# Patient Record
Sex: MALE | Race: BLACK OR AFRICAN AMERICAN | NOT HISPANIC OR LATINO | Employment: OTHER | URBAN - METROPOLITAN AREA
[De-identification: names, ages, dates, MRNs, and addresses within clinical notes are randomized per-mention and may not be internally consistent; named-entity substitution may affect disease eponyms.]

---

## 2017-01-18 ENCOUNTER — OFFICE VISIT (OUTPATIENT)
Dept: ORTHOPEDICS | Facility: CLINIC | Age: 77
End: 2017-01-18
Attending: ORTHOPAEDIC SURGERY
Payer: MEDICARE

## 2017-01-18 VITALS — RESPIRATION RATE: 20 BRPM | BODY MASS INDEX: 27.94 KG/M2 | WEIGHT: 178 LBS | HEIGHT: 67 IN

## 2017-01-18 DIAGNOSIS — M75.41 IMPINGEMENT SYNDROME OF BOTH SHOULDERS: ICD-10-CM

## 2017-01-18 DIAGNOSIS — M75.121 COMPLETE TEAR OF RIGHT ROTATOR CUFF: Primary | ICD-10-CM

## 2017-01-18 DIAGNOSIS — M75.42 IMPINGEMENT SYNDROME OF BOTH SHOULDERS: ICD-10-CM

## 2017-01-18 PROCEDURE — 20610 DRAIN/INJ JOINT/BURSA W/O US: CPT | Mod: RT,S$GLB,, | Performed by: ORTHOPAEDIC SURGERY

## 2017-01-18 PROCEDURE — 1159F MED LIST DOCD IN RCRD: CPT | Mod: S$GLB,,, | Performed by: ORTHOPAEDIC SURGERY

## 2017-01-18 PROCEDURE — 99999 PR PBB SHADOW E&M-EST. PATIENT-LVL III: CPT | Mod: PBBFAC,,, | Performed by: ORTHOPAEDIC SURGERY

## 2017-01-18 PROCEDURE — 1125F AMNT PAIN NOTED PAIN PRSNT: CPT | Mod: S$GLB,,, | Performed by: ORTHOPAEDIC SURGERY

## 2017-01-18 PROCEDURE — 99213 OFFICE O/P EST LOW 20 MIN: CPT | Mod: 25,S$GLB,, | Performed by: ORTHOPAEDIC SURGERY

## 2017-01-18 PROCEDURE — 1157F ADVNC CARE PLAN IN RCRD: CPT | Mod: S$GLB,,, | Performed by: ORTHOPAEDIC SURGERY

## 2017-01-18 PROCEDURE — 1160F RVW MEDS BY RX/DR IN RCRD: CPT | Mod: S$GLB,,, | Performed by: ORTHOPAEDIC SURGERY

## 2017-01-18 RX ORDER — TRIAMCINOLONE ACETONIDE 40 MG/ML
40 INJECTION, SUSPENSION INTRA-ARTICULAR; INTRAMUSCULAR
Status: COMPLETED | OUTPATIENT
Start: 2017-01-18 | End: 2017-01-18

## 2017-01-18 RX ADMIN — TRIAMCINOLONE ACETONIDE 40 MG: 40 INJECTION, SUSPENSION INTRA-ARTICULAR; INTRAMUSCULAR at 02:01

## 2017-01-18 NOTE — PROGRESS NOTES
HISTORY OF PRESENT ILLNESS:  Mr. Gil in followup of rotator cuff tear, right   shoulder, having a flare-up again.  Previously, we discussed surgery, but he is   trying to wait a while for the surgery, would like to have an injection for   symptomatic relief.    PHYSICAL EXAMINATION:  RIGHT SHOULDER:  Mild tenderness.  No swelling.  Range of motion limited   secondary to pain.  Positive impingement sign.  Slight weakness rotator cuff.    No instability.    IMPRESSION:  Rotator cuff tear, right shoulder.    PLAN:  Injection performed right shoulder, combination of Kenalog 40 mg, 2 mL   Xylocaine, sterile technique.  Also recommended that he avoid overhead lifting.    I would like him to consider surgery for the shoulder.  He will eventually need   this for the rotator cuff repair.  Follow up in 1-2 months.      MARK  dd: 01/18/2017 14:19:21 (CST)  td: 01/18/2017 22:02:25 (CST)  Doc ID   #[id number]  Job ID #[id number]    CC:

## 2017-01-25 ENCOUNTER — OFFICE VISIT (OUTPATIENT)
Dept: INTERNAL MEDICINE | Facility: CLINIC | Age: 77
End: 2017-01-25
Payer: MEDICARE

## 2017-01-25 VITALS
HEIGHT: 67 IN | BODY MASS INDEX: 27.48 KG/M2 | DIASTOLIC BLOOD PRESSURE: 80 MMHG | WEIGHT: 175.06 LBS | HEART RATE: 70 BPM | SYSTOLIC BLOOD PRESSURE: 144 MMHG

## 2017-01-25 DIAGNOSIS — E78.2 MIXED HYPERLIPIDEMIA: ICD-10-CM

## 2017-01-25 DIAGNOSIS — M75.42 IMPINGEMENT SYNDROME OF BOTH SHOULDERS: ICD-10-CM

## 2017-01-25 DIAGNOSIS — I70.0 AORTIC ATHEROSCLEROSIS: ICD-10-CM

## 2017-01-25 DIAGNOSIS — M75.41 IMPINGEMENT SYNDROME OF BOTH SHOULDERS: ICD-10-CM

## 2017-01-25 DIAGNOSIS — N18.2 STAGE 2 CHRONIC KIDNEY DISEASE: ICD-10-CM

## 2017-01-25 DIAGNOSIS — E11.40 TYPE 2 DIABETES MELLITUS WITH DIABETIC NEUROPATHY, WITHOUT LONG-TERM CURRENT USE OF INSULIN: ICD-10-CM

## 2017-01-25 DIAGNOSIS — Z00.00 ENCOUNTER FOR PREVENTIVE HEALTH EXAMINATION: Primary | ICD-10-CM

## 2017-01-25 DIAGNOSIS — E11.22 TYPE 2 DIABETES MELLITUS WITH STAGE 2 CHRONIC KIDNEY DISEASE, WITHOUT LONG-TERM CURRENT USE OF INSULIN: ICD-10-CM

## 2017-01-25 DIAGNOSIS — I10 ESSENTIAL HYPERTENSION: ICD-10-CM

## 2017-01-25 DIAGNOSIS — H40.053 OCULAR HYPERTENSION, BILATERAL: ICD-10-CM

## 2017-01-25 DIAGNOSIS — N18.2 TYPE 2 DIABETES MELLITUS WITH STAGE 2 CHRONIC KIDNEY DISEASE, WITHOUT LONG-TERM CURRENT USE OF INSULIN: ICD-10-CM

## 2017-01-25 DIAGNOSIS — Z85.46 HISTORY OF PROSTATE CANCER: ICD-10-CM

## 2017-01-25 DIAGNOSIS — E11.42 DIABETIC POLYNEUROPATHY ASSOCIATED WITH TYPE 2 DIABETES MELLITUS: ICD-10-CM

## 2017-01-25 PROCEDURE — 3077F SYST BP >= 140 MM HG: CPT | Mod: S$GLB,,, | Performed by: NURSE PRACTITIONER

## 2017-01-25 PROCEDURE — 99499 UNLISTED E&M SERVICE: CPT | Mod: S$GLB,,, | Performed by: NURSE PRACTITIONER

## 2017-01-25 PROCEDURE — 3079F DIAST BP 80-89 MM HG: CPT | Mod: S$GLB,,, | Performed by: NURSE PRACTITIONER

## 2017-01-25 PROCEDURE — 99999 PR PBB SHADOW E&M-EST. PATIENT-LVL IV: CPT | Mod: PBBFAC,,, | Performed by: NURSE PRACTITIONER

## 2017-01-25 PROCEDURE — G0439 PPPS, SUBSEQ VISIT: HCPCS | Mod: S$GLB,,, | Performed by: NURSE PRACTITIONER

## 2017-01-25 NOTE — PROGRESS NOTES
"Bryant Gil presented for a  Medicare AWV and comprehensive Health Risk Assessment today. The following components were reviewed and updated:    · Medical history  · Family History  · Social history  · Allergies and Current Medications  · Health Risk Assessment  · Health Maintenance  · Care Team     ** See Completed Assessments for Annual Wellness Visit within the encounter summary.**       The following assessments were completed:  · Living Situation  · CAGE  · Depression Screening  · Timed Get Up and Go  · Whisper Test  · Cognitive Function Screening  · Nutrition Screening  · ADL Screening  · PAQ Screening            Vitals:    01/25/17 0805   BP: (!) 144/80   BP Location: Left arm   Patient Position: Sitting   Pulse: 70   Weight: 79.4 kg (175 lb 0.7 oz)   Height: 5' 7" (1.702 m)     Body mass index is 27.42 kg/(m^2).  Physical Exam   Constitutional: He is oriented to person, place, and time. He appears well-developed and well-nourished. No distress.   HENT:   Head: Normocephalic and atraumatic.   Eyes: Conjunctivae are normal. No scleral icterus.   Neck: Normal range of motion.   Cardiovascular: Normal rate, regular rhythm, normal heart sounds and intact distal pulses.    Pulmonary/Chest: Effort normal and breath sounds normal. No respiratory distress.   Abdominal: Soft. Bowel sounds are normal. He exhibits no distension.   Musculoskeletal: He exhibits no edema.   Neurological: He is alert and oriented to person, place, and time.   Skin: Skin is warm and dry. He is not diaphoretic.   Old scarring noted to bilateral lower extremities from childhood accident   Psychiatric: He has a normal mood and affect. His behavior is normal.         Diagnoses and health risks identified today and associated recommendations/orders:    1. Encounter for preventive health examination  Assessments completed  Preventative health recommendations reviewed    2. Type 2 diabetes mellitus with diabetic neuropathy, without long-term " current use of insulin  Stable.   Controlled with current medical therapy  Followed by PCP.     3. Diabetic polyneuropathy associated with type 2 diabetes mellitus  Stable.   Followed by PCP.     4. Type 2 diabetes mellitus with stage 2 chronic kidney disease, without long-term current use of insulin  Stable.   Controlled with current medical therapy  Followed by PCP.     5. Aortic atherosclerosis  Stable. Seen on CT from 07/11/13  Controlled with current medical therapy  Followed by PCP.     6. Stage 2 chronic kidney disease  Stable.   Followed by PCP.     7. Essential hypertension  Mildly elevated at today's visit.  Patient denies headache, chest pain, sudden vision changes, sob.  Instructed to decreased salt intake and stay hydrated.  He is seeing his PCP in three weeks for a routine follow up and will reassess blood pressure at that time.   Controlled with current medical therapy  Followed by PCP.     8. Mixed hyperlipidemia  Stable.   Controlled with current medical therapy  Followed by PCP.     9. Impingement syndrome of both shoulders  Stable.   Controlled with current medical therapy  Followed by orthopedics.      10. History of prostate cancer  Stable. Hx of prostatectomy  Followed by PCP.     11. Ocular hypertension, bilateral  Stable.   Controlled with current medical therapy  Followed by optometry    Patient is seeing his PCP in three weeks.  Will get lab work done at that time per PCPs recommendations.  Will discuss colonoscopy with PCP as well.     Provided Bryant with a 5-10 year written screening schedule and personal prevention plan. Recommendations were developed using the USPSTF age appropriate recommendations. Education, counseling, and referrals were provided as needed. After Visit Summary printed and given to patient which includes a list of additional screenings\tests needed.    Return in 3 weeks (on 2/16/2017) for routine visit with your primary care provider or sooner if problems arise.   .    Adelina Stephen, NP

## 2017-01-25 NOTE — MR AVS SNAPSHOT
[facility]  [address]  [facility] [address]  Phone: [phone]  Fax: [phone]                  Bryant Gil   2017 8:00 AM   Office Visit    Description:  Male : 1940   Provider:  JUSTIN PARTIDA 2   Department:  [facility]           Reason for Visit     Health Risk Assessment           Diagnoses this Visit        Comments    Encounter for preventive health examination    -  Primary            To Do List           Future Appointments        Provider Department Dept Phone    2017 8:00 AM Laverne Zaidi MD [facility] [phone]      Goals (5 Years of Data)     None      Follow-Up and Disposition     Return in 3 weeks (on 2017) for routine visit with your primary care provider or sooner if problems arise.  .      [facility]     [facility] -  Assistance  Registered nurses in the [facility] provide clinical advisement, health education, appointment booking, and other advisory services.  Call for this free service at [phone].             Medications           Message regarding Medications     Verify the changes and/or additions to your medication regime listed below are the same as discussed with your clinician today.  If any of these changes or additions are incorrect, please notify your healthcare provider.             Verify that the below list of medications is an accurate representation of the medications you are currently taking.  If none reported, the list may be blank. If incorrect, please contact your healthcare provider. Carry this list with you in case of emergency.           Current Medications     acetaminophen-codeine 300-30mg (TYLENOL #3) 300-30 mg Tab 1 po tid prn    alcohol swabs (BD ALCOHOL SWABS) PadM Apply 1 each topically as needed.    amlodipine (NORVASC) 5 MG tablet TAKE 1 TABLET EVERY DAY    ascorbic acid (VITAMIN C) 100 MG tablet Take 100 mg by mouth  "once daily.    aspirin (ENTERIC COATED ASPIRIN) 81 MG EC tablet Take by mouth. 1 Tablet, Delayed Release (E.C.) Oral Every day    aspirin-caffeine (INEZ BACK & BODY) 500-32.5 mg Tab Take 1 tablet by mouth daily as needed.     atorvastatin (LIPITOR) 20 MG tablet TAKE 1 TABLET EVERY DAY    blood sugar diagnostic (TRUETEST TEST STRIPS) Strp 2 strips by Misc.(Non-Drug; Combo Route) route once daily.    blood sugar diagnostic Strp 1 strip by Misc.(Non-Drug; Combo Route) route once daily.    lancets (ACCU-CHEK SOFTCLIX LANCETS) Misc 1 Device by Misc.(Non-Drug; Combo Route) route once daily.    latanoprost 0.005 % ophthalmic solution INSTILL 1 DROP INTO BOTH EYES EVERY NIGHT    lisinopril-hydrochlorothiazide (PRINZIDE,ZESTORETIC) 20-12.5 mg per tablet TAKE 1 TABLET ONE TIME DAILY EVERY DAY    metformin (GLUCOPHAGE) 1000 MG tablet TAKE 1 TABLET TWICE DAILY WITH MEALS    multivitamin (THERAGRAN) per tablet Take 1 tablet by mouth once daily.    vitamin D 1000 units Tab Take 185 mg by mouth once daily.    vitamin E 100 UNIT capsule Take 400 Units by mouth once daily.    blood-glucose meter (ACCU-CHEK YULIA PLUS METER) Misc 1 Device by Misc.(Non-Drug; Combo Route) route once daily.           Clinical Reference Information           Vital Signs - Last Recorded  Most recent update: 1/25/2017  8:35 AM by Adelina Stephen NP    BP Pulse Ht Wt BMI    (!) 144/80 (BP Location: Left arm, Patient Position: Sitting) 70 5' 7" (1.702 m) 79.4 kg (175 lb 0.7 oz) 27.42 kg/m2      Blood Pressure          Most Recent Value    BP  (!)  144/80      Allergies as of 1/25/2017     No Known Allergies      Immunizations Administered on Date of Encounter - 1/25/2017     None      Instructions      Counseling and Referral of Other Preventative  (Italic type indicates deductible and co-insurance are waived)    Patient Name: Bryant Gil  Today's Date: 1/25/2017      SERVICE LIMITATIONS RECOMMENDATION    Vaccines    · Pneumococcal (once after " 65)    · Influenza (annually)    · Hepatitis B (if medium/high risk)    · Prevnar 13      Hepatitis B medium/high risk factors:       - End-stage renal disease       - Hemophiliacs who received Factor VII or         IX concentrates       - Clients of institutions for the mentally             retarded       - Persons who live in the same house as          a HepB carrier       - Homosexual men       - Illicit injectable drug abusers     Pneumococcal: Done, no repeat necessary     Influenza: Done, repeat in one year     Hepatitis B: N/A : defer to PCP     Prevnar 13: Done, no repeat necessary    Prostate cancer screening (annually to age 75)     Prostate specific antigen (PSA) Shared decision making with Provider. Sometimes a co-pay may be required if the patient decides to have this test. The USPSTF no longer recommends prostate cancer screening routinely in medicine: defer to PCP    Colorectal cancer screening (to age 75)    · Fecal occult blood test (annual)  · Flexible sigmoidoscopy (5y)  · Screening colonoscopy (10y)  · Barium enema   Recommended to patient.  Last done 01/10/12, due for repeat.  Will have PCP order    Diabetes self-management training (no USPSTF recommendations)  Requires referral by treating physician for patient with diabetes or renal disease. 10 hours of initial DSMT sessions of no less than 30 minutes each in a continuous 12-month period. 2 hours of follow-up DSMT in subsequent years.  Recommended to patient, declined    Glaucoma screening (no USPSTF recommendation)  Diabetes mellitus, family history   , age 50 or over    American, age 65 or over  Last done 07/19/16, recommend to repeat every 1  year    Medical nutrition therapy for diabetes or renal disease (no recommended schedule)  Requires referral by treating physician for patient with diabetes or renal disease or kidney transplant within the past 3 years.  Can be provided in same year as diabetes self-management  training (DSMT), and CMS recommends medical nutrition therapy take place after DSMT. Up to 3 hours for initial year and 2 hours in subsequent years.  N/A : n/a    Cardiovascular screening blood tests (every 5 years)  · Fasting lipid panel  Order as a panel if possible  Last done 10/22/15, recommend to repeat every 1  year    Diabetes screening tests (at least every 3 years, Medicare covers annually or at 6-month intervals for prediabetic patients)  · Fasting blood sugar (FBS) or glucose tolerance test (GTT)  Patient must be diagnosed with one of the following:       - Hypertension       - Dyslipidemia       - Obesity (BMI 30kg/m2)       - Previous elevated impaired FBS or GTT       ... or any two of the following:       - Overweight (BMI 25 but <30)       - Family history of diabetes       - Age 65 or older       - History of gestational diabetes or birth of baby weighing more than 9 pounds  Last done 04/15/16, recommend to repeat every 4  months    Abdominal aortic aneurysm screening (once)  · Sonogram   Limited to patients who meet one of the following criteria:       - Men who are 65-75 years old and have smoked more than 100 cigarette in their lifetime       - Anyone with a family history of abdominal aortic aneurysm       - Anyone recommended for screening by the USPSTF  N/A : n/a    HIV screening (annually for increased risk patients)  · HIV-1 and HIV-2 by EIA, or SOLIS, rapid antibody test or oral mucosa transudate  Patients must be at increased risk for HIV infection per USPSTF guidelines or pregnant. Tests covered annually for patient at increased risk or as requested by the patient. Pregnant patients may receive up to 3 tests during pregnancy.  Risks discussed, screening is not recommended    Smoking cessation counseling (up to 8 sessions per year)  Patients must be asymptomatic of tobacco-related conditions to receive as a preventative service.  does not smoke    Subsequent annual wellness visit  At least  12 months since last AWV  Return in one year     The following information is provided to all patients.  This information is to help you find resources for any of the problems found today that may be affecting your health:                Living healthy guide: www.[facility].[place].[facility]      Understanding Diabetes: www.diabetes.org      Eating healthy: www.cdc.gov/healthyweight      CDC home safety checklist: www.cdc.gov/steadi/patient.html      Agency on Aging: www.goea.[place].[facility]      Alcoholics anonymous (AA): www.aa.org      Physical Activity: www.juliette.nih.gov/ga4nmef      Tobacco use: www.quitwithusla.org

## 2017-01-25 NOTE — PATIENT INSTRUCTIONS
Counseling and Referral of Other Preventative  (Italic type indicates deductible and co-insurance are waived)    Patient Name: Bryant Gil  Today's Date: 1/25/2017      SERVICE LIMITATIONS RECOMMENDATION    Vaccines    · Pneumococcal (once after 65)    · Influenza (annually)    · Hepatitis B (if medium/high risk)    · Prevnar 13      Hepatitis B medium/high risk factors:       - End-stage renal disease       - Hemophiliacs who received Factor VII or         IX concentrates       - Clients of institutions for the mentally             retarded       - Persons who live in the same house as          a HepB carrier       - Homosexual men       - Illicit injectable drug abusers     Pneumococcal: Done, no repeat necessary     Influenza: Done, repeat in one year     Hepatitis B: N/A : defer to PCP     Prevnar 13: Done, no repeat necessary    Prostate cancer screening (annually to age 75)     Prostate specific antigen (PSA) Shared decision making with Provider. Sometimes a co-pay may be required if the patient decides to have this test. The USPSTF no longer recommends prostate cancer screening routinely in medicine: defer to PCP    Colorectal cancer screening (to age 75)    · Fecal occult blood test (annual)  · Flexible sigmoidoscopy (5y)  · Screening colonoscopy (10y)  · Barium enema   Recommended to patient.  Last done 01/10/12, due for repeat.  Will have PCP order    Diabetes self-management training (no USPSTF recommendations)  Requires referral by treating physician for patient with diabetes or renal disease. 10 hours of initial DSMT sessions of no less than 30 minutes each in a continuous 12-month period. 2 hours of follow-up DSMT in subsequent years.  Recommended to patient, declined    Glaucoma screening (no USPSTF recommendation)  Diabetes mellitus, family history   , age 50 or over    American, age 65 or over  Last done 07/19/16, recommend to repeat every 1  year    Medical nutrition  therapy for diabetes or renal disease (no recommended schedule)  Requires referral by treating physician for patient with diabetes or renal disease or kidney transplant within the past 3 years.  Can be provided in same year as diabetes self-management training (DSMT), and CMS recommends medical nutrition therapy take place after DSMT. Up to 3 hours for initial year and 2 hours in subsequent years.  N/A : n/a    Cardiovascular screening blood tests (every 5 years)  · Fasting lipid panel  Order as a panel if possible  Last done 10/22/15, recommend to repeat every 1  year    Diabetes screening tests (at least every 3 years, Medicare covers annually or at 6-month intervals for prediabetic patients)  · Fasting blood sugar (FBS) or glucose tolerance test (GTT)  Patient must be diagnosed with one of the following:       - Hypertension       - Dyslipidemia       - Obesity (BMI 30kg/m2)       - Previous elevated impaired FBS or GTT       ... or any two of the following:       - Overweight (BMI 25 but <30)       - Family history of diabetes       - Age 65 or older       - History of gestational diabetes or birth of baby weighing more than 9 pounds  Last done 04/15/16, recommend to repeat every 4  months    Abdominal aortic aneurysm screening (once)  · Sonogram   Limited to patients who meet one of the following criteria:       - Men who are 65-75 years old and have smoked more than 100 cigarette in their lifetime       - Anyone with a family history of abdominal aortic aneurysm       - Anyone recommended for screening by the USPSTF  N/A : n/a    HIV screening (annually for increased risk patients)  · HIV-1 and HIV-2 by EIA, or SOLIS, rapid antibody test or oral mucosa transudate  Patients must be at increased risk for HIV infection per USPSTF guidelines or pregnant. Tests covered annually for patient at increased risk or as requested by the patient. Pregnant patients may receive up to 3 tests during pregnancy.  Risks  discussed, screening is not recommended    Smoking cessation counseling (up to 8 sessions per year)  Patients must be asymptomatic of tobacco-related conditions to receive as a preventative service.  does not smoke    Subsequent annual wellness visit  At least 12 months since last AWV  Return in one year     The following information is provided to all patients.  This information is to help you find resources for any of the problems found today that may be affecting your health:                Living healthy guide: www.[facility].[place].[facility]      Understanding Diabetes: www.diabetes.org      Eating healthy: www.cdc.gov/healthyweight      CDC home safety checklist: www.cdc.gov/steadi/patient.html      Agency on Aging: www.goea.[place].[facility]      Alcoholics anonymous (AA): www.aa.org      Physical Activity: www.[place]te.nih.gov/mp7ytxy      Tobacco use: www.quitwithusla.org

## 2017-02-06 RX ORDER — LATANOPROST 50 UG/ML
SOLUTION/ DROPS OPHTHALMIC
Qty: 3 BOTTLE | Refills: 3 | Status: SHIPPED | OUTPATIENT
Start: 2017-02-06 | End: 2018-06-06 | Stop reason: SDUPTHER

## 2017-02-16 ENCOUNTER — PATIENT MESSAGE (OUTPATIENT)
Dept: ADMINISTRATIVE | Facility: OTHER | Age: 77
End: 2017-02-16

## 2017-02-16 ENCOUNTER — LAB VISIT (OUTPATIENT)
Dept: LAB | Facility: HOSPITAL | Age: 77
End: 2017-02-16
Attending: INTERNAL MEDICINE
Payer: MEDICARE

## 2017-02-16 ENCOUNTER — OFFICE VISIT (OUTPATIENT)
Dept: INTERNAL MEDICINE | Facility: CLINIC | Age: 77
End: 2017-02-16
Payer: MEDICARE

## 2017-02-16 VITALS
BODY MASS INDEX: 27.06 KG/M2 | DIASTOLIC BLOOD PRESSURE: 82 MMHG | HEIGHT: 67 IN | WEIGHT: 172.38 LBS | HEART RATE: 72 BPM | SYSTOLIC BLOOD PRESSURE: 150 MMHG

## 2017-02-16 DIAGNOSIS — E11.42 DIABETIC POLYNEUROPATHY ASSOCIATED WITH TYPE 2 DIABETES MELLITUS: ICD-10-CM

## 2017-02-16 DIAGNOSIS — N18.2 TYPE 2 DIABETES MELLITUS WITH STAGE 2 CHRONIC KIDNEY DISEASE, WITHOUT LONG-TERM CURRENT USE OF INSULIN: ICD-10-CM

## 2017-02-16 DIAGNOSIS — C61: ICD-10-CM

## 2017-02-16 DIAGNOSIS — I10 ESSENTIAL HYPERTENSION: ICD-10-CM

## 2017-02-16 DIAGNOSIS — Z85.46 HISTORY OF PROSTATE CANCER: ICD-10-CM

## 2017-02-16 DIAGNOSIS — N18.2 CHRONIC KIDNEY DISEASE, STAGE II (MILD): ICD-10-CM

## 2017-02-16 DIAGNOSIS — E11.22 TYPE 2 DIABETES MELLITUS WITH STAGE 2 CHRONIC KIDNEY DISEASE, WITHOUT LONG-TERM CURRENT USE OF INSULIN: ICD-10-CM

## 2017-02-16 DIAGNOSIS — Z00.00 ANNUAL PHYSICAL EXAM: Primary | ICD-10-CM

## 2017-02-16 DIAGNOSIS — H90.5 SENSORINEURAL HEARING LOSS, UNSPECIFIED LATERALITY: ICD-10-CM

## 2017-02-16 DIAGNOSIS — E11.49 TYPE II OR UNSPECIFIED TYPE DIABETES MELLITUS WITH NEUROLOGICAL MANIFESTATIONS, NOT STATED AS UNCONTROLLED(250.60): ICD-10-CM

## 2017-02-16 DIAGNOSIS — E78.2 MIXED HYPERLIPIDEMIA: ICD-10-CM

## 2017-02-16 DIAGNOSIS — Z12.11 SCREEN FOR COLON CANCER: ICD-10-CM

## 2017-02-16 DIAGNOSIS — R30.0 DYSURIA: ICD-10-CM

## 2017-02-16 DIAGNOSIS — D12.2 ADENOMATOUS POLYP OF ASCENDING COLON: ICD-10-CM

## 2017-02-16 DIAGNOSIS — H40.053 OCULAR HYPERTENSION, BILATERAL: ICD-10-CM

## 2017-02-16 DIAGNOSIS — E11.40 TYPE 2 DIABETES MELLITUS WITH DIABETIC NEUROPATHY: ICD-10-CM

## 2017-02-16 LAB
ALBUMIN SERPL BCP-MCNC: 3.8 G/DL
ALP SERPL-CCNC: 46 U/L
ALT SERPL W/O P-5'-P-CCNC: 20 U/L
ANION GAP SERPL CALC-SCNC: 9 MMOL/L
AST SERPL-CCNC: 19 U/L
BASOPHILS # BLD AUTO: 0.02 K/UL
BASOPHILS NFR BLD: 0.5 %
BILIRUB SERPL-MCNC: 0.4 MG/DL
BILIRUB UR QL STRIP: NEGATIVE
BUN SERPL-MCNC: 22 MG/DL
CALCIUM SERPL-MCNC: 9.5 MG/DL
CHLORIDE SERPL-SCNC: 104 MMOL/L
CHOLEST/HDLC SERPL: 3.3 {RATIO}
CLARITY UR REFRACT.AUTO: CLEAR
CO2 SERPL-SCNC: 28 MMOL/L
COLOR UR AUTO: YELLOW
COMPLEXED PSA SERPL-MCNC: <0.01 NG/ML
CREAT SERPL-MCNC: 1 MG/DL
CREAT UR-MCNC: 56 MG/DL
DIFFERENTIAL METHOD: ABNORMAL
EOSINOPHIL # BLD AUTO: 0.1 K/UL
EOSINOPHIL NFR BLD: 1.6 %
ERYTHROCYTE [DISTWIDTH] IN BLOOD BY AUTOMATED COUNT: 13 %
EST. GFR  (AFRICAN AMERICAN): >60 ML/MIN/1.73 M^2
EST. GFR  (NON AFRICAN AMERICAN): >60 ML/MIN/1.73 M^2
GLUCOSE SERPL-MCNC: 149 MG/DL
GLUCOSE UR QL STRIP: NEGATIVE
HCT VFR BLD AUTO: 39.2 %
HDL/CHOLESTEROL RATIO: 30.6 %
HDLC SERPL-MCNC: 170 MG/DL
HDLC SERPL-MCNC: 52 MG/DL
HGB BLD-MCNC: 13.1 G/DL
HGB UR QL STRIP: NEGATIVE
KETONES UR QL STRIP: NEGATIVE
LDLC SERPL CALC-MCNC: 107.4 MG/DL
LEUKOCYTE ESTERASE UR QL STRIP: NEGATIVE
LYMPHOCYTES # BLD AUTO: 1.5 K/UL
LYMPHOCYTES NFR BLD: 35.2 %
MCH RBC QN AUTO: 29.4 PG
MCHC RBC AUTO-ENTMCNC: 33.4 %
MCV RBC AUTO: 88 FL
MICROALBUMIN UR DL<=1MG/L-MCNC: 8 UG/ML
MICROALBUMIN/CREATININE RATIO: 14.3 UG/MG
MONOCYTES # BLD AUTO: 0.4 K/UL
MONOCYTES NFR BLD: 9.1 %
NEUTROPHILS # BLD AUTO: 2.3 K/UL
NEUTROPHILS NFR BLD: 53.4 %
NITRITE UR QL STRIP: NEGATIVE
NONHDLC SERPL-MCNC: 118 MG/DL
PH UR STRIP: 7 [PH] (ref 5–8)
PLATELET # BLD AUTO: 224 K/UL
PMV BLD AUTO: 10.4 FL
POTASSIUM SERPL-SCNC: 4.1 MMOL/L
PROT SERPL-MCNC: 7.2 G/DL
PROT UR QL STRIP: NEGATIVE
RBC # BLD AUTO: 4.46 M/UL
SODIUM SERPL-SCNC: 141 MMOL/L
SP GR UR STRIP: 1.01 (ref 1–1.03)
TRIGL SERPL-MCNC: 53 MG/DL
TSH SERPL DL<=0.005 MIU/L-ACNC: 1.17 UIU/ML
URATE SERPL-MCNC: 4.4 MG/DL
URN SPEC COLLECT METH UR: NORMAL
UROBILINOGEN UR STRIP-ACNC: NEGATIVE EU/DL
WBC # BLD AUTO: 4.29 K/UL

## 2017-02-16 PROCEDURE — 80061 LIPID PANEL: CPT

## 2017-02-16 PROCEDURE — 99999 PR PBB SHADOW E&M-EST. PATIENT-LVL III: CPT | Mod: PBBFAC,,, | Performed by: INTERNAL MEDICINE

## 2017-02-16 PROCEDURE — 3079F DIAST BP 80-89 MM HG: CPT | Mod: S$GLB,,, | Performed by: INTERNAL MEDICINE

## 2017-02-16 PROCEDURE — 84550 ASSAY OF BLOOD/URIC ACID: CPT

## 2017-02-16 PROCEDURE — 84443 ASSAY THYROID STIM HORMONE: CPT

## 2017-02-16 PROCEDURE — 80053 COMPREHEN METABOLIC PANEL: CPT

## 2017-02-16 PROCEDURE — 83036 HEMOGLOBIN GLYCOSYLATED A1C: CPT

## 2017-02-16 PROCEDURE — 3077F SYST BP >= 140 MM HG: CPT | Mod: S$GLB,,, | Performed by: INTERNAL MEDICINE

## 2017-02-16 PROCEDURE — 85025 COMPLETE CBC W/AUTO DIFF WBC: CPT

## 2017-02-16 PROCEDURE — 99499 UNLISTED E&M SERVICE: CPT | Mod: S$GLB,,, | Performed by: INTERNAL MEDICINE

## 2017-02-16 PROCEDURE — 84153 ASSAY OF PSA TOTAL: CPT

## 2017-02-16 PROCEDURE — 99397 PER PM REEVAL EST PAT 65+ YR: CPT | Mod: S$GLB,,, | Performed by: INTERNAL MEDICINE

## 2017-02-16 PROCEDURE — 36415 COLL VENOUS BLD VENIPUNCTURE: CPT

## 2017-02-16 NOTE — MR AVS SNAPSHOT
[facility]  [address]  [facility] [address]  Phone: [phone]  Fax: [phone]                  Bryant Gil   2017 8:00 AM   Office Visit    Description:  Male : 1940   Provider:  Laverne Zaidi MD   Department:  [facility]           Reason for Visit     Annual Exam           Diagnoses this Visit        Comments    Annual physical exam    -  Primary     Type II or unspecified type diabetes mellitus with neurological manifestations, not stated as uncontrolled         Diabetic polyneuropathy associated with type 2 diabetes mellitus         Type 2 diabetes mellitus with stage 2 chronic kidney disease, without long-term current use of insulin         Chronic kidney disease, stage II (mild)         Essential hypertension         Ocular hypertension, bilateral         Mixed hyperlipidemia         Adenomatous polyp of ascending colon         Screen for colon cancer         History of prostate cancer         Sensorineural hearing loss, unspecified laterality         Dysuria                To Do List           To Schedule:     Please call the Endoscopy Department at [phone] to schedule your appointment.          Goals (5 Years of Data)     None      Follow-Up and Disposition     Return in about 6 months (around 2017) for also one year PE.      [facility]     [facility] Care Line -  Assistance  Registered nurses in the [facility] provide clinical advisement, health education, appointment booking, and other advisory services.  Call for this free service at [phone].             Medications           Message regarding Medications     Verify the changes and/or additions to your medication regime listed below are the same as discussed with your clinician today.  If any of these changes or additions are incorrect, please notify your healthcare provider.             Verify that the below list of  "medications is an accurate representation of the medications you are currently taking.  If none reported, the list may be blank. If incorrect, please contact your healthcare provider. Carry this list with you in case of emergency.           Current Medications     amlodipine (NORVASC) 5 MG tablet TAKE 1 TABLET EVERY DAY    ascorbic acid (VITAMIN C) 100 MG tablet Take 100 mg by mouth once daily.    aspirin (ENTERIC COATED ASPIRIN) 81 MG EC tablet Take by mouth. 1 Tablet, Delayed Release (E.C.) Oral Every day    aspirin-caffeine (INEZ BACK & BODY) 500-32.5 mg Tab Take 1 tablet by mouth daily as needed.     atorvastatin (LIPITOR) 20 MG tablet TAKE 1 TABLET EVERY DAY    blood sugar diagnostic (TRUETEST TEST STRIPS) Strp 2 strips by Misc.(Non-Drug; Combo Route) route once daily.    blood sugar diagnostic Strp 1 strip by Misc.(Non-Drug; Combo Route) route once daily.    lancets (ACCU-CHEK SOFTCLIX LANCETS) Misc 1 Device by Misc.(Non-Drug; Combo Route) route once daily.    latanoprost 0.005 % ophthalmic solution INSTILL 1 DROP INTO BOTH EYES EVERY NIGHT    lisinopril-hydrochlorothiazide (PRINZIDE,ZESTORETIC) 20-12.5 mg per tablet TAKE 1 TABLET ONE TIME DAILY EVERY DAY    metformin (GLUCOPHAGE) 1000 MG tablet TAKE 1 TABLET TWICE DAILY WITH MEALS    multivitamin (THERAGRAN) per tablet Take 1 tablet by mouth once daily.    vitamin D 1000 units Tab Take 185 mg by mouth once daily.    vitamin E 100 UNIT capsule Take 400 Units by mouth once daily.    acetaminophen-codeine 300-30mg (TYLENOL #3) 300-30 mg Tab 1 po tid prn    alcohol swabs (BD ALCOHOL SWABS) PadM Apply 1 each topically as needed.    blood-glucose meter (ACCU-CHEK YULIA PLUS METER) Misc 1 Device by Misc.(Non-Drug; Combo Route) route once daily.           Clinical Reference Information           Your Vitals Were     BP Pulse Height Weight BMI    150/82 72 5' 7" (1.702 m) 78.2 kg (172 lb 6.4 oz) 27 kg/m2      Blood Pressure          Most Recent Value    BP  (!)  " 150/82      Allergies as of 2/16/2017     No Known Allergies      Immunizations Administered on Date of Encounter - 2/16/2017     None      Orders Placed During Today's Visit      Normal Orders This Visit    Assign HDMP Onboarding Questionnaire Series     Case request GI: COLONOSCOPY     Hypertension Digital Medicine (HDMP)  Enrollment Order     Microalbumin/creatinine urine ratio     Urinalysis     Urine culture       Hypertension Digital Medicine Program Information              As discussed, you could benefit from enrolling in the Hypertension Digital Medicine Program. The goal of the program is to help you effectively manage your high blood pressure through an appropriate balance of medication and lifestyle changes, all from the comfort of your own home. Effectively managed blood pressure reduces your risk of having a heart attack or a stroke in the future, so you can enjoy a long and healthy life. We want to make blood pressure control your goal.        What is the Hypertension Digital Medicine Program?  Finding the right balance in managing high blood pressure is different for every person, and we will tailor our treatment approach based on your individual needs using the most current evidence-based guidelines.  As a participant, you will be able to send home blood pressure readings, on your schedule, directly into your medical record at [facility]. I, along with a team of pharmacists, will monitor this data, help you adjust your medication(s) and/or make lifestyle recommendations to better manage your hypertension.       What are the requirements of the program?   Participating in the program is as easy as 1 - 2 - 3.   1. Smartphone - You must have your own smartphone to participate (either an iPhone or an Android phone such as M Lite Solution, MeterHero, HTC, Vizibility, RatePoint, or GigsTime).    2. [facility] account - [facility] offers a great way to connect through the online patient portal, [facility], which is free and  "provides you access to your [facility].  3. Digital blood pressure cuff - Using this blood pressure cuff that hooks up to your smartphone, you will be able to send in your home blood pressure readings to the Hypertension Digital Medicine Team. We have made arrangements to offer blood pressure cuffs at a discount for our programs participants.           What can I do to get started?   Complete the Hypertension Digital Medicine Patient Consent questionnaire already available in your [facility] account. To access and complete this questionnaire, either  - Use the [facility] website on a computer and select My Medical Record, then Questionnaires.  - Use the [employer] patricio on your smartphone and select "Questionnaires".    Once you have given consent, additional onboarding questionnaires will be assigned to you to complete prior to starting the program. You must return to the "Questionnaires" page of your [facility] account to start the additional questionnaires.     How do I purchase a digital blood pressure cuff and obtain a discount?  [facility] has negotiated a discounted price from industry leading healthcare technology companies. Patients using an Apple iPhone can purchase an [employer] Ease Blood Pressure cuff for $33 (originally $39.99). While supplies last, Android users can purchase the [employer] Blood Pressure Monitor for $45 (originally $129.95).  Purchase locations will be sent once all onboarding questionnaires have been completed (see above).    If you have any questions regarding this program or would like more information, please visit our Hypertension Digital Medicine website ([email or web address]) or call Digital Medicine Patient Support at [phone].          Language Assistance Services     ATTENTION: Language assistance services are available, free of charge. Please call [phone].      ATENCIÓN: Si habla español, tiene a cardoza disposición servicios gratuitos de " asistencia lingüística. Marco al [phone].     MARITZA Ý: N?u b?n nói Ti?ng Vi?t, có các d?ch v? h? tr? ngôn ng? mi?n phí dành cho b?n. G?i s? [phone].         [facility] complies with applicable Federal civil rights laws and does not discriminate on the basis of race, color, national origin, age, disability, or sex.

## 2017-02-17 ENCOUNTER — PATIENT MESSAGE (OUTPATIENT)
Dept: INTERNAL MEDICINE | Facility: CLINIC | Age: 77
End: 2017-02-17

## 2017-02-17 DIAGNOSIS — E11.40 TYPE 2 DIABETES MELLITUS WITH DIABETIC NEUROPATHY, WITHOUT LONG-TERM CURRENT USE OF INSULIN: Primary | ICD-10-CM

## 2017-02-17 LAB
ESTIMATED AVG GLUCOSE: 180 MG/DL
HBA1C MFR BLD HPLC: 7.9 %

## 2017-02-17 NOTE — PROGRESS NOTES
Subjective:       Patient ID: Bryant Gil is a 76 y.o. male.    Chief Complaint: Annual Exam   wellness visit  His wife accompanies him to this visit  Many issues discussed  Memory concern. Only destinesia as an example.  No difficulties at work.  Normal aging brain changes discussed.  He is hard of hearing.  He tried hearing aids in the past and did not find them helpful.  He is considering making a future investment in hearing aids.  The past 3 months he's had a cortical steroid-shots in his right shoulder and also in his left shoulder.  His blood sugar temporarily spiked.  HPI  Review of Systems   Constitutional: Negative for activity change, appetite change, chills, fatigue, fever and unexpected weight change.   HENT: Positive for hearing loss. Negative for dental problem, tinnitus, trouble swallowing and voice change.    Eyes: Negative for visual disturbance.   Respiratory: Negative for cough, chest tightness, shortness of breath and wheezing.    Cardiovascular: Negative for chest pain, palpitations and leg swelling.   Gastrointestinal: Negative for abdominal pain, blood in stool, constipation, diarrhea, nausea and vomiting.   Genitourinary: Negative for difficulty urinating, dysuria, flank pain, frequency and urgency.   Musculoskeletal: Negative for arthralgias, back pain, gait problem, joint swelling, myalgias, neck pain and neck stiffness.   Skin: Negative for rash.   Neurological: Negative for tremors, light-headedness, numbness and headaches.   Psychiatric/Behavioral: Negative for dysphoric mood and sleep disturbance. The patient is not nervous/anxious.        Objective:      Physical Exam   Constitutional: He is oriented to person, place, and time. He appears well-developed and well-nourished. No distress.   HENT:   Head: Atraumatic.   Mouth/Throat: No oropharyngeal exudate.   Eyes: Conjunctivae are normal. No scleral icterus.   Cardiovascular: Normal rate, regular rhythm and normal heart sounds.     Pulmonary/Chest: Effort normal and breath sounds normal.   Abdominal: Soft. There is no tenderness.   Musculoskeletal: He exhibits no edema.   Protective Sensation (w/ 10 gram monofilament):  Right: Intact  Left: Intact    Visual Inspection:  Normal -  Bilateral    Pedal Pulses:   Right: Present  Left: Present    Posterior tibialis:   Right:Present  Left: Present   Lymphadenopathy:     He has no cervical adenopathy.   Neurological: He is alert and oriented to person, place, and time.   Skin: Skin is warm and dry.   Psychiatric: He has a normal mood and affect. His behavior is normal.   Nursing note and vitals reviewed.      Assessment:       1. Annual physical exam    2. Type II or unspecified type diabetes mellitus with neurological manifestations, not stated as uncontrolled    3. Diabetic polyneuropathy associated with type 2 diabetes mellitus    4. Type 2 diabetes mellitus with stage 2 chronic kidney disease, without long-term current use of insulin    5. Chronic kidney disease, stage II (mild)    6. Essential hypertension    7. Ocular hypertension, bilateral    8. Mixed hyperlipidemia    9. Adenomatous polyp of ascending colon    10. Screen for colon cancer    11. History of prostate cancer    12. Sensorineural hearing loss, unspecified laterality    13. Dysuria        Plan:   Bryant was seen today for annual exam.    Diagnoses and all orders for this visit:    Annual physical exam    Type II or unspecified type diabetes mellitus with neurological manifestations, not stated as uncontrolled    Diabetic polyneuropathy associated with type 2 diabetes mellitus    Type 2 diabetes mellitus with stage 2 chronic kidney disease, without long-term current use of insulin    Chronic kidney disease, stage II (mild)    Essential hypertension  -     Hypertension Digital Medicine (HDMP)  Enrollment Order  -     Assign HDMP Onboarding Questionnaire Series    Ocular hypertension, bilateral    Mixed hyperlipidemia    Adenomatous  polyp of ascending colon    Screen for colon cancer  -     Case request GI: COLONOSCOPY    History of prostate cancer  -     Microalbumin/creatinine urine ratio  -     Urinalysis    Sensorineural hearing loss, unspecified laterality    Dysuria  -     Urine culture    Return in about 6 months (around 8/16/2017) for also one year PE.

## 2017-02-18 LAB — BACTERIA UR CULT: NORMAL

## 2017-02-23 ENCOUNTER — PATIENT OUTREACH (OUTPATIENT)
Dept: OTHER | Facility: OTHER | Age: 77
End: 2017-02-23
Payer: MEDICARE

## 2017-02-23 ENCOUNTER — TELEPHONE (OUTPATIENT)
Dept: GASTROENTEROLOGY | Facility: CLINIC | Age: 77
End: 2017-02-23

## 2017-02-23 DIAGNOSIS — Z86.010 HX OF COLONIC POLYPS: Primary | ICD-10-CM

## 2017-02-23 NOTE — PROGRESS NOTES
Last 5 Patient Entered Readings                                                               Current 30 Day Average: 145/82     Recent Readings 2/22/2017 2/22/2017 2/21/2017 2/21/2017 2/20/2017    Systolic BP (mmHg) 131 131 136 136 146    Diastolic BP (mmHg) 84 84 77 77 86    Pulse 85 85 78 78 88        Initial introduction completed with the Mr. Bryant Gil and the role of the health  was explained.   We discussed the following information:  Exercise - He does not do any formal exercise but he does walk a lot and if very active at work. I encouraged him to continue walking and try to get at least 30 minutes of activity in each day.  Diet - He does not monitor his sodium intake at this time so this is something we will work on during our next call. I sent him some resources to take a look at for now.     Resources on diet and exercise were sent.     Patient is aware that I am not available for emergencies and to call 911 or [facility] on call if one arises.  Patient is aware of the importance of medication adherence.  Patient is aware of the importance of diet and exercise.  Patient is aware that his sodium intake should be no more than 2000mg per day.  Patient is aware that the recommended physical activity each week should be about 30 minutes per day at least 5 times per week.   Patient is aware of the importance of taking BP readings at least weekly if not more and during different times each day.  Patient is aware that the health  can be used as a resource for lifestyle modifications to help reduce or maintain a healthy BP

## 2017-02-23 NOTE — TELEPHONE ENCOUNTER
Colonoscopy Referral    Referring Physician: Dr. Laverne Varma  Date: 2/23/2017    Reason for Referral: Hx colon polyps    Family History of:   Colon polyp: No  Relationship/Age of Onset:     Colon cancer: No  Relationship/Age of Onset:     Patient with:   Hemoccults Done: No  Iron deficient: No  On Blood Thinner: No  Valvular heart disease/valve replacement: No  Anemia Present: No  On NSAID: No  Lung disease: No  Kidney disease: No  Hx of polyps: Yes  Hx of colon cancer: No    Previous colon evalations: Yes   Colonoscopy  When: 1/10/2012  Where: [facility]  Pertinent symptoms: colon polyp in ascending colon and diverticulosis    Current Outpatient Prescriptions   Medication Sig Dispense Refill    acetaminophen-codeine 300-30mg (TYLENOL #3) 300-30 mg Tab 1 po tid prn (Patient taking differently: 1 po tid prn pain) 30 tablet 1    alcohol swabs (BD ALCOHOL SWABS) PadM Apply 1 each topically as needed. 100 each 4    amlodipine (NORVASC) 5 MG tablet TAKE 1 TABLET EVERY DAY 90 tablet 3    ascorbic acid (VITAMIN C) 100 MG tablet Take 100 mg by mouth once daily.      aspirin (ENTERIC COATED ASPIRIN) 81 MG EC tablet Take by mouth. 1 Tablet, Delayed Release (E.C.) Oral Every day      aspirin-caffeine (INEZ BACK & BODY) 500-32.5 mg Tab Take 1 tablet by mouth daily as needed.       atorvastatin (LIPITOR) 20 MG tablet TAKE 1 TABLET EVERY DAY 90 tablet 3    blood sugar diagnostic (TRUETEST TEST STRIPS) Strp 2 strips by Misc.(Non-Drug; Combo Route) route once daily. 200 strip 4    blood sugar diagnostic Strp 1 strip by Misc.(Non-Drug; Combo Route) route once daily. 100 strip 4    blood-glucose meter (ACCU-CHEK YULIA PLUS METER) Misc 1 Device by Misc.(Non-Drug; Combo Route) route once daily. 1 each 1    lancets (ACCU-CHEK SOFTCLIX LANCETS) Misc 1 Device by Misc.(Non-Drug; Combo Route) route once daily. 100 each 4    latanoprost 0.005 % ophthalmic solution INSTILL 1 DROP INTO BOTH EYES EVERY NIGHT 3 Bottle 3     lisinopril-hydrochlorothiazide (PRINZIDE,ZESTORETIC) 20-12.5 mg per tablet TAKE 1 TABLET ONE TIME DAILY EVERY DAY 90 tablet 3    metformin (GLUCOPHAGE) 1000 MG tablet TAKE 1 TABLET TWICE DAILY WITH MEALS 180 tablet 3    multivitamin (THERAGRAN) per tablet Take 1 tablet by mouth once daily.      vitamin D 1000 units Tab Take 185 mg by mouth once daily.      vitamin E 100 UNIT capsule Take 400 Units by mouth once daily.       No current facility-administered medications for this visit.        Review of patient's allergies indicates:  No Known Allergies    Patient was scheduled for colonoscopy on Wed 3/29/17 with Dr. Cleaning at [facility]. Golytely prep instructions were reviewed with patient.

## 2017-03-13 ENCOUNTER — PATIENT OUTREACH (OUTPATIENT)
Dept: OTHER | Facility: OTHER | Age: 77
End: 2017-03-13
Payer: MEDICARE

## 2017-03-13 DIAGNOSIS — I10 ESSENTIAL HYPERTENSION: Primary | ICD-10-CM

## 2017-03-13 NOTE — LETTER
"   Balbina Romano, PharmD  [address] [place] [address]     Dear Bryant Gil,    Welcome to the [facility] Hypert[facility] Digital [facility] Program!         My name is Balbina Romano and I am your dedicated clinical pharmacist.  As an expert in medication management, I will help ensure that the medications you are taking continue to provide you with the intended benefits.      This is Balbina Aburto and she will be your health  for the duration of the program.  Her job is to help you identify lifestyle changes to improve your blood pressure control.  You will talk about nutrition, exercise, and other ways that you may be able to adjust your current habits to better your health. Together, we will work to improve your overall health and encourage you to meet your goals for a healthier lifestyle.    What we expect from YOU:    You will need to take blood pressure readings multiple times a week and no less than one reading per week.   It is important that you take your measurements at different times during the day, when possible.     What you should expect from your Digital Medicine Care Team:   We will provide you with education about high blood pressure, including lifestyle changes that could help you to control your blood pressure.   We will review your weekly readings and provide you with monthly blood pressure progress reports after you have been in the program for more than 30 days.   We will send monthly progress reports on your blood pressure control to your physician so they can follow along with your progress as well.    You will be able to reach me by phone at   or through your [facility] account by clicking "Send a message to your doctor's office" on the home screen then selecting my name in the "To the office of:" field.     I look forward to working with you to achieve your blood pressure goals!    Sincerely,    Balbina Romano, Terence  Your personal Clinical Pharmacist    Please " visit [email or web address] to learn more about high blood pressure and what you can do lower your blood pressure.                                                                                         Bryant Gil  [facility] [address]

## 2017-03-15 RX ORDER — AMLODIPINE BESYLATE 5 MG/1
10 TABLET ORAL DAILY
Qty: 90 TABLET | Refills: 3
Start: 2017-03-15 | End: 2017-04-24 | Stop reason: SDUPTHER

## 2017-03-15 NOTE — PROGRESS NOTES
Last 5 Patient Entered Readings                                                               Current 30 Day Average: 145/83     Recent Readings 3/13/2017 3/13/2017 3/12/2017 3/12/2017 3/11/2017    Systolic BP (mmHg) 141 141 150 150 158    Diastolic BP (mmHg) 82 82 87 87 87    Pulse 82 82 82 82 81        Called Mr. Gil to introduce him into the Hypertension Digital Medicine Program.     Reviewed patient's medications and verified allergies on file.     His BP is above goal, will increase amlodipine to 10 mg QD.     Hypertension Medications             amlodipine (NORVASC) 5 MG tablet Take 2 tablets (10 mg total) by mouth once daily.    lisinopril-hydrochlorothiazide (PRINZIDE,ZESTORETIC) 20-12.5 mg per tablet TAKE 1 TABLET ONE TIME DAILY EVERY DAY        Reviewed questionnaire:    Depression: n/a  Sleep apnea: not indicated    Explained that we expect him to obtain several blood pressures/week at random times of day. Also asked that the BP be taken at least 1 hour after taking BP medications.     Explained that our goal is to get his BP to consistently below 140/90mmHg.     Patient and I agreed that the patient will take his BP daily to every other day at varying times of the day.     I will plan to follow-up with the patient in 2 weeks.    Emailed patient link to [facility] HTN webpage as well as my direct phone number in case he has in questions.

## 2017-03-23 ENCOUNTER — PATIENT OUTREACH (OUTPATIENT)
Dept: OTHER | Facility: OTHER | Age: 77
End: 2017-03-23
Payer: MEDICARE

## 2017-03-23 NOTE — PROGRESS NOTES
Last 5 Patient Entered Readings                                                               Current 30 Day Average: 144/83     Recent Readings 3/21/2017 3/21/2017 3/19/2017 3/19/2017 3/17/2017    Systolic BP (mmHg) 139 139 147 147 145    Diastolic BP (mmHg) 81 81 84 84 78    Pulse 88 88 86 86 84          Follow up with Mr. Bryant Gil completed. Patient is maintaining a low sodium diet and continuing his exercise regime. He reports that he is doing well and feeling good. He denies symptoms and also denies side effects with the recent medication adjustment. Will continue to work with him on getting his BP consistently under the goal of 140/90. Patient did not have any further questions or concerns. I will follow up in a few weeks to see how he is doing and progressing.

## 2017-03-27 ENCOUNTER — TELEPHONE (OUTPATIENT)
Dept: GASTROENTEROLOGY | Facility: CLINIC | Age: 77
End: 2017-03-27

## 2017-03-27 DIAGNOSIS — Z12.11 SPECIAL SCREENING FOR MALIGNANT NEOPLASMS, COLON: Primary | ICD-10-CM

## 2017-03-27 NOTE — TELEPHONE ENCOUNTER
Spoke with patient's wife to inform her that the prep will be called into pharmacy. She verbalized understanding.    ----- Message from Orville Calvin sent at 3/27/2017  2:47 PM CDT -----  Contact: Dina(wife) [phone]  Wife(Dina) called to speak with someone about medication for 's upcoming procedure.  Please advise.

## 2017-03-29 ENCOUNTER — HOSPITAL ENCOUNTER (OUTPATIENT)
Facility: HOSPITAL | Age: 77
Discharge: HOME | End: 2017-03-29
Attending: INTERNAL MEDICINE | Admitting: INTERNAL MEDICINE
Payer: MEDICARE

## 2017-03-29 ENCOUNTER — ANESTHESIA EVENT (OUTPATIENT)
Dept: ENDOSCOPY | Facility: HOSPITAL | Age: 77
End: 2017-03-29
Payer: MEDICARE

## 2017-03-29 ENCOUNTER — SURGERY (OUTPATIENT)
Age: 77
End: 2017-03-29

## 2017-03-29 ENCOUNTER — ANESTHESIA (OUTPATIENT)
Dept: ENDOSCOPY | Facility: HOSPITAL | Age: 77
End: 2017-03-29
Payer: MEDICARE

## 2017-03-29 VITALS
HEART RATE: 71 BPM | RESPIRATION RATE: 20 BRPM | OXYGEN SATURATION: 98 % | SYSTOLIC BLOOD PRESSURE: 145 MMHG | BODY MASS INDEX: 28.25 KG/M2 | WEIGHT: 180 LBS | DIASTOLIC BLOOD PRESSURE: 91 MMHG | HEIGHT: 67 IN | TEMPERATURE: 98 F

## 2017-03-29 DIAGNOSIS — D12.6 ADENOMATOUS POLYP OF COLON, UNSPECIFIED PART OF COLON: Primary | ICD-10-CM

## 2017-03-29 DIAGNOSIS — Z86.010 PERSONAL HISTORY OF COLONIC POLYPS: ICD-10-CM

## 2017-03-29 PROBLEM — Z86.0100 PERSONAL HISTORY OF COLONIC POLYPS: Status: ACTIVE | Noted: 2017-03-29

## 2017-03-29 LAB — GLUCOSE SERPL-MCNC: 151 MG/DL (ref 70–110)

## 2017-03-29 PROCEDURE — 63600175 PHARM REV CODE 636 W HCPCS: Performed by: NURSE ANESTHETIST, CERTIFIED REGISTERED

## 2017-03-29 PROCEDURE — 82962 GLUCOSE BLOOD TEST: CPT | Performed by: INTERNAL MEDICINE

## 2017-03-29 PROCEDURE — 37000008 HC ANESTHESIA 1ST 15 MINUTES: Performed by: INTERNAL MEDICINE

## 2017-03-29 PROCEDURE — G0105 COLORECTAL SCRN; HI RISK IND: HCPCS | Mod: ,,, | Performed by: INTERNAL MEDICINE

## 2017-03-29 PROCEDURE — 25000003 PHARM REV CODE 250: Performed by: NURSE ANESTHETIST, CERTIFIED REGISTERED

## 2017-03-29 PROCEDURE — G0105 COLORECTAL SCRN; HI RISK IND: HCPCS | Performed by: INTERNAL MEDICINE

## 2017-03-29 PROCEDURE — 25000003 PHARM REV CODE 250: Performed by: INTERNAL MEDICINE

## 2017-03-29 PROCEDURE — 37000009 HC ANESTHESIA EA ADD 15 MINS: Performed by: INTERNAL MEDICINE

## 2017-03-29 RX ORDER — LIDOCAINE HCL/PF 100 MG/5ML
SYRINGE (ML) INTRAVENOUS
Status: DISCONTINUED | OUTPATIENT
Start: 2017-03-29 | End: 2017-03-29

## 2017-03-29 RX ORDER — PROPOFOL 10 MG/ML
VIAL (ML) INTRAVENOUS CONTINUOUS PRN
Status: DISCONTINUED | OUTPATIENT
Start: 2017-03-29 | End: 2017-03-29

## 2017-03-29 RX ORDER — PROPOFOL 10 MG/ML
VIAL (ML) INTRAVENOUS
Status: DISCONTINUED | OUTPATIENT
Start: 2017-03-29 | End: 2017-03-29

## 2017-03-29 RX ORDER — SODIUM CHLORIDE 9 MG/ML
INJECTION, SOLUTION INTRAVENOUS CONTINUOUS
Status: DISCONTINUED | OUTPATIENT
Start: 2017-03-29 | End: 2017-03-29 | Stop reason: HOSPADM

## 2017-03-29 RX ADMIN — PROPOFOL 100 MG: 10 INJECTION, EMULSION INTRAVENOUS at 11:03

## 2017-03-29 RX ADMIN — SODIUM CHLORIDE: 0.9 INJECTION, SOLUTION INTRAVENOUS at 10:03

## 2017-03-29 RX ADMIN — PROPOFOL 150 MCG/KG/MIN: 10 INJECTION, EMULSION INTRAVENOUS at 11:03

## 2017-03-29 RX ADMIN — LIDOCAINE HYDROCHLORIDE 50 MG: 20 INJECTION, SOLUTION INTRAVENOUS at 11:03

## 2017-03-29 NOTE — ANESTHESIA PREPROCEDURE EVALUATION
03/29/2017  Bryant Gil is a 76 y.o., male.    Past Medical History:   Diagnosis Date    Cancer     prostate    Cataract of both eyes     Diabetes mellitus     HTN (hypertension)     HTN (hypertension) 10/9/2012    Hyperlipidemia     OHT (ocular hypertension)     Prostate cancer     Pyelonephritis, right no stone on CT scan. 7/11/2013    Sepsis, same organism in urine grew in his blood, Klebsiella 7/11/2013    Tendinitis of both rotator cuffs 6/25/2013    Type 2 diabetes mellitus     Type II or unspecified type diabetes mellitus with neurological manifestations, not stated as uncontrolled 10/9/2012     Past Surgical History:   Procedure Laterality Date    CARPAL TUNNEL RELEASE Right 08/25/2015    CIRCUMCISION  04/13/2005    COLONOSCOPY W/ POLYPECTOMY  01/10/2012    Repeat in 5 years     PENILE PROSTHESIS IMPLANT      PROSTATE SURGERY  1999    Prostatectomy for prostate ca; [facility]    TRIGGER FINGER RELEASE Right 2015     Review of patient's allergies indicates:  No Known Allergies    EKG 3/2014  Vent. Rate : 075 BPM Atrial Rate : 075 BPM  P-R Int : 128 ms QRS Dur : 100 ms  QT Int : 370 ms P-R-T Axes : 046 -60 028 degrees  QTc Int : 413 ms    Normal sinus rhythm  Left anterior fascicular block  Abnormal ECG  When compared with ECG of 11-JUL-2013 14:37,  Vent. rate has decreased BY 45 BPM  Confirmed by Geeta SPEAR, Kay ([id number]) on 3/12/2014 2:20:32 PM    Lab Results   Component Value Date    WBC 4.29 02/16/2017    HGB 13.1 (L) 02/16/2017    HCT 39.2 (L) 02/16/2017     02/16/2017    CHOL 170 02/16/2017    TRIG 53 02/16/2017    HDL 52 02/16/2017    ALT 20 02/16/2017    AST 19 02/16/2017     02/16/2017    K 4.1 02/16/2017     02/16/2017    CREATININE 1.0 02/16/2017    BUN 22 02/16/2017    CO2 28 02/16/2017    TSH 1.172 02/16/2017    PSA <0.01 04/24/2015    HGBA1C 7.9 (H)  02/16/2017       [facility] Anesthesia Evaluation    I have reviewed the Patient Summary Reports.    I have reviewed the Nursing Notes.      Review of Systems  Anesthesia Hx:  Denies Hx of Anesthetic complications  History of prior surgery of interest to airway management or planning:  Denies Personal Hx of Anesthesia complications.   Social:  Non-Smoker, No Alcohol Use    Cardiovascular:   Exercise tolerance: good Hypertension Denies MI.   Denies CABG/stent.   Denies Angina. hyperlipidemia    Pulmonary:  Pulmonary Normal    Renal/:  Renal/ Normal     Hepatic/GI:  Hepatic/GI Normal    Musculoskeletal:  Musculoskeletal Normal    Neurological:   Neuromuscular Disease,    Endocrine:   Diabetes        Physical Exam  General:  Well nourished    Airway/Jaw/Neck:  Airway Findings: Mouth Opening: Normal Tongue: Normal  General Airway Assessment: Adult  Mallampati: III  TM Distance: Normal, at least 6 cm         Dental:  Dental Findings:    Chest/Lungs:  Chest/Lungs Findings: Clear to auscultation, Normal Respiratory Rate     Heart/Vascular:  Heart Findings: Rate: Normal  Rhythm: Regular Rhythm  Sounds: Normal        Mental Status:  Mental Status Findings: Normal        Anesthesia Plan  Type of Anesthesia, risks & benefits discussed:  Anesthesia Type:  MAC, general  Patient's Preference:   Intra-op Monitoring Plan:   Intra-op Monitoring Plan Comments:   Post Op Pain Control Plan:   Post Op Pain Control Plan Comments:   Induction:   IV  Beta Blocker:  Patient is not currently on a Beta-Blocker (No further documentation required).       Informed Consent: Patient understands risks and agrees with Anesthesia plan.  Questions answered. Anesthesia consent signed with patient.  ASA Score: 2     Day of Surgery Review of History & Physical:            Ready For Surgery From Anesthesia Perspective.

## 2017-03-29 NOTE — H&P
History and Physical      Chief complaints: Requesting screening colonscopy    History of Presenting Illness    Patient requesting colonoscopy.  Patient denies any abdominal pain, weight loss or blood in the stool.  There is no family history of colon cancer.Has a history of colon polyp.    Review of Systems   Constitutional: Negative for fever and appetite change.   HENT: Negative for sore throat, trouble swallowing and neck pain.   Eyes: Negative for photophobia and visual disturbance.   Respiratory: Negative for wheezing.   Cardiovascular: Negative for chest pain and palpitations.   Gastrointestinal: See HPI for details   Musculoskeletal: Negative for joint swelling and arthralgias.   Skin: Negative for rash and wound.   Neurological: Negative for dizziness, tremors and weakness.   Hematological: Negative.   Psychiatric/Behavioral: Negative for suicidal ideas and behavioral problems.     Past Medical History:   Diagnosis Date    Cancer     prostate    Cataract of both eyes     Diabetes mellitus     HTN (hypertension)     HTN (hypertension) 10/9/2012    Hyperlipidemia     OHT (ocular hypertension)     Prostate cancer     Pyelonephritis, right no stone on CT scan. 7/11/2013    Sepsis, same organism in urine grew in his blood, Klebsiella 7/11/2013    Tendinitis of both rotator cuffs 6/25/2013    Type 2 diabetes mellitus     Type II or unspecified type diabetes mellitus with neurological manifestations, not stated as uncontrolled 10/9/2012       Past Surgical History:   Procedure Laterality Date    CARPAL TUNNEL RELEASE Right 08/25/2015    CIRCUMCISION  04/13/2005    COLONOSCOPY W/ POLYPECTOMY  01/10/2012    Repeat in 5 years     PENILE PROSTHESIS IMPLANT      PROSTATE SURGERY  1999    Prostatectomy for prostate ca; [facility]    TRIGGER FINGER RELEASE Right 2015       Family History   Problem Relation Age of Onset    Diabetes Sister     Cataracts Sister     Hypertension Mother     No Known  Problems Brother     No Known Problems Sister     No Known Problems Son     No Known Problems Daughter     Heart attack Brother     No Known Problems Sister     No Known Problems Sister     Cancer Brother     Prostate cancer Brother     Blindness Neg Hx     Amblyopia Neg Hx     Glaucoma Neg Hx     Retinal detachment Neg Hx     Strabismus Neg Hx     Macular degeneration Neg Hx     Stroke Neg Hx     Thyroid disease Neg Hx        Social History     Social History    Marital status:      Spouse name: N/A    Number of children: N/A    Years of education: N/A     Social History Main Topics    Smoking status: Former Smoker     Packs/day: 0.50     Years: 3.00     Types: Cigarettes    Smokeless tobacco: Never Used      Comment: smoked as a teenager    Alcohol use Yes      Comment: social drinks a beer 1-2 x month    Drug use: No    Sexual activity: Yes     Partners: Female     Other Topics Concern    None     Social History Narrative    Works at [employer] in [place]       Current Facility-Administered Medications   Medication Dose Route Frequency Provider Last Rate Last Dose    0.9%  NaCl infusion   Intravenous Continuous Kavitha Cleaning MD 20 mL/hr at 03/29/17 1054         Review of patient's allergies indicates:  No Known Allergies    Objective:      Vitals:    03/29/17 1041   BP: (!) 165/88   Pulse: 80   Resp: 16   Temp: 98.4 °F (36.9 °C)     Physical Exam   Constitutional: Patient is oriented to person, place, and time. Appears well-nourished.   HENT:   Mouth/Throat: Oropharynx is clear and moist.   Eyes: Pupils are equal, round, and reactive to light.   Neck: Neck supple.   Cardiovascular: Normal heart sounds.   Pulmonary/Chest: Effort normal and breath sounds normal.   Abdominal: Soft. Exhibits no mass. There is no tenderness. There is no guarding.   Musculoskeletal: Normal range of motion.   Lymphadenopathy: Has no cervical adenopathy.   Neurological:Alert and oriented to person,  place, and time.   Skin: Skin is warm. No rash noted.   Psychiatric: Has a normal mood and affect.     Assessment:  History of colon polyp    Plan:  Colonoscopy       I have reviewed the patient's medical history in detail and updated the computerized patient record

## 2017-03-29 NOTE — TRANSFER OF CARE
"Anesthesia Transfer of Care Note    Patient: Bryant Gil    Procedure(s) Performed: Procedure(s) (LRB):  COLONOSCOPY Golytely prep (N/A)    Patient location: GI    Anesthesia Type: MAC    Transport from OR: Transported from OR on room air with adequate spontaneous ventilation    Post pain: adequate analgesia    Post assessment: no apparent anesthetic complications and tolerated procedure well    Post vital signs: stable    Level of consciousness: alert, awake and oriented    Nausea/Vomiting: no nausea/vomiting    Complications: none          Last vitals:   Visit Vitals    BP (!) 165/88 (BP Location: Left arm, Patient Position: Lying, BP Method: Automatic)    Pulse 80    Temp 36.9 °C (98.4 °F) (Oral)    Resp 16    Ht 5' 7" (1.702 m)    Wt 81.6 kg (180 lb)    SpO2 98%    BMI 28.19 kg/m2     "

## 2017-03-29 NOTE — IP AVS SNAPSHOT
[facility]  [address]  [place] [address]  Phone: [phone]           Patient Discharge Instructions   Our goal is to set you up for success. This packet includes information on your condition, medications, and your home care.  It will help you care for yourself to prevent having to return to the hospital.     Please ask your nurse if you have any questions.      There are many details to remember when preparing to leave the hospital. Here is what you will need to do:    1. Take your medicine. If you are prescribed medications, review your Medication List on the following pages. You may have new medications to  at the pharmacy and others that you'll need to stop taking. Review the instructions for how and when to take your medications. Talk with your doctor or nurses if you are unsure of what to do.     2. Go to your follow-up appointments. Specific follow-up information is listed in the following pages. Your may be contacted by a nurse or clinical provider about future appointments. Be sure we have all of the phone numbers to reach you. Please contact your provider's office if you are unable to make an appointment.     3. Watch for warning signs. Your doctor or nurse will give you detailed warning signs to watch for and when to call for assistance. These instructions may also include educational information about your condition. If you experience any of warning signs to your health, call your doctor.               ** Verify the list of medication(s) below is accurate and up to date. Carry this with you in case of emergency. If your medications have changed, please notify your healthcare provider.             Medication List      CONTINUE taking these medications        Additional Info                      amlodipine 5 MG tablet   Commonly known as:  NORVASC   Quantity:  90 tablet   Refills:  3   Dose:  10 mg    Instructions:  Take 2 tablets (10 mg total) by mouth once daily.      Begin Date    AM    Noon    PM    Bedtime       ascorbic acid (vitamin C) 100 MG tablet   Commonly known as:  VITAMIN C   Refills:  0   Dose:  100 mg    Instructions:  Take 100 mg by mouth once daily.     Begin Date    AM    Noon    PM    Bedtime       atorvastatin 20 MG tablet   Commonly known as:  LIPITOR   Quantity:  90 tablet   Refills:  3    Instructions:  TAKE 1 TABLET EVERY DAY     Begin Date    AM    Noon    PM    Bedtime       INEZ BACK AND BODY 500-32.5 mg Tab   Refills:  0   Dose:  1 tablet   Indications:  Pain   Generic drug:  aspirin-caffeine    Instructions:  Take 1 tablet by mouth daily as needed.     Begin Date    AM    Noon    PM    Bedtime       * blood sugar diagnostic Strp   Commonly known as:  TRUETEST TEST STRIPS   Quantity:  200 strip   Refills:  4   Dose:  2 strip    Instructions:  2 strips by Misc.(Non-Drug; Combo Route) route once daily.     Begin Date    AM    Noon    PM    Bedtime       * blood sugar diagnostic Strp   Quantity:  100 strip   Refills:  4   Dose:  1 strip    Instructions:  1 strip by Misc.(Non-Drug; Combo Route) route once daily.     Begin Date    AM    Noon    PM    Bedtime       blood-glucose meter Misc   Commonly known as:  ACCU-CHEK YULIA PLUS METER   Quantity:  1 each   Refills:  1   Dose:  1 Device    Instructions:  1 Device by Misc.(Non-Drug; Combo Route) route once daily.     Begin Date    AM    Noon    PM    Bedtime       ENTERIC COATED ASPIRIN 81 MG EC tablet   Refills:  0   Generic drug:  aspirin    Instructions:  Take by mouth. 1 Tablet, Delayed Release (E.C.) Oral Every day     Begin Date    AM    Noon    PM    Bedtime       lancets Misc   Commonly known as:  ACCU-CHEK SOFTCLIX LANCETS   Quantity:  100 each   Refills:  4   Dose:  1 Device    Instructions:  1 Device by Misc.(Non-Drug; Combo Route) route once daily.     Begin Date    AM    Noon    PM    Bedtime       latanoprost 0.005 % ophthalmic solution   Quantity:  3 Bottle   Refills:  3    Instructions:   INSTILL 1 DROP INTO BOTH EYES EVERY NIGHT     Begin Date    AM    Noon    PM    Bedtime       lisinopril-hydrochlorothiazide 20-12.5 mg per tablet   Commonly known as:  PRINZIDE,ZESTORETIC   Quantity:  90 tablet   Refills:  3    Instructions:  TAKE 1 TABLET ONE TIME DAILY EVERY DAY     Begin Date    AM    Noon    PM    Bedtime       metformin 1000 MG tablet   Commonly known as:  GLUCOPHAGE   Quantity:  180 tablet   Refills:  3    Instructions:  TAKE 1 TABLET TWICE DAILY WITH MEALS     Begin Date    AM    Noon    PM    Bedtime       multivitamin per tablet   Commonly known as:  THERAGRAN   Refills:  0   Dose:  1 tablet    Instructions:  Take 1 tablet by mouth once daily.     Begin Date    AM    Noon    PM    Bedtime       vitamin D 1000 units Tab   Refills:  0   Dose:  185 mg    Instructions:  Take 185 mg by mouth once daily.     Begin Date    AM    Noon    PM    Bedtime       vitamin E 100 UNIT capsule   Refills:  0   Dose:  400 Units    Instructions:  Take 400 Units by mouth once daily.     Begin Date    AM    Noon    PM    Bedtime       * Notice:  This list has 2 medication(s) that are the same as other medications prescribed for you. Read the directions carefully, and ask your doctor or other care provider to review them with you.      STOP taking these medications     acetaminophen-codeine 300-30mg 300-30 mg Tab   Commonly known as:  TYLENOL #3       alcohol swabs Padm   Commonly known as:  BD ALCOHOL SWABS       COLYTE WITH FLAVOR PACKS 227.1-21.5-6.36 gram Solr   Generic drug:  peg 3350-electrolytes       peg 3350-electrolytes 227.1-21.5-6.36 gram Solr   Commonly known as:  COLYTE WITH FLAVOR PACKS                  Please bring to all follow up appointments:    1. A copy of your discharge instructions.  2. All medicines you are currently taking in their original bottles.  3. Identification and insurance card.    Please arrive 15 minutes ahead of scheduled appointment time.    Please call 24 hours in advance if  you must reschedule your appointment and/or time.          Discharge Instructions     Future Orders    Activity as tolerated     Diet general     Questions:    Total calories:      Fat restriction, if any:      Protein restriction, if any:      Na restriction, if any:      Fluid restriction:      Additional restrictions:          Discharge Instructions       Discharge Summary/Instructions for after Colonoscopy with Biopsy/Polypectomy    Bryant Gil  3/29/2017  Kavitha Cleaning MD    Restrictions on Activity:    - Do not drive car or operate machinery until the day after the procedure.  - The following day: return to full activity including work.  - For 3 days: No heavy lifting, straining or running.  - Diet: Eat and drink normally unless instructed otherwise.    Treatment for Common Side Effects:  - Mild abdominal pain and bloating or excessive gas: rest, eat lightly and use a heating pad.     Symptoms to watch for and report to your physician:  1. Severe abdominal pain.  2. Fever within 24 hours after a procedure.  3. A large amount of rectal bleeding. (A small amount of blood from the rectum is not serious, especially if hemorrhoids are present.)  4. Because air was put into your colon during the procedure, expelling large amount of air from your rectum is normal.  5. You may not have a bowel movement for 1-3 days because of the colonoscopy prep. This is normal.  6. Go directly to the emergency room if you notice any of the following:     Chills and/orfever over 101   Persistent vomiting   Severe abdominal pain, other than gas cramps   Severe chest pain   Black, tarry stools   Any bleeding - exceeding one tablespoon    If you have any questions or problems, please call your Physician:    Kavitha Cleaning MD    Lab Results: [phone]    If a complication or emergency situation arises and you are unable to reach your Physician - GO TO THE EMERGENCY ROOM.          Admission Information     Date &  "Time Provider Department CSN    3/29/2017 10:11 AM Kavitha Cleaning MD [facility] [id number]      Care Providers     Provider Role Specialty Primary office phone    Kavitha Cleaning MD Attending Provider Gastroenterology [phone]    Kavitha Cleaning MD Surgeon  Gastroenterology [phone]      Your Vitals Were     BP Pulse Temp Resp Height Weight    110/70 71 98.4 °F (36.9 °C) (Oral) 14 5' 7" (1.702 m) 81.6 kg (180 lb)    SpO2 BMI             95% 28.19 kg/m2         Recent Lab Values        7/12/2013 3/12/2014 11/19/2014 1/22/2015 4/24/2015 10/22/2015 4/15/2016 2/16/2017      5:40 AM 12:09 PM 10:12 AM 10:36 AM  7:00 AM 10:46 AM 10:25 AM  9:34 AM    A1C 6.9 (H) 7.4 (H) 7.0 (H) 7.3 (H) 7.2 (H) 7.2 (H) 7.0 (H) 7.9 (H)    Comment for A1C at  9:34 AM on 2/16/2017:  According to ADA guidelines, hemoglobin A1C <7.0% represents  optimal control in non-pregnant diabetic patients.  Different  metrics may apply to specific populations.   Standards of Medical Care in Diabetes - 2016.  For the purpose of screening for the presence of diabetes:  <5.7%     Consistent with the absence of diabetes  5.7-6.4%  Consistent with increasing risk for diabetes   (prediabetes)  >or=6.5%  Consistent with diabetes  Currently no consensus exists for use of hemoglobin A1C  for diagnosis of diabetes for children.        Allergies as of 3/29/2017     No Known Allergies      [facility]     [facility] Nurse Care Line - 24/7 Assistance  Unless otherwise directed by your provider, please contact [facility], our nurse care line that is available for 24/7 assistance.     Registered nurses in the [facility] provide clinical advisement, health education, appointment booking, and other advisory services.  Call for this free service at [phone].        Advance Directives     An advance directive is a document which, in the event you are no longer able to make decisions for yourself, " tells your healthcare team what kind of treatment you do or do not want to receive, or who you would like to make those decisions for you.  If you do not currently have an advance directive, [facility] encourages you to create one.  For more information call:  [phone]), [phone] ([phone]),  or log on to [email or web address].        Smoking Cessation     If you would like to quit smoking:   You may be eligible for free services if you are a [place] resident and started smoking cigarettes before September 1, 1988.  Call the Smoking Cessation Trust (SCT) toll free at (915) 643-7421 or (773) 667-9881.   Call 0-367-QUIT-NOW if you do not meet the above criteria.            Language Assistance Services     ATTENTION: Language assistance services are available, free of charge. Please call [phone].      ATENCIÓN: Si habla español, tiene a cardoza disposición servicios gratuitos de asistencia lingüística. Llame al [phone].     CHÚ Ý: N?u b?n nói Ti?ng Vi?t, có các d?ch v? h? tr? ngôn ng? mi?n phí dành cho b?n. G?i s? [phone].        Chronic Kindey Disease Education             Diabetes Discharge Instructions                                    [facility] complies with applicable Federal civil rights laws and does not discriminate on the basis of race, color, national origin, age, disability, or sex.

## 2017-03-29 NOTE — ANESTHESIA POSTPROCEDURE EVALUATION
"Anesthesia Post Evaluation    Patient: Bryant Gil    Procedure(s) Performed: Procedure(s) (LRB):  COLONOSCOPY Golytely prep (N/A)    Final Anesthesia Type: MAC  Patient location during evaluation: GI PACU  Patient participation: Yes- Able to Participate  Level of consciousness: awake and alert and oriented  Post-procedure vital signs: reviewed and stable  Pain management: adequate  Airway patency: patent  PONV status at discharge: No PONV  Anesthetic complications: no      Cardiovascular status: blood pressure returned to baseline and hemodynamically stable  Respiratory status: unassisted, room air and spontaneous ventilation  Hydration status: euvolemic  Follow-up not needed.        Visit Vitals    BP (!) 165/88 (BP Location: Left arm, Patient Position: Lying, BP Method: Automatic)    Pulse 80    Temp 36.9 °C (98.4 °F) (Oral)    Resp 16    Ht 5' 7" (1.702 m)    Wt 81.6 kg (180 lb)    SpO2 98%    BMI 28.19 kg/m2       Pain/Luis Manuel Score: Pain Assessment Performed: Yes (3/29/2017 11:03 AM)  Presence of Pain: complains of pain/discomfort (3/29/2017 10:45 AM)      "

## 2017-03-29 NOTE — DISCHARGE INSTRUCTIONS
Discharge Summary/Instructions for after Colonoscopy with Biopsy/Polypectomy    Bryant Gil  3/29/2017  Kavitha Cleaning MD    Restrictions on Activity:    - Do not drive car or operate machinery until the day after the procedure.  - The following day: return to full activity including work.  - For 3 days: No heavy lifting, straining or running.  - Diet: Eat and drink normally unless instructed otherwise.    Treatment for Common Side Effects:  - Mild abdominal pain and bloating or excessive gas: rest, eat lightly and use a heating pad.     Symptoms to watch for and report to your physician:  1. Severe abdominal pain.  2. Fever within 24 hours after a procedure.  3. A large amount of rectal bleeding. (A small amount of blood from the rectum is not serious, especially if hemorrhoids are present.)  4. Because air was put into your colon during the procedure, expelling large amount of air from your rectum is normal.  5. You may not have a bowel movement for 1-3 days because of the colonoscopy prep. This is normal.  6. Go directly to the emergency room if you notice any of the following:     Chills and/orfever over 101   Persistent vomiting   Severe abdominal pain, other than gas cramps   Severe chest pain   Black, tarry stools   Any bleeding - exceeding one tablespoon    If you have any questions or problems, please call your Physician:    Kavitha Cleaning MD    Lab Results: [phone]    If a complication or emergency situation arises and you are unable to reach your Physician - GO TO THE EMERGENCY ROOM.

## 2017-04-05 ENCOUNTER — PATIENT OUTREACH (OUTPATIENT)
Dept: OTHER | Facility: OTHER | Age: 77
End: 2017-04-05
Payer: MEDICARE

## 2017-04-05 DIAGNOSIS — I10 ESSENTIAL HYPERTENSION: Primary | ICD-10-CM

## 2017-04-05 RX ORDER — LISINOPRIL AND HYDROCHLOROTHIAZIDE 12.5; 2 MG/1; MG/1
2 TABLET ORAL DAILY
Qty: 180 TABLET | Refills: 3
Start: 2017-04-05 | End: 2017-04-24 | Stop reason: DRUGHIGH

## 2017-04-24 ENCOUNTER — PATIENT OUTREACH (OUTPATIENT)
Dept: OTHER | Facility: OTHER | Age: 77
End: 2017-04-24
Payer: MEDICARE

## 2017-04-24 DIAGNOSIS — I10 ESSENTIAL HYPERTENSION: ICD-10-CM

## 2017-04-24 RX ORDER — LISINOPRIL AND HYDROCHLOROTHIAZIDE 12.5; 2 MG/1; MG/1
2 TABLET ORAL DAILY
Qty: 180 TABLET | Refills: 3 | Status: SHIPPED | OUTPATIENT
Start: 2017-04-24 | End: 2017-10-10 | Stop reason: SDUPTHER

## 2017-04-24 RX ORDER — AMLODIPINE BESYLATE 10 MG/1
10 TABLET ORAL DAILY
Qty: 90 TABLET | Refills: 3 | Status: SHIPPED | OUTPATIENT
Start: 2017-04-24 | End: 2017-10-10 | Stop reason: SDUPTHER

## 2017-04-24 NOTE — PROGRESS NOTES
Last 5 Patient Entered Readings                                                               Current 30 Day Average: 141/80     Recent Readings 4/23/2017 4/23/2017 4/23/2017 4/22/2017 4/22/2017    Systolic BP (mmHg) 135 135 143 142 142    Diastolic BP (mmHg) 80 80 82 80 80    Pulse 85 85 85 88 88        Mr. aBptiste BP is getting closer to goal. No changes made today as he needs refills. May add a beta blocker if BP remains above goal.    Patient denies any symptoms and has no questions/concerns.     Current HTN regimen:  Hypertension Medications             amlodipine (NORVASC) 10 MG tablet Take 1 tablet (10 mg total) by mouth once daily.    lisinopril-hydrochlorothiazide (PRINZIDE,ZESTORETIC) 20-12.5 mg per tablet Take 2 tablets by mouth once daily.          Will continue to monitor regularly. Will follow up in 3 months, sooner if BP begins to trend upward or downward.    Patient has my contact information and knows to call with any concerns or clinical changes.

## 2017-05-18 ENCOUNTER — PATIENT OUTREACH (OUTPATIENT)
Dept: OTHER | Facility: OTHER | Age: 77
End: 2017-05-18
Payer: MEDICARE

## 2017-05-18 NOTE — PROGRESS NOTES
"Last 5 Patient Entered Readings                                                               Current 30 Day Average: 138/80     Recent Readings 5/14/2017 5/14/2017 5/14/2017 5/7/2017 5/7/2017    Systolic BP (mmHg) 136 136 146 137 137    Diastolic BP (mmHg) 77 77 79 81 81    Pulse 78 78 78 87 87          Follow up with Mr. Bryant Gil completed. Patient is maintaining a low sodium diet and continuing his exercise regime. He reports that overall, he is doing well and feeling good. Whenever he takes a reading that is a little elevated, he retakes it and it typically comes back down to normal range. It does look like he may be clicking the "done" button twice when he is done taking a reading which is why we are getting dup readings. We discussed this so I will keep an eye on it and see if it continues to happen. Patient did not have any further questions or concerns. I will follow up in a few weeks to see how he is doing and progressing.      "

## 2017-05-25 ENCOUNTER — PATIENT OUTREACH (OUTPATIENT)
Dept: OTHER | Facility: OTHER | Age: 77
End: 2017-05-25
Payer: MEDICARE

## 2017-05-25 NOTE — PROGRESS NOTES
Last 5 Patient Entered Readings                                                               Current 30 Day Average: 137/79     Recent Readings 5/24/2017 5/24/2017 5/22/2017 5/22/2017 5/14/2017    Systolic BP (mmHg) 135 135 140 140 136    Diastolic BP (mmHg) 79 79 80 80 77    Pulse 83 83 85 85 78        Mr. Gil's BP is improving with higher amlodipine dose. He has no side effects or symptoms.     Patient's BP is at goal. Patient denies any symptoms and has no questions/concerns.     Current HTN regimen:  Hypertension Medications             amlodipine (NORVASC) 10 MG tablet Take 1 tablet (10 mg total) by mouth once daily.    lisinopril-hydrochlorothiazide (PRINZIDE,ZESTORETIC) 20-12.5 mg per tablet Take 2 tablets by mouth once daily.          Will continue to monitor regularly. Will follow up in 1-2 months, sooner if BP begins to trend upward or downward.    Patient has my contact information and knows to call with any concerns or clinical changes.

## 2017-06-13 ENCOUNTER — OFFICE VISIT (OUTPATIENT)
Dept: ORTHOPEDICS | Facility: CLINIC | Age: 77
End: 2017-06-13
Payer: MEDICARE

## 2017-06-13 VITALS — WEIGHT: 180 LBS | HEIGHT: 67 IN | BODY MASS INDEX: 28.25 KG/M2

## 2017-06-13 DIAGNOSIS — M25.511 BILATERAL SHOULDER PAIN, UNSPECIFIED CHRONICITY: Primary | ICD-10-CM

## 2017-06-13 DIAGNOSIS — M75.42 IMPINGEMENT SYNDROME OF BOTH SHOULDERS: ICD-10-CM

## 2017-06-13 DIAGNOSIS — M25.512 BILATERAL SHOULDER PAIN, UNSPECIFIED CHRONICITY: Primary | ICD-10-CM

## 2017-06-13 DIAGNOSIS — M75.41 IMPINGEMENT SYNDROME OF BOTH SHOULDERS: ICD-10-CM

## 2017-06-13 PROCEDURE — 1125F AMNT PAIN NOTED PAIN PRSNT: CPT | Mod: S$GLB,,, | Performed by: ORTHOPAEDIC SURGERY

## 2017-06-13 PROCEDURE — 99213 OFFICE O/P EST LOW 20 MIN: CPT | Mod: 25,S$GLB,, | Performed by: ORTHOPAEDIC SURGERY

## 2017-06-13 PROCEDURE — 99999 PR PBB SHADOW E&M-EST. PATIENT-LVL II: CPT | Mod: PBBFAC,,, | Performed by: ORTHOPAEDIC SURGERY

## 2017-06-13 PROCEDURE — 1159F MED LIST DOCD IN RCRD: CPT | Mod: S$GLB,,, | Performed by: ORTHOPAEDIC SURGERY

## 2017-06-13 PROCEDURE — 20610 DRAIN/INJ JOINT/BURSA W/O US: CPT | Mod: 50,S$GLB,, | Performed by: ORTHOPAEDIC SURGERY

## 2017-06-13 RX ORDER — TRIAMCINOLONE ACETONIDE 40 MG/ML
40 INJECTION, SUSPENSION INTRA-ARTICULAR; INTRAMUSCULAR
Status: COMPLETED | OUTPATIENT
Start: 2017-06-13 | End: 2017-06-13

## 2017-06-13 RX ADMIN — TRIAMCINOLONE ACETONIDE 40 MG: 40 INJECTION, SUSPENSION INTRA-ARTICULAR; INTRAMUSCULAR at 11:06

## 2017-06-13 NOTE — PROGRESS NOTES
Mr. Gil in followup of bilateral shoulder symptoms.  He does have a rotator   cuff tear on the right shoulder and impingement on the left shoulder.  He would   like to have injections performed, which have helped in the past.    PHYSICAL EXAMINATION:  RIGHT SHOULDER:  Demonstrates limited range of motion secondary to pain.  Some   slight weakness rotator cuff.  Positive impingement both shoulders.    IMPRESSION:   1.  Rotator cuff tear, right shoulder.  2.  Impingement, left shoulder.    PROCEDURE NOTE:  After pause for timeout injections performed into each   shoulder, combination of Kenalog 40 mg and 2 mL Xylocaine, sterile technique.    Continue current medications.  Follow up in 2-3 months if symptoms persist.  I   think we will need to proceed with rotator cuff repair, right shoulder.      MARK  dd: 06/13/2017 11:17:30 (CDT)  td: 06/14/2017 07:24:02 (CDT)  Doc ID   #[id number]  Job ID #[id number]    CC:

## 2017-06-14 ENCOUNTER — PATIENT OUTREACH (OUTPATIENT)
Dept: OTHER | Facility: OTHER | Age: 77
End: 2017-06-14
Payer: MEDICARE

## 2017-06-14 NOTE — PROGRESS NOTES
Last 5 Patient Entered Readings                                                               Current 30 Day Average: 139/80     Recent Readings 6/11/2017 6/8/2017 6/4/2017 5/30/2017 5/28/2017    Systolic BP (mmHg) 137 140 149 144 133    Diastolic BP (mmHg) 78 79 85 78 79    Pulse 92 83 83 85 82        LVM to follow up with Mr. Bryant Gil. Inquired about how his low sodium diet and exercise is going. Overall, his readings are looking good with the exception of a few fluctuations. I reminded him about taking his medication as prescribed and about monitoring his sodium intake. Will continue to follow. Advised pt to call or message back if he has any questions or concerns.    Patient called back and informed me that he is doing well and feeling good. He is watching his sodium and does not have any questions about that. He will continue to monitor it. He is unsure why his readings fluctuate from time to time but he denies symptoms.

## 2017-06-27 RX ORDER — ATORVASTATIN CALCIUM 20 MG/1
TABLET, FILM COATED ORAL
Qty: 90 TABLET | Refills: 3 | Status: SHIPPED | OUTPATIENT
Start: 2017-06-27 | End: 2017-10-10 | Stop reason: SDUPTHER

## 2017-06-27 RX ORDER — METFORMIN HYDROCHLORIDE 1000 MG/1
TABLET ORAL
Qty: 180 TABLET | Refills: 3 | Status: SHIPPED | OUTPATIENT
Start: 2017-06-27 | End: 2017-10-10 | Stop reason: SDUPTHER

## 2017-07-11 ENCOUNTER — PATIENT OUTREACH (OUTPATIENT)
Dept: OTHER | Facility: OTHER | Age: 77
End: 2017-07-11

## 2017-07-11 NOTE — PROGRESS NOTES
Last 5 Patient Entered Redings Current 30 Day Average: 135/79     Recent Readings 7/7/2017 7/2/2017 6/25/2017 6/21/2017 6/21/2017    Systolic BP (mmHg) 123 136 124 142 146    Diastolic BP (mmHg) 79 82 76 78 85    Pulse 86 85 91 82 82          Mr. Baptiste BP is at goal. He is doing well and has no symptoms or concerns.      Current HTN regimen:  Hypertension Medications             amlodipine (NORVASC) 10 MG tablet Take 1 tablet (10 mg total) by mouth once daily.    lisinopril-hydrochlorothiazide (PRINZIDE,ZESTORETIC) 20-12.5 mg per tablet Take 2 tablets by mouth once daily.          Will continue to monitor regularly. Will follow up in 3 months, sooner if BP begins to trend upward or downward.    Patient has my contact information and knows to call with any concerns or clinical changes.

## 2017-07-24 ENCOUNTER — PATIENT OUTREACH (OUTPATIENT)
Dept: OTHER | Facility: OTHER | Age: 77
End: 2017-07-24

## 2017-07-24 NOTE — PROGRESS NOTES
Last 5 Patient Entered Redings Current 30 Day Average: 131/79     Recent Readings 7/21/2017 7/21/2017 7/17/2017 7/13/2017 7/7/2017    Systolic BP (mmHg) 137 146 139 124 123    Diastolic BP (mmHg) 82 86 81 74 79    Pulse 80 80 83 86 86            Follow up with Mr. Bryant Gil completed. Patient is maintaining a low sodium diet and continuing his exercise regime. He reports that he is doing well and feeling good. Still making small changes with his low sodium diet. He denies symptoms and side effects. Patient did not have any further questions or concerns. I will follow up in a few weeks to see how he is doing and progressing.

## 2017-08-21 ENCOUNTER — TELEPHONE (OUTPATIENT)
Dept: OPTOMETRY | Facility: CLINIC | Age: 77
End: 2017-08-21

## 2017-08-22 ENCOUNTER — PATIENT OUTREACH (OUTPATIENT)
Dept: OTHER | Facility: OTHER | Age: 77
End: 2017-08-22

## 2017-08-22 NOTE — PROGRESS NOTES
Last 5 Patient Entered [place] Current 30 Day Average: 133/78     Recent Readings 8/19/2017 8/16/2017 8/16/2017 8/14/2017 8/13/2017    Systolic BP (mmHg) 137 133 141 135 136    Diastolic BP (mmHg) 73 79 80 78 79    Pulse 81 82 83 87 85        Follow up with Mr. Bryant Gil completed. Patient reports that he is doing well and feeling good. Patient did not have any further questions or concerns. I will follow up in a few weeks to see how he is doing and progressing.      -Low sodium diet maintained?: In progress  How or how not : Patient reports that the majority of the time, he does well with managing his sodium intake. He does not add salt to meals and reads food labels. He does admit to slacking on this sometimes so he continues to work on this and will continue making small changes. He did not have any specific questions. He knows what he needs to do, he just needs to continue working on it.     -Physical activity maintained? yes    -Any side effects from BP medication? no    -Any symptoms related to their blood pressure? no     -Patient taking regular BP readings weekly? yes : he stated that he is unsure why his readings come back slightly higher then normal sometimes. He makes sure to wait a few minutes and retake the reading. Typically it comes right back down to normal range.     -Is the patient taking their BP medication as prescribed? Per patient yes    [Patient is aware of what to do incase of a medical emergency (call 911, call [facility] on call or go to the ER)].

## 2017-09-18 ENCOUNTER — PATIENT OUTREACH (OUTPATIENT)
Dept: OTHER | Facility: OTHER | Age: 77
End: 2017-09-18

## 2017-09-18 NOTE — PROGRESS NOTES
Last 5 Patient Entered Redings Current 30 Day Average: 138/78     Recent Readings 9/15/2017 9/15/2017 9/14/2017 9/11/2017 9/11/2017    Systolic BP (mmHg) 141 148 138 150 152    Diastolic BP (mmHg) 79 83 73 83 84    Pulse 77 76 72 78 78          Hypertension Digital Medicine Program (HDMP): Health  Follow Up    Lifestyle Modifications:    1.Low sodium diet: yes    2.Physical activity: yes     3.Hypotension/Hypertension symptoms: no   Frequency/Alleviating factors/Precipitating factors, etc.     4.Patient has been compliant with the medication regimen.     He reports that he waits an hour or more before taking his BP readings after taking his BP medication. He will start sitting at his kitchen table to see if that helps with his BP readings being more consistent. He is unsure why he has been having BP fluctuations. Will continue to monitor.     Follow up with  Bryant Jeffery completed. No further questions or concerns. I will follow up in a few weeks to assess progress.

## 2017-09-25 ENCOUNTER — OFFICE VISIT (OUTPATIENT)
Dept: OPTOMETRY | Facility: CLINIC | Age: 77
End: 2017-09-25
Payer: MEDICARE

## 2017-09-25 DIAGNOSIS — H25.813 COMBINED FORMS OF AGE-RELATED CATARACT OF BOTH EYES: ICD-10-CM

## 2017-09-25 DIAGNOSIS — Z01.00 EXAMINATION OF EYES AND VISION: Primary | ICD-10-CM

## 2017-09-25 DIAGNOSIS — E11.9 TYPE 2 DIABETES MELLITUS WITHOUT OPHTHALMIC MANIFESTATIONS: ICD-10-CM

## 2017-09-25 DIAGNOSIS — H40.053 OCULAR HYPERTENSION, BILATERAL: ICD-10-CM

## 2017-09-25 PROCEDURE — 99499 UNLISTED E&M SERVICE: CPT | Mod: S$GLB,,, | Performed by: OPTOMETRIST

## 2017-09-25 PROCEDURE — 92015 DETERMINE REFRACTIVE STATE: CPT | Mod: S$GLB,,, | Performed by: OPTOMETRIST

## 2017-09-25 PROCEDURE — 99999 PR PBB SHADOW E&M-EST. PATIENT-LVL III: CPT | Mod: PBBFAC,,, | Performed by: OPTOMETRIST

## 2017-09-25 PROCEDURE — 92250 FUNDUS PHOTOGRAPHY W/I&R: CPT | Mod: S$GLB,,, | Performed by: OPTOMETRIST

## 2017-09-25 PROCEDURE — 92014 COMPRE OPH EXAM EST PT 1/>: CPT | Mod: S$GLB,,, | Performed by: OPTOMETRIST

## 2017-09-25 NOTE — PROGRESS NOTES
HPI     Bryant Gil is a 77 y.o. Male who returns  for continued diabetic and   overdue ocular hypertensive eye care.  He was last seen by us on   07/19/2016 for bilateral ocular hypertension and diabetes type 2 eye care.   Bryant reports that he feels that his vision has changed with his current   trifocal glasses and he may need a new prescription. He was diagnosed with   T 2 DM 20+ years ago. He feels as if he has his blood sugar levels under   control with metformin. Ocular hypertension is being treated with   Latanoprost (1 drop each eye OU).      Treatment history: He was originally examined by me prior to 2004. In   2008, an early morning IOP spike of 32/27 was noted. He was then diagnosed   with OHT and treated with Latanoprost.  His IOP has been stable since then   with goal of </= 20 OD and </=18 OS.     Hemoglobin A1C       Date                     Value               Ref Range             Status                02/16/2017               7.9 (H)             4.5 - 6.2 %           Final              (+)blurred vision  (--)Headaches  (--)diplopia  (--)flashes  (--)floaters  (--)pain  (--)Itching  (--)tearing  (--)burning  (--)Dryness  (--) OTC Drops  (--)Photophobia    Last edited by Taniya Barney, OD on 9/25/2017  3:04 PM. (History)        ROS     Positive for: Gastrointestinal (Colon polyp), Genitourinary (h/o prostate   cancer), Endocrine (DM), Cardiovascular (HTN, HLD), Eyes (OHT)    Negative for: Constitutional, Neurological, Skin, Musculoskeletal, HENT,   Respiratory, Psychiatric, Allergic/Imm, Heme/Lymph    Last edited by Taniya Barney, OD on 9/25/2017  3:04 PM. (History)        Assessment /Plan     For exam results, see Encounter Report.    1. Ocular hypertension, bilateral  - pretreatment Tmax of 32OD, 27 OS  - (--) HVF defects OU  - normal optic nerve on OCT OD, normal optic nerve on OCT OS   -      increased CCT with adjustment factors of -2 OD and -3 OS  - Goal IOP of 22 OD 18 OS  - Current  treatment with latanoprost is adequate and is well tolerated by patient    2. Type 2 diabetes mellitus  - DFE 9/18      3. Essential hypertension - DFE 9/18    4. Mixed Senile Cataracts OU - not subjectively visually significant  - Defer treatment; Monitor annually, sooner prn    5. Refractive error with Presbyopia  - Spec Rx per final Rx below for distance only  Glasses Prescription (9/25/2017)        Sphere Cylinder Axis Add    Right +0.50 +0.75 005 +2.50    Left +0.75 +0.75 015 +2.50    Type:  Trifocal    Expiration Date:  9/26/2018        Patient education; Continue Latanoprost once daily OU    RTC in 6 months, sooner prn

## 2017-10-10 ENCOUNTER — LAB VISIT (OUTPATIENT)
Dept: LAB | Facility: HOSPITAL | Age: 77
End: 2017-10-10
Attending: INTERNAL MEDICINE
Payer: MEDICARE

## 2017-10-10 ENCOUNTER — OFFICE VISIT (OUTPATIENT)
Dept: INTERNAL MEDICINE | Facility: CLINIC | Age: 77
End: 2017-10-10
Payer: MEDICARE

## 2017-10-10 ENCOUNTER — IMMUNIZATION (OUTPATIENT)
Dept: INTERNAL MEDICINE | Facility: CLINIC | Age: 77
End: 2017-10-10
Payer: MEDICARE

## 2017-10-10 VITALS
HEART RATE: 79 BPM | OXYGEN SATURATION: 97 % | WEIGHT: 173.94 LBS | HEIGHT: 67 IN | DIASTOLIC BLOOD PRESSURE: 60 MMHG | BODY MASS INDEX: 27.3 KG/M2 | SYSTOLIC BLOOD PRESSURE: 140 MMHG

## 2017-10-10 DIAGNOSIS — Z85.46 HISTORY OF PROSTATE CANCER: ICD-10-CM

## 2017-10-10 DIAGNOSIS — N18.2 TYPE 2 DIABETES MELLITUS WITH STAGE 2 CHRONIC KIDNEY DISEASE, WITHOUT LONG-TERM CURRENT USE OF INSULIN: Primary | ICD-10-CM

## 2017-10-10 DIAGNOSIS — E11.22 TYPE 2 DIABETES MELLITUS WITH STAGE 2 CHRONIC KIDNEY DISEASE, WITHOUT LONG-TERM CURRENT USE OF INSULIN: ICD-10-CM

## 2017-10-10 DIAGNOSIS — N18.2 STAGE 2 CHRONIC KIDNEY DISEASE: ICD-10-CM

## 2017-10-10 DIAGNOSIS — E78.2 MIXED HYPERLIPIDEMIA: ICD-10-CM

## 2017-10-10 DIAGNOSIS — I10 ESSENTIAL HYPERTENSION: ICD-10-CM

## 2017-10-10 DIAGNOSIS — E11.22 TYPE 2 DIABETES MELLITUS WITH STAGE 2 CHRONIC KIDNEY DISEASE, WITHOUT LONG-TERM CURRENT USE OF INSULIN: Primary | ICD-10-CM

## 2017-10-10 DIAGNOSIS — Z23 NEEDS FLU SHOT: ICD-10-CM

## 2017-10-10 DIAGNOSIS — N18.2 TYPE 2 DIABETES MELLITUS WITH STAGE 2 CHRONIC KIDNEY DISEASE, WITHOUT LONG-TERM CURRENT USE OF INSULIN: ICD-10-CM

## 2017-10-10 DIAGNOSIS — E11.40 TYPE 2 DIABETES MELLITUS WITH DIABETIC NEUROPATHY, WITHOUT LONG-TERM CURRENT USE OF INSULIN: ICD-10-CM

## 2017-10-10 PROCEDURE — 90662 IIV NO PRSV INCREASED AG IM: CPT | Mod: S$GLB,,, | Performed by: INTERNAL MEDICINE

## 2017-10-10 PROCEDURE — 99214 OFFICE O/P EST MOD 30 MIN: CPT | Mod: S$GLB,,, | Performed by: INTERNAL MEDICINE

## 2017-10-10 PROCEDURE — G0008 ADMIN INFLUENZA VIRUS VAC: HCPCS | Mod: S$GLB,,, | Performed by: INTERNAL MEDICINE

## 2017-10-10 PROCEDURE — 36415 COLL VENOUS BLD VENIPUNCTURE: CPT

## 2017-10-10 PROCEDURE — 99999 PR PBB SHADOW E&M-EST. PATIENT-LVL III: CPT | Mod: PBBFAC,,, | Performed by: INTERNAL MEDICINE

## 2017-10-10 PROCEDURE — 99499 UNLISTED E&M SERVICE: CPT | Mod: S$GLB,,, | Performed by: INTERNAL MEDICINE

## 2017-10-10 PROCEDURE — 83036 HEMOGLOBIN GLYCOSYLATED A1C: CPT

## 2017-10-10 RX ORDER — ATORVASTATIN CALCIUM 20 MG/1
20 TABLET, FILM COATED ORAL DAILY
Qty: 90 TABLET | Refills: 3 | Status: SHIPPED | OUTPATIENT
Start: 2017-10-10 | End: 2018-10-03 | Stop reason: SDUPTHER

## 2017-10-10 RX ORDER — ATORVASTATIN CALCIUM 20 MG/1
20 TABLET, FILM COATED ORAL DAILY
Qty: 90 TABLET | Refills: 3 | Status: SHIPPED | OUTPATIENT
Start: 2017-10-10 | End: 2017-10-10 | Stop reason: SDUPTHER

## 2017-10-10 RX ORDER — LISINOPRIL AND HYDROCHLOROTHIAZIDE 12.5; 2 MG/1; MG/1
2 TABLET ORAL DAILY
Qty: 180 TABLET | Refills: 3 | Status: SHIPPED | OUTPATIENT
Start: 2017-10-10 | End: 2017-10-10 | Stop reason: SDUPTHER

## 2017-10-10 RX ORDER — LISINOPRIL AND HYDROCHLOROTHIAZIDE 12.5; 2 MG/1; MG/1
2 TABLET ORAL DAILY
Qty: 180 TABLET | Refills: 3 | Status: SHIPPED | OUTPATIENT
Start: 2017-10-10 | End: 2018-10-03 | Stop reason: SDUPTHER

## 2017-10-10 RX ORDER — METFORMIN HYDROCHLORIDE 1000 MG/1
1000 TABLET ORAL 2 TIMES DAILY WITH MEALS
Qty: 180 TABLET | Refills: 3 | Status: SHIPPED | OUTPATIENT
Start: 2017-10-10 | End: 2017-10-10 | Stop reason: SDUPTHER

## 2017-10-10 RX ORDER — METFORMIN HYDROCHLORIDE 1000 MG/1
1000 TABLET ORAL 2 TIMES DAILY WITH MEALS
Qty: 180 TABLET | Refills: 3 | Status: SHIPPED | OUTPATIENT
Start: 2017-10-10 | End: 2018-10-03 | Stop reason: SDUPTHER

## 2017-10-10 RX ORDER — AMLODIPINE BESYLATE 10 MG/1
10 TABLET ORAL DAILY
Qty: 90 TABLET | Refills: 3 | Status: SHIPPED | OUTPATIENT
Start: 2017-10-10 | End: 2018-10-03 | Stop reason: SDUPTHER

## 2017-10-10 RX ORDER — ASPIRIN 81 MG/1
81 TABLET ORAL ONCE
Start: 2017-10-10 | End: 2018-10-03 | Stop reason: SDUPTHER

## 2017-10-10 NOTE — PROGRESS NOTES
Subjective:       Patient ID: Bryant Gil is a 77 y.o. male.    Chief Complaint: Shoulder Pain and Hypertension  He is contemplating having a rotator cuff surgery with Dr. Cooper.  He has a rotator cuff tear in his right shoulder.  He has bilateral impingement syndrome both shoulders.  He needs medication refills.  As usual, he is doing extremely well  HPI  Review of Systems   Constitutional: Negative for activity change, appetite change, chills, fatigue, fever and unexpected weight change.   HENT: Negative for dental problem, hearing loss, tinnitus, trouble swallowing and voice change.    Eyes: Negative for visual disturbance.   Respiratory: Negative for cough, chest tightness, shortness of breath and wheezing.    Cardiovascular: Negative for chest pain, palpitations and leg swelling.   Gastrointestinal: Negative for abdominal pain, blood in stool, constipation, diarrhea, nausea and vomiting.   Genitourinary: Negative for difficulty urinating, dysuria, flank pain, frequency and urgency.   Musculoskeletal: Negative for arthralgias, back pain, gait problem, joint swelling, myalgias, neck pain and neck stiffness.   Skin: Negative for rash.   Neurological: Negative for tremors, light-headedness, numbness and headaches.   Psychiatric/Behavioral: Negative for dysphoric mood and sleep disturbance. The patient is not nervous/anxious.        Objective:      Physical Exam   Constitutional: He is oriented to person, place, and time. No distress.   HENT:   Head: Atraumatic.   Eyes: Conjunctivae are normal. No scleral icterus.   Neck: Neck supple.   Cardiovascular: Normal rate and regular rhythm.    Pulmonary/Chest: Effort normal and breath sounds normal.   Abdominal: Soft. There is no tenderness.   Musculoskeletal: He exhibits no edema.   Lymphadenopathy:     He has no cervical adenopathy.   Neurological: He is alert and oriented to person, place, and time.   Skin: Skin is warm and dry.   Psychiatric: He has a normal mood  and affect. His behavior is normal.       Assessment:       1. Type 2 diabetes mellitus with stage 2 chronic kidney disease, without long-term current use of insulin    2. Type 2 diabetes mellitus with diabetic neuropathy, without long-term current use of insulin    3. Stage 2 chronic kidney disease    4. Mixed hyperlipidemia    5. Essential hypertension    6. Needs flu shot    7. History of prostate cancer        Plan:   Bryant was seen today for shoulder pain and hypertension.    Diagnoses and all orders for this visit:    Type 2 diabetes mellitus with stage 2 chronic kidney disease, without long-term current use of insulin  -     Hemoglobin A1c; Future    Type 2 diabetes mellitus with diabetic neuropathy, without long-term current use of insulin    Stage 2 chronic kidney disease    Mixed hyperlipidemia    Essential hypertension  -     lisinopril-hydrochlorothiazide (PRINZIDE,ZESTORETIC) 20-12.5 mg per tablet; Take 2 tablets by mouth once daily.    Needs flu shot    History of prostate cancer    Other orders  -     atorvastatin (LIPITOR) 20 MG tablet; Take 1 tablet (20 mg total) by mouth once daily.  -     metformin (GLUCOPHAGE) 1000 MG tablet; Take 1 tablet (1,000 mg total) by mouth 2 (two) times daily with meals.    We discussed Januvia should hisA1c remain elevated on metformin alone.  Return in about 4 months (around 2/10/2018).  Medication List with Changes/Refills   Current Medications    ASCORBIC ACID (VITAMIN C) 100 MG TABLET    Take 100 mg by mouth once daily.    BLOOD SUGAR DIAGNOSTIC (TRUETEST TEST STRIPS) STRP    2 strips by Misc.(Non-Drug; Combo Route) route once daily.    BLOOD SUGAR DIAGNOSTIC STRP    1 strip by Misc.(Non-Drug; Combo Route) route once daily.    BLOOD-GLUCOSE METER (ACCU-CHEK YULIA PLUS METER) MISC    1 Device by Misc.(Non-Drug; Combo Route) route once daily.    LANCETS (ACCU-CHEK SOFTCLIX LANCETS) MISC    1 Device by Misc.(Non-Drug; Combo Route) route once daily.    LATANOPROST  0.005 % OPHTHALMIC SOLUTION    INSTILL 1 DROP INTO BOTH EYES EVERY NIGHT    VITAMIN D 1000 UNITS TAB    Take 185 mg by mouth once daily.   Changed and/or Refilled Medications    Modified Medication Previous Medication    AMLODIPINE (NORVASC) 10 MG TABLET amlodipine (NORVASC) 10 MG tablet       Take 1 tablet (10 mg total) by mouth once daily.    Take 1 tablet (10 mg total) by mouth once daily.    ASPIRIN (ENTERIC COATED ASPIRIN) 81 MG EC TABLET aspirin (ENTERIC COATED ASPIRIN) 81 MG EC tablet       Take 1 tablet (81 mg total) by mouth once. 1 Tablet, Delayed Release (E.C.) Oral Every day    Take by mouth. 1 Tablet, Delayed Release (E.C.) Oral Every day    ATORVASTATIN (LIPITOR) 20 MG TABLET atorvastatin (LIPITOR) 20 MG tablet       Take 1 tablet (20 mg total) by mouth once daily.    TAKE 1 TABLET EVERY DAY    LISINOPRIL-HYDROCHLOROTHIAZIDE (PRINZIDE,ZESTORETIC) 20-12.5 MG PER TABLET lisinopril-hydrochlorothiazide (PRINZIDE,ZESTORETIC) 20-12.5 mg per tablet       Take 2 tablets by mouth once daily.    Take 2 tablets by mouth once daily.    METFORMIN (GLUCOPHAGE) 1000 MG TABLET metformin (GLUCOPHAGE) 1000 MG tablet       Take 1 tablet (1,000 mg total) by mouth 2 (two) times daily with meals.    TAKE 1 TABLET TWICE DAILY WITH MEALS   Discontinued Medications    ASPIRIN-CAFFEINE (INEZ BACK & BODY) 500-32.5 MG TAB    Take 1 tablet by mouth daily as needed.     MULTIVITAMIN (THERAGRAN) PER TABLET    Take 1 tablet by mouth once daily.    VITAMIN E 100 UNIT CAPSULE    Take 400 Units by mouth once daily.

## 2017-10-10 NOTE — PATIENT INSTRUCTIONS
Sitagliptin oral tablet  What is this medicine?  SITAGLIPTIN (sit a GLIP tin) helps to treat type 2 diabetes. It helps to control blood sugar. Treatment is combined with diet and exercise.  How should I use this medicine?  Take this medicine by mouth with a glass of water. Follow the directions on the prescription label. You can take it with or without food. Do not cut, crush or chew this medicine. Take your dose at the same time each day. Do not take more often than directed. Do not stop taking except on your doctor's advice.  Talk to your pediatrician regarding the use of this medicine in children. Special care may be needed.  What side effects may I notice from receiving this medicine?  Side effects that you should report to your doctor or health care professional as soon as possible:  · allergic reactions like skin rash, itching or hives, swelling of the face, lips, or tongue  · breathing problems  · fever, chills  · joint pain  · loss of appetite  · signs and symptoms of low blood sugar such as feeling anxious, confusion, dizziness, increased hunger, unusually weak or tired, sweating, shakiness, cold, irritable, headache, blurred vision, fast heartbeat, loss of consciousness  · unusual stomach pain or discomfort  · vomiting  Side effects that usually do not require medical attention (report to your doctor or health care professional if they continue or are bothersome):  · diarrhea  · headache  · sore throat  · stomach upset  · stuffy or runny nose  What may interact with this medicine?  Do not take this medicine with any of the following medications:  · gatifloxacin  This medicine may also interact with the following medications:  · alcohol  · digoxin  · insulin  · sulfonylureas like glimepiride, glipizide, glyburide  What if I miss a dose?  If you miss a dose, take it as soon as you can. If it is almost time for your next dose, take only that dose. Do not take double or extra doses.  Where should I keep my  medicine?  Keep out of the reach of children.  Store at room temperature between 15 and 30 degrees C (59 and 86 degrees F). Throw away any unused medicine after the expiration date.  What should I tell my health care provider before I take this medicine?  They need to know if you have any of these conditions:  · diabetic ketoacidosis  · kidney disease  · pancreatitis  · previous swelling of the tongue, face, or lips with difficulty breathing, difficulty swallowing, hoarseness, or tightening of the throat  · type 1 diabetes  · an unusual or allergic reaction to sitagliptin, other medicines, foods, dyes, or preservatives  · pregnant or trying to get pregnant  · breast-feeding  What should I watch for while using this medicine?  Visit your doctor or health care professional for regular checks on your progress.  A test called the HbA1C (A1C) will be monitored. This is a simple blood test. It measures your blood sugar control over the last 2 to 3 months. You will receive this test every 3 to 6 months.  Learn how to check your blood sugar. Learn the symptoms of low and high blood sugar and how to manage them.  Always carry a quick-source of sugar with you in case you have symptoms of low blood sugar. Examples include hard sugar candy or glucose tablets. Make sure others know that you can choke if you eat or drink when you develop serious symptoms of low blood sugar, such as seizures or unconsciousness. They must get medical help at once.  Tell your doctor or health care professional if you have high blood sugar. You might need to change the dose of your medicine. If you are sick or exercising more than usual, you might need to change the dose of your medicine.  Do not skip meals. Ask your doctor or health care professional if you should avoid alcohol. Many nonprescription cough and cold products contain sugar or alcohol. These can affect blood sugar.  Wear a medical ID bracelet or chain, and carry a card that describes  your disease and details of your medicine and dosage times.  NOTE:This sheet is a summary. It may not cover all possible information. If you have questions about this medicine, talk to your doctor, pharmacist, or health care provider. Copyright© 2017 [employer]

## 2017-10-11 ENCOUNTER — PATIENT OUTREACH (OUTPATIENT)
Dept: OTHER | Facility: OTHER | Age: 77
End: 2017-10-11

## 2017-10-11 ENCOUNTER — PATIENT MESSAGE (OUTPATIENT)
Dept: INTERNAL MEDICINE | Facility: CLINIC | Age: 77
End: 2017-10-11

## 2017-10-11 DIAGNOSIS — E11.40 TYPE 2 DIABETES MELLITUS WITH DIABETIC NEUROPATHY, WITHOUT LONG-TERM CURRENT USE OF INSULIN: Primary | ICD-10-CM

## 2017-10-11 LAB
ESTIMATED AVG GLUCOSE: 177 MG/DL
HBA1C MFR BLD HPLC: 7.8 %

## 2017-10-11 NOTE — PROGRESS NOTES
Last 5 Patient Entered Redings Current 30 Day Average: 139/77     Recent Readings 10/9/2017 10/9/2017 10/6/2017 10/6/2017 10/4/2017    Systolic BP (mmHg) 137 143 130 146 129    Diastolic BP (mmHg) 79 80 74 84 76    Pulse 79 81 81 80 75          Hypertension Digital Medicine Program (HDMP): Health  Follow Up    Lifestyle Modifications:    1.Low sodium diet: In progress : Patient informed me that he has not done anything different in his diet and his wife cooks most of his meals. He does go out to eat sometimes on the weekend but its limited. We spoke about what his wife cooks with and he did mention that she does season their food. We spoke about how these seasonings have a lot of salt in them and provided him with some alternatives. He was unsure why this is starting to affect him now and it was not in the past. I explained that as our bodies change, the salt can start affect us more then in the past. He also mentioned that the times he takes his medications has changed so this could be affecting his readings as well. I also offered to speak with his wife about sodium content and he stated that he will talk to her about all of this and have her reach out to me with any further questions.     2.Physical activity: Deferred     3.Hypotension/Hypertension symptoms: no   Frequency/Alleviating factors/Precipitating factors, etc.     4.Patient has been compliant with the medication regimen.     Follow up with Manoj Bryant Gil completed. No further questions or concerns. I will follow up in a few weeks to assess progress.

## 2017-10-11 NOTE — TELEPHONE ENCOUNTER
I sent Mr. Gil an email to begin taking Januvia 100 mg every morning with breakfast   in addition to all of his existing medications.  I sent the Januvia to his mail order [facility] pharmacy    Please schedule labs as ordered in early February

## 2017-10-12 NOTE — TELEPHONE ENCOUNTER
Spoke with patient scheduled appt for labs and advised of medication changes per Dr. Zaidi's notes.

## 2017-10-17 ENCOUNTER — PATIENT MESSAGE (OUTPATIENT)
Dept: INTERNAL MEDICINE | Facility: CLINIC | Age: 77
End: 2017-10-17

## 2017-11-08 ENCOUNTER — PATIENT OUTREACH (OUTPATIENT)
Dept: OTHER | Facility: OTHER | Age: 77
End: 2017-11-08

## 2017-11-08 NOTE — PROGRESS NOTES
Last 5 Patient Entered Readings Current 30 Day Average: 130/74     Recent Readings 11/8/2017 11/7/2017 11/3/2017 11/1/2017 10/31/2017    Systolic BP (mmHg) 131 132 122 133 124    Diastolic BP (mmHg) 75 78 71 73 72    Pulse 73 78 77 73 80        Mr. Baptiste BP average is controlled. He is feeling well and has no questions or concerns about his BP. He did mention that he recently started Januvia for diabetes. He noticed some GI upset when he first started it, but feels it is improving.     Current HTN regimen:  Hypertension Medications             amlodipine (NORVASC) 10 MG tablet Take 1 tablet (10 mg total) by mouth once daily.    lisinopril-hydrochlorothiazide (PRINZIDE,ZESTORETIC) 20-12.5 mg per tablet Take 2 tablets by mouth once daily.        Will continue to monitor regularly. Will follow up in 3-4 months, sooner if BP begins to trend upward or downward.    Patient has my contact information and knows to call with any concerns or clinical changes.

## 2017-12-07 ENCOUNTER — PATIENT OUTREACH (OUTPATIENT)
Dept: OTHER | Facility: OTHER | Age: 77
End: 2017-12-07

## 2017-12-07 NOTE — PROGRESS NOTES
Last 5 Patient Entered Readings Current 30 Day Average: 132/75     Recent Readings 12/6/2017 12/1/2017 11/30/2017 11/30/2017 11/28/2017    Systolic BP (mmHg) 139 131 130 140 136    Diastolic BP (mmHg) 79 75 75 77 79    Pulse 82 79 82 84 72          Hypertension Digital Medicine (HDMP) Health  Follow Up    LVM to follow up with Manoj Bryant Gil.    Per 30 day average, blood pressure is board line controlled 132/75 mmHg. Encouraged adherence to low sodium diet and physical activity guidelines. Informed him of the new BP guidelines. Requested patient call or message back so we can discuss his readings/obtain an update. Will continue to monitor/follow up.

## 2018-01-08 ENCOUNTER — PATIENT OUTREACH (OUTPATIENT)
Dept: OTHER | Facility: OTHER | Age: 78
End: 2018-01-08

## 2018-01-08 NOTE — PROGRESS NOTES
Last 5 Patient Entered Readings Current 30 Day Average: 136/76     Recent Readings 1/6/2018 1/5/2018 1/4/2018 1/4/2018 1/4/2018    SBP (mmHg) 136 139 146 148 152    DBP (mmHg) 73 77 82 85 88    Pulse 81 75 72 73 73          Hypertension Digital Medicine Program (HDMP): Health  Follow Up    Lifestyle Modifications:    1.Low sodium diet: In progress : Patient is working on getting his sodium intake a little lower. We reviewed all of the guidelines again and spoke about alternatives. He is going to work on making some brand swaps with some items that he has at home that may be higher in sodium. He is going to get with me if he has specific questions.     2.Physical activity: Deferred    3.Hypotension/Hypertension symptoms: no   Frequency/Alleviating factors/Precipitating factors, etc.     4.Patient has been compliant with the medication regimen.     Follow up with  Bryant Jeffery completed. No further questions or concerns. I will follow up in a few weeks to assess progress.

## 2018-01-15 ENCOUNTER — PES CALL (OUTPATIENT)
Dept: ADMINISTRATIVE | Facility: CLINIC | Age: 78
End: 2018-01-15

## 2018-01-31 ENCOUNTER — PATIENT OUTREACH (OUTPATIENT)
Dept: OTHER | Facility: OTHER | Age: 78
End: 2018-01-31

## 2018-01-31 NOTE — PROGRESS NOTES
Last 5 Patient Entered Readings                                      Current 30 Day Average: 138/76     Recent Readings 1/31/2018 1/30/2018 1/30/2018 1/24/2018 1/22/2018    SBP (mmHg) 138 131 143 132 144    DBP (mmHg) 76 76 79 75 77    Pulse 76 71 76 76 70          Hypertension Digital Medicine Program (HDMP): Health  Follow Up    Lifestyle Modifications:    1.Low sodium diet: In progress : Patient stated that he continues working on maintaining a low sodium diet but his wife and daughter do most of the grocery shopping and cooking. He knows they do not add salt to meals but they may be using seasonings. He is going to speak with them about this. I also sent the patient the sodium guidelines and resources so that he can share that with his wife and daughter. He will call me with any further questions. Also offered to speak with his wife and daughter if they have specific questions.     2.Physical activity: yes : Patient rides his stationary bike 4-7 times per week.     3.Hypotension/Hypertension symptoms: no   Frequency/Alleviating factors/Precipitating factors, etc.     4.Patient has been compliant with the medication regimen.     Follow up with  Bryantbishop Gil completed. No further questions or concerns. I will follow up in a few weeks to assess progress.

## 2018-02-06 ENCOUNTER — OFFICE VISIT (OUTPATIENT)
Dept: INTERNAL MEDICINE | Facility: CLINIC | Age: 78
End: 2018-02-06
Attending: INTERNAL MEDICINE
Payer: MEDICARE

## 2018-02-06 ENCOUNTER — HOSPITAL ENCOUNTER (OUTPATIENT)
Dept: CARDIOLOGY | Facility: CLINIC | Age: 78
Discharge: HOME | End: 2018-02-06
Attending: INTERNAL MEDICINE
Payer: MEDICARE

## 2018-02-06 ENCOUNTER — OFFICE VISIT (OUTPATIENT)
Dept: INTERNAL MEDICINE | Facility: CLINIC | Age: 78
End: 2018-02-06
Payer: MEDICARE

## 2018-02-06 ENCOUNTER — HOSPITAL ENCOUNTER (OUTPATIENT)
Dept: CARDIOLOGY | Facility: CLINIC | Age: 78
Discharge: HOME | End: 2018-02-06
Payer: MEDICARE

## 2018-02-06 ENCOUNTER — HOSPITAL ENCOUNTER (OUTPATIENT)
Dept: RADIOLOGY | Facility: HOSPITAL | Age: 78
Discharge: HOME | End: 2018-02-06
Attending: INTERNAL MEDICINE
Payer: MEDICARE

## 2018-02-06 VITALS
DIASTOLIC BLOOD PRESSURE: 70 MMHG | BODY MASS INDEX: 27.57 KG/M2 | HEIGHT: 67 IN | SYSTOLIC BLOOD PRESSURE: 134 MMHG | OXYGEN SATURATION: 95 % | WEIGHT: 175.69 LBS | HEART RATE: 77 BPM

## 2018-02-06 VITALS
HEIGHT: 67 IN | SYSTOLIC BLOOD PRESSURE: 130 MMHG | HEART RATE: 76 BPM | DIASTOLIC BLOOD PRESSURE: 68 MMHG | BODY MASS INDEX: 27.6 KG/M2 | WEIGHT: 175.81 LBS

## 2018-02-06 DIAGNOSIS — E11.40 TYPE 2 DIABETES MELLITUS WITH DIABETIC NEUROPATHY, WITHOUT LONG-TERM CURRENT USE OF INSULIN: ICD-10-CM

## 2018-02-06 DIAGNOSIS — M75.41 IMPINGEMENT SYNDROME OF BOTH SHOULDERS: ICD-10-CM

## 2018-02-06 DIAGNOSIS — Z86.010 PERSONAL HISTORY OF COLONIC POLYPS: ICD-10-CM

## 2018-02-06 DIAGNOSIS — I35.1 NONRHEUMATIC AORTIC VALVE INSUFFICIENCY: ICD-10-CM

## 2018-02-06 DIAGNOSIS — N12 PYELONEPHRITIS: ICD-10-CM

## 2018-02-06 DIAGNOSIS — I10 ESSENTIAL HYPERTENSION: ICD-10-CM

## 2018-02-06 DIAGNOSIS — I70.0 AORTIC ATHEROSCLEROSIS: ICD-10-CM

## 2018-02-06 DIAGNOSIS — H40.053 BILATERAL OCULAR HYPERTENSION: ICD-10-CM

## 2018-02-06 DIAGNOSIS — E11.22 TYPE 2 DIABETES MELLITUS WITH STAGE 2 CHRONIC KIDNEY DISEASE, WITHOUT LONG-TERM CURRENT USE OF INSULIN: ICD-10-CM

## 2018-02-06 DIAGNOSIS — E78.2 MIXED HYPERLIPIDEMIA: ICD-10-CM

## 2018-02-06 DIAGNOSIS — I77.1 TORTUOUS AORTA: ICD-10-CM

## 2018-02-06 DIAGNOSIS — N18.2 TYPE 2 DIABETES MELLITUS WITH STAGE 2 CHRONIC KIDNEY DISEASE, WITHOUT LONG-TERM CURRENT USE OF INSULIN: ICD-10-CM

## 2018-02-06 DIAGNOSIS — Z85.46 HISTORY OF PROSTATE CANCER: ICD-10-CM

## 2018-02-06 DIAGNOSIS — I77.819 ECTATIC AORTA: ICD-10-CM

## 2018-02-06 DIAGNOSIS — N18.2 STAGE 2 CHRONIC KIDNEY DISEASE: ICD-10-CM

## 2018-02-06 DIAGNOSIS — D12.6 ADENOMATOUS POLYP OF COLON, UNSPECIFIED PART OF COLON: ICD-10-CM

## 2018-02-06 DIAGNOSIS — G56.02 CARPAL TUNNEL SYNDROME OF LEFT WRIST: ICD-10-CM

## 2018-02-06 DIAGNOSIS — E11.42 DIABETIC POLYNEUROPATHY ASSOCIATED WITH TYPE 2 DIABETES MELLITUS: ICD-10-CM

## 2018-02-06 DIAGNOSIS — E11.42 DIABETIC POLYNEUROPATHY ASSOCIATED WITH TYPE 2 DIABETES MELLITUS: Primary | ICD-10-CM

## 2018-02-06 DIAGNOSIS — M65.30 TRIGGER FINGER, UNSPECIFIED FINGER, UNSPECIFIED LATERALITY: ICD-10-CM

## 2018-02-06 DIAGNOSIS — M75.42 IMPINGEMENT SYNDROME OF BOTH SHOULDERS: ICD-10-CM

## 2018-02-06 DIAGNOSIS — H90.5 SENSORINEURAL HEARING LOSS (SNHL), UNSPECIFIED LATERALITY: ICD-10-CM

## 2018-02-06 DIAGNOSIS — Z00.00 ENCOUNTER FOR PREVENTIVE HEALTH EXAMINATION: Primary | ICD-10-CM

## 2018-02-06 DIAGNOSIS — Z85.46 H/O PROSTATE CANCER: ICD-10-CM

## 2018-02-06 DIAGNOSIS — E53.8 VITAMIN B 12 DEFICIENCY: ICD-10-CM

## 2018-02-06 LAB
AORTIC VALVE REGURGITATION: NORMAL
BACTERIA #/AREA URNS AUTO: ABNORMAL /HPF
BILIRUB UR QL STRIP: NEGATIVE
CAOX CRY UR QL COMP ASSIST: ABNORMAL
CLARITY UR REFRACT.AUTO: CLEAR
COLOR UR AUTO: YELLOW
CREAT UR-MCNC: 178 MG/DL
DIASTOLIC DYSFUNCTION: NO
ESTIMATED PA SYSTOLIC PRESSURE: 25.09
GLUCOSE UR QL STRIP: NEGATIVE
HGB UR QL STRIP: NEGATIVE
HYALINE CASTS UR QL AUTO: 3 /LPF
KETONES UR QL STRIP: NEGATIVE
LEUKOCYTE ESTERASE UR QL STRIP: NEGATIVE
MICROALBUMIN UR DL<=1MG/L-MCNC: 17 UG/ML
MICROALBUMIN/CREATININE RATIO: 9.6 UG/MG
MICROSCOPIC COMMENT: ABNORMAL
NITRITE UR QL STRIP: NEGATIVE
PH UR STRIP: 5 [PH] (ref 5–8)
PROT UR QL STRIP: NEGATIVE
RBC #/AREA URNS AUTO: 1 /HPF (ref 0–4)
RETIRED EF AND QEF - SEE NOTES: 65 (ref 55–65)
SP GR UR STRIP: 1.03 (ref 1–1.03)
SQUAMOUS #/AREA URNS AUTO: 0 /HPF
TRICUSPID VALVE REGURGITATION: NORMAL
URN SPEC COLLECT METH UR: NORMAL
UROBILINOGEN UR STRIP-ACNC: NEGATIVE EU/DL
WBC #/AREA URNS AUTO: 1 /HPF (ref 0–5)

## 2018-02-06 PROCEDURE — 3008F BODY MASS INDEX DOCD: CPT | Mod: S$GLB,,, | Performed by: INTERNAL MEDICINE

## 2018-02-06 PROCEDURE — 1126F AMNT PAIN NOTED NONE PRSNT: CPT | Mod: S$GLB,,, | Performed by: INTERNAL MEDICINE

## 2018-02-06 PROCEDURE — G0439 PPPS, SUBSEQ VISIT: HCPCS | Mod: S$GLB,,, | Performed by: NURSE PRACTITIONER

## 2018-02-06 PROCEDURE — 99999 PR PBB SHADOW E&M-EST. PATIENT-LVL V: CPT | Mod: PBBFAC,,, | Performed by: NURSE PRACTITIONER

## 2018-02-06 PROCEDURE — 99214 OFFICE O/P EST MOD 30 MIN: CPT | Mod: S$GLB,,, | Performed by: INTERNAL MEDICINE

## 2018-02-06 PROCEDURE — 82043 UR ALBUMIN QUANTITATIVE: CPT

## 2018-02-06 PROCEDURE — 71046 X-RAY EXAM CHEST 2 VIEWS: CPT | Mod: TC

## 2018-02-06 PROCEDURE — 99499 UNLISTED E&M SERVICE: CPT | Mod: S$GLB,,, | Performed by: INTERNAL MEDICINE

## 2018-02-06 PROCEDURE — 81001 URINALYSIS AUTO W/SCOPE: CPT

## 2018-02-06 PROCEDURE — 93306 TTE W/DOPPLER COMPLETE: CPT | Mod: S$GLB,,, | Performed by: INTERNAL MEDICINE

## 2018-02-06 PROCEDURE — 99999 PR PBB SHADOW E&M-EST. PATIENT-LVL III: CPT | Mod: PBBFAC,,, | Performed by: INTERNAL MEDICINE

## 2018-02-06 PROCEDURE — 93000 ELECTROCARDIOGRAM COMPLETE: CPT | Mod: S$GLB,,, | Performed by: INTERNAL MEDICINE

## 2018-02-06 PROCEDURE — 99499 UNLISTED E&M SERVICE: CPT | Mod: S$GLB,,, | Performed by: NURSE PRACTITIONER

## 2018-02-06 PROCEDURE — 71046 X-RAY EXAM CHEST 2 VIEWS: CPT | Mod: 26,,, | Performed by: RADIOLOGY

## 2018-02-06 PROCEDURE — 1159F MED LIST DOCD IN RCRD: CPT | Mod: S$GLB,,, | Performed by: INTERNAL MEDICINE

## 2018-02-06 NOTE — PATIENT INSTRUCTIONS
Counseling and Referral of Other Preventative  (Italic type indicates deductible and co-insurance are waived)    Patient Name: Bryant Gil  Today's Date: 2/6/2018    Health Maintenance       Date Due Completion Date    TETANUS VACCINE Done about 1.5 years ago Done about 1.5 years ago    Foot Exam Done today 6/23/2016 (Done)    Override on 6/23/2016: Done (per md note)    Override on 3/20/2014: Done    Urine Microalbumin 02/16/2018 2/16/2017    Lipid Panel 02/16/2018 2/16/2017    Hemoglobin A1c 04/10/2018 10/10/2017    Eye Exam 09/25/2018 9/25/2017    High Dose Statin 10/10/2018 10/10/2017    Colonoscopy 03/29/2022 3/29/2017        Orders Placed This Encounter   Procedures    Ambulatory Referral to Audiology     The following information is provided to all patients.  This information is to help you find resources for any of the problems found today that may be affecting your health:                Living healthy guide: www.Count includes the [facility].[email or web address]      Understanding Diabetes: www.diabetes.org      Eating healthy: www.cdc.gov/healthyweight      [facility] home safety checklist: www.cdc.gov/steadi/patient.html      Agency on Aging: www.goea.[place]      Alcoholics anonymous (AA): www.aa.org      Physical Activity: www.juilette.nih.gov/ai4dnkt      Tobacco use: www.quitwithusla.org

## 2018-02-06 NOTE — PROGRESS NOTES
"Bryant Gil presented for a  Medicare AWV and comprehensive Health Risk Assessment today. The following components were reviewed and updated:    · Medical history  · Family History  · Social history  · Allergies and Current Medications  · Health Risk Assessment  · Health Maintenance  · Care Team     ** See Completed Assessments for Annual Wellness Visit within the encounter summary.**       The following assessments were completed:  · Living Situation  · CAGE  · Depression Screening  · Timed Get Up and Go  · Whisper Test  · Cognitive Function Screening  · Nutrition Screening  · ADL Screening  · PAQ Screening          Vitals:    02/06/18 0736   BP: 130/68   BP Location: Right arm   Patient Position: Sitting   Pulse: 76   Weight: 79.8 kg (175 lb 13.1 oz)   Height: 5' 7" (1.702 m)     Body mass index is 27.54 kg/m².  Physical Exam   Constitutional: He is oriented to person, place, and time. He appears well-developed and well-nourished. No distress.   HENT:   Head: Normocephalic and atraumatic.   Eyes: No scleral icterus.   Neck: Normal range of motion. Neck supple.   Cardiovascular: Normal rate, regular rhythm, normal heart sounds and intact distal pulses.    Pulses:       Dorsalis pedis pulses are 2+ on the right side, and 2+ on the left side.   Pulmonary/Chest: Effort normal and breath sounds normal. No respiratory distress.   Abdominal: Bowel sounds are normal. He exhibits no distension.   Musculoskeletal: Normal range of motion. He exhibits no edema.        Right foot: There is no deformity.        Left foot: There is no deformity.   Feet:   Right Foot:   Protective Sensation: 7 sites tested. 7 sites sensed.   Skin Integrity: Negative for ulcer, blister, skin breakdown or dry skin.   Left Foot:   Protective Sensation: 7 sites tested. 7 sites sensed.   Skin Integrity: Negative for ulcer, blister, skin breakdown or dry skin.   Neurological: He is alert and oriented to person, place, and time.   Skin: Skin is warm " and dry. He is not diaphoretic.   Areas of hypopigmentation on ble, small healing cut on left shin   Psychiatric: He has a normal mood and affect. His behavior is normal.   Vitals reviewed.        Diagnoses and health risks identified today and associated recommendations/orders:    1. Encounter for preventive health examination  Assessments completed  Preventative health recommendations reviewed    2. Type 2 diabetes mellitus with stage 2 chronic kidney disease, without long-term current use of insulin  Stable. Last HgbA1C was 7.8  Currently on metformin, not taking januvia because of side effects.  Will discuss this with his PCP today at his visit.   Followed by PCP.     3. Stage 2 chronic kidney disease  Stable.   Followed by PCP.     4. Type 2 diabetes mellitus with diabetic neuropathy, without long-term current use of insulin  Stable. Last HgbA1C was 7.8  Currently on metformin, not taking januvia because of side effects.  Will discuss this with his PCP today at his visit.   Followed by PCP.     5. Diabetic polyneuropathy associated with type 2 diabetes mellitus  Stable.   Followed by PCP.     6. Aortic atherosclerosis  Stable. Seen on CT from 07/11/13  Controlled with current medical therapy  Followed by PCP.     7. Essential hypertension  Stable.   Controlled with current medical therapy  Followed by PCP.     8. Mixed hyperlipidemia  Stable.   Controlled with current medical therapy  Followed by PCP.     9. Bilateral ocular hypertension  Stable.   Controlled with current medical therapy  Followed by optometry.     10. History of prostate cancer  Stable.   Followed by PCP.     11. Personal history of colonic polyps  Stable.   Last colonoscopy 03/29/17, repeat in 5 years  Followed by PCP.     12. Impingement syndrome of both shoulders  Stable.   Followed by orhtopedics.     13. Trigger finger, unspecified finger, unspecified laterality  Stable.   Controlled with current medical therapy  Followed by orthopedics.      14. Sensorineural hearing loss (SNHL), unspecified laterality  Has had hearing aids in the past, they broke  Interested in getting another pair of hearing aids    - Ambulatory Referral to Audiology    Provided Bryant with a 5-10 year written screening schedule and personal prevention plan. Recommendations were developed using the USPSTF age appropriate recommendations. Education, counseling, and referrals were provided as needed. After Visit Summary printed and given to patient which includes a list of additional screenings\tests needed.    Patient is seeing his PCP today, after his HRA visit.  Will get lab work done at/after his PCP visit.     Adelina Stephen NP

## 2018-02-09 NOTE — PROGRESS NOTES
"Subjective:       Patient ID: Bryant Gil is a 77 y.o. male.    Chief Complaint: Follow-up   he presents to the office today with his wife [place]  This note was created with the use of Dragon dictation    Ricardouvia caused loose bowels  He cannot afford it  It was over $100 for a one-month supply  He has noticed that his urine "slacks up"  He's had a little trouble with low back pain  No stinging no burning on urination no nocturia no fever chills or sweats his energy level is good  He is enjoying working  HPI  Review of Systems   Constitutional: Negative for activity change, chills and fever.   HENT: Negative for congestion, dental problem, hearing loss, tinnitus and trouble swallowing.    Eyes: Negative for visual disturbance.   Respiratory: Negative for cough, shortness of breath and wheezing.    Cardiovascular: Negative for chest pain, palpitations and leg swelling.   Genitourinary: Negative for dysuria, frequency and urgency.   Musculoskeletal: Negative for back pain and neck pain.   Neurological: Negative for dizziness, weakness and headaches.   Psychiatric/Behavioral: Negative for dysphoric mood and sleep disturbance. The patient is not nervous/anxious.        Objective:      Physical Exam   Constitutional: He is oriented to person, place, and time. He appears well-developed and well-nourished. No distress.   HENT:   Head: Atraumatic.   Mouth/Throat: No oropharyngeal exudate.   Eyes: Conjunctivae are normal. No scleral icterus.   Cardiovascular: Normal rate, regular rhythm and normal heart sounds.    Pulmonary/Chest: Effort normal and breath sounds normal.   Abdominal: Soft. There is no tenderness.   Musculoskeletal: He exhibits no edema.   Lymphadenopathy:     He has no cervical adenopathy.   Neurological: He is alert and oriented to person, place, and time.   Skin: Skin is warm and dry.   Psychiatric: He has a normal mood and affect. His behavior is normal.       Assessment:       1. Diabetic " polyneuropathy associated with type 2 diabetes mellitus    2. Essential hypertension    3. Impingement syndrome of both shoulders    4. Mixed hyperlipidemia    5. Bilateral ocular hypertension    6. Personal history of colonic polyps    7. Pyelonephritis, right no stone on CT scan.    8. Stage 2 chronic kidney disease    9. Type 2 diabetes mellitus with diabetic neuropathy, without long-term current use of insulin    10. Type 2 diabetes mellitus with stage 2 chronic kidney disease, without long-term current use of insulin    11. Carpal tunnel syndrome of left wrist    12. Aortic atherosclerosis    13. Adenomatous polyp of colon, unspecified part of colon    14. Tortuous aorta    15. Ectatic aorta    16. Nonrheumatic aortic valve insufficiency    17. Vitamin B 12 deficiency    18. H/O prostate cancer        Plan:   Bryant was seen today for follow-up.    Diagnoses and all orders for this visit:    Diabetic polyneuropathy associated with type 2 diabetes mellitus    Essential hypertension  -     2D echo with color flow doppler; Future  -     EKG 12-lead; Future  -     X-Ray Chest PA And Lateral; Future    Impingement syndrome of both shoulders    Mixed hyperlipidemia  -     2D echo with color flow doppler; Future  -     EKG 12-lead; Future  -     X-Ray Chest PA And Lateral; Future    Bilateral ocular hypertension    Personal history of colonic polyps    Pyelonephritis, right no stone on CT scan.    Stage 2 chronic kidney disease  -     2D echo with color flow doppler; Future  -     EKG 12-lead; Future  -     X-Ray Chest PA And Lateral; Future  -     Urinalysis  -     Microalbumin/creatinine urine ratio    Type 2 diabetes mellitus with diabetic neuropathy, without long-term current use of insulin  -     2D echo with color flow doppler; Future  -     EKG 12-lead; Future  -     X-Ray Chest PA And Lateral; Future  -     Urinalysis  -     Microalbumin/creatinine urine ratio  -     Hemoglobin A1c; Future    Type 2 diabetes  mellitus with stage 2 chronic kidney disease, without long-term current use of insulin  -     Comprehensive metabolic panel; Future  -     Hemoglobin A1c; Future  -     Hemoglobin A1c; Future    Carpal tunnel syndrome of left wrist    Aortic atherosclerosis  -     2D echo with color flow doppler; Future  -     EKG 12-lead; Future  -     X-Ray Chest PA And Lateral; Future    Adenomatous polyp of colon, unspecified part of colon    Tortuous aorta  -     2D echo with color flow doppler; Future  -     EKG 12-lead; Future  -     X-Ray Chest PA And Lateral; Future    Ectatic aorta  -     2D echo with color flow doppler; Future  -     EKG 12-lead; Future  -     X-Ray Chest PA And Lateral; Future    Nonrheumatic aortic valve insufficiency  -     2D echo with color flow doppler; Future  -     EKG 12-lead; Future  -     X-Ray Chest PA And Lateral; Future    Vitamin B 12 deficiency  -     Vitamin B12; Future    H/O prostate cancer  -     PROSTATE SPECIFIC ANTIGEN, DIAGNOSTIC; Future    Other orders  -     Urinalysis Microscopic      Diabetes Management Status    Statin: Taking  ACE/ARB: Taking    Screening or Prevention Patient's value Goal Complete/Controlled?   HgA1C Testing and Control   Lab Results   Component Value Date    HGBA1C 6.8 (H) 02/06/2018    HGBA1C 6.8 (H) 02/06/2018      Annually/Less than 8% Yes   Lipid profile : 02/16/2017 Annually Yes   LDL control Lab Results   Component Value Date    LDLCALC 107.4 02/16/2017    Annually/Less than 100 mg/dl  No   Nephropathy screening Lab Results   Component Value Date    LABMICR 17.0 02/06/2018     Lab Results   Component Value Date    PROTEINUA Negative 02/06/2018    Annually Yes   Blood pressure BP Readings from Last 1 Encounters:   02/06/18 134/70    Less than 140/90 Yes   Dilated retinal exam : 09/25/2017 Annually Yes   Foot exam   : 02/06/2018 Annually Yes     Hemoglobin A1C   Date Value Ref Range Status   02/06/2018 6.8 (H) 4.0 - 5.6 % Final     Comment:      According to ADA guidelines, hemoglobin A1c <7.0% represents  optimal control in non-pregnant diabetic patients. Different  metrics may apply to specific patient populations.   Standards of Medical Care in Diabetes-2016.  For the purpose of screening for the presence of diabetes:  <5.7%     Consistent with the absence of diabetes  5.7-6.4%  Consistent with increasing risk for diabetes   (prediabetes)  >or=6.5%  Consistent with diabetes  Currently, no consensus exists for use of hemoglobin A1c  for diagnosis of diabetes for children.  This Hemoglobin A1c assay has significant interference with fetal   hemoglobin   (HbF). The results are invalid for patients with abnormal amounts of   HbF,   including those with known Hereditary Persistence   of Fetal Hemoglobin. Heterozygous hemoglobin variants (HbAS, HbAC,   HbAD, HbAE, HbA2) do not significantly interfere with this assay;   however, presence of multiple variants in a sample may impact the %   interference.     02/06/2018 6.8 (H) 4.0 - 5.6 % Final     Comment:     According to ADA guidelines, hemoglobin A1c <7.0% represents  optimal control in non-pregnant diabetic patients. Different  metrics may apply to specific patient populations.   Standards of Medical Care in Diabetes-2016.  For the purpose of screening for the presence of diabetes:  <5.7%     Consistent with the absence of diabetes  5.7-6.4%  Consistent with increasing risk for diabetes   (prediabetes)  >or=6.5%  Consistent with diabetes  Currently, no consensus exists for use of hemoglobin A1c  for diagnosis of diabetes for children.  This Hemoglobin A1c assay has significant interference with fetal   hemoglobin   (HbF). The results are invalid for patients with abnormal amounts of   HbF,   including those with known Hereditary Persistence   of Fetal Hemoglobin. Heterozygous hemoglobin variants (HbAS, HbAC,   HbAD, HbAE, HbA2) do not significantly interfere with this assay;   however, presence of multiple  variants in a sample may impact the %   interference.     10/10/2017 7.8 (H) 4.0 - 5.6 % Final     Comment:     According to ADA guidelines, hemoglobin A1c <7.0% represents  optimal control in non-pregnant diabetic patients. Different  metrics may apply to specific patient populations.   Standards of Medical Care in Diabetes-2016.  For the purpose of screening for the presence of diabetes:  <5.7%     Consistent with the absence of diabetes  5.7-6.4%  Consistent with increasing risk for diabetes   (prediabetes)  >or=6.5%  Consistent with diabetes  Currently, no consensus exists for use of hemoglobin A1c  for diagnosis of diabetes for children.  This Hemoglobin A1c assay has significant interference with fetal   hemoglobin   (HbF). The results are invalid for patients with abnormal amounts of   HbF,   including those with known Hereditary Persistence   of Fetal Hemoglobin. Heterozygous hemoglobin variants (HbAS, HbAC,   HbAD, HbAE, HbA2) do not significantly interfere with this assay;   however, presence of multiple variants in a sample may impact the %   interference.       Medication List with Changes/Refills   Current Medications    AMLODIPINE (NORVASC) 10 MG TABLET    Take 1 tablet (10 mg total) by mouth once daily.    ASCORBIC ACID (VITAMIN C) 100 MG TABLET    Take 100 mg by mouth once daily.    ASPIRIN (ENTERIC COATED ASPIRIN) 81 MG EC TABLET    Take 1 tablet (81 mg total) by mouth once. 1 Tablet, Delayed Release (E.C.) Oral Every day    ATORVASTATIN (LIPITOR) 20 MG TABLET    Take 1 tablet (20 mg total) by mouth once daily.    BLOOD SUGAR DIAGNOSTIC (TRUETEST TEST STRIPS) STRP    2 strips by Misc.(Non-Drug; Combo Route) route once daily.    BLOOD SUGAR DIAGNOSTIC STRP    1 strip by Misc.(Non-Drug; Combo Route) route once daily.    BLOOD-GLUCOSE METER (ACCU-CHEK YULIA PLUS METER) MISC    1 Device by Misc.(Non-Drug; Combo Route) route once daily.    LANCETS (ACCU-CHEK SOFTCLIX LANCETS) MISC    1 Device by  Misc.(Non-Drug; Combo Route) route once daily.    LATANOPROST 0.005 % OPHTHALMIC SOLUTION    INSTILL 1 DROP INTO BOTH EYES EVERY NIGHT    LISINOPRIL-HYDROCHLOROTHIAZIDE (PRINZIDE,ZESTORETIC) 20-12.5 MG PER TABLET    Take 2 tablets by mouth once daily.    METFORMIN (GLUCOPHAGE) 1000 MG TABLET    Take 1 tablet (1,000 mg total) by mouth 2 (two) times daily with meals.    VITAMIN D 1000 UNITS TAB    Take 185 mg by mouth once daily.   Discontinued Medications    SITAGLIPTIN (JANUVIA) 100 MG TAB    Take 1 tablet (100 mg total) by mouth every morning. Better blood sugar control     Follow-up in about 4 months (around 6/6/2018) for me or Barbara Ventura NP for diabetes check.

## 2018-02-14 ENCOUNTER — TELEPHONE (OUTPATIENT)
Dept: INTERNAL MEDICINE | Facility: CLINIC | Age: 78
End: 2018-02-14

## 2018-02-14 NOTE — TELEPHONE ENCOUNTER
----- Message from Toño Zimmerman sent at 2/14/2018  9:57 AM CST -----  Contact: self [phone]  Doctor appointment and lab have been scheduled.  Please link lab orders to the lab appointment.  Date of doctor appointment: 06/06   Physical or EP:  EP  Date of lab appointment: 06/01   Comments:

## 2018-02-27 ENCOUNTER — PATIENT OUTREACH (OUTPATIENT)
Dept: OTHER | Facility: OTHER | Age: 78
End: 2018-02-27

## 2018-02-27 NOTE — PROGRESS NOTES
"Last 5 Patient Entered Readings                                      Current 30 Day Average: 133/74     Recent Readings 2/27/2018 2/23/2018 2/21/2018 2/21/2018 2/16/2018    SBP (mmHg) 138 128 134 142 136    DBP (mmHg) 79 70 75 74 74    Pulse 76 76 72 72 78          Hypertension Digital Medicine Program (HDMP): Health  Follow Up    Lifestyle Modifications:    1.Low sodium diet: In progress : He informed me that they are doing better with watching the sodium intake and he did talk to his wife and daughter about it but he did not share the resources that I sent with them yet. He admits to being "hard headed" when it comes to his diet but knows it is important so he will share that information with his daughter and wife soon.     2.Physical activity: yes : Patient continues riding his stationary bike 4-7 times per week and is walking a lot at work.     3.Hypotension/Hypertension symptoms: no   Frequency/Alleviating factors/Precipitating factors, etc.     4.Patient has been compliant with the medication regimen.     Follow up with Mr. Bryant Gil completed. No further questions or concerns. I will follow up in a few weeks to assess progress.     "

## 2018-03-12 ENCOUNTER — PATIENT OUTREACH (OUTPATIENT)
Dept: OTHER | Facility: OTHER | Age: 78
End: 2018-03-12

## 2018-03-27 ENCOUNTER — PATIENT OUTREACH (OUTPATIENT)
Dept: OTHER | Facility: OTHER | Age: 78
End: 2018-03-27

## 2018-03-27 NOTE — PROGRESS NOTES
Last 5 Patient Entered Readings                                      Current 30 Day Average: 140/78     Recent Readings 3/22/2018 3/8/2018 3/2/2018 3/1/2018 3/1/2018    SBP (mmHg) 136 151 139 137 148    DBP (mmHg) 75 81 74 80 84    Pulse 77 77 69 71 71        Mr. Gil's BP average has increased above goal but he was having problems with BP monitor. Will continue to monitor readings now that his monitor is working properly. Encouraged him to monitor salt intake.     Patient's BP average is above goal of <130/80.     Will continue to monitor regularly. Will follow up in 4-6 weeks, sooner if BP begins to trend upward or downward.    Patient has my contact information and knows to call with any concerns or clinical changes.     Current HTN regimen:  Hypertension Medications             amlodipine (NORVASC) 10 MG tablet Take 1 tablet (10 mg total) by mouth once daily.    lisinopril-hydrochlorothiazide (PRINZIDE,ZESTORETIC) 20-12.5 mg per tablet Take 2 tablets by mouth once daily.

## 2018-04-18 ENCOUNTER — PATIENT OUTREACH (OUTPATIENT)
Dept: OTHER | Facility: OTHER | Age: 78
End: 2018-04-18

## 2018-04-18 NOTE — PROGRESS NOTES
Last 5 Patient Entered Readings                                      Current 30 Day Average: 137/76     Recent Readings 4/17/2018 4/12/2018 4/12/2018 4/10/2018 4/10/2018    SBP (mmHg) 135 137 145 135 149    DBP (mmHg) 78 72 77 77 81    Pulse 78 78 77 81 82          Hypertension Digital Medicine Program (HDMP): Health  Follow Up    Lifestyle Modifications:    1.Low sodium diet: He shared the resources that I sent to him with his wife and he believes that they have been doing better with the sodium intake. He does his best to avoid highly salted foods and he will have his wife call me if she has any other questions.     2.Physical activity: Patient does not appear to be riding his stationary bike anymore but he works 5 days a week and his job is very physical. 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     3.Hypotension/Hypertension symptoms: no   Frequency/Alleviating factors/Precipitating factors, etc.     4.Patient has been compliant with the medication regimen.     Patient has been taking his BP readings after he gets home from work which is at 4am-5am. He takes his BP medication at 11am when he eats. I requested that he takes his BP reading an hour after he takes his BP medication. I also requested that he sit at the kitchen table instead of the couch just to see if these changes have any affect. He is going to try this for the next few weeks and he will let me know if he has any issues.     Follow up with  Bryantbishop Gil completed. No further questions or concerns. I will follow up in a few weeks to assess progress.

## 2018-04-25 ENCOUNTER — PATIENT OUTREACH (OUTPATIENT)
Dept: OTHER | Facility: OTHER | Age: 78
End: 2018-04-25

## 2018-04-25 DIAGNOSIS — I10 ESSENTIAL HYPERTENSION: Primary | ICD-10-CM

## 2018-04-25 RX ORDER — METOPROLOL SUCCINATE 25 MG/1
25 TABLET, EXTENDED RELEASE ORAL DAILY
Qty: 30 TABLET | Refills: 0 | Status: SHIPPED | OUTPATIENT
Start: 2018-04-25 | End: 2018-05-16 | Stop reason: SDUPTHER

## 2018-04-25 NOTE — PROGRESS NOTES
Last 5 Patient Entered Readings                                      Current 30 Day Average: 136/75     Recent Readings 4/23/2018 4/19/2018 4/17/2018 4/12/2018 4/12/2018    SBP (mmHg) 135 136 135 137 145    DBP (mmHg) 72 75 78 72 77    Pulse 83 79 78 78 77        Mr. Gil's BP average is above goal. He confirms he is checking BP after taking his medications, which he was not doing previously. Will add metoprolol XL 25 mg to current regimen.     Will continue to monitor regularly. Will follow up in 2-3 weeks, sooner if BP begins to trend upward or downward.    Patient has my contact information and knows to call with any concerns or clinical changes.     Current HTN regimen:  Hypertension Medications             amlodipine (NORVASC) 10 MG tablet Take 1 tablet (10 mg total) by mouth once daily.    lisinopril-hydrochlorothiazide (PRINZIDE,ZESTORETIC) 20-12.5 mg per tablet Take 2 tablets by mouth once daily.    metoprolol succinate (TOPROL-XL) 25 MG 24 hr tablet Take 1 tablet (25 mg total) by mouth once daily.

## 2018-05-02 ENCOUNTER — PATIENT OUTREACH (OUTPATIENT)
Dept: OTHER | Facility: OTHER | Age: 78
End: 2018-05-02

## 2018-05-02 NOTE — PROGRESS NOTES
"Last 5 Patient Entered Readings                                      Current 30 Day Average: 132/74     Recent Readings 5/1/2018 5/1/2018 4/30/2018 4/30/2018 4/29/2018    SBP (mmHg) 133 135 130 144 121    DBP (mmHg) 78 78 71 74 70    Pulse 79 79 74 73 83        Mr. Gil reports "having a rough start" on metoprolol because it "drained the life out of him." He is feeling better now and "is handling it fine." He is pleased to see his BP improve with the addition of metoprolol. Will continue current dose and regimen.    Will continue to monitor regularly. Will follow up in 2-3 weeks, sooner if BP begins to trend upward or downward.    Patient has my contact information and knows to call with any concerns or clinical changes.     Current HTN regimen:  Hypertension Medications             amlodipine (NORVASC) 10 MG tablet Take 1 tablet (10 mg total) by mouth once daily.    lisinopril-hydrochlorothiazide (PRINZIDE,ZESTORETIC) 20-12.5 mg per tablet Take 2 tablets by mouth once daily.    metoprolol succinate (TOPROL-XL) 25 MG 24 hr tablet Take 1 tablet (25 mg total) by mouth once daily.              "

## 2018-05-10 ENCOUNTER — OFFICE VISIT (OUTPATIENT)
Dept: ORTHOPEDICS | Facility: CLINIC | Age: 78
End: 2018-05-10
Payer: MEDICARE

## 2018-05-10 VITALS — HEIGHT: 67 IN | WEIGHT: 175 LBS | BODY MASS INDEX: 27.47 KG/M2

## 2018-05-10 DIAGNOSIS — G56.02 CARPAL TUNNEL SYNDROME OF LEFT WRIST: Primary | ICD-10-CM

## 2018-05-10 PROCEDURE — 99213 OFFICE O/P EST LOW 20 MIN: CPT | Mod: 25,S$GLB,, | Performed by: ORTHOPAEDIC SURGERY

## 2018-05-10 PROCEDURE — 99999 PR PBB SHADOW E&M-EST. PATIENT-LVL II: CPT | Mod: PBBFAC,,, | Performed by: ORTHOPAEDIC SURGERY

## 2018-05-10 PROCEDURE — 20526 THER INJECTION CARP TUNNEL: CPT | Mod: LT,S$GLB,, | Performed by: ORTHOPAEDIC SURGERY

## 2018-05-10 RX ORDER — TRIAMCINOLONE ACETONIDE 40 MG/ML
40 INJECTION, SUSPENSION INTRA-ARTICULAR; INTRAMUSCULAR
Status: COMPLETED | OUTPATIENT
Start: 2018-05-10 | End: 2018-05-10

## 2018-05-10 RX ADMIN — TRIAMCINOLONE ACETONIDE 40 MG: 40 INJECTION, SUSPENSION INTRA-ARTICULAR; INTRAMUSCULAR at 11:05

## 2018-05-10 NOTE — PROGRESS NOTES
HISTORY OF PRESENT ILLNESS:  Mr. Gil seen previously for rotator cuff tear of   the shoulder, is doing well with his shoulder.  He really does not want any   further treatment at least not right now.  His main complaint today is pain and   numbness, left hand.  He has had right carpal tunnel release in the past, but   not the left, and I do not think he has had a nerve test yet.    PHYSICAL EXAMINATION:  There is some mild swelling in the volar compartment of   the wrist.  Positive Tinel sign.  Phalen test negative.  Range of motion in   wrist and fingers full.    IMPRESSION:  Possible left carpal tunnel syndrome.    PLAN:  After pause for timeout, he identified the left wrist injected the left   carpal tunnel with combination of Kenalog 20 mg, 0.5 mL Xylocaine, sterile   technique.  He tolerated the procedure well without complication.      I have also recommended and ordered a nerve conduction study for the left hand   and wrist to check for carpal tunnel syndrome.    Even though he is not interested in surgery for his shoulder, I would like him   to keep an eye on his shoulder symptoms and we will treat that accordingly.    Follow up after the nerve test is complete.      MARK  dd: 05/10/2018 11:25:27 (CDT)  td: 05/11/2018 07:29:09 (CDT)  Doc ID   #[id number]  Job ID #[id number]    CC:

## 2018-05-16 ENCOUNTER — PATIENT OUTREACH (OUTPATIENT)
Dept: OTHER | Facility: OTHER | Age: 78
End: 2018-05-16

## 2018-05-16 DIAGNOSIS — I10 ESSENTIAL HYPERTENSION: ICD-10-CM

## 2018-05-16 RX ORDER — METOPROLOL SUCCINATE 25 MG/1
25 TABLET, EXTENDED RELEASE ORAL DAILY
Qty: 90 TABLET | Refills: 1 | Status: SHIPPED | OUTPATIENT
Start: 2018-05-16 | End: 2018-10-03 | Stop reason: SDUPTHER

## 2018-05-16 NOTE — PROGRESS NOTES
Last 5 Patient Entered Readings                                      Current 30 Day Average: 131/73     Recent Readings 5/16/2018 5/16/2018 5/13/2018 5/12/2018 5/12/2018    SBP (mmHg) 122 146 137 132 142    DBP (mmHg) 68 74 70 72 79    Pulse 72 74 81 76 76        Patient's BP average is approaching goal based on 2017 ACC/AHA HTN guidelines of goal BP <130/80.      Mr. Gil is feeling well. He is pleased to see BP average trend down on current regimen. Will send 90 day prescription of metoprolol to [facility].     Patient's health , Balbina Aburto, will be following up every 3-4 weeks. I will continue to monitor regularly and will follow up in 2-3 months, sooner if BP begins to trend upward or downward.    Patient has my contact information and knows to call with any concerns or clinical changes.     Current HTN regimen:  Hypertension Medications             amlodipine (NORVASC) 10 MG tablet Take 1 tablet (10 mg total) by mouth once daily.    lisinopril-hydrochlorothiazide (PRINZIDE,ZESTORETIC) 20-12.5 mg per tablet Take 2 tablets by mouth once daily.    metoprolol succinate (TOPROL-XL) 25 MG 24 hr tablet Take 1 tablet (25 mg total) by mouth once daily.

## 2018-06-01 ENCOUNTER — LAB VISIT (OUTPATIENT)
Dept: LAB | Facility: HOSPITAL | Age: 78
End: 2018-06-01
Payer: MEDICARE

## 2018-06-01 DIAGNOSIS — E11.40 TYPE 2 DIABETES MELLITUS WITH DIABETIC NEUROPATHY, WITHOUT LONG-TERM CURRENT USE OF INSULIN: ICD-10-CM

## 2018-06-01 LAB
ESTIMATED AVG GLUCOSE: 157 MG/DL
HBA1C MFR BLD HPLC: 7.1 %

## 2018-06-01 PROCEDURE — 83036 HEMOGLOBIN GLYCOSYLATED A1C: CPT

## 2018-06-01 PROCEDURE — 36415 COLL VENOUS BLD VENIPUNCTURE: CPT

## 2018-06-04 RX ORDER — LATANOPROST 50 UG/ML
SOLUTION/ DROPS OPHTHALMIC
Qty: 3 BOTTLE | Refills: 3 | Status: SHIPPED | OUTPATIENT
Start: 2018-06-04 | End: 2018-10-23 | Stop reason: SDUPTHER

## 2018-06-06 ENCOUNTER — OFFICE VISIT (OUTPATIENT)
Dept: INTERNAL MEDICINE | Facility: CLINIC | Age: 78
End: 2018-06-06
Payer: MEDICARE

## 2018-06-06 VITALS
BODY MASS INDEX: 27.64 KG/M2 | WEIGHT: 176.13 LBS | OXYGEN SATURATION: 98 % | HEART RATE: 73 BPM | DIASTOLIC BLOOD PRESSURE: 68 MMHG | HEIGHT: 67 IN | SYSTOLIC BLOOD PRESSURE: 136 MMHG

## 2018-06-06 DIAGNOSIS — Z85.46 HISTORY OF PROSTATE CANCER: ICD-10-CM

## 2018-06-06 DIAGNOSIS — E11.42 DIABETIC POLYNEUROPATHY ASSOCIATED WITH TYPE 2 DIABETES MELLITUS: ICD-10-CM

## 2018-06-06 DIAGNOSIS — I10 ESSENTIAL HYPERTENSION: ICD-10-CM

## 2018-06-06 DIAGNOSIS — E11.40 TYPE 2 DIABETES MELLITUS WITH DIABETIC NEUROPATHY, WITHOUT LONG-TERM CURRENT USE OF INSULIN: ICD-10-CM

## 2018-06-06 DIAGNOSIS — G56.02 CARPAL TUNNEL SYNDROME OF LEFT WRIST: Primary | ICD-10-CM

## 2018-06-06 DIAGNOSIS — E78.2 MIXED HYPERLIPIDEMIA: ICD-10-CM

## 2018-06-06 PROCEDURE — 99499 UNLISTED E&M SERVICE: CPT | Mod: S$PBB,,, | Performed by: INTERNAL MEDICINE

## 2018-06-06 PROCEDURE — 3078F DIAST BP <80 MM HG: CPT | Mod: CPTII,S$GLB,, | Performed by: INTERNAL MEDICINE

## 2018-06-06 PROCEDURE — 99214 OFFICE O/P EST MOD 30 MIN: CPT | Mod: S$GLB,,, | Performed by: INTERNAL MEDICINE

## 2018-06-06 PROCEDURE — 3045F PR MOST RECENT HEMOGLOBIN A1C LEVEL 7.0-9.0%: CPT | Mod: CPTII,S$GLB,, | Performed by: INTERNAL MEDICINE

## 2018-06-06 PROCEDURE — 3075F SYST BP GE 130 - 139MM HG: CPT | Mod: CPTII,S$GLB,, | Performed by: INTERNAL MEDICINE

## 2018-06-06 PROCEDURE — 99999 PR PBB SHADOW E&M-EST. PATIENT-LVL III: CPT | Mod: PBBFAC,,, | Performed by: INTERNAL MEDICINE

## 2018-06-07 NOTE — PROGRESS NOTES
Subjective:       Patient ID: Bryant Gil is a 77 y.o. male.    Chief Complaint: Follow-up  This dictation was performed using using MModal.    MAIN CONCERN  He is in the digital medicine hypertension program  Every time he stands up, he becomes momentarily lightheaded.  He has not had any falls.  He is concerned that he is overmedicated.    He got an injection in his wrist for carpal tunnel syndrome it seems to be helping.  He has less numbness and may be better dexterity.    He has decided to continue to put up with right shoulder pain. He knows to people who have had shoulder surgery who are still complete 10 you to complain of pain. One of these person is his wife.  HPI  Review of Systems   Constitutional: Negative for activity change, appetite change, chills, fatigue, fever and unexpected weight change.   HENT: Negative for dental problem, hearing loss, tinnitus, trouble swallowing and voice change.    Eyes: Negative for visual disturbance.   Respiratory: Negative for cough, chest tightness, shortness of breath and wheezing.    Cardiovascular: Negative for chest pain, palpitations and leg swelling.   Gastrointestinal: Negative for abdominal pain, blood in stool, constipation, diarrhea, nausea and vomiting.   Genitourinary: Negative for difficulty urinating, dysuria, flank pain, frequency and urgency.   Musculoskeletal: Negative for arthralgias, back pain, gait problem, joint swelling, myalgias, neck pain and neck stiffness.   Skin: Negative for rash.   Neurological: Negative for tremors, light-headedness, numbness and headaches.   Psychiatric/Behavioral: Negative for dysphoric mood and sleep disturbance. The patient is not nervous/anxious.        Objective:      Physical Exam   Constitutional: He is oriented to person, place, and time. No distress.   HENT:   Head: Atraumatic.   Eyes: Conjunctivae are normal. No scleral icterus.   Neck: Neck supple.   Cardiovascular: Normal rate and regular rhythm.     Pulmonary/Chest: Effort normal and breath sounds normal.   Abdominal: Soft. There is no tenderness.   Musculoskeletal: He exhibits no edema.   Lymphadenopathy:     He has no cervical adenopathy.   Neurological: He is alert and oriented to person, place, and time.   Skin: Skin is warm and dry.   Psychiatric: He has a normal mood and affect. His behavior is normal.   Nursing note and vitals reviewed.      Assessment:       1. Carpal tunnel syndrome of left wrist    2. Diabetic polyneuropathy associated with type 2 diabetes mellitus    3. History of prostate cancer    4. Essential hypertension    5. Mixed hyperlipidemia    6. Type 2 diabetes mellitus with diabetic neuropathy, without long-term current use of insulin        Plan:   Bryant was seen today for follow-up.    Diagnoses and all orders for this visit:    Carpal tunnel syndrome of left wrist  -     CBC auto differential; Future    Diabetic polyneuropathy associated with type 2 diabetes mellitus  -     Hemoglobin A1c; Future  -     Lipid panel; Future  -     Hemoglobin A1c; Future  -     Comprehensive metabolic panel; Future  -     CBC auto differential; Future    History of prostate cancer  -     CBC auto differential; Future    Essential hypertension  -     CBC auto differential; Future    Mixed hyperlipidemia  -     Hemoglobin A1c; Future  -     Lipid panel; Future    Type 2 diabetes mellitus with diabetic neuropathy, without long-term current use of insulin  -     Hemoglobin A1c; Future  -     Lipid panel; Future      Medication List with Changes/Refills   Current Medications    AMLODIPINE (NORVASC) 10 MG TABLET    Take 1 tablet (10 mg total) by mouth once daily.    ASCORBIC ACID (VITAMIN C) 100 MG TABLET    Take 100 mg by mouth once daily.    ASPIRIN (ENTERIC COATED ASPIRIN) 81 MG EC TABLET    Take 1 tablet (81 mg total) by mouth once. 1 Tablet, Delayed Release (E.C.) Oral Every day    ATORVASTATIN (LIPITOR) 20 MG TABLET    Take 1 tablet (20 mg total) by  mouth once daily.    BLOOD SUGAR DIAGNOSTIC (TRUETEST TEST STRIPS) STRP    2 strips by Misc.(Non-Drug; Combo Route) route once daily.    BLOOD SUGAR DIAGNOSTIC STRP    1 strip by Misc.(Non-Drug; Combo Route) route once daily.    BLOOD-GLUCOSE METER (ACCU-CHEK YULIA PLUS METER) MISC    1 Device by Misc.(Non-Drug; Combo Route) route once daily.    LANCETS (ACCU-CHEK SOFTCLIX LANCETS) MISC    1 Device by Misc.(Non-Drug; Combo Route) route once daily.    LATANOPROST 0.005 % OPHTHALMIC SOLUTION    INSTILL 1 DROP INTO BOTH EYES EVERY NIGHT (OPENED BOTTLE EXPIRES IN 42 DAYS)    LISINOPRIL-HYDROCHLOROTHIAZIDE (PRINZIDE,ZESTORETIC) 20-12.5 MG PER TABLET    Take 2 tablets by mouth once daily.    METFORMIN (GLUCOPHAGE) 1000 MG TABLET    Take 1 tablet (1,000 mg total) by mouth 2 (two) times daily with meals.    METOPROLOL SUCCINATE (TOPROL-XL) 25 MG 24 HR TABLET    Take 1 tablet (25 mg total) by mouth once daily.    VITAMIN D 1000 UNITS TAB    Take 185 mg by mouth once daily.     Follow-up in about 6 months (around 12/4/2018) for after labs.

## 2018-06-08 ENCOUNTER — PATIENT OUTREACH (OUTPATIENT)
Dept: OTHER | Facility: OTHER | Age: 78
End: 2018-06-08

## 2018-06-08 NOTE — PROGRESS NOTES
Last 5 Patient Entered Readings                                      Current 30 Day Average: 129/73     Recent Readings 6/7/2018 6/7/2018 6/3/2018 5/31/2018 5/29/2018    SBP (mmHg) 127 140 123 128 134    DBP (mmHg) 75 79 75 76 77    Pulse 81 81 78 78 72          Digital Medicine: Health  Follow Up    Lifestyle Modifications:    1.Dietary Modifications (Sodium intake <2,000mg/day, food labels, dining out): Patient continues working on his low sodium diet. He and his wife continue cooking at home and avoiding salt and salted seasonings.     2.Physical Activity: Deferred    3.Medication Therapy: Patient has been compliant with the medication regimen.    4.Patient has the following medication side effects/concerns:   (Frequency/Alleviating factors/Precipitating factors, etc.)     Patient has changed up the times he takes his BP medication and his BP readings. Before he was taking his medication after taking his BP readings so we were not really getting a good idea of how his medication was working. Now, he gets home from work around 4am and goes to sleep. He wakes up around 9am and takes his BP medication along with something light to eat. He eats lunch around 11am and take his vitamins during that time as well. He takes his BP readings after noon. He use to take all of his medication together but has been struggling with feeling run down and fatigue so he is now waiting to take his vitamins around lunch time. This has helped a little but he still feels run down. I asked if he always felt like this or if it is something new. He stated that he has always felt like this and just figured that he was suppose to feel this way because of the medication but it is starting to take a toll on him. He is going to see how the next few weeks go and if it doesn't get better (since splitting up medication times) he will let us know and I will discuss further with his PharmD, SHANIQUE Romano.     Follow up with Mr. Bryant Gil  completed. No further questions or concerns. Will continue follow up to achieve health goals.

## 2018-07-11 ENCOUNTER — PATIENT OUTREACH (OUTPATIENT)
Dept: OTHER | Facility: OTHER | Age: 78
End: 2018-07-11

## 2018-07-11 NOTE — PROGRESS NOTES
Last 5 Patient Entered Readings                                      Current 30 Day Average: 128/75     Recent Readings 7/8/2018 7/4/2018 6/30/2018 6/30/2018 6/29/2018    SBP (mmHg) 122 130 131 140 136    DBP (mmHg) 72 75 79 79 80    Pulse 82 76 77 76 77        Patient's BP average is controlled based on 2017 ACC/AHA HTN guidelines of goal BP <130/80.      Mr. Gil is doing well. He is unsure why his BP spikes on occasion, but usually improves upon repeat check. Overall average remains at goal. He has no questions or concerns at this time.      Patient's health , Balbina Aburto, will be following up every 3-4 weeks. I will continue to monitor regularly and will follow up in 3-4 months, sooner if BP begins to trend upward or downward.    Patient has my contact information and knows to call with any concerns or clinical changes.     Current HTN regimen:  Hypertension Medications             amlodipine (NORVASC) 10 MG tablet Take 1 tablet (10 mg total) by mouth once daily.    lisinopril-hydrochlorothiazide (PRINZIDE,ZESTORETIC) 20-12.5 mg per tablet Take 2 tablets by mouth once daily.    metoprolol succinate (TOPROL-XL) 25 MG 24 hr tablet Take 1 tablet (25 mg total) by mouth once daily.            
2018

## 2018-08-22 ENCOUNTER — PATIENT OUTREACH (OUTPATIENT)
Dept: OTHER | Facility: OTHER | Age: 78
End: 2018-08-22

## 2018-08-22 NOTE — PROGRESS NOTES
Last 5 Patient Entered Readings                                      Current 30 Day Average: 119/68     Recent Readings 8/19/2018 8/19/2018 8/15/2018 8/11/2018 8/11/2018    SBP (mmHg) 126 138 117 125 138    DBP (mmHg) 68 73 66 68 86    Pulse 78 80 86 85 82          Digital Medicine: Health  Follow Up    Lifestyle Modifications:    1.Dietary Modifications (Sodium intake <2,000mg/day, food labels, dining out): Deferred    2.Physical Activity: Deferred    3.Medication Therapy: Patient has been compliant with the medication regimen. Patient is doing well on his current BP medication regimen. He denies symptoms/side effects.     4.Patient has the following medication side effects/concerns:   (Frequency/Alleviating factors/Precipitating factors, etc.)     Patient has now retired and he is not working 4pm-4am anymore. 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   He is very  Pleased with his BP readings.     Follow up with Mr. Bryant Gil completed. No further questions or concerns. Will continue follow up to achieve health goals.

## 2018-09-21 ENCOUNTER — PATIENT OUTREACH (OUTPATIENT)
Dept: OTHER | Facility: OTHER | Age: 78
End: 2018-09-21

## 2018-09-21 NOTE — PROGRESS NOTES
Last 5 Patient Entered Readings                                      Current 30 Day Average: 123/73     Recent Readings 9/19/2018 9/18/2018 9/18/2018 9/16/2018 9/16/2018    SBP (mmHg) 125 130 131 119 132    DBP (mmHg) 68 76 77 70 75    Pulse 77 77 77 78 78          Digital Medicine: Health  Follow Up    Lifestyle Modifications:    1.Dietary Modifications (Sodium intake <2,000mg/day, food labels, dining out): Deferred    2.Physical Activity: Deferred    3.Medication Therapy: Patient has been compliant with the medication regimen. Patient is doing well on his current regimen. He denies symptoms/side effects.     4.Patient has the following medication side effects/concerns:   (Frequency/Alleviating factors/Precipitating factors, etc.)     Patient is very pleased with his BP readings.     Follow up with Mr. Bryant Gil completed. No further questions or concerns. Will continue to follow up to achieve health goals.

## 2018-10-02 ENCOUNTER — LAB VISIT (OUTPATIENT)
Dept: LAB | Facility: HOSPITAL | Age: 78
End: 2018-10-02
Attending: INTERNAL MEDICINE
Payer: MEDICARE

## 2018-10-02 DIAGNOSIS — E78.2 MIXED HYPERLIPIDEMIA: ICD-10-CM

## 2018-10-02 DIAGNOSIS — E11.42 DIABETIC POLYNEUROPATHY ASSOCIATED WITH TYPE 2 DIABETES MELLITUS: ICD-10-CM

## 2018-10-02 DIAGNOSIS — E11.40 TYPE 2 DIABETES MELLITUS WITH DIABETIC NEUROPATHY, WITHOUT LONG-TERM CURRENT USE OF INSULIN: ICD-10-CM

## 2018-10-02 LAB
CHOLEST SERPL-MCNC: 167 MG/DL
CHOLEST/HDLC SERPL: 3.9 {RATIO}
ESTIMATED AVG GLUCOSE: 186 MG/DL
HBA1C MFR BLD HPLC: 8.1 %
HDLC SERPL-MCNC: 43 MG/DL
HDLC SERPL: 25.7 %
LDLC SERPL CALC-MCNC: 109.2 MG/DL
NONHDLC SERPL-MCNC: 124 MG/DL
TRIGL SERPL-MCNC: 74 MG/DL

## 2018-10-02 PROCEDURE — 80061 LIPID PANEL: CPT

## 2018-10-02 PROCEDURE — 83036 HEMOGLOBIN GLYCOSYLATED A1C: CPT

## 2018-10-02 PROCEDURE — 36415 COLL VENOUS BLD VENIPUNCTURE: CPT

## 2018-10-03 ENCOUNTER — IMMUNIZATION (OUTPATIENT)
Dept: INTERNAL MEDICINE | Facility: CLINIC | Age: 78
End: 2018-10-03
Payer: MEDICARE

## 2018-10-03 ENCOUNTER — OFFICE VISIT (OUTPATIENT)
Dept: INTERNAL MEDICINE | Facility: CLINIC | Age: 78
End: 2018-10-03
Payer: MEDICARE

## 2018-10-03 ENCOUNTER — TELEPHONE (OUTPATIENT)
Dept: INTERNAL MEDICINE | Facility: CLINIC | Age: 78
End: 2018-10-03

## 2018-10-03 ENCOUNTER — TELEPHONE (OUTPATIENT)
Dept: OPHTHALMOLOGY | Facility: CLINIC | Age: 78
End: 2018-10-03

## 2018-10-03 VITALS
DIASTOLIC BLOOD PRESSURE: 72 MMHG | SYSTOLIC BLOOD PRESSURE: 130 MMHG | HEART RATE: 75 BPM | BODY MASS INDEX: 27.37 KG/M2 | OXYGEN SATURATION: 95 % | WEIGHT: 174.38 LBS | HEIGHT: 67 IN

## 2018-10-03 DIAGNOSIS — E11.40 TYPE 2 DIABETES MELLITUS WITH DIABETIC NEUROPATHY, WITHOUT LONG-TERM CURRENT USE OF INSULIN: Primary | ICD-10-CM

## 2018-10-03 DIAGNOSIS — I10 ESSENTIAL HYPERTENSION: ICD-10-CM

## 2018-10-03 DIAGNOSIS — Z23 NEEDS FLU SHOT: ICD-10-CM

## 2018-10-03 DIAGNOSIS — E78.2 MIXED HYPERLIPIDEMIA: ICD-10-CM

## 2018-10-03 PROCEDURE — 99999 PR PBB SHADOW E&M-EST. PATIENT-LVL IV: CPT | Mod: PBBFAC,,, | Performed by: INTERNAL MEDICINE

## 2018-10-03 PROCEDURE — 1101F PT FALLS ASSESS-DOCD LE1/YR: CPT | Mod: CPTII,,, | Performed by: INTERNAL MEDICINE

## 2018-10-03 PROCEDURE — 90662 IIV NO PRSV INCREASED AG IM: CPT | Mod: PBBFAC

## 2018-10-03 PROCEDURE — 3078F DIAST BP <80 MM HG: CPT | Mod: CPTII,,, | Performed by: INTERNAL MEDICINE

## 2018-10-03 PROCEDURE — 99214 OFFICE O/P EST MOD 30 MIN: CPT | Mod: S$PBB,,, | Performed by: INTERNAL MEDICINE

## 2018-10-03 PROCEDURE — 99214 OFFICE O/P EST MOD 30 MIN: CPT | Mod: PBBFAC,25 | Performed by: INTERNAL MEDICINE

## 2018-10-03 PROCEDURE — 3075F SYST BP GE 130 - 139MM HG: CPT | Mod: CPTII,,, | Performed by: INTERNAL MEDICINE

## 2018-10-03 RX ORDER — ATORVASTATIN CALCIUM 20 MG/1
20 TABLET, FILM COATED ORAL DAILY
Qty: 90 TABLET | Refills: 3 | Status: SHIPPED | OUTPATIENT
Start: 2018-10-03 | End: 2019-09-25 | Stop reason: SDUPTHER

## 2018-10-03 RX ORDER — LISINOPRIL AND HYDROCHLOROTHIAZIDE 12.5; 2 MG/1; MG/1
2 TABLET ORAL DAILY
Qty: 180 TABLET | Refills: 3 | Status: SHIPPED | OUTPATIENT
Start: 2018-10-03 | End: 2018-11-27 | Stop reason: ALTCHOICE

## 2018-10-03 RX ORDER — ASPIRIN 81 MG/1
81 TABLET ORAL ONCE
Qty: 1 TABLET | Refills: 0
Start: 2018-10-03 | End: 2021-02-05

## 2018-10-03 RX ORDER — METOPROLOL SUCCINATE 25 MG/1
25 TABLET, EXTENDED RELEASE ORAL DAILY
Qty: 90 TABLET | Refills: 1 | Status: SHIPPED | OUTPATIENT
Start: 2018-10-03 | End: 2019-03-19 | Stop reason: SDUPTHER

## 2018-10-03 RX ORDER — METFORMIN HYDROCHLORIDE 1000 MG/1
1000 TABLET ORAL 2 TIMES DAILY WITH MEALS
Qty: 180 TABLET | Refills: 3 | Status: SHIPPED | OUTPATIENT
Start: 2018-10-03 | End: 2019-09-25 | Stop reason: SDUPTHER

## 2018-10-03 RX ORDER — AMLODIPINE BESYLATE 10 MG/1
10 TABLET ORAL DAILY
Qty: 90 TABLET | Refills: 3 | Status: SHIPPED | OUTPATIENT
Start: 2018-10-03 | End: 2019-09-25 | Stop reason: SDUPTHER

## 2018-10-03 NOTE — PROGRESS NOTES
Subjective:       Patient ID: Bryant Gil is a 78 y.o. male.    Chief Complaint: Follow-up (4 month f/u)  This dictation was performed using using MModal.  This 78-year-old gentleman presents to address all of his chronic medical problems.    He retired August 1, 2018 and he cannot believe how much happyer and how much better he feels overall.  He feels like he is taking good care of himself now.    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  Most of the people are young and pleasant, tip him generously and are going a short distance.    He is compliant with all his medications and willing to update his immunizations and screenings.  Diabetes Management Status    Statin: Taking  ACE/ARB: Taking    Screening or Prevention Patient's value Goal Complete/Controlled?   HgA1C Testing and Control   Lab Results   Component Value Date    HGBA1C 8.1 (H) 10/02/2018      Annually/Less than 8% No   Lipid profile : 10/02/2018 Annually Yes   LDL control Lab Results   Component Value Date    LDLCALC 109.2 10/02/2018    Annually/Less than 100 mg/dl  No   Nephropathy screening Lab Results   Component Value Date    LABMICR 17.0 02/06/2018     Lab Results   Component Value Date    PROTEINUA Negative 02/06/2018    Annually Yes   Blood pressure BP Readings from Last 1 Encounters:   10/03/18 130/72    Less than 140/90 Yes   Dilated retinal exam : 09/25/2017 Annually No   Foot exam   : 02/06/2018 Annually Yes       Hemoglobin A1C   Date Value Ref Range Status   10/02/2018 8.1 (H) 4.0 - 5.6 % Final     Comment:     ADA Screening Guidelines:  5.7-6.4%  Consistent with prediabetes  >or=6.5%  Consistent with diabetes  High levels of fetal hemoglobin interfere with the HbA1C  assay. Heterozygous hemoglobin variants (HbS, HgC, etc)do  not significantly interfere with this assay.   However, presence of multiple variants may affect accuracy.     06/01/2018 7.1 (H) 4.0 - 5.6 % Final     Comment:     ADA Screening Guidelines:  5.7-6.4%   Consistent with prediabetes  >or=6.5%  Consistent with diabetes  High levels of fetal hemoglobin interfere with the HbA1C  assay. Heterozygous hemoglobin variants (HbS, HgC, etc)do  not significantly interfere with this assay.   However, presence of multiple variants may affect accuracy.     02/06/2018 6.8 (H) 4.0 - 5.6 % Final     Comment:     According to ADA guidelines, hemoglobin A1c <7.0% represents  optimal control in non-pregnant diabetic patients. Different  metrics may apply to specific patient populations.   Standards of Medical Care in Diabetes-2016.  For the purpose of screening for the presence of diabetes:  <5.7%     Consistent with the absence of diabetes  5.7-6.4%  Consistent with increasing risk for diabetes   (prediabetes)  >or=6.5%  Consistent with diabetes  Currently, no consensus exists for use of hemoglobin A1c  for diagnosis of diabetes for children.  This Hemoglobin A1c assay has significant interference with fetal   hemoglobin   (HbF). The results are invalid for patients with abnormal amounts of   HbF,   including those with known Hereditary Persistence   of Fetal Hemoglobin. Heterozygous hemoglobin variants (HbAS, HbAC,   HbAD, HbAE, HbA2) do not significantly interfere with this assay;   however, presence of multiple variants in a sample may impact the %   interference.     02/06/2018 6.8 (H) 4.0 - 5.6 % Final     Comment:     According to ADA guidelines, hemoglobin A1c <7.0% represents  optimal control in non-pregnant diabetic patients. Different  metrics may apply to specific patient populations.   Standards of Medical Care in Diabetes-2016.  For the purpose of screening for the presence of diabetes:  <5.7%     Consistent with the absence of diabetes  5.7-6.4%  Consistent with increasing risk for diabetes   (prediabetes)  >or=6.5%  Consistent with diabetes  Currently, no consensus exists for use of hemoglobin A1c  for diagnosis of diabetes for children.  This Hemoglobin A1c assay has  significant interference with fetal   hemoglobin   (HbF). The results are invalid for patients with abnormal amounts of   HbF,   including those with known Hereditary Persistence   of Fetal Hemoglobin. Heterozygous hemoglobin variants (HbAS, HbAC,   HbAD, HbAE, HbA2) do not significantly interfere with this assay;   however, presence of multiple variants in a sample may impact the %   interference.       He requests no medication change and will be monitored.  HPI  Review of Systems   Constitutional: Negative for activity change, chills and fever.   HENT: Negative for congestion, dental problem, hearing loss, tinnitus and trouble swallowing.    Eyes: Negative for visual disturbance.   Respiratory: Negative for cough, shortness of breath and wheezing.    Cardiovascular: Negative for chest pain, palpitations and leg swelling.   Genitourinary: Negative for dysuria, frequency and urgency.   Musculoskeletal: Negative for back pain and neck pain.   Neurological: Negative for dizziness, weakness and headaches.   Psychiatric/Behavioral: Negative for dysphoric mood and sleep disturbance. The patient is not nervous/anxious.        Objective:      Physical Exam   Constitutional: He is oriented to person, place, and time. No distress.   HENT:   Head: Atraumatic.   Eyes: Conjunctivae are normal. No scleral icterus.   Neck: Neck supple.   Cardiovascular: Normal rate and regular rhythm.   Pulmonary/Chest: Effort normal and breath sounds normal.   Abdominal: Soft. There is no tenderness.   Musculoskeletal: He exhibits no edema.   Lymphadenopathy:     He has no cervical adenopathy.   Neurological: He is alert and oriented to person, place, and time.   Skin: Skin is warm and dry.   Psychiatric: He has a normal mood and affect. His behavior is normal.       Assessment:       1. Type 2 diabetes mellitus with diabetic neuropathy, without long-term current use of insulin    2. Essential hypertension    3. Mixed hyperlipidemia    4. Needs  flu shot        Plan:   Bryant was seen today for follow-up.    Diagnoses and all orders for this visit:    Type 2 diabetes mellitus with diabetic neuropathy, without long-term current use of insulin  -     Ambulatory referral to Optometry  -     Comprehensive metabolic panel; Future  -     Hemoglobin A1c; Future    Essential hypertension  -     lisinopril-hydrochlorothiazide (PRINZIDE,ZESTORETIC) 20-12.5 mg per tablet; Take 2 tablets by mouth once daily.  -     metoprolol succinate (TOPROL-XL) 25 MG 24 hr tablet; Take 1 tablet (25 mg total) by mouth once daily.  -     amLODIPine (NORVASC) 10 MG tablet; Take 1 tablet (10 mg total) by mouth once daily.  -     Comprehensive metabolic panel; Future  -     Hemoglobin A1c; Future    Mixed hyperlipidemia    Needs flu shot    Other orders  -     atorvastatin (LIPITOR) 20 MG tablet; Take 1 tablet (20 mg total) by mouth once daily.  -     metFORMIN (GLUCOPHAGE) 1000 MG tablet; Take 1 tablet (1,000 mg total) by mouth 2 (two) times daily with meals.  -     aspirin (ENTERIC COATED ASPIRIN) 81 MG EC tablet; Take 1 tablet (81 mg total) by mouth once. 1 Tablet, Delayed Release (E.C.) Oral Every day for 1 dose    Follow-up in about 6 months (around 4/4/2019) for after labs.

## 2018-10-23 ENCOUNTER — INITIAL CONSULT (OUTPATIENT)
Dept: OPTOMETRY | Facility: CLINIC | Age: 78
End: 2018-10-23
Payer: MEDICARE

## 2018-10-23 ENCOUNTER — CLINICAL SUPPORT (OUTPATIENT)
Dept: OPHTHALMOLOGY | Facility: CLINIC | Age: 78
End: 2018-10-23
Payer: MEDICARE

## 2018-10-23 ENCOUNTER — PATIENT OUTREACH (OUTPATIENT)
Dept: OTHER | Facility: OTHER | Age: 78
End: 2018-10-23

## 2018-10-23 ENCOUNTER — TELEPHONE (OUTPATIENT)
Dept: OPTOMETRY | Facility: CLINIC | Age: 78
End: 2018-10-23

## 2018-10-23 DIAGNOSIS — H40.053 OCULAR HYPERTENSION, BILATERAL: ICD-10-CM

## 2018-10-23 DIAGNOSIS — H25.13 NUCLEAR SCLEROTIC CATARACT OF BOTH EYES: ICD-10-CM

## 2018-10-23 DIAGNOSIS — Z79.84 LONG TERM CURRENT USE OF ORAL HYPOGLYCEMIC DRUG: ICD-10-CM

## 2018-10-23 DIAGNOSIS — E11.9 TYPE 2 DIABETES MELLITUS WITHOUT RETINOPATHY: Primary | ICD-10-CM

## 2018-10-23 PROCEDURE — 92133 CPTRZD OPH DX IMG PST SGM ON: CPT | Mod: PBBFAC | Performed by: OPTOMETRIST

## 2018-10-23 PROCEDURE — 99999 PR PBB SHADOW E&M-EST. PATIENT-LVL III: CPT | Mod: PBBFAC,,, | Performed by: OPTOMETRIST

## 2018-10-23 PROCEDURE — 92083 EXTENDED VISUAL FIELD XM: CPT | Mod: PBBFAC | Performed by: OPTOMETRIST

## 2018-10-23 PROCEDURE — 99213 OFFICE O/P EST LOW 20 MIN: CPT | Mod: PBBFAC | Performed by: OPTOMETRIST

## 2018-10-23 PROCEDURE — 92014 COMPRE OPH EXAM EST PT 1/>: CPT | Mod: S$PBB,,, | Performed by: OPTOMETRIST

## 2018-10-23 RX ORDER — LATANOPROST 50 UG/ML
1 SOLUTION/ DROPS OPHTHALMIC DAILY
Qty: 3 BOTTLE | Refills: 3 | Status: SHIPPED | OUTPATIENT
Start: 2018-10-23 | End: 2020-06-16 | Stop reason: SDUPTHER

## 2018-10-23 NOTE — LETTER
October 23, 2018      Laverne Zaidi MD  [address]  [facility] [address]           [facility]  [address]  [facility] [address]  Phone: [phone]          Patient: Bryant Gil   MR Number: [id number]   YOB: 1940   Date of Visit: 10/23/2018       Dear Dr. Laverne Zaidi:    Thank you for referring Bryant Gil to me for evaluation. Attached you will find relevant portions of my assessment and plan of care.    If you have questions, please do not hesitate to call me. I look forward to following Bryant Gil along with you.    Sincerely,    Adelina Enciso, OD    Enclosure  CC:  No Recipients    If you would like to receive this communication electronically, please contact [email or web address] or [phone] to request more information on [employer] access.    For providers and/or their staff who would like to refer a patient to [facility], please contact us through our one-stop-shop provider referral line, [facility], at [phone].    If you feel you have received this communication in error or would no longer like to receive these types of communications, please e-mail [email or web address]

## 2018-10-23 NOTE — PROGRESS NOTES
HPI     Mr. Bryant Gil was referred by Laverne Roberson MD for a diabetic eye   exam.    Patient reports no complaints about eyes, vision, or glasses today.     He reports good compliance and no adverse effects with Latanoprost. He   used to use QHS OU, now uses qAM because of change in work schedule.    Would patient like a refraction today? No, pt will return in the future   when blood glucose lower/stable.     (+)drops: Latanoprost qAM OU (last used yesterday about 8am)  (-)flashes  (-)floaters  (-)diplopia    Diabetic: yes, Type 2, dx over 20 years ago  Hemoglobin A1C       Date                     Value               Ref Range             Status           10/02/2018               8.1 (H)             4.0 - 5.6 %         Final  06/01/2018               7.1 (H)             4.0 - 5.6 %         Final  02/06/2018               6.8 (H)             4.0 - 5.6 %         Final        OCULAR HISTORY  Last Eye Exam: 09/25/17 with Dr. Barney   (-)eye surgery   Ocular hypertension OU  Cataracts OU     FAMILY HISTORY  (+)Glaucoma: sister and mother         Last edited by Adelina Enciso, OD on 10/23/2018 11:13 AM. (History)            Assessment /Plan     For exam results, see Encounter Report.    Type 2 diabetes mellitus without retinopathy  Long term current use of oral hypoglycemic drug   No retinopathy noted OU. Monitor with yearly DFE.     Ocular hypertension, bilateral   IOP below target with Latanoprost qAM OU. Continue same management.   Pt sent today for HVF 24-2ss, RNFL OCT, and Posterior Pole OCT to monitor for progression (last performed in 2016) -- OCT stable OU, HVF clear OD, HVF borderline OS but questionable reliability. Will call pt with results. RTC 6 months for IOP check. Repeat RNFL and PPole OCT annually. Repeat HVF if progression in OCT noted.    -     latanoprost 0.005 % ophthalmic solution; Place 1 drop into both eyes once daily.  Dispense: 3 Bottle; Refill: 3  -     Cedeno Visual Field - OU -  "Extended - Both Eyes  -     Posterior Segment OCT Optic Nerve- Both eyes    GLAUCOMA HISTORY (updated 10/23/2018):  Diagnosis: Ocular hypertension OU  Date of Dx: 2008  (+)Family history of glaucoma: mother, sister  PreTx Tmax: OD 32mmHg, OS 27mmHg  Target IOP: OD </= 20mmHg, OS </= 18mmHg  Treatment history:   * Originally examined by Dr. Taniya Barney prior to 2004.   * In 2008, an early morning IOP spike of 32/27 was noted. He was then diagnosed with OHT and treated with Latanoprost. His IOP has been stable since then.  Started treatment on: 2008  Current treatment: Latanoprost qDay OU  Adverse effects: none  CCT: actual values unknown, but per prior note: "increased CCT with adjustment factors of -2 OD and -3 OS"  Interpretation of RNFL OCT (10/23/2018): WNL OU. Stable compared to 2016 OU.    Date ST T IT IN N SN G   OD 03/16 134 65 120 82 56 114 86    10/18 132 65 120 84 54 112 86 Stable.   OS 03/16 132 62 142 76 50 119 87    10/18 130 62 140 80 48 116 86 Stable.  Interpretation of Posterior Pole OCT (10/23/2018):   OD: Superior hemisphere slightly thinner than inferior. Normal foveal contour and macular morphology.   OS: Superior and inferior hemispheres relatively symmetrical. Normal foveal contour and macular morphology.  Interpretation of HVF 24-2ss (10/23/2018):   OD: Good reliability (fixation losses 0/14, false positives 0%, false negatives 0%). Clear field.   OS: Good reliability indices (fixation losses 1/15, false positives 1%, false negatives 9%), but lots of movement on eye tracker. Cluster at superior edge of field (new, no correlation to other findings). GHT borderline.  Last DFE: 10/23/2018   Last gonio: never  Last stereo disc photos: 2017     Nuclear sclerotic cataract of both eyes   Stable. Minimal visual significance OU. Surgery not yet indicated. Monitor.        RTC 6 months for IOP check  Next annual diabetic eye exam due around October 2019                 "

## 2018-10-23 NOTE — Clinical Note
Dear Dr. Zaidi,Thank you for referring Mr. Gil for a diabetic eye examination; there is no retinopathy and I will continue to monitor yearly. Please let me know if you have questions.Sincerely,Adelina Enciso OD

## 2018-10-23 NOTE — PROGRESS NOTES
Last 5 Patient Entered Readings                                      Current 30 Day Average: 127/74     Recent Readings 10/23/2018 10/22/2018 10/22/2018 10/21/2018 10/19/2018    SBP (mmHg) 119 124 138 129 131    DBP (mmHg) 75 74 77 75 74    Pulse 83 77 79 75 75          Digital Medicine: Health  Follow Up    Lifestyle Modifications:    1.Dietary Modifications (Sodium intake <2,000mg/day, food labels, dining out): Patient is starting to focus on his sodium intake more especially since his wife is starting a low sodium diet as well. I offered to speak with her to answer any questions she has so he will have her call me if any questions arise.     2.Physical Activity: Patient continues riding his stationary bike.    3.Medication Therapy: Patient has been compliant with the medication regimen. Patient is doing well on his current regimen. He denies symptoms/side effects.     4.Patient has the following medication side effects/concerns:   (Frequency/Alleviating factors/Precipitating factors, etc.)     Follow up with Mr. Bryant Gil completed. No further questions or concerns. Will continue to follow up to achieve health goals.

## 2018-10-23 NOTE — TELEPHONE ENCOUNTER
Spoke with pt directly regarding results of HVF/OCT performed earlier today. Normal and stable OU. Continue Latanoprost OU. RTC 6 months for IOP check. Pt denies having any questions.    Adelina Enciso, OD   10/23/2018

## 2018-10-31 ENCOUNTER — PATIENT OUTREACH (OUTPATIENT)
Dept: OTHER | Facility: OTHER | Age: 78
End: 2018-10-31

## 2018-10-31 NOTE — PROGRESS NOTES
Last 5 Patient Entered Readings                                      Current 30 Day Average: 124/73     Recent Readings 10/30/2018 10/29/2018 10/28/2018 10/28/2018 10/24/2018    SBP (mmHg) 129 119 115 134 129    DBP (mmHg) 74 65 71 73 73    Pulse 79 81 77 78 80        Patient's BP average is controlled based on 2017 ACC/AHA HTN guidelines of goal BP <130/80.      Mr. Gil is doing well. He has no questions or concerns and is pleased with current BP readings/average.     Patient's health , Balbina Aburto, will be following up every 3-4 weeks. I will continue to monitor regularly and will follow up in 4-6 months, sooner if BP begins to trend upward or downward.    Patient has my contact information and knows to call with any concerns or clinical changes.     Current HTN regimen:  Hypertension Medications             amLODIPine (NORVASC) 10 MG tablet Take 1 tablet (10 mg total) by mouth once daily.    lisinopril-hydrochlorothiazide (PRINZIDE,ZESTORETIC) 20-12.5 mg per tablet Take 2 tablets by mouth once daily.    metoprolol succinate (TOPROL-XL) 25 MG 24 hr tablet Take 1 tablet (25 mg total) by mouth once daily.

## 2018-11-08 ENCOUNTER — TELEPHONE (OUTPATIENT)
Dept: INTERNAL MEDICINE | Facility: CLINIC | Age: 78
End: 2018-11-08

## 2018-11-14 ENCOUNTER — PATIENT MESSAGE (OUTPATIENT)
Dept: ADMINISTRATIVE | Facility: OTHER | Age: 78
End: 2018-11-14

## 2018-11-27 ENCOUNTER — OFFICE VISIT (OUTPATIENT)
Dept: INTERNAL MEDICINE | Facility: CLINIC | Age: 78
End: 2018-11-27
Payer: MEDICARE

## 2018-11-27 ENCOUNTER — PATIENT OUTREACH (OUTPATIENT)
Dept: OTHER | Facility: OTHER | Age: 78
End: 2018-11-27

## 2018-11-27 VITALS
DIASTOLIC BLOOD PRESSURE: 60 MMHG | HEART RATE: 75 BPM | WEIGHT: 175.06 LBS | SYSTOLIC BLOOD PRESSURE: 112 MMHG | BODY MASS INDEX: 27.48 KG/M2 | HEIGHT: 67 IN | OXYGEN SATURATION: 97 %

## 2018-11-27 DIAGNOSIS — K31.89 NSAID-ASSOCIATED GASTROPATHY: Primary | ICD-10-CM

## 2018-11-27 DIAGNOSIS — E11.22 TYPE 2 DIABETES MELLITUS WITH STAGE 2 CHRONIC KIDNEY DISEASE, WITHOUT LONG-TERM CURRENT USE OF INSULIN: ICD-10-CM

## 2018-11-27 DIAGNOSIS — I10 ESSENTIAL HYPERTENSION: ICD-10-CM

## 2018-11-27 DIAGNOSIS — N18.2 TYPE 2 DIABETES MELLITUS WITH STAGE 2 CHRONIC KIDNEY DISEASE, WITHOUT LONG-TERM CURRENT USE OF INSULIN: ICD-10-CM

## 2018-11-27 DIAGNOSIS — T39.395A NSAID-ASSOCIATED GASTROPATHY: Primary | ICD-10-CM

## 2018-11-27 PROCEDURE — 3074F SYST BP LT 130 MM HG: CPT | Mod: CPTII,HCNC,S$GLB, | Performed by: INTERNAL MEDICINE

## 2018-11-27 PROCEDURE — 1101F PT FALLS ASSESS-DOCD LE1/YR: CPT | Mod: CPTII,HCNC,S$GLB, | Performed by: INTERNAL MEDICINE

## 2018-11-27 PROCEDURE — 3078F DIAST BP <80 MM HG: CPT | Mod: CPTII,HCNC,S$GLB, | Performed by: INTERNAL MEDICINE

## 2018-11-27 PROCEDURE — 99999 PR PBB SHADOW E&M-EST. PATIENT-LVL III: CPT | Mod: PBBFAC,HCNC,, | Performed by: INTERNAL MEDICINE

## 2018-11-27 PROCEDURE — 99214 OFFICE O/P EST MOD 30 MIN: CPT | Mod: HCNC,S$GLB,, | Performed by: INTERNAL MEDICINE

## 2018-11-27 RX ORDER — LOSARTAN POTASSIUM AND HYDROCHLOROTHIAZIDE 25; 100 MG/1; MG/1
1 TABLET ORAL EVERY MORNING
Qty: 90 TABLET | Refills: 3 | Status: SHIPPED | OUTPATIENT
Start: 2018-11-27 | End: 2019-10-02 | Stop reason: SDUPTHER

## 2018-11-27 RX ORDER — PANTOPRAZOLE SODIUM 40 MG/1
40 TABLET, DELAYED RELEASE ORAL EVERY MORNING
Qty: 30 TABLET | Refills: 0 | Status: SHIPPED | OUTPATIENT
Start: 2018-11-27 | End: 2018-12-26 | Stop reason: ALTCHOICE

## 2018-11-27 NOTE — PROGRESS NOTES
Last 5 Patient Entered Readings                                      Current 30 Day Average: 121/71     Recent Readings 11/26/2018 11/24/2018 11/24/2018 11/23/2018 11/21/2018    SBP (mmHg) 124 122 138 125 126    DBP (mmHg) 71 75 77 68 72    Pulse 74 76 78 79 79          Digital Medicine: Health  Follow Up    Lifestyle Modifications:    1.Dietary Modifications (Sodium intake <2,000mg/day, food labels, dining out): Patient continues monitoring his sodium intake. He and his wife have been working on this together.     2.Physical Activity: Deferred    3.Medication Therapy: Patient has been compliant with the medication regimen. Patient is doing well on his current regimen. He denies symptoms/side effects.     4.Patient has the following medication side effects/concerns:   (Frequency/Alleviating factors/Precipitating factors, etc.)     Patient could not talk too long because he was out when I called.     Follow up with  Bryant Jeffery completed. No further questions or concerns. Will continue to follow up to achieve health goals.

## 2018-12-01 NOTE — PROGRESS NOTES
Subjective:       Patient ID: Bryant Gil is a 78 y.o. male.    Chief Complaint: GI Problem   Established patient  Presents with his wife  Very concerned about a stomach problem that he has been having for the past 3 weeks  Upper abdomen is tender  Slightly nauseous  He has been taking ibuprofen and Shruthi Back and Body relief 2 to 3 times a day    Blood pressure well controlled    Last A1c was not optimal    He retired in August and is not as active as he used to be.  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   HENT: Negative for congestion, dental problem, hearing loss, tinnitus and trouble swallowing.    Eyes: Negative for visual disturbance.   Respiratory: Negative for cough, shortness of breath and wheezing.    Cardiovascular: Negative for chest pain, palpitations and leg swelling.   Gastrointestinal: Positive for abdominal pain.   Genitourinary: Negative for dysuria, frequency and urgency.   Musculoskeletal: Positive for arthralgias, back pain and neck pain.   Neurological: Negative for dizziness, weakness and headaches.   Psychiatric/Behavioral: Negative for dysphoric mood and sleep disturbance. The patient is not nervous/anxious.        Objective:      Physical Exam   Constitutional: He is oriented to person, place, and time. He appears well-developed and well-nourished. No distress.   HENT:   Head: Atraumatic.   Mouth/Throat: No oropharyngeal exudate.   Eyes: Conjunctivae are normal. No scleral icterus.   Cardiovascular: Normal rate, regular rhythm and normal heart sounds.   Pulmonary/Chest: Effort normal and breath sounds normal.   Abdominal: Soft. There is no tenderness.   Musculoskeletal: He exhibits no edema.   Lymphadenopathy:     He has no cervical adenopathy.   Neurological: He is alert and oriented to person, place, and time.   Skin: Skin is warm and dry.   Psychiatric: He has a normal mood and affect. His behavior is  normal.   Nursing note and vitals reviewed.      Assessment:       1. NSAID-associated gastropathy    2. Essential hypertension    3. Type 2 diabetes mellitus with stage 2 chronic kidney disease, without long-term current use of insulin        Plan:   Bryant was seen today for gi problem.    Diagnoses and all orders for this visit:    NSAID-associated gastropathy,.  All nonsteroidal anti-inflammatory medication except for aspirin 81 mg once daily.  Tylenol is okay to take.  Try topical over-the-counter pain relievers.    Essential hypertension, lisinopril discontinued because of lung cancer risk    Type 2 diabetes mellitus with stage 2 chronic kidney disease, without long-term current use of insulin, bring record of home recorded blood sugars to next visit.    Other orders  -     losartan-hydrochlorothiazide 100-25 mg (HYZAAR) 100-25 mg per tablet; Take 1 tablet by mouth every morning. Blood pressure  -     pantoprazole (PROTONIX) 40 MG tablet; Take 1 tablet (40 mg total) by mouth every morning.  -     ranitidine (ZANTAC) 300 MG tablet; Take 1 tablet (300 mg total) by mouth nightly.       Medication List           Accurate as of 11/27/18 11:59 PM. If you have any questions, ask your nurse or doctor.               START taking these medications    losartan-hydrochlorothiazide 100-25 mg 100-25 mg per tablet  Commonly known as:  HYZAAR  Take 1 tablet by mouth every morning. Blood pressure  Started by:  Laverne Roberson MD     pantoprazole 40 MG tablet  Commonly known as:  PROTONIX  Take 1 tablet (40 mg total) by mouth every morning.  Started by:  Laverne Roberson MD     ranitidine 300 MG tablet  Commonly known as:  ZANTAC  Take 1 tablet (300 mg total) by mouth nightly.  Started by:  Laverne Roberson MD        CONTINUE taking these medications    amLODIPine 10 MG tablet  Commonly known as:  NORVASC  Take 1 tablet (10 mg total) by mouth once daily.     ascorbic acid (vitamin C) 100 MG tablet  Commonly known as:   VITAMIN C     aspirin 81 MG EC tablet  Commonly known as:  ENTERIC COATED ASPIRIN  Take 1 tablet (81 mg total) by mouth once. 1 Tablet, Delayed Release (E.C.) Oral Every day for 1 dose     atorvastatin 20 MG tablet  Commonly known as:  LIPITOR  Take 1 tablet (20 mg total) by mouth once daily.     * blood sugar diagnostic Strp  Commonly known as:  TRUETEST TEST STRIPS  2 strips by Misc.(Non-Drug; Combo Route) route once daily.     * blood sugar diagnostic Strp  1 strip by Misc.(Non-Drug; Combo Route) route once daily.     blood-glucose meter Misc  Commonly known as:  ACCU-CHEK YULIA PLUS METER  1 Device by Misc.(Non-Drug; Combo Route) route once daily.     lancets Misc  Commonly known as:  ACCU-CHEK SOFTCLIX LANCETS  1 Device by Misc.(Non-Drug; Combo Route) route once daily.     latanoprost 0.005 % ophthalmic solution  Place 1 drop into both eyes once daily.     metFORMIN 1000 MG tablet  Commonly known as:  GLUCOPHAGE  Take 1 tablet (1,000 mg total) by mouth 2 (two) times daily with meals.     metoprolol succinate 25 MG 24 hr tablet  Commonly known as:  TOPROL-XL  Take 1 tablet (25 mg total) by mouth once daily.     vitamin D 1000 units Tab  Commonly known as:  VITAMIN D3         * This list has 2 medication(s) that are the same as other medications prescribed for you. Read the directions carefully, and ask your doctor or other care provider to review them with you.            STOP taking these medications    lisinopril-hydrochlorothiazide 20-12.5 mg per tablet  Commonly known as:  BARBARA ESPINOZA  Stopped by:  Laverne Roberson MD           Where to Get Your Medications      These medications were sent to [facility] - [place], [place] - [address]  [address], [place] [place] [address]    Phone:  [phone]   · losartan-hydrochlorothiazide 100-25 mg 100-25 mg per tablet     These medications were sent to [address] ([place], [place] - [address]   [address] ([place] [address]    Phone:  [phone]   · pantoprazole 40 MG tablet  · ranitidine 300 MG tablet         Diabetes Management Status    Statin: Taking  ACE/ARB: Taking    Screening or Prevention Patient's value Goal Complete/Controlled?   HgA1C Testing and Control   Lab Results   Component Value Date    HGBA1C 8.1 (H) 10/02/2018      Annually/Less than 8% No   Lipid profile : 10/02/2018 Annually Yes   LDL control Lab Results   Component Value Date    LDLCALC 109.2 10/02/2018    Annually/Less than 100 mg/dl  No   Nephropathy screening Lab Results   Component Value Date    LABMICR 17.0 02/06/2018     Lab Results   Component Value Date    PROTEINUA Negative 02/06/2018    Annually Yes   Blood pressure BP Readings from Last 1 Encounters:   11/27/18 112/60    Less than 140/90 Yes   Dilated retinal exam : 10/23/2018 Annually Yes   Foot exam   : 02/06/2018 Annually Yes

## 2018-12-04 ENCOUNTER — LAB VISIT (OUTPATIENT)
Dept: LAB | Facility: HOSPITAL | Age: 78
End: 2018-12-04
Attending: INTERNAL MEDICINE
Payer: MEDICARE

## 2018-12-04 DIAGNOSIS — I10 ESSENTIAL HYPERTENSION: ICD-10-CM

## 2018-12-04 DIAGNOSIS — G56.02 CARPAL TUNNEL SYNDROME OF LEFT WRIST: ICD-10-CM

## 2018-12-04 DIAGNOSIS — Z85.46 HISTORY OF PROSTATE CANCER: ICD-10-CM

## 2018-12-04 DIAGNOSIS — E11.42 DIABETIC POLYNEUROPATHY ASSOCIATED WITH TYPE 2 DIABETES MELLITUS: ICD-10-CM

## 2018-12-04 LAB
ALBUMIN SERPL BCP-MCNC: 4.2 G/DL
ALP SERPL-CCNC: 51 U/L
ALT SERPL W/O P-5'-P-CCNC: 21 U/L
ANION GAP SERPL CALC-SCNC: 10 MMOL/L
AST SERPL-CCNC: 18 U/L
BASOPHILS # BLD AUTO: 0.03 K/UL
BASOPHILS NFR BLD: 0.6 %
BILIRUB SERPL-MCNC: 0.4 MG/DL
BUN SERPL-MCNC: 23 MG/DL
CALCIUM SERPL-MCNC: 9.6 MG/DL
CHLORIDE SERPL-SCNC: 104 MMOL/L
CO2 SERPL-SCNC: 27 MMOL/L
CREAT SERPL-MCNC: 1 MG/DL
DIFFERENTIAL METHOD: ABNORMAL
EOSINOPHIL # BLD AUTO: 0.1 K/UL
EOSINOPHIL NFR BLD: 1.8 %
ERYTHROCYTE [DISTWIDTH] IN BLOOD BY AUTOMATED COUNT: 12.4 %
EST. GFR  (AFRICAN AMERICAN): >60 ML/MIN/1.73 M^2
EST. GFR  (NON AFRICAN AMERICAN): >60 ML/MIN/1.73 M^2
ESTIMATED AVG GLUCOSE: 177 MG/DL
GLUCOSE SERPL-MCNC: 144 MG/DL
HBA1C MFR BLD HPLC: 7.8 %
HCT VFR BLD AUTO: 41 %
HGB BLD-MCNC: 13.4 G/DL
LYMPHOCYTES # BLD AUTO: 1.8 K/UL
LYMPHOCYTES NFR BLD: 34.4 %
MCH RBC QN AUTO: 28.2 PG
MCHC RBC AUTO-ENTMCNC: 32.7 G/DL
MCV RBC AUTO: 86 FL
MONOCYTES # BLD AUTO: 0.6 K/UL
MONOCYTES NFR BLD: 11.3 %
NEUTROPHILS # BLD AUTO: 2.7 K/UL
NEUTROPHILS NFR BLD: 51.7 %
PLATELET # BLD AUTO: 275 K/UL
PMV BLD AUTO: 9.7 FL
POTASSIUM SERPL-SCNC: 3.8 MMOL/L
PROT SERPL-MCNC: 7.3 G/DL
RBC # BLD AUTO: 4.76 M/UL
SODIUM SERPL-SCNC: 141 MMOL/L
WBC # BLD AUTO: 5.12 K/UL

## 2018-12-04 PROCEDURE — 83036 HEMOGLOBIN GLYCOSYLATED A1C: CPT | Mod: HCNC

## 2018-12-04 PROCEDURE — 80053 COMPREHEN METABOLIC PANEL: CPT | Mod: HCNC

## 2018-12-04 PROCEDURE — 36415 COLL VENOUS BLD VENIPUNCTURE: CPT | Mod: HCNC

## 2018-12-04 PROCEDURE — 85025 COMPLETE CBC W/AUTO DIFF WBC: CPT | Mod: HCNC

## 2018-12-26 ENCOUNTER — OFFICE VISIT (OUTPATIENT)
Dept: INTERNAL MEDICINE | Facility: CLINIC | Age: 78
End: 2018-12-26
Payer: MEDICARE

## 2018-12-26 VITALS
HEART RATE: 88 BPM | SYSTOLIC BLOOD PRESSURE: 122 MMHG | BODY MASS INDEX: 27.71 KG/M2 | WEIGHT: 176.56 LBS | DIASTOLIC BLOOD PRESSURE: 64 MMHG | OXYGEN SATURATION: 96 % | HEIGHT: 67 IN

## 2018-12-26 DIAGNOSIS — Z85.46 HISTORY OF PROSTATE CANCER: ICD-10-CM

## 2018-12-26 DIAGNOSIS — R10.13 EPIGASTRIC PAIN: Primary | ICD-10-CM

## 2018-12-26 DIAGNOSIS — N18.2 TYPE 2 DIABETES MELLITUS WITH STAGE 2 CHRONIC KIDNEY DISEASE, WITHOUT LONG-TERM CURRENT USE OF INSULIN: ICD-10-CM

## 2018-12-26 DIAGNOSIS — E11.40 TYPE 2 DIABETES MELLITUS WITH DIABETIC NEUROPATHY, WITHOUT LONG-TERM CURRENT USE OF INSULIN: ICD-10-CM

## 2018-12-26 DIAGNOSIS — I10 ESSENTIAL HYPERTENSION: ICD-10-CM

## 2018-12-26 DIAGNOSIS — E11.22 TYPE 2 DIABETES MELLITUS WITH STAGE 2 CHRONIC KIDNEY DISEASE, WITHOUT LONG-TERM CURRENT USE OF INSULIN: ICD-10-CM

## 2018-12-26 PROCEDURE — 99214 OFFICE O/P EST MOD 30 MIN: CPT | Mod: HCNC,S$GLB,, | Performed by: INTERNAL MEDICINE

## 2018-12-26 PROCEDURE — 1101F PT FALLS ASSESS-DOCD LE1/YR: CPT | Mod: CPTII,HCNC,S$GLB, | Performed by: INTERNAL MEDICINE

## 2018-12-26 PROCEDURE — 3074F SYST BP LT 130 MM HG: CPT | Mod: CPTII,HCNC,S$GLB, | Performed by: INTERNAL MEDICINE

## 2018-12-26 PROCEDURE — 3078F DIAST BP <80 MM HG: CPT | Mod: CPTII,HCNC,S$GLB, | Performed by: INTERNAL MEDICINE

## 2018-12-26 PROCEDURE — 99999 PR PBB SHADOW E&M-EST. PATIENT-LVL III: CPT | Mod: PBBFAC,HCNC,, | Performed by: INTERNAL MEDICINE

## 2018-12-26 NOTE — PROGRESS NOTES
Subjective:       Patient ID: Bryant Gil is a 78 y.o. male.    Chief Complaint: Follow-up (4 wk f/u)  all stomach problem resolved  HPI  Review of Systems   Constitutional: Negative for activity change, chills and fever.   HENT: Negative for congestion, dental problem, hearing loss, tinnitus and trouble swallowing.    Eyes: Negative for visual disturbance.   Respiratory: Negative for cough, shortness of breath and wheezing.    Cardiovascular: Negative for chest pain, palpitations and leg swelling.   Genitourinary: Negative for dysuria, frequency and urgency.   Musculoskeletal: Negative for back pain and neck pain.   Neurological: Negative for dizziness, weakness and headaches.   Psychiatric/Behavioral: Negative for dysphoric mood and sleep disturbance. The patient is not nervous/anxious.        Objective:      Physical Exam   Constitutional: He is oriented to person, place, and time. No distress.   HENT:   Head: Atraumatic.   Eyes: Conjunctivae are normal. No scleral icterus.   Neck: Neck supple.   Cardiovascular: Normal rate and regular rhythm.   Pulmonary/Chest: Effort normal and breath sounds normal.   Abdominal: Soft. Bowel sounds are normal. He exhibits no distension and no mass. There is no tenderness. There is no rebound and no guarding. No hernia.   Musculoskeletal: He exhibits no edema.   Lymphadenopathy:     He has no cervical adenopathy.   Neurological: He is alert and oriented to person, place, and time.   Skin: Skin is warm and dry.   Psychiatric: He has a normal mood and affect. His behavior is normal.       Assessment:       1. Epigastric pain    2. Essential hypertension    3. Type 2 diabetes mellitus with diabetic neuropathy, without long-term current use of insulin    4. Type 2 diabetes mellitus with stage 2 chronic kidney disease, without long-term current use of insulin    5. History of prostate cancer        Plan:   Bryant was seen today for follow-up.    Diagnoses and all orders for this  visit:    Epigastric pain    Essential hypertension  -     Comprehensive metabolic panel; Future  -     Lipid panel; Future  -     Comprehensive metabolic panel; Future  -     Hemoglobin A1c; Future    Type 2 diabetes mellitus with diabetic neuropathy, without long-term current use of insulin  -     Hemoglobin A1c; Future  -     Lipid panel; Future  -     Comprehensive metabolic panel; Future  -     Hemoglobin A1c; Future    Type 2 diabetes mellitus with stage 2 chronic kidney disease, without long-term current use of insulin  -     Hemoglobin A1c; Future    History of prostate cancer  -     PROSTATE SPECIFIC ANTIGEN, DIAGNOSTIC; Future    Other orders  -     blood sugar diagnostic Strp; 1 strip by Misc.(Non-Drug; Combo Route) route once daily.

## 2019-01-08 ENCOUNTER — HOSPITAL ENCOUNTER (OUTPATIENT)
Facility: HOSPITAL | Age: 79
Discharge: HOME | End: 2019-01-09
Attending: EMERGENCY MEDICINE | Admitting: HOSPITALIST
Payer: MEDICARE

## 2019-01-08 ENCOUNTER — ANESTHESIA (OUTPATIENT)
Dept: ENDOSCOPY | Facility: HOSPITAL | Age: 79
End: 2019-01-08
Payer: MEDICARE

## 2019-01-08 ENCOUNTER — ANESTHESIA EVENT (OUTPATIENT)
Dept: ENDOSCOPY | Facility: HOSPITAL | Age: 79
End: 2019-01-08
Payer: MEDICARE

## 2019-01-08 DIAGNOSIS — K57.30 SIGMOID DIVERTICULOSIS: ICD-10-CM

## 2019-01-08 DIAGNOSIS — K62.5 BRIGHT RED BLOOD PER RECTUM: ICD-10-CM

## 2019-01-08 DIAGNOSIS — K62.5 BRBPR (BRIGHT RED BLOOD PER RECTUM): Primary | ICD-10-CM

## 2019-01-08 PROBLEM — K76.89 HEPATIC CYST: Status: ACTIVE | Noted: 2019-01-08

## 2019-01-08 PROBLEM — N18.2 TYPE 2 DIABETES MELLITUS WITH STAGE 2 CHRONIC KIDNEY DISEASE, WITHOUT LONG-TERM CURRENT USE OF INSULIN: Chronic | Status: ACTIVE | Noted: 2017-01-25

## 2019-01-08 PROBLEM — E11.22 TYPE 2 DIABETES MELLITUS WITH STAGE 2 CHRONIC KIDNEY DISEASE, WITHOUT LONG-TERM CURRENT USE OF INSULIN: Chronic | Status: ACTIVE | Noted: 2017-01-25

## 2019-01-08 PROBLEM — N18.2 STAGE 2 CHRONIC KIDNEY DISEASE: Chronic | Status: ACTIVE | Noted: 2017-01-25

## 2019-01-08 LAB
ABO + RH BLD: NORMAL
ALBUMIN SERPL BCP-MCNC: 4.1 G/DL
ALP SERPL-CCNC: 57 U/L
ALT SERPL W/O P-5'-P-CCNC: 21 U/L
ANION GAP SERPL CALC-SCNC: 13 MMOL/L
AST SERPL-CCNC: 18 U/L
BASOPHILS # BLD AUTO: 0.02 K/UL
BASOPHILS NFR BLD: 0.4 %
BILIRUB SERPL-MCNC: 0.2 MG/DL
BLD GP AB SCN CELLS X3 SERPL QL: NORMAL
BUN SERPL-MCNC: 30 MG/DL
CALCIUM SERPL-MCNC: 9.6 MG/DL
CHLORIDE SERPL-SCNC: 107 MMOL/L
CO2 SERPL-SCNC: 21 MMOL/L
CREAT SERPL-MCNC: 1.2 MG/DL
DIFFERENTIAL METHOD: ABNORMAL
EOSINOPHIL # BLD AUTO: 0.1 K/UL
EOSINOPHIL NFR BLD: 1.6 %
ERYTHROCYTE [DISTWIDTH] IN BLOOD BY AUTOMATED COUNT: 13.1 %
EST. GFR  (AFRICAN AMERICAN): >60 ML/MIN/1.73 M^2
EST. GFR  (NON AFRICAN AMERICAN): 58 ML/MIN/1.73 M^2
GLUCOSE SERPL-MCNC: 198 MG/DL
HCT VFR BLD AUTO: 36.2 %
HGB BLD-MCNC: 10.6 G/DL
HGB BLD-MCNC: 11 G/DL
HGB BLD-MCNC: 11.1 G/DL
HGB BLD-MCNC: 11.8 G/DL
INR PPP: 1
LYMPHOCYTES # BLD AUTO: 2.1 K/UL
LYMPHOCYTES NFR BLD: 42.7 %
MCH RBC QN AUTO: 28.1 PG
MCHC RBC AUTO-ENTMCNC: 32.6 G/DL
MCV RBC AUTO: 86 FL
MONOCYTES # BLD AUTO: 0.6 K/UL
MONOCYTES NFR BLD: 12.1 %
NEUTROPHILS # BLD AUTO: 2.2 K/UL
NEUTROPHILS NFR BLD: 43.2 %
PLATELET # BLD AUTO: 315 K/UL
PMV BLD AUTO: 10.7 FL
POCT GLUCOSE: 131 MG/DL (ref 70–110)
POCT GLUCOSE: 136 MG/DL (ref 70–110)
POCT GLUCOSE: 146 MG/DL (ref 70–110)
POCT GLUCOSE: 180 MG/DL (ref 70–110)
POTASSIUM SERPL-SCNC: 3.9 MMOL/L
PROT SERPL-MCNC: 7.2 G/DL
PROTHROMBIN TIME: 10 SEC
RBC # BLD AUTO: 4.2 M/UL
SODIUM SERPL-SCNC: 141 MMOL/L
WBC # BLD AUTO: 4.97 K/UL

## 2019-01-08 PROCEDURE — G0378 HOSPITAL OBSERVATION PER HR: HCPCS | Mod: HCNC

## 2019-01-08 PROCEDURE — 93010 ELECTROCARDIOGRAM REPORT: CPT | Mod: 76,,, | Performed by: INTERNAL MEDICINE

## 2019-01-08 PROCEDURE — 82962 GLUCOSE BLOOD TEST: CPT | Mod: 59,HCNC

## 2019-01-08 PROCEDURE — 63600175 PHARM REV CODE 636 W HCPCS: Mod: HCNC | Performed by: NURSE ANESTHETIST, CERTIFIED REGISTERED

## 2019-01-08 PROCEDURE — 88342 IMHCHEM/IMCYTCHM 1ST ANTB: CPT | Mod: 26,HCNC,, | Performed by: PATHOLOGY

## 2019-01-08 PROCEDURE — 93010 ELECTROCARDIOGRAM REPORT: CPT | Mod: HCNC,,, | Performed by: INTERNAL MEDICINE

## 2019-01-08 PROCEDURE — 45330 DIAGNOSTIC SIGMOIDOSCOPY: CPT | Mod: HCNC,,, | Performed by: INTERNAL MEDICINE

## 2019-01-08 PROCEDURE — 88305 TISSUE SPECIMEN TO PATHOLOGY - SURGERY: ICD-10-PCS | Mod: 26,HCNC,, | Performed by: PATHOLOGY

## 2019-01-08 PROCEDURE — 25000003 PHARM REV CODE 250: Mod: HCNC | Performed by: NURSE ANESTHETIST, CERTIFIED REGISTERED

## 2019-01-08 PROCEDURE — 43239 EGD BIOPSY SINGLE/MULTIPLE: CPT | Mod: 51,HCNC,, | Performed by: INTERNAL MEDICINE

## 2019-01-08 PROCEDURE — 85610 PROTHROMBIN TIME: CPT | Mod: HCNC

## 2019-01-08 PROCEDURE — 99285 EMERGENCY DEPT VISIT HI MDM: CPT | Mod: HCNC

## 2019-01-08 PROCEDURE — 36415 COLL VENOUS BLD VENIPUNCTURE: CPT | Mod: HCNC

## 2019-01-08 PROCEDURE — 86920 COMPATIBILITY TEST SPIN: CPT | Mod: HCNC,59

## 2019-01-08 PROCEDURE — 45330 PR SIGMOIDOSCOPY,DIAG2STIC: ICD-10-PCS | Mod: HCNC,,, | Performed by: INTERNAL MEDICINE

## 2019-01-08 PROCEDURE — 88305 TISSUE EXAM BY PATHOLOGIST: CPT | Mod: 26,HCNC,, | Performed by: PATHOLOGY

## 2019-01-08 PROCEDURE — 88342 TISSUE SPECIMEN TO PATHOLOGY - SURGERY: ICD-10-PCS | Mod: 26,HCNC,, | Performed by: PATHOLOGY

## 2019-01-08 PROCEDURE — P9021 RED BLOOD CELLS UNIT: HCPCS | Mod: HCNC

## 2019-01-08 PROCEDURE — 86850 RBC ANTIBODY SCREEN: CPT | Mod: HCNC

## 2019-01-08 PROCEDURE — 93010 EKG 12-LEAD: ICD-10-PCS | Mod: 76,,, | Performed by: INTERNAL MEDICINE

## 2019-01-08 PROCEDURE — 85018 HEMOGLOBIN: CPT | Mod: 91,HCNC

## 2019-01-08 PROCEDURE — 85025 COMPLETE CBC W/AUTO DIFF WBC: CPT | Mod: HCNC

## 2019-01-08 PROCEDURE — 36430 TRANSFUSION BLD/BLD COMPNT: CPT | Mod: HCNC

## 2019-01-08 PROCEDURE — 80053 COMPREHEN METABOLIC PANEL: CPT | Mod: HCNC

## 2019-01-08 PROCEDURE — 99204 OFFICE O/P NEW MOD 45 MIN: CPT | Mod: HCNC,25,GC, | Performed by: INTERNAL MEDICINE

## 2019-01-08 PROCEDURE — 93010 EKG 12-LEAD: ICD-10-PCS | Mod: HCNC,,, | Performed by: INTERNAL MEDICINE

## 2019-01-08 PROCEDURE — 43239 PR EGD, FLEX, W/BIOPSY, SGL/MULTI: ICD-10-PCS | Mod: 51,HCNC,, | Performed by: INTERNAL MEDICINE

## 2019-01-08 PROCEDURE — 37000008 HC ANESTHESIA 1ST 15 MINUTES: Mod: HCNC | Performed by: INTERNAL MEDICINE

## 2019-01-08 PROCEDURE — 43239 EGD BIOPSY SINGLE/MULTIPLE: CPT | Mod: HCNC | Performed by: INTERNAL MEDICINE

## 2019-01-08 PROCEDURE — 88305 TISSUE EXAM BY PATHOLOGIST: CPT | Mod: HCNC | Performed by: PATHOLOGY

## 2019-01-08 PROCEDURE — 93005 ELECTROCARDIOGRAM TRACING: CPT | Mod: HCNC,59

## 2019-01-08 PROCEDURE — 45330 DIAGNOSTIC SIGMOIDOSCOPY: CPT | Mod: HCNC | Performed by: INTERNAL MEDICINE

## 2019-01-08 PROCEDURE — 27201012 HC FORCEPS, HOT/COLD, DISP: Mod: HCNC | Performed by: INTERNAL MEDICINE

## 2019-01-08 PROCEDURE — 37000009 HC ANESTHESIA EA ADD 15 MINS: Mod: HCNC | Performed by: INTERNAL MEDICINE

## 2019-01-08 PROCEDURE — 25500020 PHARM REV CODE 255: Mod: HCNC | Performed by: EMERGENCY MEDICINE

## 2019-01-08 PROCEDURE — 88342 IMHCHEM/IMCYTCHM 1ST ANTB: CPT | Mod: HCNC | Performed by: PATHOLOGY

## 2019-01-08 PROCEDURE — 99204 PR OFFICE/OUTPT VISIT, NEW, LEVL IV, 45-59 MIN: ICD-10-PCS | Mod: HCNC,25,GC, | Performed by: INTERNAL MEDICINE

## 2019-01-08 RX ORDER — GLUCAGON 1 MG
1 KIT INJECTION
Status: DISCONTINUED | OUTPATIENT
Start: 2019-01-08 | End: 2019-01-09 | Stop reason: HOSPADM

## 2019-01-08 RX ORDER — IBUPROFEN 200 MG
16 TABLET ORAL
Status: DISCONTINUED | OUTPATIENT
Start: 2019-01-08 | End: 2019-01-09 | Stop reason: HOSPADM

## 2019-01-08 RX ORDER — IBUPROFEN 200 MG
24 TABLET ORAL
Status: DISCONTINUED | OUTPATIENT
Start: 2019-01-08 | End: 2019-01-09 | Stop reason: HOSPADM

## 2019-01-08 RX ORDER — ONDANSETRON 8 MG/1
8 TABLET, ORALLY DISINTEGRATING ORAL EVERY 8 HOURS PRN
Status: DISCONTINUED | OUTPATIENT
Start: 2019-01-08 | End: 2019-01-09 | Stop reason: HOSPADM

## 2019-01-08 RX ORDER — SODIUM CHLORIDE 9 MG/ML
INJECTION, SOLUTION INTRAVENOUS CONTINUOUS PRN
Status: DISCONTINUED | OUTPATIENT
Start: 2019-01-08 | End: 2019-01-08

## 2019-01-08 RX ORDER — PROPOFOL 10 MG/ML
VIAL (ML) INTRAVENOUS
Status: DISCONTINUED | OUTPATIENT
Start: 2019-01-08 | End: 2019-01-08

## 2019-01-08 RX ORDER — HYDROCODONE BITARTRATE AND ACETAMINOPHEN 500; 5 MG/1; MG/1
TABLET ORAL
Status: DISCONTINUED | OUTPATIENT
Start: 2019-01-08 | End: 2019-01-09 | Stop reason: HOSPADM

## 2019-01-08 RX ORDER — INSULIN ASPART 100 [IU]/ML
0-5 INJECTION, SOLUTION INTRAVENOUS; SUBCUTANEOUS
Status: DISCONTINUED | OUTPATIENT
Start: 2019-01-08 | End: 2019-01-09 | Stop reason: HOSPADM

## 2019-01-08 RX ORDER — ACETAMINOPHEN 325 MG/1
650 TABLET ORAL EVERY 6 HOURS PRN
Status: DISCONTINUED | OUTPATIENT
Start: 2019-01-08 | End: 2019-01-09 | Stop reason: HOSPADM

## 2019-01-08 RX ORDER — SODIUM CHLORIDE 0.9 % (FLUSH) 0.9 %
5 SYRINGE (ML) INJECTION
Status: DISCONTINUED | OUTPATIENT
Start: 2019-01-08 | End: 2019-01-09 | Stop reason: HOSPADM

## 2019-01-08 RX ORDER — LIDOCAINE HCL/PF 100 MG/5ML
SYRINGE (ML) INTRAVENOUS
Status: DISCONTINUED | OUTPATIENT
Start: 2019-01-08 | End: 2019-01-08

## 2019-01-08 RX ADMIN — PROPOFOL 20 MG: 10 INJECTION, EMULSION INTRAVENOUS at 02:01

## 2019-01-08 RX ADMIN — PROPOFOL 60 MG: 10 INJECTION, EMULSION INTRAVENOUS at 02:01

## 2019-01-08 RX ADMIN — LIDOCAINE HYDROCHLORIDE 75 MG: 20 INJECTION, SOLUTION INTRAVENOUS at 02:01

## 2019-01-08 RX ADMIN — SODIUM CHLORIDE: 0.9 INJECTION, SOLUTION INTRAVENOUS at 02:01

## 2019-01-08 RX ADMIN — PROPOFOL 40 MG: 10 INJECTION, EMULSION INTRAVENOUS at 02:01

## 2019-01-08 RX ADMIN — PROPOFOL 20 MG: 10 INJECTION, EMULSION INTRAVENOUS at 03:01

## 2019-01-08 RX ADMIN — IOHEXOL 100 ML: 350 INJECTION, SOLUTION INTRAVENOUS at 03:01

## 2019-01-08 NOTE — ANESTHESIA POSTPROCEDURE EVALUATION
"Anesthesia Post Evaluation    Patient: Bryant Gil    Procedure(s) Performed: Procedure(s) (LRB):  SIGMOIDOSCOPY, FLEXIBLE (N/A)  EGD (ESOPHAGOGASTRODUODENOSCOPY) (N/A)    Final Anesthesia Type: MAC  Patient location during evaluation: GI PACU  Patient participation: Yes- Able to Participate  Level of consciousness: awake and alert  Post-procedure vital signs: reviewed and stable  Pain management: adequate  Airway patency: patent  PONV status at discharge: No PONV  Anesthetic complications: no      Cardiovascular status: hemodynamically stable  Respiratory status: unassisted, spontaneous ventilation and room air  Hydration status: euvolemic  Follow-up not needed.        Visit Vitals  /74 (BP Location: Left arm, Patient Position: Lying)   Pulse 80   Temp 36.6 °C (97.9 °F) (Temporal)   Resp 18   Ht 5' 7" (1.702 m)   Wt 79.4 kg (175 lb)   SpO2 96%   BMI 27.41 kg/m²       Pain/Luis Manuel Score: No Data Recorded      "

## 2019-01-08 NOTE — ASSESSMENT & PLAN NOTE
Presentation consistent with likely diverticular etiology given painless brighter red bleeding with mild anemia, CTA negative, recent colonoscopy with diverticulosis on R and L sides of colon. No known or mentioned history of hemorrhoids but could also be hemorrhoidal. Hemodynamically stable with mild anemia lower than baseline.  Plan:  - Discussed various potential clinical courses with patient and options include clinical observation and colonoscopy if needed/symptoms continued vs prep with planned colonoscopy tomorrow vs flex sig now with followup depending on findings --> elects to proceed with offered flex sig today that may identify etiology with understanding that if negative, unremarkable, or findings warrant may recommend colonoscopy tomorrow.  - will also perform EGD for significant NSAID history to exclude PUD as a contributory etiology to pain. Lower suspicion as he does not admit to pain or related upper GI symptoms.  - generally, recommend avoidance of BC powder altogether and limit NSAID use.  - Maintain NPO  - Tap water enema x2 now  - hold any chemical prophylactic anticoagulation  - EGD/flex sig today as above with further recs to follow

## 2019-01-08 NOTE — TRANSFER OF CARE
"Anesthesia Transfer of Care Note    Patient: Bryant Gil    Procedure(s) Performed: Procedure(s) (LRB):  SIGMOIDOSCOPY, FLEXIBLE (N/A)  EGD (ESOPHAGOGASTRODUODENOSCOPY) (N/A)    Patient location: GI    Anesthesia Type: MAC    Transport from OR: Transported from OR on room air with adequate spontaneous ventilation    Post pain: adequate analgesia    Post assessment: no apparent anesthetic complications and tolerated procedure well    Post vital signs: stable    Level of consciousness: responds to stimulation and sedated    Nausea/Vomiting: no nausea/vomiting    Complications: none    Transfer of care protocol was followed      Last vitals:   Visit Vitals  /74 (BP Location: Left arm, Patient Position: Lying)   Pulse 80   Temp 36.6 °C (97.9 °F) (Temporal)   Resp 18   Ht 5' 7" (1.702 m)   Wt 79.4 kg (175 lb)   SpO2 96%   BMI 27.41 kg/m²     "

## 2019-01-08 NOTE — ED PROVIDER NOTES
"Encounter Date: 1/8/2019    SCRIBE #1 NOTE: I, Jonh Padilla, am scribing for, and in the presence of,  Dr. Roper. I have scribed the entire note.       History     Chief Complaint   Patient presents with    Rectal Bleeding     patient states  " i been passing alot of blood in my stool" noticed it for the last couple of days however tonight it is " running like water and nonstop.      This is a 78 y.o. male who has a past medical history of Cancer, Cataract of both eyes, Diabetes mellitus, HTN (hypertension), HTN (hypertension) (10/9/2012), OHT (ocular hypertension), Prostate cancer, Pyelonephritis, right no stone on CT scan. (7/11/2013), Sepsis, same organism in urine grew in his blood, Klebsiella (7/11/2013), Tendinitis of both rotator cuffs (6/25/2013), and Type II or unspecified type diabetes mellitus with neurological manifestations, not stated as uncontrolled(250.60) (10/9/2012).     The patient presents to the Emergency Department with rectal bleeding x 2 days.  He reports passing BRB with loose stool over the past couple of days, but reports an increase in bleeding tonight.  Specifically, he reports passing liquid BRB without stool twice tonight.   Symptoms are associated with dizziness and lightheadedness while moving around.  He also reports a recent sinus HA over the past few weeks.  He denies passing any blood from his rectum while not having a BM.  Pt denies any LOC, abdominal pain, nausea, vomiting, SOB, or any other concerning symptoms.   Patient has no prior history of similar symptoms, but does take a daily Aspirin.  He is not on any other anticoagulants.      The history is provided by the patient.     Review of patient's allergies indicates:  No Known Allergies  Past Medical History:   Diagnosis Date    Cataract of both eyes     HTN (hypertension)     HTN (hypertension) 10/9/2012    OHT (ocular hypertension)     Prostate cancer     Pyelonephritis, right no stone on CT scan. 7/11/2013    " Sepsis, same organism in urine grew in his blood, Klebsiella 7/11/2013    Sigmoid diverticulosis 03/19/2017    Tendinitis of both rotator cuffs 6/25/2013    Tubular adenoma of colon 01/10/2012    Type II or unspecified type diabetes mellitus with neurological manifestations, not stated as uncontrolled(250.60) 10/9/2012     Past Surgical History:   Procedure Laterality Date    CARPAL TUNNEL RELEASE Right 08/25/2015    CIRCUMCISION  04/13/2005    COLONOSCOPY Golytely prep N/A 3/29/2017    Performed by Kavitha Cleaning [facility] at [facility]    COLONOSCOPY W/ POLYPECTOMY  01/10/2012    Repeat in 5 years     PENILE PROSTHESIS IMPLANT      PROSTATE SURGERY  1999    Prostatectomy for prostate ca; [facility]    RELEASE-CARPAL TUNNEL Right 8/25/2015    Performed by Anthony Cooper Jr., MD at [facility] OR    RELEASE-FINGER-TRIGGER Right 8/25/2015    Performed by Anthony Cooper Jr., MD at [facility] OR    TRIGGER FINGER RELEASE Right 2015     Family History   Problem Relation Age of Onset    Diabetes Sister     Cataracts Sister     Hypertension Mother     No Known Problems Brother     No Known Problems Sister     No Known Problems Son     No Known Problems Daughter     Heart attack Brother     No Known Problems Sister     No Known Problems Sister     Cancer Brother     Prostate cancer Brother     Blindness Neg Hx     Amblyopia Neg Hx     Glaucoma Neg Hx     Retinal detachment Neg Hx     Strabismus Neg Hx     Macular degeneration Neg Hx     Stroke Neg Hx     Thyroid disease Neg Hx      Social History     Tobacco Use    Smoking status: Former Smoker     Packs/day: 0.50     Years: 3.00     Pack years: 1.50     Types: Cigarettes    Smokeless tobacco: Never Used    Tobacco comment: smoked as a teenager   Substance Use Topics    Alcohol use: Yes     Comment: social drinks a beer 1-2 x month    Drug use: No     Review of Systems   Constitutional: Negative for activity change, appetite change and fatigue.    Respiratory: Negative for shortness of breath.    Cardiovascular: Negative for chest pain.   Gastrointestinal: Positive for anal bleeding, blood in stool and diarrhea. Negative for abdominal pain, nausea and vomiting.   Skin: Negative for color change and rash.   Neurological: Positive for dizziness, light-headedness and headaches.   Hematological: Bruises/bleeds easily.     Physical Exam     Initial Vitals [01/08/19 0010]   BP Pulse Resp Temp SpO2   138/86 107 14 98 °F (36.7 °C) 98 %      MAP       --         Physical Exam    Nursing note and vitals reviewed.  Constitutional: He appears well-developed and well-nourished. He is not diaphoretic. No distress.   HENT:   Head: Normocephalic and atraumatic.   Mouth/Throat: Oropharynx is clear and moist.   Gums mildly pale   Eyes: Conjunctivae and EOM are normal. Pupils are equal, round, and reactive to light.   No conjunctival pallor   Neck: Normal range of motion. Neck supple.   Cardiovascular: Regular rhythm, normal heart sounds and intact distal pulses.   Tachycardic  Good peripheral pulses   Pulmonary/Chest: Breath sounds normal. No respiratory distress. He has no wheezes. He has no rhonchi. He has no rales.   Abdominal: Soft. Bowel sounds are normal. He exhibits no distension. There is no tenderness.   Musculoskeletal: Normal range of motion. He exhibits no edema or tenderness.   Lymphadenopathy:     He has no cervical adenopathy.   Neurological: He is alert and oriented to person, place, and time. He has normal strength.   Skin: Skin is warm and dry.   Good capillary refill   Psychiatric: He has a normal mood and affect. Thought content normal.       ED Course   Critical Care  Date/Time: 1/14/2019 1:07 AM  Performed by: Efra Roper MD  Authorized by: Efra Roper MD   Direct patient critical care time: 15 minutes  Additional history critical care time: 5 minutes  Ordering / reviewing critical care time: 5 minutes  Documentation critical care time: 5  minutes  Consulting other physicians critical care time: 5 minutes  Total critical care time (exclusive of procedural time) : 35 minutes  Comments: Critical Care time: 35 minutes inclusive of direct patient care, review of previous records, interpretation of labs, imaging and ekg, as well as discussion of my impression and plan of care with the patient, family and other clinicians/consultants. This time is exclusive of any separate billable procedures and of treating other patients. Critical care was required for this patient who presented with large volume hematochezia and hypovolemic shock requiring my emergent evaluation and management in the emergency department.          Labs Reviewed   CBC W/ AUTO DIFFERENTIAL - Abnormal; Notable for the following components:       Result Value    RBC 4.20 (*)     Hemoglobin 11.8 (*)     Hematocrit 36.2 (*)     All other components within normal limits   COMPREHENSIVE METABOLIC PANEL - Abnormal; Notable for the following components:    CO2 21 (*)     Glucose 198 (*)     BUN, Bld 30 (*)     eGFR if non  58 (*)     All other components within normal limits   HEMOGLOBIN - Abnormal; Notable for the following components:    Hemoglobin 11.1 (*)     All other components within normal limits   POCT GLUCOSE - Abnormal; Notable for the following components:    POCT Glucose 146 (*)     All other components within normal limits   POCT GLUCOSE - Abnormal; Notable for the following components:    POCT Glucose 136 (*)     All other components within normal limits   PROTIME-INR   POCT GLUCOSE, HAND-HELD DEVICE   TYPE & SCREEN   PREPARE RBC SOFT     EKG Readings: (Independently Interpreted)   Rhythm: Normal Sinus Rhythm. Heart Rate: 91. Conduction: LAFP.   Normal axis  Mild LVH  No ST or T wave abnormalities     ECG Results          EKG 12-lead (Final result)  Result time 01/08/19 18:56:38    Final result by Interface, Lab In [facility] (01/08/19 18:56:38)                  Narrative:    Test Reason : K62.5,  Blood Pressure : 129/073 mmHG  Vent. Rate : 088 BPM     Atrial Rate : 088 BPM     P-R Int : 132 ms          QRS Dur : 098 ms      QT Int : 352 ms       P-R-T Axes : 148 239 156 degrees     QTc Int : 425 ms    Right and left arm leads are transposed, a technical artifact  Normal sinus rhythm with Premature atrial complexes  Possible LVH  Abnormal ECG  When compared with ECG of 06-FEB-2018 14:51,  limb leads are now transposed  Confirmed by Ace Gibson MD ([id number]) on 1/8/2019 6:56:29 PM    Referred By: [email or web address]   SELF           Confirmed By:Ace Gibson MD                          X-Rays:   Independently Interpreted Readings:   Other Readings:  0420  Per my read, there is no extravasation or blushing of contrast on  CTA.    Imaging Results          CTA Abdomen and Pelvis (Final result)  Result time 01/08/19 04:42:05    Final result by Florence Simpson MD (01/08/19 04:42:05)                 Impression:      1. No evidence to suggest acute gastrointestinal hemorrhage.  No additional acute intra-abdominal abnormality identified.  2. Large right hepatic lobe cyst.  3. Nonobstructing left nephrolithiasis and bilateral renal cortical hypodensities many of which are too small to definitively characterize and the larger of which likely represent cysts.  4. Postoperative change of prior prostatectomy and pelvic lymph node dissection.  Penile prosthesis in place.  5. Additional findings as above.      Electronically signed by: Florence Simpson MD  Date:    01/08/2019  Time:    04:42             Narrative:    EXAMINATION:  CTA ABDOMEN AND PELVIS    CLINICAL HISTORY:  GI bleeding;    TECHNIQUE:  Contiguous axial CT images of the abdomen and pelvis were obtained before and after the administration of 100 cc omni 350 IV contrast per CTA GI bleed protocol.  Coronal, sagittal, MIP reformatted images reviewed.    COMPARISON:  Renal stone study 07/11/2013    FINDINGS:  Visualized lung bases  demonstrate mild dependent bibasilar atelectasis.  The visualized portions of the heart and pericardium demonstrate no significant abnormalities.    There is a 7.2 cm right hepatic hypodensity demonstrating fluid attenuation compatible with a hepatic cyst.  This is slightly increased in size from prior examination.  The portal vein and splenic vein are patent.  The spleen, pancreas, and adrenal glands are unremarkable.  The gallbladder demonstrates no radiopaque stones.  No significant intra or extrahepatic biliary ductal dilatation.    The kidneys are normal in size and location and enhance symmetrically and excrete contrast satisfactorily on delayed phase imaging.  No evidence of hydronephrosis.  There is a nonobstructing nephroliths in the superior pole of the left kidney.  There are bilateral renal cortical hypodensities the majority of which are too small to definitively characterize the larger of which likely reflect renal cysts.  The urinary bladder is unremarkable.  There is postoperative change of prostatectomy and pelvic lymph node dissection.  There is a penile prosthesis in place.    The abdominal aorta is nonaneurysmal with mild atherosclerosis.  No significant lymphadenopathy.    The visualized loops of large and small bowel demonstrate no evidence of obstruction or inflammatory change.  There are small hyperdense foci within the distal small bowel loops on noncontrast CT images presumably reflecting recent ingested material.  No significant intraluminal contrast is seen within the stomach or visualized loops of large and small bowel suggest gastrointestinal hemorrhage.  No ascites, free intraperitoneal air or portal venous gas.    The visualized osseous structures demonstrate degenerative change without acute abnormality.                              Medical Decision Making:   Initial Assessment:   Patient presents due to bright red blood per rectum x 2 tonight.   Differential Diagnosis:    Hemorrhoids, diverticulosis, angiodysplasia, colitis, polyp, other mass  Clinical Tests:   Lab Tests: Ordered and Reviewed  Radiological Study: Ordered and Reviewed  Medical Tests: Ordered and Reviewed  ED Management:  Plan for basic labs, INR, type and screen, and EKG.                       0045  Patient passed approximately 300 mL of BRB while in the ED.    0300  Patient's H&H is 11.8 and 36, down from previous 1 month ago by 1.5, no acute renal failure. Will order CTA abdomen/pelvis.     0420  Per my read, there is no extravasation or blushing of contrast on  CTA.    0540  Spoke with Dr. Mendez, [facility], who will accept the patient to observation.      Clinical Impression:     1. BRBPR (bright red blood per rectum)    2. Sigmoid diverticulosis        Disposition:   Disposition: Placed in Observation  Condition: Fair      I, Dr. Efra Roper, personally performed the services described in this documentation.   All medical record entries made by the scribe were at my direction and in my presence.   I have reviewed the chart and agree that the record is accurate and complete.   Efra Roper MD.       Efra Roper MD  01/08/19 [id number]       Efar Roper MD  01/14/19 [id number]

## 2019-01-08 NOTE — HPI
Bryant Gil is a 78 y.o. male patient with history of ascending/descending/sigmoid diverticulosis who presents with 6-day history of progressive, painless, intermittent rectal bleeding; associated with history of NSAID use including BC powder and ibuprofen; not associated with dysphagia, odynophagia, abdominal pain, rectal pain with defecation, or known history of hemorrhoids. Last colonoscopy 2017 with diverticulosis as noted above.    Medical records reviewed with collateral history obtained and summarized for inclusion here.    Collateral history obtained from patient's immediately-available family member, wife, present at bedside.

## 2019-01-08 NOTE — HOSPITAL COURSE
The pt presented to ED, H/H was stable, he had 1 unit of PRBC transfused.  1/9 the pt had egd that was negative and he had a flex sigg, that showed old nela blood in recto-sigmoid colon. H/H stable no acute issues, he wants to go home, feeling good.  Pt will be dc home on PPI, no NSAIDS, and F/U with PCP and GI.  Dc once is cleared by GI

## 2019-01-08 NOTE — [employer] PATIENT INSTRUCTIONS
Discharge Summary/Instructions after an Endoscopic Procedure  Patient Name: Bryant Gil  Patient MRN: [id number]  Patient YOB: 1940 Tuesday, January 08, 2019  Rachel Burrows MD  RESTRICTIONS:  During your procedure today, you received medications for sedation.  These   medications may affect your judgment, balance and coordination.  Therefore,   for 24 hours, you have the following restrictions:   - DO NOT drive a car, operate machinery, make legal/financial decisions,   sign important papers or drink alcohol.    ACTIVITY:  Today: no heavy lifting, straining or running due to procedural   sedation/anesthesia.  The following day: return to full activity including work.  DIET:  Eat and drink normally unless instructed otherwise.     TREATMENT FOR COMMON SIDE EFFECTS:  - Mild abdominal pain, nausea, belching, bloating or excessive gas:  rest,   eat lightly and use a heating pad.  - Sore Throat: treat with throat lozenges and/or gargle with warm salt   water.  - Because air was used during the procedure, expelling large amounts of air   from your rectum or belching is normal.  - If a bowel prep was taken, you may not have a bowel movement for 1-3 days.    This is normal.  SYMPTOMS TO WATCH FOR AND REPORT TO YOUR PHYSICIAN:  1. Abdominal pain or bloating, other than gas cramps.  2. Chest pain.  3. Back pain.  4. Signs of infection such as: chills or fever occurring within 24 hours   after the procedure.  5. Rectal bleeding, which would show as bright red, maroon, or black stools.   (A tablespoon of blood from the rectum is not serious, especially if   hemorrhoids are present.)  6. Vomiting.  7. Weakness or dizziness.  GO DIRECTLY TO THE NEAREST EMERGENCY ROOM IF YOU HAVE ANY OF THE FOLLOWING:      Difficulty breathing              Chills and/or fever over 101 F   Persistent vomiting and/or vomiting blood   Severe abdominal pain   Severe chest pain   Black, tarry stools   Bleeding- more than one  tablespoon   Any other symptom or condition that you feel may need urgent attention  Your doctor recommends these additional instructions:  If any biopsies were taken, your doctors clinic will contact you in 1 to 2   weeks with any results.  - Proceed with flexible sigmoidoscopy  - No ibuprofen, naproxen, or other non-steroidal anti-inflammatory drugs   indefinitely including cessation of BC powder.   - Await pathology results with further follow-up as needed pending results   of biopsies.  - Daily PO PPI x 6 weeks  - Discharge patient to home.  For questions, problems or results please call your physician - Rachel Burrows MD at Work:  [phone].  EMERGENCY PHONE NUMBER: [phone],  LAB RESULTS: [phone]  IF A COMPLICATION OR EMERGENCY SITUATION ARISES AND YOU ARE UNABLE TO REACH   YOUR PHYSICIAN - GO DIRECTLY TO THE EMERGENCY ROOM.  Rachel Burrows MD  1/8/2019 3:55:34 PM  This report has been verified and signed electronically.  [employer]

## 2019-01-08 NOTE — [employer] PATIENT INSTRUCTIONS
Discharge Summary/Instructions after an Endoscopic Procedure  Patient Name: Bryant Gil  Patient MRN: [id number]  Patient YOB: 1940 Tuesday, January 08, 2019  Rachel Burrows MD  RESTRICTIONS:  During your procedure today, you received medications for sedation.  These   medications may affect your judgment, balance and coordination.  Therefore,   for 24 hours, you have the following restrictions:   - DO NOT drive a car, operate machinery, make legal/financial decisions,   sign important papers or drink alcohol.    ACTIVITY:  Today: no heavy lifting, straining or running due to procedural   sedation/anesthesia.  The following day: return to full activity including work.  DIET:  Eat and drink normally unless instructed otherwise.     TREATMENT FOR COMMON SIDE EFFECTS:  - Mild abdominal pain, nausea, belching, bloating or excessive gas:  rest,   eat lightly and use a heating pad.  - Sore Throat: treat with throat lozenges and/or gargle with warm salt   water.  - Because air was used during the procedure, expelling large amounts of air   from your rectum or belching is normal.  - If a bowel prep was taken, you may not have a bowel movement for 1-3 days.    This is normal.  SYMPTOMS TO WATCH FOR AND REPORT TO YOUR PHYSICIAN:  1. Abdominal pain or bloating, other than gas cramps.  2. Chest pain.  3. Back pain.  4. Signs of infection such as: chills or fever occurring within 24 hours   after the procedure.  5. Rectal bleeding, which would show as bright red, maroon, or black stools.   (A tablespoon of blood from the rectum is not serious, especially if   hemorrhoids are present.)  6. Vomiting.  7. Weakness or dizziness.  GO DIRECTLY TO THE NEAREST EMERGENCY ROOM IF YOU HAVE ANY OF THE FOLLOWING:      Difficulty breathing              Chills and/or fever over 101 F   Persistent vomiting and/or vomiting blood   Severe abdominal pain   Severe chest pain   Black, tarry stools   Bleeding- more than one  tablespoon   Any other symptom or condition that you feel may need urgent attention  Your doctor recommends these additional instructions:  If any biopsies were taken, your doctors clinic will contact you in 1 to 2   weeks with any results.  - Observe the patient for overnight for improvement in symptoms.   - May advance to clear liquid diet.  - GI will follow, to follow up clinical course in morning.  - Discharge patient to home (via wheelchair).  For questions, problems or results please call your physician - Rachel Burrows MD at Work:  [phone].  EMERGENCY PHONE NUMBER: [phone],  LAB RESULTS: [phone]  IF A COMPLICATION OR EMERGENCY SITUATION ARISES AND YOU ARE UNABLE TO REACH   YOUR PHYSICIAN - GO DIRECTLY TO THE EMERGENCY ROOM.  Rachel Burrows MD  1/8/2019 3:55:16 PM  This report has been verified and signed electronically.  [employer]

## 2019-01-08 NOTE — SUBJECTIVE & OBJECTIVE
Past Medical History:   Diagnosis Date    Cataract of both eyes     HTN (hypertension)     HTN (hypertension) 10/9/2012    OHT (ocular hypertension)     Prostate cancer     Pyelonephritis, right no stone on CT scan. 7/11/2013    Sepsis, same organism in urine grew in his blood, Klebsiella 7/11/2013    Sigmoid diverticulosis 03/19/2017    Tendinitis of both rotator cuffs 6/25/2013    Tubular adenoma of colon 01/10/2012    Type II or unspecified type diabetes mellitus with neurological manifestations, not stated as uncontrolled(250.60) 10/9/2012     Past Surgical History:   Procedure Laterality Date    CARPAL TUNNEL RELEASE Right 08/25/2015    CIRCUMCISION  04/13/2005    COLONOSCOPY Golytely prep N/A 3/29/2017    Performed [facility]a [facility]aning [facility] at [facility]    COLONOSCOPY W/ POLYPECTOMY  01/10/2012    Repeat in 5 years     PENILE PROSTHESIS IMPLANT      PROSTATE SURGERY  1999    Prostatectomy for prostate ca; [facility]    RELEASE-CARPAL TUNNEL Right 8/25/2015    [facility] by [facility] Jr[facility] at [facility]    RELEASE-FINGER-TRIGGER Right 8/25/2015    [facility] by [facility] at [facility]    TRIGGER FINGER RELEASE Right 2015       Review of patient's allergies indicates:  No Known Allergies  Family History     Problem Relation (Age of Onset)    Cancer Brother    Cataracts Sister    Diabetes Sister    Heart attack Brother    Hypertension Mother    No Known Problems Brother, Sister, Son, Daughter, Sister, Sister    Prostate cancer Brother        Tobacco Use    Smoking status: Former Smoker     Packs/day: 0.50     Years: 3.00     Pack years: 1.50     Types: Cigarettes    Smokeless tobacco: Never Used    Tobacco comment: smoked as a teenager   Substance and Sexual Activity    Alcohol use: Yes     Comment: social drinks a beer 1-2 x month    Drug use: No    Sexual activity: Yes     Partners: Female     Review of Systems   Constitutional: Negative for chills and fever.   HENT:  Negative for sore throat and trouble swallowing.    Eyes: Negative for photophobia and visual disturbance.   Respiratory: Negative for chest tightness and shortness of breath.    Cardiovascular: Negative for chest pain and palpitations.   Gastrointestinal: Positive for anal bleeding and blood in stool. Negative for abdominal pain, diarrhea, nausea, rectal pain and vomiting.   Genitourinary: Negative for dysuria and hematuria.   Musculoskeletal: Negative for arthralgias and joint swelling.   Skin: Negative for rash and wound.   Neurological: Negative for seizures and syncope.   Psychiatric/Behavioral: Negative for agitation and behavioral problems.     Objective:     Vital Signs (Most Recent):  Temp: 97.9 °F (36.6 °C) (01/08/19 0709)  Pulse: 91 (01/08/19 1128)  Resp: 16 (01/08/19 1128)  BP: 127/72 (01/08/19 1128)  SpO2: 100 % (01/08/19 1128) Vital Signs (24h Range):  Temp:  [97.9 °F (36.6 °C)-98.2 °F (36.8 °C)] 97.9 °F (36.6 °C)  Pulse:  [] 91  Resp:  [14-28] 16  SpO2:  [96 %-100 %] 100 %  BP: (110-138)/(59-86) 127/72     Weight: 79.4 kg (175 lb) (01/08/19 0010)  Body mass index is 27.41 kg/m².      Intake/Output Summary (Last 24 hours) at 1/8/2019 1310  Last data filed at 1/8/2019 0858  Gross per 24 hour   Intake 420 ml   Output 100 ml   Net 320 ml     Lines/Drains/Airways     Peripheral Intravenous Line                 Peripheral IV - Single Lumen 01/08/19 0014 Left Forearm less than 1 day         Peripheral IV - Single Lumen 01/08/19 0048 Right Forearm less than 1 day              Physical Exam   Constitutional: He is oriented to person, place, and time. He appears well-developed and well-nourished.   HENT:   Head: Normocephalic and atraumatic.   Mouth/Throat: Oropharynx is clear and moist. No oropharyngeal exudate.   Eyes: Conjunctivae and EOM are normal. Pupils are equal, round, and reactive to light.   Neck: Neck supple.   Cardiovascular: Normal rate and regular rhythm. Exam reveals no friction rub.    Pulmonary/Chest: Effort normal and breath sounds normal. No respiratory distress. He has no wheezes.   Abdominal: Soft. Bowel sounds are normal. He exhibits no distension. There is no tenderness. There is no rebound and no guarding.   Musculoskeletal: Normal range of motion.   Neurological: He is alert and oriented to person, place, and time. Coordination normal.   Skin: Skin is warm and dry. No rash noted.   Psychiatric: He has a normal mood and affect. His behavior is normal.     Significant Labs:  CBC:   Recent Labs   Lab 19   WBC 4.97   HGB 11.8*   HCT 36.2*        CMP:   Recent Labs   Lab 19   *   CALCIUM 9.6   ALBUMIN 4.1   PROT 7.2      K 3.9   CO2 21*      BUN 30*   CREATININE 1.2   ALKPHOS 57   ALT 21   AST 18   BILITOT 0.2     Coagulation:   Recent Labs   Lab 19   INR 1.0       Significant Imagin19 CTA: I have personally reviewed all results within the past 24 hours.  Impression       1. No evidence to suggest acute gastrointestinal hemorrhage.  No additional acute intra-abdominal abnormality identified.  2. Large right hepatic lobe cyst.  3. Nonobstructing left nephrolithiasis and bilateral renal cortical hypodensities many of which are too small to definitively characterize and the larger of which likely represent cysts.  4. Postoperative change of prior prostatectomy and pelvic lymph node dissection.  Penile prosthesis in place.  5. Additional findings as above.

## 2019-01-08 NOTE — PLAN OF CARE
01/08/19 0833   SNELL Message   Medicare Outpatient and Observation Notification regarding financial responsibility Given to patient/caregiver;Explained to patient/caregiver;Signed/date by patient/caregiver  (signed by spouse)   Date SNELL was signed 01/08/19   Time SNELL was signed 0830

## 2019-01-08 NOTE — CONSULTS
[facility]  Gastroenterology  Consult Note    Patient Name: Bryant Gil  MRN: [id number]  Admission Date: 1/8/2019  Hospital Length of Stay: 0 days  Code Status: Full Code   Attending Provider: No att. providers found   Consulting Provider: Endy Vega MD  Primary Care Physician: Laverne Roberson MD  Principal Problem:Bright red blood per rectum    Inpatient consult to [facility]  Consult performed by: Endy Vega MD  Consult ordered by: Lavell Mendez MD        Subjective:     HPI:  Bryant Gil is a 78 y.o. male patient with history of ascending/descending/sigmoid diverticulosis who presents with 6-day history of progressive, painless, intermittent rectal bleeding; associated with history of NSAID use including BC powder and ibuprofen; not associated with dysphagia, odynophagia, abdominal pain, rectal pain with defecation, or known history of hemorrhoids. Last colonoscopy 2017 with diverticulosis as noted above.    Medical records reviewed with collateral history obtained and summarized for inclusion here.    Collateral history obtained from patient's immediately-available family member, wife, present at bedside.    Past Medical History:   Diagnosis Date    Cataract of both eyes     HTN (hypertension)     HTN (hypertension) 10/9/2012    OHT (ocular hypertension)     Prostate cancer     Pyelonephritis, right no stone on CT scan. 7/11/2013    Sepsis, same organism in urine grew in his blood, Klebsiella 7/11/2013    Sigmoid diverticulosis 03/19/2017    Tendinitis of both rotator cuffs 6/25/2013    Tubular adenoma of colon 01/10/2012    Type II or unspecified type diabetes mellitus with neurological manifestations, not stated as uncontrolled(250.60) 10/9/2012     Past Surgical History:   Procedure Laterality Date    CARPAL TUNNEL RELEASE Right 08/25/2015    CIRCUMCISION  04/13/2005    COLONOSCOPY Golytely prep N/A 3/29/2017    Performed by Kavitha MACHADO  [facility] END[facility]    COLONOSCOPY W/ POLYPECTOMY  01/10/2012    Repeat in 5 years     PENILE PROSTHESIS IMPLANT      PROSTATE SURGERY  1999    Prostatectomy for prostate ca; [facility]    RELEASE-CARPAL TUNNEL Right 8/25/2015    Performed by [facility] Jr[facility] at [facility] OR    RELEASE-FINGER-TRIGGER Right 8/25/2015    Performed by [facility] at [facility]    TRIGGER FINGER RELEASE Right 2015       Review of patient's allergies indicates:  No Known Allergies  Family History     Problem Relation (Age of Onset)    Cancer Brother    Cataracts Sister    Diabetes Sister    Heart attack Brother    Hypertension Mother    No Known Problems Brother, Sister, Son, Daughter, Sister, Sister    Prostate cancer Brother        Tobacco Use    Smoking status: Former Smoker     Packs/day: 0.50     Years: 3.00     Pack years: 1.50     Types: Cigarettes    Smokeless tobacco: Never Used    Tobacco comment: smoked as a teenager   Substance and Sexual Activity    Alcohol use: Yes     Comment: social drinks a beer 1-2 x month    Drug use: No    Sexual activity: Yes     Partners: Female     Review of Systems   Constitutional: Negative for chills and fever.   HENT: Negative for sore throat and trouble swallowing.    Eyes: Negative for photophobia and visual disturbance.   Respiratory: Negative for chest tightness and shortness of breath.    Cardiovascular: Negative for chest pain and palpitations.   Gastrointestinal: Positive for anal bleeding and blood in stool. Negative for abdominal pain, diarrhea, nausea, rectal pain and vomiting.   Genitourinary: Negative for dysuria and hematuria.   Musculoskeletal: Negative for arthralgias and joint swelling.   Skin: Negative for rash and wound.   Neurological: Negative for seizures and syncope.   Psychiatric/Behavioral: Negative for agitation and behavioral problems.     Objective:     Vital Signs (Most Recent):  Temp: 97.9 °F (36.6 °C) (01/08/19 0709)  Pulse: 91 (01/08/19  1128)  Resp: 16 (01/08/19 1128)  BP: 127/72 (01/08/19 1128)  SpO2: 100 % (01/08/19 1128) Vital Signs (24h Range):  Temp:  [97.9 °F (36.6 °C)-98.2 °F (36.8 °C)] 97.9 °F (36.6 °C)  Pulse:  [] 91  Resp:  [14-28] 16  SpO2:  [96 %-100 %] 100 %  BP: (110-138)/(59-86) 127/72     Weight: 79.4 kg (175 lb) (01/08/19 0010)  Body mass index is 27.41 kg/m².      Intake/Output Summary (Last 24 hours) at 1/8/2019 1310  Last data filed at 1/8/2019 0858  Gross per 24 hour   Intake 420 ml   Output 100 ml   Net 320 ml     Lines/Drains/Airways     Peripheral Intravenous Line                 Peripheral IV - Single Lumen 01/08/19 0014 Left Forearm less than 1 day         Peripheral IV - Single Lumen 01/08/19 0048 Right Forearm less than 1 day              Physical Exam   Constitutional: He is oriented to person, place, and time. He appears well-developed and well-nourished.   HENT:   Head: Normocephalic and atraumatic.   Mouth/Throat: Oropharynx is clear and moist. No oropharyngeal exudate.   Eyes: Conjunctivae and EOM are normal. Pupils are equal, round, and reactive to light.   Neck: Neck supple.   Cardiovascular: Normal rate and regular rhythm. Exam reveals no friction rub.   Pulmonary/Chest: Effort normal and breath sounds normal. No respiratory distress. He has no wheezes.   Abdominal: Soft. Bowel sounds are normal. He exhibits no distension. There is no tenderness. There is no rebound and no guarding.   Musculoskeletal: Normal range of motion.   Neurological: He is alert and oriented to person, place, and time. Coordination normal.   Skin: Skin is warm and dry. No rash noted.   Psychiatric: He has a normal mood and affect. His behavior is normal.     Significant Labs:  CBC:   Recent Labs   Lab 01/08/19 0039   WBC 4.97   HGB 11.8*   HCT 36.2*        CMP:   Recent Labs   Lab 01/08/19 0039   *   CALCIUM 9.6   ALBUMIN 4.1   PROT 7.2      K 3.9   CO2 21*      BUN 30*   CREATININE 1.2   ALKPHOS 57   ALT  21   AST 18   BILITOT 0.2     Coagulation:   Recent Labs   Lab 19  0039   INR 1.0       Significant Imagin19 CTA: I have personally reviewed all results within the past 24 hours.  Impression       1. No evidence to suggest acute gastrointestinal hemorrhage.  No additional acute intra-abdominal abnormality identified.  2. Large right hepatic lobe cyst.  3. Nonobstructing left nephrolithiasis and bilateral renal cortical hypodensities many of which are too small to definitively characterize and the larger of which likely represent cysts.  4. Postoperative change of prior prostatectomy and pelvic lymph node dissection.  Penile prosthesis in place.  5. Additional findings as above.         Assessment/Plan:     * Bright red blood per rectum    Presentation consistent with likely diverticular etiology given painless brighter red bleeding with mild anemia, CTA negative, recent colonoscopy with diverticulosis on R and L sides of colon. No known or mentioned history of hemorrhoids but could also be hemorrhoidal. Hemodynamically stable with mild anemia lower than baseline.  Plan:  - Discussed various potential clinical courses with patient and options include clinical observation and colonoscopy if needed/symptoms continued vs prep with planned colonoscopy tomorrow vs flex sig now with followup depending on findings --> elects to proceed with offered flex sig today that may identify etiology with understanding that if negative, unremarkable, or findings warrant may recommend colonoscopy tomorrow.  - will also perform EGD for significant NSAID history to exclude PUD as a contributory etiology to pain. Lower suspicion as he does not admit to pain or related upper GI symptoms.  - generally, recommend avoidance of BC powder altogether and limit NSAID use.  - Maintain NPO  - Tap water enema x2 now  - hold any chemical prophylactic anticoagulation  - EGD/flex sig today as above with further recs to follow          Thank you for your consult. I will follow-up with patient. Please contact us if you have any additional questions.    Patient case discussed with attending physician Dr. Burrows with assessment and plan as above and any addenda/clarifications to follow in attestation as needed.    Endy Vega MD  Gastroenterology Fellow  [facility]

## 2019-01-08 NOTE — SUBJECTIVE & OBJECTIVE
Past Medical History:   Diagnosis Date    Cataract of both eyes     HTN (hypertension)     HTN (hypertension) 10/9/2012    OHT (ocular hypertension)     Prostate cancer     Pyelonephritis, right no stone on CT scan. 7/11/2013    Sepsis, same organism in urine grew in his blood, Klebsiella 7/11/2013    Sigmoid diverticulosis 03/19/2017    Tendinitis of both rotator cuffs 6/25/2013    Tubular adenoma of colon 01/10/2012    Type II or unspecified type diabetes mellitus with neurological manifestations, not stated as uncontrolled(250.60) 10/9/2012       Past Surgical History:   Procedure Laterality Date    CARPAL TUNNEL RELEASE Right 08/25/2015    CIRCUMCISION  04/13/2005    COLONOSCOPY Golytely prep N/A 3/29/2017    Performed by Kavitha Cleaning [facility] at [facility] END[facility]    COLONOSCOPY W/ POLYPECTOMY  01/10/2012    Repeat in 5 years     PENILE PROSTHESIS IMPLANT      PROSTATE SURGERY  1999    Prostatectomy for prostate ca; [facility]    RELEASE-CARPAL TUNNEL Right 8/25/2015    Performed by [facility] Jr[facility] at [facility]    RELEASE-FINGER-TRIGGER Right 8/25/2015    [facility] by [facility] at [facility]    TRIGGER FINGER RELEASE Right 2015       Review of patient's allergies indicates:  No Known Allergies    No current facility-administered medications on file prior to encounter.      Current Outpatient Medications on File Prior to Encounter   Medication Sig    amLODIPine (NORVASC) 10 MG tablet Take 1 tablet (10 mg total) by mouth once daily.    ascorbic acid (VITAMIN C) 100 MG tablet Take 100 mg by mouth once daily.    aspirin (ENTERIC COATED ASPIRIN) 81 MG EC tablet Take 1 tablet (81 mg total) by mouth once. 1 Tablet, Delayed Release (E.C.) Oral Every day for 1 dose    atorvastatin (LIPITOR) 20 MG tablet Take 1 tablet (20 mg total) by mouth once daily.    blood sugar diagnostic (TRUETEST TEST STRIPS) Strp 2 strips by Misc.(Non-Drug; Combo Route) route once daily.    blood sugar  diagnostic Strp 1 strip by Misc.(Non-Drug; Combo Route) route once daily.    blood-glucose meter (ACCU-CHEK YULIA PLUS METER) Misc 1 Device by Misc.(Non-Drug; Combo Route) route once daily.    lancets (ACCU-CHEK SOFTCLIX LANCETS) Misc 1 Device by Misc.(Non-Drug; Combo Route) route once daily.    latanoprost 0.005 % ophthalmic solution Place 1 drop into both eyes once daily.    losartan-hydrochlorothiazide 100-25 mg (HYZAAR) 100-25 mg per tablet Take 1 tablet by mouth every morning. Blood pressure    metFORMIN (GLUCOPHAGE) 1000 MG tablet Take 1 tablet (1,000 mg total) by mouth 2 (two) times daily with meals.    metoprolol succinate (TOPROL-XL) 25 MG 24 hr tablet Take 1 tablet (25 mg total) by mouth once daily.    vitamin D 1000 units Tab Take 185 mg by mouth once daily.     Family History     Problem Relation (Age of Onset)    Cancer Brother    Cataracts Sister    Diabetes Sister    Heart attack Brother    Hypertension Mother    No Known Problems Brother, Sister, Son, Daughter, Sister, Sister    Prostate cancer Brother        Tobacco Use    Smoking status: Former Smoker     Packs/day: 0.50     Years: 3.00     Pack years: 1.50     Types: Cigarettes    Smokeless tobacco: Never Used    Tobacco comment: smoked as a teenager   Substance and Sexual Activity    Alcohol use: Yes     Comment: social drinks a beer 1-2 x month    Drug use: No    Sexual activity: Yes     Partners: Female     Review of Systems   Constitutional: Negative for activity change, appetite change, fatigue and fever.   HENT: Negative for congestion and sore throat.    Eyes: Negative for photophobia and visual disturbance.   Respiratory: Negative for apnea, chest tightness and shortness of breath.    Cardiovascular: Negative for chest pain and palpitations.   Gastrointestinal: Positive for blood in stool. Negative for abdominal distention, abdominal pain, constipation, diarrhea and rectal pain.   Genitourinary: Negative for difficulty  urinating and flank pain.   Musculoskeletal: Negative for gait problem and neck pain.   Neurological: Negative for dizziness, seizures, syncope and speech difficulty.   Psychiatric/Behavioral: Negative for agitation and confusion. The patient is not nervous/anxious.      Objective:     Vital Signs (Most Recent):  Temp: 97.9 °F (36.6 °C) (01/08/19 0709)  Pulse: 82 (01/08/19 0709)  Resp: 16 (01/08/19 0709)  BP: 129/76 (01/08/19 0709)  SpO2: 100 % (01/08/19 0709) Vital Signs (24h Range):  Temp:  [97.9 °F (36.6 °C)-98.2 °F (36.8 °C)] 97.9 °F (36.6 °C)  Pulse:  [] 82  Resp:  [14-28] 16  SpO2:  [96 %-100 %] 100 %  BP: (110-138)/(59-86) 129/76     Weight: 79.4 kg (175 lb)  Body mass index is 27.41 kg/m².    Physical Exam   Constitutional: He is oriented to person, place, and time. He appears well-developed and well-nourished.   HENT:   Head: Normocephalic and atraumatic.   Eyes: EOM are normal. Right eye exhibits no discharge. Left eye exhibits no discharge.   Neck: Normal range of motion. No JVD present.   Cardiovascular: Normal rate and regular rhythm.   Pulmonary/Chest: Effort normal and breath sounds normal.   Abdominal: Soft. Bowel sounds are normal. He exhibits no distension. There is no tenderness. There is no rebound and no guarding.   Musculoskeletal: Normal range of motion. He exhibits no edema or deformity.   Neurological: He is alert and oriented to person, place, and time.   Skin: Skin is warm and dry.   Psychiatric: He has a normal mood and affect. His behavior is normal. Judgment and thought content normal.         CRANIAL NERVES     CN III, IV, VI   Extraocular motions are normal.        Significant Labs:   A1C:   Recent Labs   Lab 10/02/18  0710 12/04/18  0857   HGBA1C 8.1* 7.8*     Bilirubin:   Recent Labs   Lab 01/08/19  0039   BILITOT 0.2     Blood Culture: No results for input(s): LABBLOO in the last 48 hours.  BMP:   Recent Labs   Lab 01/08/19  0039   *      K 3.9      CO2 21*    BUN 30*   CREATININE 1.2   CALCIUM 9.6     CBC:   Recent Labs   Lab 01/08/19 0039   WBC 4.97   HGB 11.8*   HCT 36.2*        CMP:   Recent Labs   Lab 01/08/19 0039      K 3.9      CO2 21*   *   BUN 30*   CREATININE 1.2   CALCIUM 9.6   PROT 7.2   ALBUMIN 4.1   BILITOT 0.2   ALKPHOS 57   AST 18   ALT 21   ANIONGAP 13   EGFRNONAA 58*     Cardiac Markers: No results for input(s): CKMB, MYOGLOBIN, BNP, TROPISTAT in the last 48 hours.  Coagulation:   Recent Labs   Lab 01/08/19 0039   INR 1.0     Magnesium: No results for input(s): MG in the last 48 hours.  Troponin: No results for input(s): TROPONINI in the last 48 hours.  TSH: No results for input(s): TSH in the last 4320 hours.  Urine Culture: No results for input(s): LABURIN in the last 48 hours.    Significant Imaging: will revew ct result

## 2019-01-08 NOTE — HPI
The pt is a 78 years old male pt with PMH significant for HLP , HTN, DMII, hx of prostate cancer resected in the 1990's.  The pt had been in good health recently.  Over the last 3 days he started seeing blood with his BM. First, he thought it was something he ate with color in it.  But the amount of bleeding and color of it had changed.  It was red bright now more a darkish color to it.  The pt also had increased amount yeserday, and he had to stop at a bus stop to go to bathroom from the increased BM volume with blood.  The pt does not have headache, nausea, emesis, abd pain or distention.  He said last week, he had been taking more otc with goods powder for generalized pain and some headache. No CP, no SOB.

## 2019-01-08 NOTE — H&P
"[facility] Medicine  History & Physical    Patient Name: Bryant Gil  MRN: [id number]  Admission Date: 1/8/2019  Attending Physician: Sam Lane DO  Primary Care Provider: Laverne Roberson MD         Patient information was obtained from patient, spouse/SO and ER records.     Subjective:     Principal Problem:Bright red blood per rectum    Chief Complaint:   Chief Complaint   Patient presents with    Rectal Bleeding     patient states  " i been passing alot of blood in my stool" noticed it for the last couple of days however tonight it is " running like water and nonstop.         HPI: The pt is a 78 years old male pt with PMH significant for HLP , HTN, DMII, hx of prostate cancer resected in the 1990's.  The pt had been in good health recently.  Over the last 3 days he started seeing blood with his BM. First, he thought it was something he ate with color in it.  But the amount of bleeding and color of it had changed.  It was red bright now more a darkish color to it.  The pt also had increased amount yeserday, and he had to stop at a bus stop to go to bathroom from the increased BM volume with blood.  The pt does not have headache, nausea, emesis, abd pain or distention.  He said last week, he had been taking more otc with goods powder for generalized pain and some headache. No CP, no SOB.    Past Medical History:   Diagnosis Date    Cataract of both eyes     HTN (hypertension)     HTN (hypertension) 10/9/2012    OHT (ocular hypertension)     Prostate cancer     Pyelonephritis, right no stone on CT scan. 7/11/2013    Sepsis, same organism in urine grew in his blood, Klebsiella 7/11/2013    Sigmoid diverticulosis 03/19/2017    Tendinitis of both rotator cuffs 6/25/2013    Tubular adenoma of colon 01/10/2012    Type II or unspecified type diabetes mellitus with neurological manifestations, not stated as uncontrolled(250.60) 10/9/2012       Past Surgical History:   Procedure " Laterality Date    CARPAL TUNNEL RELEASE Right 08/25/2015    CIRCUMCISION  04/13/2005    COLONOSCOPY Golytely prep N/A 3/29/2017    Performed by Kavitha Cleaning [facility] at [facility] ENDO    COLONOSCOPY W/ POLYPECTOMY  01/10/2012    Repeat in 5 years     PENILE PROSTHESIS IMPLANT      PROSTATE SURGERY  1999    Prostatectomy for prostate ca; [facility]    RELEASE-CARPAL TUNNEL Right 8/25/2015    Performed by Anthony Cooper Jr., MD at [facility] OR    RELEASE-FINGER-TRIGGER Right 8/25/2015    Performed by Anthony Cooper Jr., [facility] at [facility] OR    TRIGGER FINGER RELEASE Right 2015       Review of patient's allergies indicates:  No Known Allergies    No current facility-administered medications on file prior to encounter.      Current Outpatient Medications on File Prior to Encounter   Medication Sig    amLODIPine (NORVASC) 10 MG tablet Take 1 tablet (10 mg total) by mouth once daily.    ascorbic acid (VITAMIN C) 100 MG tablet Take 100 mg by mouth once daily.    aspirin (ENTERIC COATED ASPIRIN) 81 MG EC tablet Take 1 tablet (81 mg total) by mouth once. 1 Tablet, Delayed Release (E.C.) Oral Every day for 1 dose    atorvastatin (LIPITOR) 20 MG tablet Take 1 tablet (20 mg total) by mouth once daily.    blood sugar diagnostic (TRUETEST TEST STRIPS) Strp 2 strips by Misc.(Non-Drug; Combo Route) route once daily.    blood sugar diagnostic Strp 1 strip by Misc.(Non-Drug; Combo Route) route once daily.    blood-glucose meter (ACCU-CHEK YULIA PLUS METER) Misc 1 Device by Misc.(Non-Drug; Combo Route) route once daily.    lancets (ACCU-CHEK SOFTCLIX LANCETS) Misc 1 Device by Misc.(Non-Drug; Combo Route) route once daily.    latanoprost 0.005 % ophthalmic solution Place 1 drop into both eyes once daily.    losartan-hydrochlorothiazide 100-25 mg (HYZAAR) 100-25 mg per tablet Take 1 tablet by mouth every morning. Blood pressure    metFORMIN (GLUCOPHAGE) 1000 MG tablet Take 1 tablet (1,000 mg total) by mouth 2 (two) times daily  with meals.    metoprolol succinate (TOPROL-XL) 25 MG 24 hr tablet Take 1 tablet (25 mg total) by mouth once daily.    vitamin D 1000 units Tab Take 185 mg by mouth once daily.     Family History     Problem Relation (Age of Onset)    Cancer Brother    Cataracts Sister    Diabetes Sister    Heart attack Brother    Hypertension Mother    No Known Problems Brother, Sister, Son, Daughter, Sister, Sister    Prostate cancer Brother        Tobacco Use    Smoking status: Former Smoker     Packs/day: 0.50     Years: 3.00     Pack years: 1.50     Types: Cigarettes    Smokeless tobacco: Never Used    Tobacco comment: smoked as a teenager   Substance and Sexual Activity    Alcohol use: Yes     Comment: social drinks a beer 1-2 x month    Drug use: No    Sexual activity: Yes     Partners: Female     Review of Systems   Constitutional: Negative for activity change, appetite change, fatigue and fever.   HENT: Negative for congestion and sore throat.    Eyes: Negative for photophobia and visual disturbance.   Respiratory: Negative for apnea, chest tightness and shortness of breath.    Cardiovascular: Negative for chest pain and palpitations.   Gastrointestinal: Positive for blood in stool. Negative for abdominal distention, abdominal pain, constipation, diarrhea and rectal pain.   Genitourinary: Negative for difficulty urinating and flank pain.   Musculoskeletal: Negative for gait problem and neck pain.   Neurological: Negative for dizziness, seizures, syncope and speech difficulty.   Psychiatric/Behavioral: Negative for agitation and confusion. The patient is not nervous/anxious.      Objective:     Vital Signs (Most Recent):  Temp: 97.9 °F (36.6 °C) (01/08/19 0709)  Pulse: 82 (01/08/19 0709)  Resp: 16 (01/08/19 0709)  BP: 129/76 (01/08/19 0709)  SpO2: 100 % (01/08/19 0709) Vital Signs (24h Range):  Temp:  [97.9 °F (36.6 °C)-98.2 °F (36.8 °C)] 97.9 °F (36.6 °C)  Pulse:  [] 82  Resp:  [14-28] 16  SpO2:  [96 %-100 %]  100 %  BP: (110-138)/(59-86) 129/76     Weight: 79.4 kg (175 lb)  Body mass index is 27.41 kg/m².    Physical Exam   Constitutional: He is oriented to person, place, and time. He appears well-developed and well-nourished.   HENT:   Head: Normocephalic and atraumatic.   Eyes: EOM are normal. Right eye exhibits no discharge. Left eye exhibits no discharge.   Neck: Normal range of motion. No JVD present.   Cardiovascular: Normal rate and regular rhythm.   Pulmonary/Chest: Effort normal and breath sounds normal.   Abdominal: Soft. Bowel sounds are normal. He exhibits no distension. There is no tenderness. There is no rebound and no guarding.   Musculoskeletal: Normal range of motion. He exhibits no edema or deformity.   Neurological: He is alert and oriented to person, place, and time.   Skin: Skin is warm and dry.   Psychiatric: He has a normal mood and affect. His behavior is normal. Judgment and thought content normal.         CRANIAL NERVES     CN III, IV, VI   Extraocular motions are normal.        Significant Labs:   A1C:   Recent Labs   Lab 10/02/18  0710 12/04/18  0857   HGBA1C 8.1* 7.8*     Bilirubin:   Recent Labs   Lab 01/08/19 0039   BILITOT 0.2     Blood Culture: No results for input(s): LABBLOO in the last 48 hours.  BMP:   Recent Labs   Lab 01/08/19 0039   *      K 3.9      CO2 21*   BUN 30*   CREATININE 1.2   CALCIUM 9.6     CBC:   Recent Labs   Lab 01/08/19 0039   WBC 4.97   HGB 11.8*   HCT 36.2*        CMP:   Recent Labs   Lab 01/08/19 0039      K 3.9      CO2 21*   *   BUN 30*   CREATININE 1.2   CALCIUM 9.6   PROT 7.2   ALBUMIN 4.1   BILITOT 0.2   ALKPHOS 57   AST 18   ALT 21   ANIONGAP 13   EGFRNONAA 58*     Cardiac Markers: No results for input(s): CKMB, MYOGLOBIN, BNP, TROPISTAT in the last 48 hours.  Coagulation:   Recent Labs   Lab 01/08/19 0039   INR 1.0     Magnesium: No results for input(s): MG in the last 48 hours.  Troponin: No results for  input(s): TROPONINI in the last 48 hours.  TSH: No results for input(s): TSH in the last 4320 hours.  Urine Culture: No results for input(s): LABURIN in the last 48 hours.    Significant Imaging: will revew ct result    Assessment/Plan:     * Bright red blood per rectum    Admit pt.  Place on tele.  Serial H/H  Transfuse as needed, for symptoms or H/H < 7/21  Consult GI for EGD +/- c-scope  protonix drip       Essential hypertension    Hold BP meds while pt is NPO  monitor       Type 2 diabetes mellitus with diabetic polyneuropathy, without long-term current use of insulin    accu-check.  ISS       Mixed hyperlipidemia    Will restart home meds once off NPO       Hepatic cyst    Large right hepatic cyst on ct abd.  Will f/u  With us and outpatient         VTE Risk Mitigation (From admission, onward)    None             Sam Lane DO  Department of Hospital Medicine   [facility]

## 2019-01-08 NOTE — ASSESSMENT & PLAN NOTE
Admit pt.  Place on tele.  Serial H/H  Transfuse as needed, for symptoms or H/H < 7/21  Consult GI for EGD +/- c-scope  protonix drip

## 2019-01-08 NOTE — ANESTHESIA PREPROCEDURE EVALUATION
01/08/2019  Bryant Gil is a 78 y.o., male. Presenting for flex sig for GI bleed.    Anesthesia Evaluation    I have reviewed the Patient Summary Reports.    I have reviewed the Nursing Notes.      Review of Systems  Anesthesia Hx:  No problems with previous Anesthesia   Denies Personal Hx of Anesthesia complications.   Social:  Former Smoker    Hematology/Oncology:         -- Anemia: --  Cancer in past history:  Oncology Comments: Prostate CA     EENT/Dental:EENT/Dental Normal   Cardiovascular:   Exercise tolerance: good Hypertension, well controlled ECG has been reviewed.    Pulmonary:  Pulmonary Normal    Renal/:   Chronic Renal Disease, CRI    Hepatic/GI:  Hepatic/GI Normal    Musculoskeletal:  Musculoskeletal Normal    Neurological:  Neurology Normal    Endocrine:   Diabetes, type 2    Dermatological:  Skin Normal    Psych:  Psychiatric Normal         Lab Results   Component Value Date    WBC 4.97 01/08/2019    HGB 11.1 (L) 01/08/2019    HCT 36.2 (L) 01/08/2019    MCV 86 01/08/2019     01/08/2019       Chemistry        Component Value Date/Time     01/08/2019 0039    K 3.9 01/08/2019 0039     01/08/2019 0039    CO2 21 (L) 01/08/2019 0039    BUN 30 (H) 01/08/2019 0039    CREATININE 1.2 01/08/2019 0039     (H) 01/08/2019 0039        Component Value Date/Time    CALCIUM 9.6 01/08/2019 0039    ALKPHOS 57 01/08/2019 0039    AST 18 01/08/2019 0039    ALT 21 01/08/2019 0039    BILITOT 0.2 01/08/2019 0039    ESTGFRAFRICA >60 01/08/2019 0039    EGFRNONAA 58 (A) 01/08/2019 0039            Physical Exam  General:  Well nourished    Airway/Jaw/Neck:  Airway Findings: Mouth Opening: Normal Mallampati: II  TM Distance: Normal, at least 6 cm      Dental:  Dental Findings:    Chest/Lungs:  Chest/Lungs Findings: Clear to auscultation     Heart/Vascular:  Heart Findings: Rate: Normal  Rhythm:  Regular Rhythm             Anesthesia Plan  Type of Anesthesia, risks & benefits discussed:  Anesthesia Type:  MAC  Patient's Preference:   Intra-op Monitoring Plan: standard ASA monitors  Intra-op Monitoring Plan Comments:   Post Op Pain Control Plan: multimodal analgesia  Post Op Pain Control Plan Comments:   Induction:   IV  Beta Blocker:         Informed Consent: Patient understands risks and agrees with Anesthesia plan.  Questions answered. Anesthesia consent signed with patient.  ASA Score: 2     Day of Surgery Review of History & Physical: I have interviewed and examined the patient. I have reviewed the patient's H&P dated:  There are no significant changes.  H&P update referred to the provider.  H&P completed by Anesthesiologist.       Ready For Surgery From Anesthesia Perspective.

## 2019-01-08 NOTE — NURSING
Patient admitted to floor via endo stretcher without incident. Patient and family oriented to room. Patient placed on cardiac monitoring. NADN at time of arrival. Will continue to monitor patient.

## 2019-01-09 VITALS
HEIGHT: 67 IN | WEIGHT: 166.44 LBS | TEMPERATURE: 97 F | BODY MASS INDEX: 26.12 KG/M2 | SYSTOLIC BLOOD PRESSURE: 115 MMHG | DIASTOLIC BLOOD PRESSURE: 59 MMHG | RESPIRATION RATE: 16 BRPM | HEART RATE: 75 BPM | OXYGEN SATURATION: 99 %

## 2019-01-09 LAB
ANION GAP SERPL CALC-SCNC: 8 MMOL/L
BASOPHILS # BLD AUTO: 0.02 K/UL
BASOPHILS NFR BLD: 0.5 %
BUN SERPL-MCNC: 12 MG/DL
CALCIUM SERPL-MCNC: 9 MG/DL
CHLORIDE SERPL-SCNC: 106 MMOL/L
CO2 SERPL-SCNC: 26 MMOL/L
CREAT SERPL-MCNC: 1 MG/DL
DIFFERENTIAL METHOD: ABNORMAL
EOSINOPHIL # BLD AUTO: 0.1 K/UL
EOSINOPHIL NFR BLD: 3.4 %
ERYTHROCYTE [DISTWIDTH] IN BLOOD BY AUTOMATED COUNT: 13.4 %
EST. GFR  (AFRICAN AMERICAN): >60 ML/MIN/1.73 M^2
EST. GFR  (NON AFRICAN AMERICAN): >60 ML/MIN/1.73 M^2
GLUCOSE SERPL-MCNC: 212 MG/DL
HCT VFR BLD AUTO: 34.7 %
HGB BLD-MCNC: 10.9 G/DL
HGB BLD-MCNC: 11.1 G/DL
HGB BLD-MCNC: 11.4 G/DL
LYMPHOCYTES # BLD AUTO: 1.5 K/UL
LYMPHOCYTES NFR BLD: 36.6 %
MCH RBC QN AUTO: 28 PG
MCHC RBC AUTO-ENTMCNC: 32.9 G/DL
MCV RBC AUTO: 85 FL
MONOCYTES # BLD AUTO: 0.4 K/UL
MONOCYTES NFR BLD: 8.8 %
NEUTROPHILS # BLD AUTO: 2.1 K/UL
NEUTROPHILS NFR BLD: 50.7 %
PLATELET # BLD AUTO: 217 K/UL
PMV BLD AUTO: 9.5 FL
POCT GLUCOSE: 149 MG/DL (ref 70–110)
POCT GLUCOSE: 173 MG/DL (ref 70–110)
POTASSIUM SERPL-SCNC: 3.7 MMOL/L
RBC # BLD AUTO: 4.07 M/UL
SODIUM SERPL-SCNC: 140 MMOL/L
WBC # BLD AUTO: 4.1 K/UL

## 2019-01-09 PROCEDURE — 25000003 PHARM REV CODE 250: Mod: HCNC | Performed by: HOSPITALIST

## 2019-01-09 PROCEDURE — 80048 BASIC METABOLIC PNL TOTAL CA: CPT | Mod: HCNC

## 2019-01-09 PROCEDURE — 85025 COMPLETE CBC W/AUTO DIFF WBC: CPT | Mod: HCNC

## 2019-01-09 PROCEDURE — 85018 HEMOGLOBIN: CPT | Mod: HCNC,91

## 2019-01-09 PROCEDURE — G0378 HOSPITAL OBSERVATION PER HR: HCPCS | Mod: HCNC

## 2019-01-09 PROCEDURE — 36415 COLL VENOUS BLD VENIPUNCTURE: CPT | Mod: HCNC

## 2019-01-09 RX ORDER — PANTOPRAZOLE SODIUM 40 MG/1
40 TABLET, DELAYED RELEASE ORAL DAILY
Qty: 30 TABLET | Refills: 5 | Status: SHIPPED | OUTPATIENT
Start: 2019-01-09 | End: 2019-04-29 | Stop reason: SDUPTHER

## 2019-01-09 RX ADMIN — ACETAMINOPHEN 650 MG: 325 TABLET ORAL at 11:01

## 2019-01-09 NOTE — PLAN OF CARE
Discharge noted, No HH or DME noted.    Discharge rounds on patient. Discussed followup appointments, blue discharge folder, discharge nurse will go over home medications and reasons for medications and final discharge instructions. All patient/caregiver questions answered. Patient verbalized understanding.      Future Appointments   Date Time Provider Department Center   1/16/2019  9:00 AM Britton Urb[facility]   [facility] 10[facility]   2/5/2019  7:00 AM LAB, APPOINTMENT DEQUAN COLVIN [facility]        01/09/19 1612   Final Note   Assessment Type Final Discharge Note   Anticipated Discharge Disposition Home   What phone number can be called within the next 1-3 days to see how you are doing after discharge? [phone]   Hospital Follow Up  Appt(s) scheduled? Yes   Discharge plans and expectations educations in teach back method with documentation complete? No   Right Care Referral Info   Post Acute Recommendation No Care   Zofia Yoo RN-BC  Transitional Navigator  [phone]   independent

## 2019-01-09 NOTE — DISCHARGE INSTRUCTIONS
Lower GI Bleeding (Stable) (English) View Edit Remove   Pantoprazole tablets (English) View Edit Remove

## 2019-01-09 NOTE — PLAN OF CARE
Problem: Adult Inpatient Plan of Care  Goal: Plan of Care Review  Outcome: Ongoing (interventions implemented as appropriate)  18 gauge IVs to right and left forearms removed without difficulty- no redness or swelling noted at time of removal. Tele monitor removed, cleaned, and returned to tele bunker. D/C instructions and medications reviewed with patient per SHAQUILLE Martines- verbalizes understanding. NADN at time of D/C. Patient to be transported home via personal vehicle by son.

## 2019-01-09 NOTE — DISCHARGE SUMMARY
[facility] Medicine  Discharge Summary      Patient Name: Bryant Gil  MRN: [id number]  Admission Date: 1/8/2019  Hospital Length of Stay: 0 days  Discharge Date and Time: No discharge date for patient encounter.  Attending Physician: Sam Lane DO   Discharging Provider: Sam Lane DO  Primary Care Provider: Laverne Roberson MD      HPI:   The pt is a 78 years old male pt with PMH significant for HLP , HTN, DMII, hx of prostate cancer resected in the 1990's.  The pt had been in good health recently.  Over the last 3 days he started seeing blood with his BM. First, he thought it was something he ate with color in it.  But the amount of bleeding and color of it had changed.  It was red bright now more a darkish color to it.  The pt also had increased amount yeserday, and he had to stop at a bus stop to go to bathroom from the increased BM volume with blood.  The pt does not have headache, nausea, emesis, abd pain or distention.  He said last week, he had been taking more otc with goods powder for generalized pain and some headache. No CP, no SOB.    Procedure(s) (LRB):  SIGMOIDOSCOPY, FLEXIBLE (N/A)  EGD (ESOPHAGOGASTRODUODENOSCOPY) (N/A)      Hospital Course:   The pt presented to ED, H/H was stable, he had 1 unit of PRBC transfused.  1/9 the pt had egd that was negative and he had a flex sigg, that showed old nela blood in recto-sigmoid colon. H/H stable no acute issues, he wants to go home, feeling good.  Pt will be dc home on PPI, no NSAIDS, and F/U with PCP and GI.  Dc once is cleared by GI     Consults:   Consults (From admission, onward)        Status Ordering Provider     Inpatient consult to Anesthesiology  Once     Provider:  (Not yet assigned)    Acknowledged WALT GIRON     Inpatient consult to [facility]  Once     Provider:  Rachel Burrows MD    Completed NANDO JENNINGS          No new Assessment & Plan notes have been filed under this hospital  service since the last note was generated.  Service: Hospital Medicine    Final Active Diagnoses:    Diagnosis Date Noted POA    PRINCIPAL PROBLEM:  Bright red blood per rectum [K62.5] 01/08/2019 Yes    Essential hypertension [I10] 10/09/2012 Yes     Chronic    Type 2 diabetes mellitus with diabetic polyneuropathy, without long-term current use of insulin [E11.42] 03/12/2014 Yes     Chronic    Stage 2 chronic kidney disease [N18.2] 01/25/2017 Yes     Chronic    Mixed hyperlipidemia [E78.2] 01/29/2013 Yes     Chronic    History of prostate cancer [Z85.46] 01/29/2013 Not Applicable     Chronic    Hepatic cyst [K76.89] 01/08/2019 Yes    Sigmoid diverticulosis [K57.30] 03/19/2017 Yes     Chronic    Type 2 diabetes mellitus with stage 2 chronic kidney disease, without long-term current use of insulin [E11.22, N18.2] 01/25/2017 Yes     Chronic      Problems Resolved During this Admission:       Discharged Condition: stable    Disposition: Home or Self Care    Follow Up:  Follow-up Information     Laverne Roberson MD In 1 week.    Specialty:  Internal Medicine  Contact information:  [address] [facility] [address]  [phone]             [facility] - Gastroenterology. Schedule an appointment as soon as possible for a visit in 2 weeks.    Specialty:  Gastroenterology  Contact information:  [address] [facility] [address] [phone]  Additional information:  [facility] - 4th Floor               Patient Instructions:      Diet Cardiac     Notify your health care provider if you experience any of the following:  temperature >100.4     Notify your health care provider if you experience any of the following:  persistent nausea and vomiting or diarrhea     Notify your health care provider if you experience any of the following:  severe uncontrolled pain     Notify your health care provider if you experience any of the following:  difficulty breathing or increased cough     Notify your health  care provider if you experience any of the following:  severe persistent headache     Notify your health care provider if you experience any of the following:  worsening rash     Notify your health care provider if you experience any of the following:  persistent dizziness, light-headedness, or visual disturbances     Notify your health care provider if you experience any of the following:  increased confusion or weakness     Notify your health care provider if you experience any of the following:   Scheduling Instructions: Black stools  Bloody stools     Activity as tolerated       Significant Diagnostic Studies: Labs:   BMP:   Recent Labs   Lab 01/08/19  0039 01/09/19  0850   * 212*    140   K 3.9 3.7    106   CO2 21* 26   BUN 30* 12   CREATININE 1.2 1.0   CALCIUM 9.6 9.0   , CMP   Recent Labs   Lab 01/08/19 0039 01/09/19  0850    140   K 3.9 3.7    106   CO2 21* 26   * 212*   BUN 30* 12   CREATININE 1.2 1.0   CALCIUM 9.6 9.0   PROT 7.2  --    ALBUMIN 4.1  --    BILITOT 0.2  --    ALKPHOS 57  --    AST 18  --    ALT 21  --    ANIONGAP 13 8   ESTGFRAFRICA >60 >60   EGFRNONAA 58* >60   , CBC   Recent Labs   Lab 01/08/19  0039  01/09/19  0540 01/09/19  0850 01/09/19  1136   WBC 4.97  --   --  4.10  --    HGB 11.8*   < > 10.9* 11.4* 11.1*   HCT 36.2*  --   --  34.7*  --      --   --  217  --     < > = values in this interval not displayed.    and Lipid Panel   Lab Results   Component Value Date    CHOL 167 10/02/2018    HDL 43 10/02/2018    LDLCALC 109.2 10/02/2018    TRIG 74 10/02/2018    CHOLHDL 25.7 10/02/2018       Pending Diagnostic Studies:     None         Medications:  Reconciled Home Medications:      Medication List      START taking these medications    pantoprazole 40 MG tablet  Commonly known as:  PROTONIX  Take 1 tablet (40 mg total) by mouth once daily.        CONTINUE taking these medications    amLODIPine 10 MG tablet  Commonly known as:  NORVASC  Take 1  tablet (10 mg total) by mouth once daily.     ascorbic acid (vitamin C) 100 MG tablet  Commonly known as:  VITAMIN C  Take 100 mg by mouth once daily.     aspirin 81 MG EC tablet  Commonly known as:  ENTERIC COATED ASPIRIN  Take 1 tablet (81 mg total) by mouth once. 1 Tablet, Delayed Release (E.C.) Oral Every day for 1 dose     atorvastatin 20 MG tablet  Commonly known as:  LIPITOR  Take 1 tablet (20 mg total) by mouth once daily.     * blood sugar diagnostic Strp  Commonly known as:  TRUETEST TEST STRIPS  2 strips by Misc.(Non-Drug; Combo Route) route once daily.     * blood sugar diagnostic Strp  1 strip by Misc.(Non-Drug; Combo Route) route once daily.     blood-glucose meter Misc  Commonly known as:  ACCU-CHEK YULIA PLUS METER  1 Device by Misc.(Non-Drug; Combo Route) route once daily.     lancets Misc  Commonly known as:  ACCU-CHEK SOFTCLIX LANCETS  1 Device by Misc.(Non-Drug; Combo Route) route once daily.     latanoprost 0.005 % ophthalmic solution  Place 1 drop into both eyes once daily.     losartan-hydrochlorothiazide 100-25 mg 100-25 mg per tablet  Commonly known as:  HYZAAR  Take 1 tablet by mouth every morning. Blood pressure     metFORMIN 1000 MG tablet  Commonly known as:  GLUCOPHAGE  Take 1 tablet (1,000 mg total) by mouth 2 (two) times daily with meals.     metoprolol succinate 25 MG 24 hr tablet  Commonly known as:  TOPROL-XL  Take 1 tablet (25 mg total) by mouth once daily.     vitamin D 1000 units Tab  Commonly known as:  VITAMIN D3  Take 185 mg by mouth once daily.         * This list has 2 medication(s) that are the same as other medications prescribed for you. Read the directions carefully, and ask your doctor or other care provider to review them with you.                Indwelling Lines/Drains at time of discharge:   Lines/Drains/Airways          None          Time spent on the discharge of patient: 65 minutes  Patient was seen and examined on the date of discharge and determined to be  suitable for discharge.         Sam Lane DO  Department of Hospital Medicine  [facility]

## 2019-01-09 NOTE — NURSING
VN reviewed discharge instructions with pt. Reviewed follow-up appointments, new/current medications, diet, and importance of medication compliance. Reviewed home care instructions with pt. Reviewed treatment plan, self-management, and when to seek medical attention. Allowed time for questions. All questions answered. Patient verbalized complete understanding.     Discharge instructions complete. Pt will let us know when his ride is here. Bedside nurse notified.

## 2019-01-09 NOTE — PLAN OF CARE
Problem: Adult Inpatient Plan of Care  Goal: Plan of Care Review  Outcome: Ongoing (interventions implemented as appropriate)  Patient had  an unevenful night. Nurse went over plan of care with patient, questions encouraged. H&H stable overnight.On continuous heart monitoring, NSR,  no true red alarms . Fall precautions maintained.  Bed locked and low, side rails X3, call bell within reach.

## 2019-01-09 NOTE — PROGRESS NOTES
VN morning rounds: Pt resting comfortably in bed. Family at bedside. NAD noted. Allowed time for questions. Pt states he has a headache and would like tylenol for it. VN notified bedside nurse. Fall risk protocol discussed with pt. Pt aware and agreeable. Will cont to be available and intervene as needed.        01/09/19 0951   Type of Frequent Check   Type Patient Rounds;Other (see comments)  (VN rounds)   Safety/Activity   Patient Rounds visualized patient;call light in reach   Safety Promotion/Fall Prevention Fall Risk reviewed with patient/family;assistive device/personal item within reach;family to remain at bedside   Positioning   Body Position position maintained   Pain/Comfort/Sleep   Preferred Pain Scale word (verbal rating pain scale)   Pain Rating: Rest 4 - moderate pain   Assessments (Pre/Post)   Level of Consciousness (AVPU) alert

## 2019-01-10 ENCOUNTER — PATIENT OUTREACH (OUTPATIENT)
Dept: OTHER | Facility: OTHER | Age: 79
End: 2019-01-10

## 2019-01-10 DIAGNOSIS — K62.5 BRBPR (BRIGHT RED BLOOD PER RECTUM): Primary | ICD-10-CM

## 2019-01-10 LAB
BLD PROD TYP BPU: NORMAL
BLD PROD TYP BPU: NORMAL
BLOOD UNIT EXPIRATION DATE: NORMAL
BLOOD UNIT EXPIRATION DATE: NORMAL
BLOOD UNIT TYPE CODE: 7300
BLOOD UNIT TYPE CODE: 7300
BLOOD UNIT TYPE: NORMAL
BLOOD UNIT TYPE: NORMAL
CODING SYSTEM: NORMAL
CODING SYSTEM: NORMAL
DISPENSE STATUS: NORMAL
DISPENSE STATUS: NORMAL
TRANS ERYTHROCYTES VOL PATIENT: NORMAL ML
TRANS ERYTHROCYTES VOL PATIENT: NORMAL ML

## 2019-01-10 NOTE — PROGRESS NOTES
Last 5 Patient Entered Readings                                      Current 30 Day Average: 127/74     Recent Readings 1/10/2019 1/10/2019 1/5/2019 1/4/2019 1/2/2019    SBP (mmHg) 101 100 127 129 129    DBP (mmHg) 61 61 74 73 75    Pulse 79 83 79 82 77          Digital Medicine: Health  Follow Up    Lifestyle Modifications:    1.Dietary Modifications (Sodium intake <2,000mg/day, food labels, dining out): Patient is doing well with maintaining a low sodium diet. It was a little harder over the holidays but he stated that he was drinking alcohol as well which is something he does not typically do. He is now back on track with his normal diet and his readings are back to normal range.     2.Physical Activity: Deferred    3.Medication Therapy: Patient has been compliant with the medication regimen. Patient is doing well on his current medication regimen and he denies symptoms/side effects.     4.Patient has the following medication side effects/concerns:   (Frequency/Alleviating factors/Precipitating factors, etc.)     Follow up with Mr. Bryant Gil completed. No further questions or concerns. Will continue to follow up to achieve health goals.

## 2019-01-16 ENCOUNTER — OFFICE VISIT (OUTPATIENT)
Dept: GASTROENTEROLOGY | Facility: CLINIC | Age: 79
End: 2019-01-16
Payer: MEDICARE

## 2019-01-16 VITALS
WEIGHT: 172 LBS | DIASTOLIC BLOOD PRESSURE: 74 MMHG | HEART RATE: 73 BPM | HEIGHT: 67 IN | SYSTOLIC BLOOD PRESSURE: 121 MMHG | BODY MASS INDEX: 27 KG/M2

## 2019-01-16 DIAGNOSIS — K31.A0 INTESTINAL METAPLASIA OF GASTRIC CARDIA: ICD-10-CM

## 2019-01-16 DIAGNOSIS — D64.9 ANEMIA, UNSPECIFIED TYPE: ICD-10-CM

## 2019-01-16 DIAGNOSIS — K57.30 DIVERTICULOSIS OF LARGE INTESTINE WITHOUT HEMORRHAGE: Primary | ICD-10-CM

## 2019-01-16 PROCEDURE — 3074F PR MOST RECENT SYSTOLIC BLOOD PRESSURE < 130 MM HG: ICD-10-PCS | Mod: CPTII,HCNC,S$GLB, | Performed by: PHYSICIAN ASSISTANT

## 2019-01-16 PROCEDURE — 1101F PR PT FALLS ASSESS DOC 0-1 FALLS W/OUT INJ PAST YR: ICD-10-PCS | Mod: CPTII,HCNC,S$GLB, | Performed by: PHYSICIAN ASSISTANT

## 2019-01-16 PROCEDURE — 99213 OFFICE O/P EST LOW 20 MIN: CPT | Mod: HCNC,S$GLB,, | Performed by: PHYSICIAN ASSISTANT

## 2019-01-16 PROCEDURE — 3078F PR MOST RECENT DIASTOLIC BLOOD PRESSURE < 80 MM HG: ICD-10-PCS | Mod: CPTII,HCNC,S$GLB, | Performed by: PHYSICIAN ASSISTANT

## 2019-01-16 PROCEDURE — 3074F SYST BP LT 130 MM HG: CPT | Mod: CPTII,HCNC,S$GLB, | Performed by: PHYSICIAN ASSISTANT

## 2019-01-16 PROCEDURE — 3078F DIAST BP <80 MM HG: CPT | Mod: CPTII,HCNC,S$GLB, | Performed by: PHYSICIAN ASSISTANT

## 2019-01-16 PROCEDURE — 99213 PR OFFICE/OUTPT VISIT, EST, LEVL III, 20-29 MIN: ICD-10-PCS | Mod: HCNC,S$GLB,, | Performed by: PHYSICIAN ASSISTANT

## 2019-01-16 PROCEDURE — 1101F PT FALLS ASSESS-DOCD LE1/YR: CPT | Mod: CPTII,HCNC,S$GLB, | Performed by: PHYSICIAN ASSISTANT

## 2019-01-16 PROCEDURE — 99999 PR PBB SHADOW E&M-EST. PATIENT-LVL IV: ICD-10-PCS | Mod: PBBFAC,HCNC,, | Performed by: PHYSICIAN ASSISTANT

## 2019-01-16 PROCEDURE — 99999 PR PBB SHADOW E&M-EST. PATIENT-LVL IV: CPT | Mod: PBBFAC,HCNC,, | Performed by: PHYSICIAN ASSISTANT

## 2019-01-16 NOTE — LETTER
January 17, 2019      RENZO BALBUENA  [address]  [facility] [address]           [facility]  [address]  [facility] [address]  Phone: [phone]  Fax: [phone]          Patient: Bryant Gil   MR Number: [id number]   YOB: 1940   Date of Visit: 1/16/2019       Dear Renzo Balbuena:    Thank you for referring Bryant Gil to me for evaluation. Attached you will find relevant portions of my assessment and plan of care.    If you have questions, please do not hesitate to call me. I look forward to following Bryant Gil along with you.    Sincerely,    Janelle Floyd PA-C    Enclosure  CC:  No Recipients    If you would like to receive this communication electronically, please contact [email or web address] or [phone] to request more information on [employer] Link access.    For providers and/or their staff who would like to refer a patient to [facility], please contact us through our one-stop-shop provider referral line, [facility], at [phone].    If you feel you have received this communication in error or would no longer like to receive these types of communications, please e-mail [email or web address]

## 2019-01-16 NOTE — PROGRESS NOTES
[facility] Consultation Note    Reason for Consult:  The primary encounter diagnosis was Diverticulosis of large intestine without hemorrhage. Diagnoses of Intestinal metaplasia of gastric cardia and Anemia, unspecified type were also pertinent to this visit.    PCP:   aLverne Burns[address] / [place] [address]    Referring MD:  Laverne Burns[address] [place] [address]    HPI:  This is a 78 y.o. male here for a diverticular bleed hospital f/u    1/8/19 GI service consult  history of ascending/descending/sigmoid diverticulosis who presents with 6-day history of progressive, painless, intermittent rectal bleeding; associated with history of NSAID use including BC powder and ibuprofen; not associated with dysphagia, odynophagia, abdominal pain, rectal pain with defecation, or known history of hemorrhoids. Last colonoscopy 2017 with diverticulosis as noted above.    Asssessment  Bright red blood per rectum     Presentation consistent with likely diverticular etiology given painless brighter red bleeding with mild anemia, CTA negative, recent colonoscopy with diverticulosis on R and L sides of colon. No known or mentioned history of hemorrhoids but could also be hemorrhoidal. Hemodynamically stable with mild anemia lower than baseline.  Plan:     EGD/Fleg performed  Normal esophagus.                        - Erythematous mucosa in the cardia. Biopsied (Jar                         1).                        - A single papule (nodule) found in the stomach.                         Biopsied (Jar 2). + intestinal metaplasia                        - A single non-bleeding angioectasia in the stomach.                        - Normal examined duodenum.    Interval Hx  Today he is doing well with no complain  No problems with constipation  No further bleeding  He admits to eating nuts for several days prior to the bleeding  episode    ROS:  Constitutional: No fevers, chills, No weight loss  ENT: No allergies  CV: No chest pain  Pulm: No cough, No shortness of breath  Ophtho: No vision changes  GI: see HPI  Derm: No rash  Heme: No lymphadenopathy, No bruising  MSK: No arthritis  : No dysuria, No hematuria  Endo: No hot or cold intolerance  Neuro: No syncope, No seizure  Psych: No anxiety, No depression    Medical History:  has a past medical history of Cataract of both eyes, Diverticulosis, HTN (hypertension), HTN (hypertension) (10/9/2012), OHT (ocular hypertension), Prostate cancer, Pyelonephritis, right no stone on CT scan. (7/11/2013), Sepsis, same organism in urine grew in his blood, Klebsiella (7/11/2013), Sigmoid diverticulosis (03/19/2017), Tendinitis of both rotator cuffs (6/25/2013), Tubular adenoma of colon (01/10/2012), and Type II or unspecified type diabetes mellitus with neurological manifestations, not stated as uncontrolled(250.60) (10/9/2012).    Surgical History:  has a past surgical history that includes Trigger finger release (Right, 2015); Carpal tunnel release (Right, 08/25/2015); Penile prosthesis implant; Prostate surgery (1999); Circumcision (04/13/2005); Colonoscopy w/ polypectomy (01/10/2012); Colonoscopy (N/A, 3/29/2017); Flexible sigmoidoscopy (N/A, 1/8/2019); and Esophagogastroduodenoscopy (N/A, 1/8/2019).    Family History: family history includes Cancer in his brother; Cataracts in his sister; Diabetes in his sister; Heart attack in his brother; Hypertension in his mother; No Known Problems in his brother, daughter, sister, sister, sister, and son; Prostate cancer in his brother..     Social History:  reports that he has quit smoking. His smoking use included cigarettes. He has a 1.50 pack-year smoking history. he has never used smokeless tobacco. He reports that he drinks alcohol. He reports that he does not use drugs.    Review of patient's allergies indicates:  No Known Allergies    Current  "Outpatient Medications on File Prior to Visit   Medication Sig Dispense Refill    amLODIPine (NORVASC) 10 MG tablet Take 1 tablet (10 mg total) by mouth once daily. 90 tablet 3    ascorbic acid (VITAMIN C) 100 MG tablet Take 100 mg by mouth once daily.      atorvastatin (LIPITOR) 20 MG tablet Take 1 tablet (20 mg total) by mouth once daily. 90 tablet 3    blood sugar diagnostic (TRUETEST TEST STRIPS) Strp 2 strips by Misc.(Non-Drug; Combo Route) route once daily. 200 strip 4    blood sugar diagnostic Strp 1 strip by Misc.(Non-Drug; Combo Route) route once daily. 100 strip 4    lancets (ACCU-CHEK SOFTCLIX LANCETS) Misc 1 Device by Misc.(Non-Drug; Combo Route) route once daily. 100 each 4    latanoprost 0.005 % ophthalmic solution Place 1 drop into both eyes once daily. 3 Bottle 3    metFORMIN (GLUCOPHAGE) 1000 MG tablet Take 1 tablet (1,000 mg total) by mouth 2 (two) times daily with meals. 180 tablet 3    metoprolol succinate (TOPROL-XL) 25 MG 24 hr tablet Take 1 tablet (25 mg total) by mouth once daily. 90 tablet 1    pantoprazole (PROTONIX) 40 MG tablet Take 1 tablet (40 mg total) by mouth once daily. 30 tablet 5    vitamin D 1000 units Tab Take 185 mg by mouth once daily.      aspirin (ENTERIC COATED ASPIRIN) 81 MG EC tablet Take 1 tablet (81 mg total) by mouth once. 1 Tablet, Delayed Release (E.C.) Oral Every day for 1 dose 1 tablet 0    blood-glucose meter (ACCU-CHEK YULIA PLUS METER) Misc 1 Device by Misc.(Non-Drug; Combo Route) route once daily. 1 each 1    losartan-hydrochlorothiazide 100-25 mg (HYZAAR) 100-25 mg per tablet Take 1 tablet by mouth every morning. Blood pressure 90 tablet 3     No current facility-administered medications on file prior to visit.          Objective Findings:    Vital Signs:  /74   Pulse 73   Ht 5' 7" (1.702 m)   Wt 78 kg (172 lb)   BMI 26.94 kg/m²   Body mass index is 26.94 kg/m².    Physical Exam:  General Appearance: Well appearing in no acute " distress  Head:   Normocephalic, without obvious abnormality  Eyes:    No scleral icterus  ENT: Neck supple, Lips, mucosa, and tongue normal  Lungs: CTA bilaterally in anterior and posterior fields, no wheezes, no crackles.  Heart:  Regular rate and rhythm, S1, S2 normal, no murmurs heard  Abdomen: Soft, non tender, non distended with positive bowel sounds in all four quadrants.   Extremities: no edema  Skin: No rash  Neurologic: AAO x 3      Labs:  Lab Results   Component Value Date    WBC 4.10 01/09/2019    HGB 11.1 (L) 01/09/2019    HCT 34.7 (L) 01/09/2019     01/09/2019    CHOL 167 10/02/2018    TRIG 74 10/02/2018    HDL 43 10/02/2018    ALT 21 01/08/2019    AST 18 01/08/2019     01/09/2019    K 3.7 01/09/2019     01/09/2019    CREATININE 1.0 01/09/2019    BUN 12 01/09/2019    CO2 26 01/09/2019    TSH 1.172 02/16/2017    PSA <0.01 04/24/2015    INR 1.0 01/08/2019    HGBA1C 7.8 (H) 12/04/2018         Assessment:  1. Diverticulosis of large intestine without hemorrhage    2. Intestinal metaplasia of gastric cardia    3. Anemia, unspecified type       79yo M presents for a hospital follow up after a diverticular bleed. No further bleeding. No GI complaints    Recommendations:  1. CBC to evaluate whether his Hgb has improved     High fiber diet. Avoid trigger foods    No Follow-up on file.      Order summary:  Orders Placed This Encounter    CBC auto differential         Thank you so much for allowing me to participate in the care of Bryant Floyd PA-C

## 2019-01-21 ENCOUNTER — TELEPHONE (OUTPATIENT)
Dept: GASTROENTEROLOGY | Facility: CLINIC | Age: 79
End: 2019-01-21

## 2019-01-28 ENCOUNTER — PATIENT MESSAGE (OUTPATIENT)
Dept: ADMINISTRATIVE | Facility: OTHER | Age: 79
End: 2019-01-28

## 2019-02-06 ENCOUNTER — LAB VISIT (OUTPATIENT)
Dept: LAB | Facility: HOSPITAL | Age: 79
End: 2019-02-06
Attending: INTERNAL MEDICINE
Payer: MEDICARE

## 2019-02-06 ENCOUNTER — OFFICE VISIT (OUTPATIENT)
Dept: INTERNAL MEDICINE | Facility: CLINIC | Age: 79
End: 2019-02-06
Payer: MEDICARE

## 2019-02-06 VITALS
WEIGHT: 176.38 LBS | BODY MASS INDEX: 27.68 KG/M2 | SYSTOLIC BLOOD PRESSURE: 128 MMHG | HEART RATE: 68 BPM | DIASTOLIC BLOOD PRESSURE: 72 MMHG | HEIGHT: 67 IN

## 2019-02-06 DIAGNOSIS — E53.8 VITAMIN B 12 DEFICIENCY: ICD-10-CM

## 2019-02-06 DIAGNOSIS — I10 ESSENTIAL HYPERTENSION: ICD-10-CM

## 2019-02-06 DIAGNOSIS — I10 ESSENTIAL HYPERTENSION: Chronic | ICD-10-CM

## 2019-02-06 DIAGNOSIS — E11.42 TYPE 2 DIABETES MELLITUS WITH DIABETIC POLYNEUROPATHY, WITHOUT LONG-TERM CURRENT USE OF INSULIN: Chronic | ICD-10-CM

## 2019-02-06 DIAGNOSIS — E11.22 TYPE 2 DIABETES MELLITUS WITH STAGE 2 CHRONIC KIDNEY DISEASE, WITHOUT LONG-TERM CURRENT USE OF INSULIN: Chronic | ICD-10-CM

## 2019-02-06 DIAGNOSIS — E78.2 MIXED HYPERLIPIDEMIA: Chronic | ICD-10-CM

## 2019-02-06 DIAGNOSIS — N18.2 TYPE 2 DIABETES MELLITUS WITH STAGE 2 CHRONIC KIDNEY DISEASE, WITHOUT LONG-TERM CURRENT USE OF INSULIN: ICD-10-CM

## 2019-02-06 DIAGNOSIS — N18.2 TYPE 2 DIABETES MELLITUS WITH STAGE 2 CHRONIC KIDNEY DISEASE, WITHOUT LONG-TERM CURRENT USE OF INSULIN: Chronic | ICD-10-CM

## 2019-02-06 DIAGNOSIS — K57.30 SIGMOID DIVERTICULOSIS: Chronic | ICD-10-CM

## 2019-02-06 DIAGNOSIS — N18.2 STAGE 2 CHRONIC KIDNEY DISEASE: Chronic | ICD-10-CM

## 2019-02-06 DIAGNOSIS — Z85.46 HISTORY OF PROSTATE CANCER: Chronic | ICD-10-CM

## 2019-02-06 DIAGNOSIS — H91.93 DECREASED HEARING, BILATERAL: ICD-10-CM

## 2019-02-06 DIAGNOSIS — I70.0 AORTIC ATHEROSCLEROSIS: ICD-10-CM

## 2019-02-06 DIAGNOSIS — E11.40 TYPE 2 DIABETES MELLITUS WITH DIABETIC NEUROPATHY, WITHOUT LONG-TERM CURRENT USE OF INSULIN: ICD-10-CM

## 2019-02-06 DIAGNOSIS — Z85.46 HISTORY OF PROSTATE CANCER: ICD-10-CM

## 2019-02-06 DIAGNOSIS — Z00.00 ENCOUNTER FOR PREVENTIVE HEALTH EXAMINATION: Primary | ICD-10-CM

## 2019-02-06 DIAGNOSIS — E11.22 TYPE 2 DIABETES MELLITUS WITH STAGE 2 CHRONIC KIDNEY DISEASE, WITHOUT LONG-TERM CURRENT USE OF INSULIN: ICD-10-CM

## 2019-02-06 DIAGNOSIS — E11.42 DIABETIC POLYNEUROPATHY ASSOCIATED WITH TYPE 2 DIABETES MELLITUS: ICD-10-CM

## 2019-02-06 PROBLEM — I77.1 TORTUOUS AORTA: Status: RESOLVED | Noted: 2018-02-06 | Resolved: 2019-02-06

## 2019-02-06 PROBLEM — I77.819 ECTATIC AORTA: Status: RESOLVED | Noted: 2018-02-06 | Resolved: 2019-02-06

## 2019-02-06 LAB
ALBUMIN SERPL BCP-MCNC: 3.9 G/DL
ALBUMIN SERPL BCP-MCNC: 3.9 G/DL
ALP SERPL-CCNC: 59 U/L
ALP SERPL-CCNC: 59 U/L
ALT SERPL W/O P-5'-P-CCNC: 18 U/L
ALT SERPL W/O P-5'-P-CCNC: 18 U/L
ANION GAP SERPL CALC-SCNC: 8 MMOL/L
ANION GAP SERPL CALC-SCNC: 8 MMOL/L
AST SERPL-CCNC: 15 U/L
AST SERPL-CCNC: 15 U/L
BASOPHILS # BLD AUTO: 0.04 K/UL
BASOPHILS NFR BLD: 0.9 %
BILIRUB SERPL-MCNC: 0.3 MG/DL
BILIRUB SERPL-MCNC: 0.3 MG/DL
BUN SERPL-MCNC: 25 MG/DL
BUN SERPL-MCNC: 25 MG/DL
CALCIUM SERPL-MCNC: 9.4 MG/DL
CALCIUM SERPL-MCNC: 9.4 MG/DL
CHLORIDE SERPL-SCNC: 108 MMOL/L
CHLORIDE SERPL-SCNC: 108 MMOL/L
CHOLEST SERPL-MCNC: 165 MG/DL
CHOLEST/HDLC SERPL: 4 {RATIO}
CO2 SERPL-SCNC: 27 MMOL/L
CO2 SERPL-SCNC: 27 MMOL/L
COMPLEXED PSA SERPL-MCNC: <0.01 NG/ML
CREAT SERPL-MCNC: 1 MG/DL
CREAT SERPL-MCNC: 1 MG/DL
DIFFERENTIAL METHOD: ABNORMAL
EOSINOPHIL # BLD AUTO: 0.1 K/UL
EOSINOPHIL NFR BLD: 1.3 %
ERYTHROCYTE [DISTWIDTH] IN BLOOD BY AUTOMATED COUNT: 12.9 %
EST. GFR  (AFRICAN AMERICAN): >60 ML/MIN/1.73 M^2
EST. GFR  (AFRICAN AMERICAN): >60 ML/MIN/1.73 M^2
EST. GFR  (NON AFRICAN AMERICAN): >60 ML/MIN/1.73 M^2
EST. GFR  (NON AFRICAN AMERICAN): >60 ML/MIN/1.73 M^2
ESTIMATED AVG GLUCOSE: 177 MG/DL
ESTIMATED AVG GLUCOSE: 177 MG/DL
GLUCOSE SERPL-MCNC: 158 MG/DL
GLUCOSE SERPL-MCNC: 158 MG/DL
HBA1C MFR BLD HPLC: 7.8 %
HBA1C MFR BLD HPLC: 7.8 %
HCT VFR BLD AUTO: 34.8 %
HDLC SERPL-MCNC: 41 MG/DL
HDLC SERPL: 24.8 %
HGB BLD-MCNC: 11.6 G/DL
IMM GRANULOCYTES # BLD AUTO: 0.01 K/UL
IMM GRANULOCYTES NFR BLD AUTO: 0.2 %
LDLC SERPL CALC-MCNC: 107 MG/DL
LYMPHOCYTES # BLD AUTO: 1.3 K/UL
LYMPHOCYTES NFR BLD: 29.8 %
MCH RBC QN AUTO: 28.3 PG
MCHC RBC AUTO-ENTMCNC: 33.3 G/DL
MCV RBC AUTO: 85 FL
MONOCYTES # BLD AUTO: 0.5 K/UL
MONOCYTES NFR BLD: 10.9 %
NEUTROPHILS # BLD AUTO: 2.6 K/UL
NEUTROPHILS NFR BLD: 56.9 %
NONHDLC SERPL-MCNC: 124 MG/DL
NRBC BLD-RTO: 0 /100 WBC
PLATELET # BLD AUTO: 281 K/UL
PMV BLD AUTO: 11.2 FL
POTASSIUM SERPL-SCNC: 3.5 MMOL/L
POTASSIUM SERPL-SCNC: 3.5 MMOL/L
PROT SERPL-MCNC: 7 G/DL
PROT SERPL-MCNC: 7 G/DL
RBC # BLD AUTO: 4.1 M/UL
SODIUM SERPL-SCNC: 143 MMOL/L
SODIUM SERPL-SCNC: 143 MMOL/L
TRIGL SERPL-MCNC: 85 MG/DL
WBC # BLD AUTO: 4.5 K/UL

## 2019-02-06 PROCEDURE — 36415 COLL VENOUS BLD VENIPUNCTURE: CPT | Mod: HCNC

## 2019-02-06 PROCEDURE — 3078F DIAST BP <80 MM HG: CPT | Mod: HCNC,CPTII,S$GLB, | Performed by: NURSE PRACTITIONER

## 2019-02-06 PROCEDURE — 83036 HEMOGLOBIN GLYCOSYLATED A1C: CPT | Mod: HCNC

## 2019-02-06 PROCEDURE — 84153 ASSAY OF PSA TOTAL: CPT | Mod: HCNC

## 2019-02-06 PROCEDURE — 99999 PR PBB SHADOW E&M-EST. PATIENT-LVL V: ICD-10-PCS | Mod: PBBFAC,HCNC,, | Performed by: NURSE PRACTITIONER

## 2019-02-06 PROCEDURE — 3078F PR MOST RECENT DIASTOLIC BLOOD PRESSURE < 80 MM HG: ICD-10-PCS | Mod: HCNC,CPTII,S$GLB, | Performed by: NURSE PRACTITIONER

## 2019-02-06 PROCEDURE — 99999 PR PBB SHADOW E&M-EST. PATIENT-LVL V: CPT | Mod: PBBFAC,HCNC,, | Performed by: NURSE PRACTITIONER

## 2019-02-06 PROCEDURE — 80053 COMPREHEN METABOLIC PANEL: CPT | Mod: HCNC

## 2019-02-06 PROCEDURE — 85025 COMPLETE CBC W/AUTO DIFF WBC: CPT | Mod: HCNC

## 2019-02-06 PROCEDURE — G0439 PPPS, SUBSEQ VISIT: HCPCS | Mod: HCNC,S$GLB,, | Performed by: NURSE PRACTITIONER

## 2019-02-06 PROCEDURE — 3074F SYST BP LT 130 MM HG: CPT | Mod: HCNC,CPTII,S$GLB, | Performed by: NURSE PRACTITIONER

## 2019-02-06 PROCEDURE — G0439 PR MEDICARE ANNUAL WELLNESS SUBSEQUENT VISIT: ICD-10-PCS | Mod: HCNC,S$GLB,, | Performed by: NURSE PRACTITIONER

## 2019-02-06 PROCEDURE — 3074F PR MOST RECENT SYSTOLIC BLOOD PRESSURE < 130 MM HG: ICD-10-PCS | Mod: HCNC,CPTII,S$GLB, | Performed by: NURSE PRACTITIONER

## 2019-02-06 PROCEDURE — 80061 LIPID PANEL: CPT | Mod: HCNC

## 2019-02-06 NOTE — PROGRESS NOTES
I offered to discuss end of life issues, including information on how to make advance directives that the patient could use to name someone who would make medical decisions on their behalf if they became too ill to make themselves.    _X_Patient has advance directives on file  ___Patient is interested, I provided paper work and offered to discuss.

## 2019-02-06 NOTE — PATIENT INSTRUCTIONS
Counseling and Referral of Other Preventative  (Italic type indicates deductible and co-insurance are waived)    Patient Name: Bryant Gil  Today's Date: 2/6/2019    Health Maintenance       Date Due Completion Date    Foot Exam 02/06/2019 2/6/2018    Override on 2/6/2018: Done    Override on 6/23/2016: Done (per md note)    Override on 3/20/2014: Done    Urine Microalbumin 02/06/2019 2/6/2018    Hemoglobin A1c 06/04/2019 12/4/2018    Lipid Panel 10/02/2019 10/2/2018    Eye Exam 10/23/2019 10/23/2018    Colonoscopy 03/29/2022 3/29/2017    TETANUS VACCINE 01/01/2027 1/1/2017 (Done)    Override on 1/1/2017: Done (Done through work)        No orders of the defined types were placed in this encounter.    The following information is provided to all patients.  This information is to help you find resources for any of the problems found today that may be affecting your health:                Living healthy guide: www.[facility].[place].gov      Understanding Diabetes: www.diabetes.org      Eating healthy: www.cdc.gov/healthyweight      CDC home safety checklist: www.cdc.gov/steadi/patient.html      Agency on Aging: www.goea.[place].gov      Alcoholics anonymous (AA): www.aa.org      Physical Activity: www.juliette.nih.gov/qv4yclm      Tobacco use: www.quitwithusla.org

## 2019-02-06 NOTE — Clinical Note
Prakash Zaidi,Good morning!  Bryant Gil was seen today for an HRA visit.  At the visit a mini-cognitive assessment was performed and found to be abnormal.  He scored a 4/7.  He denies any serious memory concerns.  I am bringing this to your attention so that further evaluation or follow-up can be done should you decide it is appropriate. Thank you for allowing me to participate in the care of your patient.Adelina Stephen NP

## 2019-02-06 NOTE — PROGRESS NOTES
"Bryant Gil presented for a  Medicare AWV and comprehensive Health Risk Assessment today. The following components were reviewed and updated:    · Medical history  · Family History  · Social history  · Allergies and Current Medications  · Health Risk Assessment  · Health Maintenance  · Care Team     ** See Completed Assessments for Annual Wellness Visit within the encounter summary.**       The following assessments were completed:  · Living Situation  · CAGE  · Depression Screening  · Timed Get Up and Go  · Whisper Test  · Cognitive Function Screening  · Nutrition Screening  · ADL Screening  · PAQ Screening        Vitals:    02/06/19 0738 02/06/19 0819   BP: (!) 146/84 128/72   BP Location: Left arm Left arm   Patient Position: Sitting Sitting   Pulse: 68    Weight: 80 kg (176 lb 6.2 oz)    Height: 5' 7" (1.702 m)      Body mass index is 27.63 kg/m².  Physical Exam   Constitutional: He is oriented to person, place, and time. He appears well-developed and well-nourished. No distress.   HENT:   Head: Normocephalic and atraumatic.   Decreased hearing bilateral   Eyes: No scleral icterus.   Neck: Normal range of motion. Neck supple.   Cardiovascular: Normal rate, regular rhythm, normal heart sounds and intact distal pulses.   Pulses:       Dorsalis pedis pulses are 2+ on the right side, and 2+ on the left side.   Pulmonary/Chest: Effort normal and breath sounds normal. No respiratory distress.   Abdominal: He exhibits no distension.   Musculoskeletal: Normal range of motion. He exhibits no edema.        Right foot: There is no deformity.        Left foot: There is no deformity.   Feet:   Right Foot:   Protective Sensation: 7 sites tested. 7 sites sensed.   Skin Integrity: Positive for dry skin. Negative for ulcer, blister or skin breakdown.   Left Foot:   Protective Sensation: 7 sites tested. 7 sites sensed.   Skin Integrity: Positive for dry skin. Negative for ulcer, blister or skin breakdown.   Neurological: He is " alert and oriented to person, place, and time.   Skin: Skin is warm and dry. He is not diaphoretic.   Psychiatric: He has a normal mood and affect. His behavior is normal.       Diagnoses and health risks identified today and associated recommendations/orders:    1. Encounter for preventive health examination  Assessments completed  Preventive health recommendations reviewed  Mini cognitive screening abnormal: pt scored a 4/7.  Last year he passed with a 6/7 on the mini cog.   He denies any serious memory concerns.  Does all adls independently.  Will notify pcp for further evaluation if deemed appropriate.     2. Aortic atherosclerosis  Stable. Seen on CT from 07/11/13  Controlled with current medical therapy  Followed by PCP.     3. Type 2 diabetes mellitus with stage 2 chronic kidney disease, without long-term current use of insulin  Stable. Last HgbA1C was 7.8  Controlled with current medical therapy  Followed by PCP.     4. Stage 2 chronic kidney disease  Stable.   Followed by PCP.     5. Type 2 diabetes mellitus with diabetic polyneuropathy, without long-term current use of insulin  Stable.   Followed by PCP.     6. Diabetic polyneuropathy associated with type 2 diabetes mellitus  Stable.   Followed by PCP.     7. Essential hypertension  Stable.   Controlled with current medical therapy  Followed by PCP.     8. Mixed hyperlipidemia  Stable.   Controlled with current medical therapy  Followed by PCP.     9. Vitamin B 12 deficiency  Stable.   Followed by PCP.     10. Sigmoid diverticulosis  Stable.   Asymptomatic currently  Followed by GI and PCP.     11. History of prostate cancer  Stable.   Hx of prostatectomy  Followed by PCP.     12. Decreased hearing, bilateral  Abnormal whisper test  Reports unable to understand people in crowded rooms with background noise    - Ambulatory Referral to ENT  - Ambulatory Referral to Audiology    Provided Bryant with a 5-10 year written screening schedule and personal  prevention plan. Recommendations were developed using the USPSTF age appropriate recommendations. Education, counseling, and referrals were provided as needed. After Visit Summary printed and given to patient which includes a list of additional screenings\tests needed.    Follow-up in about 3 months (around 5/6/2019) for a routine visit with your primary care provider or sooner if problems arise. .    Adelina Stephen, NP

## 2019-02-07 ENCOUNTER — TELEPHONE (OUTPATIENT)
Dept: INTERNAL MEDICINE | Facility: CLINIC | Age: 79
End: 2019-02-07

## 2019-02-11 ENCOUNTER — PATIENT OUTREACH (OUTPATIENT)
Dept: OTHER | Facility: OTHER | Age: 79
End: 2019-02-11

## 2019-02-11 NOTE — PROGRESS NOTES
Last 5 Patient Entered Readings                                      Current 30 Day Average: 125/72     Recent Readings 2/11/2019 2/11/2019 2/10/2019 2/9/2019 2/9/2019    SBP (mmHg) 127 141 130 127 142    DBP (mmHg) 65 68 79 70 77    Pulse 73 79 79 73 59          Digital Medicine: Health  Follow Up    Lifestyle Modifications:    1.Dietary Modifications (Sodium intake <2,000mg/day, food labels, dining out): Patient continues maintaining a low sodium diet. He denies any major changes that would cause an increase in salt intake.     2.Physical Activity: Deferred    3.Medication Therapy: Patient has been compliant with the medication regimen. Patient is doing well on his current regimen. He denies symptoms/side effects.    He is realizing that he may not be fully relaxing before taking his BP readings. When they are higher then normal, he leaves the cuff on and waits 10 minutes and retakes the reading. It almost always comes back down to normal range after doing that. He will work on relaxing more the initial reading so we can see if that is the issue.     4.Patient has the following medication side effects/concerns:   (Frequency/Alleviating factors/Precipitating factors, etc.)     Follow up with Manoj Bryant Jeffery completed. No further questions or concerns. Will continue to follow up to achieve health goals.

## 2019-02-13 ENCOUNTER — TELEPHONE (OUTPATIENT)
Dept: INTERNAL MEDICINE | Facility: CLINIC | Age: 79
End: 2019-02-13

## 2019-02-13 ENCOUNTER — PATIENT MESSAGE (OUTPATIENT)
Dept: INTERNAL MEDICINE | Facility: CLINIC | Age: 79
End: 2019-02-13

## 2019-02-14 ENCOUNTER — LAB VISIT (OUTPATIENT)
Dept: LAB | Facility: HOSPITAL | Age: 79
End: 2019-02-14
Payer: MEDICARE

## 2019-02-14 DIAGNOSIS — D64.9 ANEMIA, UNSPECIFIED TYPE: ICD-10-CM

## 2019-02-14 LAB
BASOPHILS # BLD AUTO: 0.04 K/UL
BASOPHILS NFR BLD: 0.9 %
DIFFERENTIAL METHOD: ABNORMAL
EOSINOPHIL # BLD AUTO: 0.1 K/UL
EOSINOPHIL NFR BLD: 1.3 %
ERYTHROCYTE [DISTWIDTH] IN BLOOD BY AUTOMATED COUNT: 13 %
HCT VFR BLD AUTO: 36.4 %
HGB BLD-MCNC: 11.6 G/DL
LYMPHOCYTES # BLD AUTO: 1.8 K/UL
LYMPHOCYTES NFR BLD: 39.3 %
MCH RBC QN AUTO: 27.4 PG
MCHC RBC AUTO-ENTMCNC: 31.9 G/DL
MCV RBC AUTO: 86 FL
MONOCYTES # BLD AUTO: 0.5 K/UL
MONOCYTES NFR BLD: 11.2 %
NEUTROPHILS # BLD AUTO: 2.2 K/UL
NEUTROPHILS NFR BLD: 47.1 %
PLATELET # BLD AUTO: 289 K/UL
PMV BLD AUTO: 11 FL
RBC # BLD AUTO: 4.24 M/UL
WBC # BLD AUTO: 4.66 K/UL

## 2019-02-14 PROCEDURE — 36415 COLL VENOUS BLD VENIPUNCTURE: CPT | Mod: HCNC

## 2019-02-14 PROCEDURE — 85025 COMPLETE CBC W/AUTO DIFF WBC: CPT | Mod: HCNC

## 2019-02-19 ENCOUNTER — OFFICE VISIT (OUTPATIENT)
Dept: INTERNAL MEDICINE | Facility: CLINIC | Age: 79
End: 2019-02-19
Payer: MEDICARE

## 2019-02-19 VITALS
OXYGEN SATURATION: 98 % | DIASTOLIC BLOOD PRESSURE: 62 MMHG | HEART RATE: 82 BPM | WEIGHT: 173.5 LBS | BODY MASS INDEX: 27.23 KG/M2 | HEIGHT: 67 IN | SYSTOLIC BLOOD PRESSURE: 118 MMHG

## 2019-02-19 DIAGNOSIS — N18.2 STAGE 2 CHRONIC KIDNEY DISEASE: Chronic | ICD-10-CM

## 2019-02-19 DIAGNOSIS — E53.8 VITAMIN B 12 DEFICIENCY: ICD-10-CM

## 2019-02-19 DIAGNOSIS — E11.42 TYPE 2 DIABETES MELLITUS WITH DIABETIC POLYNEUROPATHY, WITHOUT LONG-TERM CURRENT USE OF INSULIN: Chronic | ICD-10-CM

## 2019-02-19 DIAGNOSIS — I35.1 NONRHEUMATIC AORTIC VALVE INSUFFICIENCY: ICD-10-CM

## 2019-02-19 DIAGNOSIS — R06.09 DOE (DYSPNEA ON EXERTION): ICD-10-CM

## 2019-02-19 DIAGNOSIS — R58 BLOOD LOSS: ICD-10-CM

## 2019-02-19 DIAGNOSIS — R53.83 EASY FATIGABILITY: Primary | ICD-10-CM

## 2019-02-19 DIAGNOSIS — I70.0 AORTIC ATHEROSCLEROSIS: ICD-10-CM

## 2019-02-19 DIAGNOSIS — I10 ESSENTIAL HYPERTENSION: Chronic | ICD-10-CM

## 2019-02-19 DIAGNOSIS — N18.2 TYPE 2 DIABETES MELLITUS WITH STAGE 2 CHRONIC KIDNEY DISEASE, WITHOUT LONG-TERM CURRENT USE OF INSULIN: Chronic | ICD-10-CM

## 2019-02-19 DIAGNOSIS — E78.2 MIXED HYPERLIPIDEMIA: Chronic | ICD-10-CM

## 2019-02-19 DIAGNOSIS — E11.22 TYPE 2 DIABETES MELLITUS WITH STAGE 2 CHRONIC KIDNEY DISEASE, WITHOUT LONG-TERM CURRENT USE OF INSULIN: Chronic | ICD-10-CM

## 2019-02-19 PROCEDURE — 3074F SYST BP LT 130 MM HG: CPT | Mod: HCNC,CPTII,S$GLB, | Performed by: INTERNAL MEDICINE

## 2019-02-19 PROCEDURE — 99214 PR OFFICE/OUTPT VISIT, EST, LEVL IV, 30-39 MIN: ICD-10-PCS | Mod: HCNC,S$GLB,, | Performed by: INTERNAL MEDICINE

## 2019-02-19 PROCEDURE — 3074F PR MOST RECENT SYSTOLIC BLOOD PRESSURE < 130 MM HG: ICD-10-PCS | Mod: HCNC,CPTII,S$GLB, | Performed by: INTERNAL MEDICINE

## 2019-02-19 PROCEDURE — 1101F PT FALLS ASSESS-DOCD LE1/YR: CPT | Mod: HCNC,CPTII,S$GLB, | Performed by: INTERNAL MEDICINE

## 2019-02-19 PROCEDURE — 1101F PR PT FALLS ASSESS DOC 0-1 FALLS W/OUT INJ PAST YR: ICD-10-PCS | Mod: HCNC,CPTII,S$GLB, | Performed by: INTERNAL MEDICINE

## 2019-02-19 PROCEDURE — 99499 RISK ADDL DX/OHS AUDIT: ICD-10-PCS | Mod: S$GLB,,, | Performed by: INTERNAL MEDICINE

## 2019-02-19 PROCEDURE — 99214 OFFICE O/P EST MOD 30 MIN: CPT | Mod: HCNC,S$GLB,, | Performed by: INTERNAL MEDICINE

## 2019-02-19 PROCEDURE — 99999 PR PBB SHADOW E&M-EST. PATIENT-LVL IV: CPT | Mod: PBBFAC,HCNC,, | Performed by: INTERNAL MEDICINE

## 2019-02-19 PROCEDURE — 3078F DIAST BP <80 MM HG: CPT | Mod: HCNC,CPTII,S$GLB, | Performed by: INTERNAL MEDICINE

## 2019-02-19 PROCEDURE — 99499 UNLISTED E&M SERVICE: CPT | Mod: S$GLB,,, | Performed by: INTERNAL MEDICINE

## 2019-02-19 PROCEDURE — 3078F PR MOST RECENT DIASTOLIC BLOOD PRESSURE < 80 MM HG: ICD-10-PCS | Mod: HCNC,CPTII,S$GLB, | Performed by: INTERNAL MEDICINE

## 2019-02-19 PROCEDURE — 99999 PR PBB SHADOW E&M-EST. PATIENT-LVL IV: ICD-10-PCS | Mod: PBBFAC,HCNC,, | Performed by: INTERNAL MEDICINE

## 2019-02-19 RX ORDER — FERROUS SULFATE 325(65) MG
TABLET ORAL
Qty: 15 TABLET | Refills: 0 | Status: SHIPPED | OUTPATIENT
Start: 2019-02-19 | End: 2019-10-02

## 2019-02-19 NOTE — PATIENT INSTRUCTIONS
Results for orders placed or performed in visit on 02/14/19   CBC auto differential   Result Value Ref Range    WBC 4.66 3.90 - 12.70 K/uL    RBC 4.24 (L) 4.60 - 6.20 M/uL    Hemoglobin 11.6 (L) 14.0 - 18.0 g/dL    Hematocrit 36.4 (L) 40.0 - 54.0 %    MCV 86 82 - 98 fL    MCH 27.4 27.0 - 31.0 pg    MCHC 31.9 (L) 32.0 - 36.0 g/dL    RDW 13.0 11.5 - 14.5 %    Platelets 289 150 - 350 K/uL    MPV 11.0 9.2 - 12.9 fL    Gran # (ANC) 2.2 1.8 - 7.7 K/uL    Lymph # 1.8 1.0 - 4.8 K/uL    Mono # 0.5 0.3 - 1.0 K/uL    Eos # 0.1 0.0 - 0.5 K/uL    Baso # 0.04 0.00 - 0.20 K/uL    Gran% 47.1 38.0 - 73.0 %    Lymph% 39.3 18.0 - 48.0 %    Mono% 11.2 4.0 - 15.0 %    Eosinophil% 1.3 0.0 - 8.0 %    Basophil% 0.9 0.0 - 1.9 %    Differential Method Automated

## 2019-02-25 NOTE — PROGRESS NOTES
Subjective:       Patient ID: Bryant Gil is a 78 y.o. male.    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  HPI  Review of Systems   Constitutional: Positive for fatigue. Negative for activity change, chills and fever.   HENT: Negative for congestion, dental problem, hearing loss, tinnitus and trouble swallowing.    Eyes: Negative for visual disturbance.   Respiratory: Negative for cough, shortness of breath and wheezing.    Cardiovascular: Negative for chest pain, palpitations and leg swelling.   Genitourinary: Negative for dysuria, frequency and urgency.   Musculoskeletal: Negative for back pain and neck pain.   Neurological: Negative for dizziness, weakness and headaches.   Psychiatric/Behavioral: Negative for dysphoric mood and sleep disturbance. The patient is not nervous/anxious.        Objective:      Physical Exam   Constitutional: He is oriented to person, place, and time. He appears well-developed and well-nourished. No distress.   HENT:   Head: Atraumatic.   Mouth/Throat: No oropharyngeal exudate.   Eyes: Conjunctivae are normal. No scleral icterus.   Cardiovascular: Normal rate, regular rhythm and normal heart sounds.   Pulmonary/Chest: Effort normal and breath sounds normal.   Abdominal: Soft. There is no tenderness.   Musculoskeletal: He exhibits no edema.   Lymphadenopathy:     He has no cervical adenopathy.   Neurological: He is alert and oriented to person, place, and time.   Skin: Skin is warm and dry.   Psychiatric: He has a normal mood and affect. His behavior is normal.       Assessment:       1. Easy fatigability    2. PAIGE (dyspnea on exertion)    3. Aortic atherosclerosis    4. Essential hypertension    5. Mixed  hyperlipidemia    6. Nonrheumatic aortic valve insufficiency    7. Stage 2 chronic kidney disease    8. Type 2 diabetes mellitus with diabetic polyneuropathy, without long-term current use of insulin    9. Type 2 diabetes mellitus with stage 2 chronic kidney disease, without long-term current use of insulin    10. Vitamin B 12 deficiency    11. Blood loss        Plan:   Bryant was seen today for follow-up, fatigue and shortness of breath.    Diagnoses and all orders for this visit:    Easy fatigability  PAIGE (dyspnea on exertion), recovering from a lower GI bleed that was acute and believed to be diverticular.  He has had no such bleeding since.  Will resolve iron deficiency anemia by taking 1 iron tablet every other day  -     CBC auto differential; Future  -     Vitamin B12; Future  -     Hemoglobin A1c; Future    Aortic atherosclerosis    Essential hypertension    Mixed hyperlipidemia    Nonrheumatic aortic valve insufficiency    Stage 2 chronic kidney disease  -     Comprehensive metabolic panel; Future  -     Hemoglobin A1c; Future    Type 2 diabetes mellitus with diabetic polyneuropathy, without long-term current use of insulin  -     Hemoglobin A1c; Future  -     Comprehensive metabolic panel; Future  -     Hemoglobin A1c; Future    Type 2 diabetes mellitus with stage 2 chronic kidney disease, without long-term current use of insulin  -     Hemoglobin A1c; Future  -     Comprehensive metabolic panel; Future  -     Hemoglobin A1c; Future    Vitamin B 12 deficiency  -     Vitamin B12; Future    Blood loss    Other orders  -     ferrous sulfate (FEOSOL) 325 mg (65 mg iron) Tab tablet; One tablet after a meal every other day  Medication List with Changes/Refills   New Medications    FERROUS SULFATE (FEOSOL) 325 MG (65 MG IRON) TAB TABLET    One tablet after a meal every other day   Current Medications    AMLODIPINE (NORVASC) 10 MG TABLET    Take 1 tablet (10 mg total) by mouth once daily.    ASCORBIC ACID (VITAMIN  C) 100 MG TABLET    Take 100 mg by mouth once daily.    ASPIRIN (ENTERIC COATED ASPIRIN) 81 MG EC TABLET    Take 1 tablet (81 mg total) by mouth once. 1 Tablet, Delayed Release (E.C.) Oral Every day for 1 dose    ATORVASTATIN (LIPITOR) 20 MG TABLET    Take 1 tablet (20 mg total) by mouth once daily.    BLOOD SUGAR DIAGNOSTIC (TRUETEST TEST STRIPS) STRP    2 strips by Misc.(Non-Drug; Combo Route) route once daily.    BLOOD SUGAR DIAGNOSTIC STRP    1 strip by Misc.(Non-Drug; Combo Route) route once daily.    BLOOD-GLUCOSE METER (ACCU-CHEK YULIA PLUS METER) MISC    1 Device by Misc.(Non-Drug; Combo Route) route once daily.    LANCETS (ACCU-CHEK SOFTCLIX LANCETS) MISC    1 Device by Misc.(Non-Drug; Combo Route) route once daily.    LATANOPROST 0.005 % OPHTHALMIC SOLUTION    Place 1 drop into both eyes once daily.    LOSARTAN-HYDROCHLOROTHIAZIDE 100-25 MG (HYZAAR) 100-25 MG PER TABLET    Take 1 tablet by mouth every morning. Blood pressure    METFORMIN (GLUCOPHAGE) 1000 MG TABLET    Take 1 tablet (1,000 mg total) by mouth 2 (two) times daily with meals.    METOPROLOL SUCCINATE (TOPROL-XL) 25 MG 24 HR TABLET    Take 1 tablet (25 mg total) by mouth once daily.    PANTOPRAZOLE (PROTONIX) 40 MG TABLET    Take 1 tablet (40 mg total) by mouth once daily.    VITAMIN D 1000 UNITS TAB    Take 185 mg by mouth once daily.       Follow-up in about 5 months (around 7/19/2019) for after July's blood work.

## 2019-03-13 ENCOUNTER — PATIENT OUTREACH (OUTPATIENT)
Dept: OTHER | Facility: OTHER | Age: 79
End: 2019-03-13

## 2019-03-13 NOTE — PROGRESS NOTES
Last 5 Patient Entered Readings                                      Current 30 Day Average: 125/72     Recent Readings 3/13/2019 3/13/2019 3/10/2019 3/10/2019 3/9/2019    SBP (mmHg) 117 134 121 138 133    DBP (mmHg) 67 71 72 75 74    Pulse 71 73 77 81 78          Digital Medicine: Health  Follow Up    Lifestyle Modifications:    1.Dietary Modifications (Sodium intake <2,000mg/day, food labels, dining out): Deferred    2.Physical Activity: Patient continues riding his stationary bike 2-3 times per week.     3.Medication Therapy: Patient has been compliant with the medication regimen. Patient is doing well on his current BP medication regimen. He denies symptoms/side effects.     4.Patient has the following medication side effects/concerns:   (Frequency/Alleviating factors/Precipitating factors, etc.)     Follow up with Mr. Bryant Gil completed. No further questions or concerns. Will continue to follow up to achieve health goals.

## 2019-03-18 ENCOUNTER — LAB VISIT (OUTPATIENT)
Dept: LAB | Facility: HOSPITAL | Age: 79
End: 2019-03-18
Attending: INTERNAL MEDICINE
Payer: MEDICARE

## 2019-03-18 DIAGNOSIS — E53.8 VITAMIN B 12 DEFICIENCY: ICD-10-CM

## 2019-03-18 DIAGNOSIS — R53.83 EASY FATIGABILITY: ICD-10-CM

## 2019-03-18 DIAGNOSIS — E11.22 TYPE 2 DIABETES MELLITUS WITH STAGE 2 CHRONIC KIDNEY DISEASE, WITHOUT LONG-TERM CURRENT USE OF INSULIN: Chronic | ICD-10-CM

## 2019-03-18 DIAGNOSIS — R06.09 DOE (DYSPNEA ON EXERTION): ICD-10-CM

## 2019-03-18 DIAGNOSIS — N18.2 TYPE 2 DIABETES MELLITUS WITH STAGE 2 CHRONIC KIDNEY DISEASE, WITHOUT LONG-TERM CURRENT USE OF INSULIN: Chronic | ICD-10-CM

## 2019-03-18 DIAGNOSIS — E11.42 TYPE 2 DIABETES MELLITUS WITH DIABETIC POLYNEUROPATHY, WITHOUT LONG-TERM CURRENT USE OF INSULIN: Chronic | ICD-10-CM

## 2019-03-18 LAB
BASOPHILS # BLD AUTO: 0.04 K/UL
BASOPHILS NFR BLD: 0.9 %
DIFFERENTIAL METHOD: ABNORMAL
EOSINOPHIL # BLD AUTO: 0.1 K/UL
EOSINOPHIL NFR BLD: 2.2 %
ERYTHROCYTE [DISTWIDTH] IN BLOOD BY AUTOMATED COUNT: 13.5 %
ESTIMATED AVG GLUCOSE: 174 MG/DL
HBA1C MFR BLD HPLC: 7.7 %
HCT VFR BLD AUTO: 39.8 %
HGB BLD-MCNC: 12.7 G/DL
LYMPHOCYTES # BLD AUTO: 1.6 K/UL
LYMPHOCYTES NFR BLD: 36.2 %
MCH RBC QN AUTO: 27.3 PG
MCHC RBC AUTO-ENTMCNC: 31.9 G/DL
MCV RBC AUTO: 86 FL
MONOCYTES # BLD AUTO: 0.4 K/UL
MONOCYTES NFR BLD: 9.8 %
NEUTROPHILS # BLD AUTO: 2.3 K/UL
NEUTROPHILS NFR BLD: 50.7 %
PLATELET # BLD AUTO: 269 K/UL
PMV BLD AUTO: 10.7 FL
RBC # BLD AUTO: 4.65 M/UL
VIT B12 SERPL-MCNC: 1402 PG/ML
WBC # BLD AUTO: 4.47 K/UL

## 2019-03-18 PROCEDURE — 82607 VITAMIN B-12: CPT | Mod: HCNC

## 2019-03-18 PROCEDURE — 83036 HEMOGLOBIN GLYCOSYLATED A1C: CPT | Mod: HCNC

## 2019-03-18 PROCEDURE — 85025 COMPLETE CBC W/AUTO DIFF WBC: CPT | Mod: HCNC

## 2019-03-18 PROCEDURE — 36415 COLL VENOUS BLD VENIPUNCTURE: CPT | Mod: HCNC

## 2019-03-19 DIAGNOSIS — I10 ESSENTIAL HYPERTENSION: ICD-10-CM

## 2019-03-19 RX ORDER — METOPROLOL SUCCINATE 25 MG/1
TABLET, EXTENDED RELEASE ORAL
Qty: 90 TABLET | Refills: 1 | Status: SHIPPED | OUTPATIENT
Start: 2019-03-19 | End: 2019-09-25 | Stop reason: SDUPTHER

## 2019-03-28 ENCOUNTER — OFFICE VISIT (OUTPATIENT)
Dept: OTOLARYNGOLOGY | Facility: CLINIC | Age: 79
End: 2019-03-28
Payer: MEDICARE

## 2019-03-28 ENCOUNTER — CLINICAL SUPPORT (OUTPATIENT)
Dept: AUDIOLOGY | Facility: CLINIC | Age: 79
End: 2019-03-28
Payer: MEDICARE

## 2019-03-28 VITALS
BODY MASS INDEX: 27.25 KG/M2 | HEART RATE: 77 BPM | DIASTOLIC BLOOD PRESSURE: 75 MMHG | TEMPERATURE: 96 F | SYSTOLIC BLOOD PRESSURE: 127 MMHG | WEIGHT: 174 LBS

## 2019-03-28 DIAGNOSIS — H90.3 HEARING LOSS, SENSORINEURAL, HIGH FREQUENCY, BILATERAL: Primary | ICD-10-CM

## 2019-03-28 DIAGNOSIS — Z77.122 HISTORY OF EXPOSURE TO NOISE: ICD-10-CM

## 2019-03-28 DIAGNOSIS — H93.13 TINNITUS, BILATERAL: ICD-10-CM

## 2019-03-28 DIAGNOSIS — H90.3 SENSORINEURAL HEARING LOSS, BILATERAL: Primary | ICD-10-CM

## 2019-03-28 PROCEDURE — 99999 PR PBB SHADOW E&M-EST. PATIENT-LVL IV: ICD-10-PCS | Mod: PBBFAC,HCNC,, | Performed by: OTOLARYNGOLOGY

## 2019-03-28 PROCEDURE — 92567 TYMPANOMETRY: CPT | Mod: HCNC,S$GLB,, | Performed by: AUDIOLOGIST-HEARING AID FITTER

## 2019-03-28 PROCEDURE — 3074F SYST BP LT 130 MM HG: CPT | Mod: HCNC,CPTII,S$GLB, | Performed by: OTOLARYNGOLOGY

## 2019-03-28 PROCEDURE — 92557 COMPREHENSIVE HEARING TEST: CPT | Mod: HCNC,S$GLB,, | Performed by: AUDIOLOGIST-HEARING AID FITTER

## 2019-03-28 PROCEDURE — 99203 PR OFFICE/OUTPT VISIT, NEW, LEVL III, 30-44 MIN: ICD-10-PCS | Mod: HCNC,S$GLB,, | Performed by: OTOLARYNGOLOGY

## 2019-03-28 PROCEDURE — 1101F PR PT FALLS ASSESS DOC 0-1 FALLS W/OUT INJ PAST YR: ICD-10-PCS | Mod: HCNC,CPTII,S$GLB, | Performed by: OTOLARYNGOLOGY

## 2019-03-28 PROCEDURE — 92567 PR TYMPA2METRY: ICD-10-PCS | Mod: HCNC,S$GLB,, | Performed by: AUDIOLOGIST-HEARING AID FITTER

## 2019-03-28 PROCEDURE — 99203 OFFICE O/P NEW LOW 30 MIN: CPT | Mod: HCNC,S$GLB,, | Performed by: OTOLARYNGOLOGY

## 2019-03-28 PROCEDURE — 99999 PR PBB SHADOW E&M-EST. PATIENT-LVL IV: CPT | Mod: PBBFAC,HCNC,, | Performed by: OTOLARYNGOLOGY

## 2019-03-28 PROCEDURE — 92557 PR COMPREHENSIVE HEARING TEST: ICD-10-PCS | Mod: HCNC,S$GLB,, | Performed by: AUDIOLOGIST-HEARING AID FITTER

## 2019-03-28 PROCEDURE — 1101F PT FALLS ASSESS-DOCD LE1/YR: CPT | Mod: HCNC,CPTII,S$GLB, | Performed by: OTOLARYNGOLOGY

## 2019-03-28 PROCEDURE — 3078F PR MOST RECENT DIASTOLIC BLOOD PRESSURE < 80 MM HG: ICD-10-PCS | Mod: HCNC,CPTII,S$GLB, | Performed by: OTOLARYNGOLOGY

## 2019-03-28 PROCEDURE — 3078F DIAST BP <80 MM HG: CPT | Mod: HCNC,CPTII,S$GLB, | Performed by: OTOLARYNGOLOGY

## 2019-03-28 PROCEDURE — 3074F PR MOST RECENT SYSTOLIC BLOOD PRESSURE < 130 MM HG: ICD-10-PCS | Mod: HCNC,CPTII,S$GLB, | Performed by: OTOLARYNGOLOGY

## 2019-03-28 NOTE — PROGRESS NOTES
Subjective:       Patient ID: Bryant Gil is a 78 y.o. male.    Chief Complaint: Hearing Loss    HPI: Mr. Gil is a 78-year-old bespectacled  male whose hand written reason for the visit today is hearing   He indicates trouble understanding what is said to him quite often.  He denies any previous audiometric testing.  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  He denies any history of vertigo.  He denies a family history of hearing loss.  He does indicate some tinnitus perception. He has experienced dizziness which is not incapacitating.  He was examined by Dr. Laverne Zaidi 02/19/2019 for easy fatigability.  He was diagnosed with easy fatigability, dyspnea on exertion, aortic atherosclerosis, essential hypertension, mixed hyperlipidemia, non rheumatic aortic valve insufficiency, stage 2 chronic kidney disease, type 2 diabetes with polyneuropathy, vitamin B12 deficiency blood loss  He was the diagnosed with hearing problem in June 2013 according to the EMR.    PMH:  High blood pressure, diabetes, hearing loss  Past Surgical History:   Procedure Laterality Date    CARPAL TUNNEL RELEASE Right 08/25/2015    CIRCUMCISION  04/13/2005    COLONOSCOPY Golytely prep N/A 3/29/2017    Performed by Kavitha Cleaning MD at [facility]    COLONOSCOPY W/ POLYPECTOMY  01/10/2012    Repeat in 5 years     EGD (ESOPHAGOGASTRODUODENOSCOPY) N/A 1/8/2019    Performed by Rachel Burrows MD at [facility]    PENILE PROSTHESIS IMPLANT      PROSTATE SURGERY  1999    Prostatectomy for prostate ca; [facility]    RELEASE-CARPAL TUNNEL Right 8/25/2015    Performed by Anthony Cooper Jr., MD at [facility] OR    RELEASE-FINGER-TRIGGER Right 8/25/2015    Performed by Anthony [facility] Jr[facility] at [facility]    SIGMOIDOSCOPY, FLEXIBLE N/A 1/8/2019    Performed by Rachel Burrows MD at [facility]    TRIGGER FINGER RELEASE Right 2015    Family history:  High blood pressure, glaucoma, diabetes  Allergies:  None    Review of  Systems   Ears: Positive for hearing loss, ringing in ear and dizziness.    Eyes:  Positive for history of glaucoma.   Cardiovascular:  Positive for history of high blood pressure.   Gastrointestinal:  Positive for blood in stool.   Other:  Negative for rash.     Current Outpatient Medications on File Prior to Visit   Medication Sig Dispense Refill    amLODIPine (NORVASC) 10 MG tablet Take 1 tablet (10 mg total) by mouth once daily. 90 tablet 3    ascorbic acid (VITAMIN C) 100 MG tablet Take 100 mg by mouth once daily.      atorvastatin (LIPITOR) 20 MG tablet Take 1 tablet (20 mg total) by mouth once daily. 90 tablet 3    blood sugar diagnostic (TRUETEST TEST STRIPS) Strp 2 strips by Misc.(Non-Drug; Combo Route) route once daily. 200 strip 4    blood sugar diagnostic Strp 1 strip by Misc.(Non-Drug; Combo Route) route once daily. 100 strip 4    ferrous sulfate (FEOSOL) 325 mg (65 mg iron) Tab tablet One tablet after a meal every other day 15 tablet 0    lancets (ACCU-CHEK SOFTCLIX LANCETS) Misc 1 Device by Misc.(Non-Drug; Combo Route) route once daily. 100 each 4    latanoprost 0.005 % ophthalmic solution Place 1 drop into both eyes once daily. 3 Bottle 3    metFORMIN (GLUCOPHAGE) 1000 MG tablet Take 1 tablet (1,000 mg total) by mouth 2 (two) times daily with meals. 180 tablet 3    metoprolol succinate (TOPROL-XL) 25 MG 24 hr tablet TAKE 1 TABLET ONE TIME DAILY 90 tablet 1    pantoprazole (PROTONIX) 40 MG tablet Take 1 tablet (40 mg total) by mouth once daily. 30 tablet 5    vitamin D 1000 units Tab Take 185 mg by mouth once daily.      aspirin (ENTERIC COATED ASPIRIN) 81 MG EC tablet Take 1 tablet (81 mg total) by mouth once. 1 Tablet, Delayed Release (E.C.) Oral Every day for 1 dose 1 tablet 0    blood-glucose meter (ACCU-CHEK YULIA PLUS METER) Misc 1 Device by Misc.(Non-Drug; Combo Route) route once daily. 1 each 1    losartan-hydrochlorothiazide 100-25 mg (HYZAAR) 100-25 mg per tablet Take 1 tablet  by mouth every morning. Blood pressure 90 tablet 3     No current facility-administered medications on file prior to visit.            The patient completed an audiometric study performed by the [facility] audiology service.  The study is duplicated below and the results are reviewed with him in detail  Objective:         Blood pressure 127/75 pulse 77 temperature 96° height 5 ft 7 in weight 174  General:  Alert and oriented gentleman in no acute distress  Both ears were examined under the microscope in the micro procedure room.  Physical Exam   Constitutional: He is oriented to person, place, and time. He appears well-developed and well-nourished.   HENT:   Head: Normocephalic.   Right Ear: Hearing, tympanic membrane and ear canal normal. No drainage. No foreign bodies. No mastoid tenderness. Tympanic membrane is not perforated. No decreased hearing is noted.   Left Ear: Hearing, tympanic membrane and ear canal normal. No drainage. No foreign bodies. No mastoid tenderness. Tympanic membrane is not perforated. No decreased hearing is noted.   Ears:    Nose: No nose lacerations, nasal deformity, septal deviation or nasal septal hematoma. No epistaxis. Right sinus exhibits no maxillary sinus tenderness and no frontal sinus tenderness. Left sinus exhibits no maxillary sinus tenderness and no frontal sinus tenderness.   Mouth/Throat: Uvula is midline, oropharynx is clear and moist and mucous membranes are normal. He does not have dentures. No oral lesions. No trismus in the jaw. No uvula swelling or dental caries. No oropharyngeal exudate or tonsillar abscesses.   Neck: No thyromegaly present.   Pulmonary/Chest: Effort normal. No stridor.   Lymphadenopathy:     He has no cervical adenopathy.   Neurological: He is alert and oriented to person, place, and time.   Skin: No rash noted.   Psychiatric: His behavior is normal.       Assessment:       1. Hearing loss, sensorineural, high frequency, bilateral    2. Tinnitus, bilateral     3. History of exposure to noise        Plan:     Audiometry reviewed  Pt. is a candidate for amplification for one or both ears  Copy of audiogram/DARRYL Godfery's card/Rx to obtain hearing aid(s) provided  Yearly audiometric monitoring encouraged; literature provided  Hearing protection encouraged prn  Pt. directed to DARRYL Godfrey's office for information re: amplification

## 2019-03-28 NOTE — PROGRESS NOTES
Bryant Gil was seen in the clinic today for a hearing evaluation. Mr. Gil reported bilateral hearing loss and occasional tinnitus.      Otoscopy was unremarkable. Audiological testing revealed a mild to severe mid to high frequency sensorineural hearing loss in the right ear and a moderately severe to severe mid to high frequency sensorineural hearing loss in the left ear. A speech reception threshold was obtained at 15 dBHL in the right ear and 20 dBHL in the left ear. Speech recognition was 68% in the right ear and 80% in the left ear.    Tympanometry revealed normal Type A tympanograms, bilaterally.    Recommendations:  1. Otologic evaluation  2. Annual hearing evaluation  3. Hearing aid consult

## 2019-03-28 NOTE — PATIENT INSTRUCTIONS
Audiometry reviewed  Pt. is a candidate for amplification for one or both ears  Copy of audiogram/DARRYL Godfrey's card/Rx to obtain hearing aid(s) provided  Yearly audiometric monitoring encouraged; literature provided  Hearing protection encouraged prn  Pt. directed to DARRYL Godfrey's office for information re: amplification

## 2019-03-28 NOTE — LETTER
March 28, 2019      Adelina Stephen, NP  [address]  [facility] [address]           [facility]  [address]  [facility] [address]  Phone: [phone]  Fax: [phone]          Patient: Bryant Gil   MR Number: [id number]   YOB: 1940   Date of Visit: 3/28/2019       Dear Adelina Stephen:    Thank you for referring Bryant Gil to me for evaluation. Attached you will find relevant portions of my assessment and plan of care.    If you have questions, please do not hesitate to call me. I look forward to following Bryant Gil along with you.    Sincerely,    Kobe Reilly III, MD    Enclosure  CC:  No Recipients    If you would like to receive this communication electronically, please contact [email or web address] or [phone] to request more information on [employer] access.    For providers and/or their staff who would like to refer a patient to [facility], please contact us through our one-stop-shop provider referral line, [facility], at [phone].    If you feel you have received this communication in error or would no longer like to receive these types of communications, please e-mail [email or web address]

## 2019-04-08 ENCOUNTER — TELEPHONE (OUTPATIENT)
Dept: GASTROENTEROLOGY | Facility: CLINIC | Age: 79
End: 2019-04-08

## 2019-04-08 NOTE — TELEPHONE ENCOUNTER
Spoke with patient about scheduling a sooner appointment due to patient's stomach pains.  Patient scheduled an office visit with Dr. Burrows on 4/29/2019.

## 2019-04-11 ENCOUNTER — PATIENT OUTREACH (OUTPATIENT)
Dept: OTHER | Facility: OTHER | Age: 79
End: 2019-04-11

## 2019-04-11 NOTE — PROGRESS NOTES
Last 5 Patient Entered Readings                                      Current 30 Day Average: 126/72     Recent Readings 4/11/2019 4/11/2019 4/9/2019 4/8/2019 4/8/2019    SBP (mmHg) 122 136 124 138 136    DBP (mmHg) 72 73 72 75 75    Pulse 71 72 77 82 78          Digital Medicine: Health  Follow Up    Lifestyle Modifications:    1.Dietary Modifications (Sodium intake <2,000mg/day, food labels, dining out): Patient continue to monitor his sodium intake. He has been eating more soups/broths/crackers due to stomach issues he is having. He will be sure to read the food labels to make sure he chooses a lower sodium options.     2.Physical Activity: Patient recently bought a stationary bike and his goal is to use it twice a week for 15 minutes each day. He will slowly increase that activity over the course of a few months.     3.Medication Therapy: Patient has been compliant with the medication regimen. Patient is doing well on his current BP medication regimen. He denies symptoms/side effects.  He is curious to know if the medication he takes for his stomach (pantoprazole) could be affect his BP readings as well. I will have his PharmD, SHANIQUE Romano, look into this for him.     4.Patient has the following medication side effects/concerns:   (Frequency/Alleviating factors/Precipitating factors, etc.)     Follow up with Mr. Bryant Gil completed. No further questions or concerns. Will continue to follow up to achieve health goals.

## 2019-04-23 ENCOUNTER — TELEPHONE (OUTPATIENT)
Dept: OPTOMETRY | Facility: CLINIC | Age: 79
End: 2019-04-23

## 2019-04-23 NOTE — TELEPHONE ENCOUNTER
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 A recall has already been sent out but the pt has not yet scheduled. Please call to offer to schedule and IOP check with me.    Thank you,  Adelina Enciso OD

## 2019-04-24 ENCOUNTER — PATIENT OUTREACH (OUTPATIENT)
Dept: OTHER | Facility: OTHER | Age: 79
End: 2019-04-24

## 2019-04-24 NOTE — PROGRESS NOTES
Last 5 Patient Entered Readings                                      Current 30 Day Average: 124/72     Recent Readings 4/23/2019 4/20/2019 4/18/2019 4/16/2019 4/16/2019    SBP (mmHg) 124 122 119 124 133    DBP (mmHg) 72 70 70 71 74    Pulse 74 72 77 69 72        Per 30 day average, 124/72 mmHg, patient's BP is at goal.     Mr. Hurtado's BP average is well controlled. He was concerned that pantoprazole would cause his BP to spike, but explained that it should not affect his BP. He has no other questions or concerns at this time.     Patient's health , Balbina Aburto, will be following up. I will continue to monitor regularly and will follow up in 4-6 months, sooner if BP begins to trend upward or downward.    Asked patient to call or message with questions or concerns.     Current HTN regimen:  Hypertension Medications             amLODIPine (NORVASC) 10 MG tablet Take 1 tablet (10 mg total) by mouth once daily.    losartan-hydrochlorothiazide 100-25 mg (HYZAAR) 100-25 mg per tablet Take 1 tablet by mouth every morning. Blood pressure    metoprolol succinate (TOPROL-XL) 25 MG 24 hr tablet TAKE 1 TABLET ONE TIME DAILY

## 2019-04-29 ENCOUNTER — OFFICE VISIT (OUTPATIENT)
Dept: GASTROENTEROLOGY | Facility: CLINIC | Age: 79
End: 2019-04-29
Payer: MEDICARE

## 2019-04-29 VITALS
BODY MASS INDEX: 27.59 KG/M2 | WEIGHT: 176.13 LBS | HEART RATE: 77 BPM | SYSTOLIC BLOOD PRESSURE: 141 MMHG | DIASTOLIC BLOOD PRESSURE: 74 MMHG

## 2019-04-29 DIAGNOSIS — R10.13 DYSPEPSIA: Primary | ICD-10-CM

## 2019-04-29 PROCEDURE — 99999 PR PBB SHADOW E&M-EST. PATIENT-LVL III: ICD-10-PCS | Mod: PBBFAC,HCNC,, | Performed by: INTERNAL MEDICINE

## 2019-04-29 PROCEDURE — 99214 OFFICE O/P EST MOD 30 MIN: CPT | Mod: HCNC,S$GLB,, | Performed by: INTERNAL MEDICINE

## 2019-04-29 PROCEDURE — 99214 PR OFFICE/OUTPT VISIT, EST, LEVL IV, 30-39 MIN: ICD-10-PCS | Mod: HCNC,S$GLB,, | Performed by: INTERNAL MEDICINE

## 2019-04-29 PROCEDURE — 1101F PR PT FALLS ASSESS DOC 0-1 FALLS W/OUT INJ PAST YR: ICD-10-PCS | Mod: HCNC,CPTII,S$GLB, | Performed by: INTERNAL MEDICINE

## 2019-04-29 PROCEDURE — 3078F DIAST BP <80 MM HG: CPT | Mod: HCNC,CPTII,S$GLB, | Performed by: INTERNAL MEDICINE

## 2019-04-29 PROCEDURE — 3078F PR MOST RECENT DIASTOLIC BLOOD PRESSURE < 80 MM HG: ICD-10-PCS | Mod: HCNC,CPTII,S$GLB, | Performed by: INTERNAL MEDICINE

## 2019-04-29 PROCEDURE — 1101F PT FALLS ASSESS-DOCD LE1/YR: CPT | Mod: HCNC,CPTII,S$GLB, | Performed by: INTERNAL MEDICINE

## 2019-04-29 PROCEDURE — 3077F PR MOST RECENT SYSTOLIC BLOOD PRESSURE >= 140 MM HG: ICD-10-PCS | Mod: HCNC,CPTII,S$GLB, | Performed by: INTERNAL MEDICINE

## 2019-04-29 PROCEDURE — 99999 PR PBB SHADOW E&M-EST. PATIENT-LVL III: CPT | Mod: PBBFAC,HCNC,, | Performed by: INTERNAL MEDICINE

## 2019-04-29 PROCEDURE — 3077F SYST BP >= 140 MM HG: CPT | Mod: HCNC,CPTII,S$GLB, | Performed by: INTERNAL MEDICINE

## 2019-04-29 RX ORDER — PANTOPRAZOLE SODIUM 40 MG/1
40 TABLET, DELAYED RELEASE ORAL DAILY
Qty: 30 TABLET | Refills: 5 | Status: SHIPPED | OUTPATIENT
Start: 2019-04-29 | End: 2019-10-02 | Stop reason: SDUPTHER

## 2019-04-29 NOTE — PROGRESS NOTES
Subjective:       Patient ID: Bryant Gil is a 78 y.o. male.    Chief Complaint: Abdominal Pain    This is a 78-year-old male here for a follow-up visit regarding dyspepsia.  He presented to the hospital earlier in the year with hematochezia and underwent upper endoscopy and flexible sigmoidoscopy in this was suspected to be a diverticular bleed.  He was noted to have a gastric angiectasia in addition to some H pylori negative gastritis.  He has been weaned off PPI therapy in notes recurrent dyspepsia off acid suppression.  Denies any nausea, vomiting, change in appetite, dark urine, jaundice or weight loss.  Family is present to give additional history.  When he takes his medication he feels much better.  No other exacerbating or relieving factors or other associated symptoms.  No particular dietary relation.    The following portions of the patient's history were reviewed and updated as appropriate: allergies, current medications, past family history, past medical history, past social history, past surgical history and problem list.    (Portions of this note were dictated using voice recognition software and may contain dictation related errors in spelling/grammar/syntax not found on text review)    HPI  Review of Systems   Constitutional: Negative for appetite change and unexpected weight change.   Respiratory: Negative for apnea and chest tightness.    Cardiovascular: Negative for chest pain and palpitations.   Gastrointestinal: Positive for abdominal pain. Negative for diarrhea and nausea.       Objective:      Physical Exam   Constitutional: He is oriented to person, place, and time. He appears well-developed and well-nourished. No distress.   HENT:   Head: Normocephalic and atraumatic.   Eyes: Conjunctivae are normal. No scleral icterus.   Neck: No tracheal deviation present. No thyromegaly present.   Cardiovascular: Normal rate, regular rhythm and normal heart sounds. Exam reveals no gallop and no friction  rub.   Pulmonary/Chest: Effort normal and breath sounds normal. He has no wheezes. He has no rales.   Abdominal: Soft. Bowel sounds are normal. He exhibits no distension. There is no tenderness. There is no rebound and no guarding.   Musculoskeletal: Normal range of motion. He exhibits no edema or tenderness.   Neurological: He is alert and oriented to person, place, and time.   Skin: He is not diaphoretic.   Psychiatric: He has a normal mood and affect. His behavior is normal.   Vitals reviewed.      Assessment:       1. Dyspepsia        Plan:   1. Trial of [id number]  2. PPI as needed if no relief with h2 blocker

## 2019-05-17 ENCOUNTER — PATIENT OUTREACH (OUTPATIENT)
Dept: OTHER | Facility: OTHER | Age: 79
End: 2019-05-17

## 2019-05-17 NOTE — PROGRESS NOTES
Last 5 Patient Entered Readings                                      Current 30 Day Average: 124/72     Recent Readings 5/16/2019 5/15/2019 5/14/2019 5/11/2019 5/10/2019    SBP (mmHg) 116 128 117 125 130    DBP (mmHg) 67 72 74 75 73    Pulse 69 69 72 74 69            Digital Medicine: Health  Follow Up    Left voicemail to follow up with Mr. Bryant Gil.  Current BP average 124/72 mmHg is at goal, <130/80.

## 2019-06-20 NOTE — PROGRESS NOTES
Last 5 Patient Entered Readings                                      Current 30 Day Average: 123/72     Recent Readings 6/20/2019 6/18/2019 6/18/2019 6/17/2019 6/17/2019    SBP (mmHg) 116 120 132 120 134    DBP (mmHg) 66 72 82 71 74    Pulse 77 77 95 71 75          Digital Medicine: Health  Follow Up    Lifestyle Modifications:    1.Dietary Modifications (Sodium intake <2,000mg/day, food labels, dining out): Deferred    2.Physical Activity: Patient has been riding on his stationary bike twice a week and walking on his treadmill but he hopes to increase his activity to an extra day or two per week or just a longer duration each day.     3.Medication Therapy: Patient has been compliant with the medication regimen. Patient is doing well on his current BP medication regimen. He denies symptoms/side effects.     4.Patient has the following medication side effects/concerns: None  (Frequency/Alleviating factors/Precipitating factors, etc.)     Follow up with Manoj Bryant Jeffery completed. No further questions or concerns. Will continue to follow up to achieve health goals.

## 2019-06-29 NOTE — PATIENT INSTRUCTIONS
Results for orders placed or performed in visit on 10/02/18   Hemoglobin A1c   Result Value Ref Range    Hemoglobin A1C 8.1 (H) 4.0 - 5.6 %    Estimated Avg Glucose 186 (H) 68 - 131 mg/dL   Lipid panel   Result Value Ref Range    Cholesterol 167 120 - 199 mg/dL    Triglycerides 74 30 - 150 mg/dL    HDL 43 40 - 75 mg/dL    LDL Cholesterol 109.2 63.0 - 159.0 mg/dL    HDL/Chol Ratio 25.7 20.0 - 50.0 %    Total Cholesterol/HDL Ratio 3.9 2.0 - 5.0    Non-HDL Cholesterol 124 mg/dL        no exertional dyspnea/no shortness of breath/no cough/no hemoptysis

## 2019-09-24 ENCOUNTER — TELEPHONE (OUTPATIENT)
Dept: INTERNAL MEDICINE | Facility: CLINIC | Age: 79
End: 2019-09-24

## 2019-09-24 NOTE — TELEPHONE ENCOUNTER
----- Message from Renetta Enriquez sent at 9/24/2019  3:16 PM CDT -----  Pt requesting an appt via portal. Please contact pt with available appt.    Appointment Request From: Bryant Gil    With Provider: Laverne Roberson MD [[facility] - Internal Medicine]    Preferred Date Range: 10/7/2019 - 10/14/2019    Preferred Times: Any time    Reason for visit: Existing Patient    Comments:  check  up    Thanks

## 2019-09-25 ENCOUNTER — TELEPHONE (OUTPATIENT)
Dept: OPTOMETRY | Facility: CLINIC | Age: 79
End: 2019-09-25

## 2019-09-25 DIAGNOSIS — I10 ESSENTIAL HYPERTENSION: ICD-10-CM

## 2019-09-25 RX ORDER — METOPROLOL SUCCINATE 25 MG/1
TABLET, EXTENDED RELEASE ORAL
Qty: 90 TABLET | Refills: 1 | Status: SHIPPED | OUTPATIENT
Start: 2019-09-25 | End: 2019-10-02 | Stop reason: SDUPTHER

## 2019-09-25 RX ORDER — METFORMIN HYDROCHLORIDE 1000 MG/1
TABLET ORAL
Qty: 180 TABLET | Refills: 3 | Status: SHIPPED | OUTPATIENT
Start: 2019-09-25 | End: 2019-10-02 | Stop reason: SDUPTHER

## 2019-09-25 RX ORDER — AMLODIPINE BESYLATE 10 MG/1
TABLET ORAL
Qty: 90 TABLET | Refills: 3 | Status: SHIPPED | OUTPATIENT
Start: 2019-09-25 | End: 2020-06-12 | Stop reason: SDUPTHER

## 2019-09-25 RX ORDER — ATORVASTATIN CALCIUM 20 MG/1
TABLET, FILM COATED ORAL
Qty: 90 TABLET | Refills: 3 | Status: SHIPPED | OUTPATIENT
Start: 2019-09-25 | End: 2020-06-12 | Stop reason: SDUPTHER

## 2019-10-01 ENCOUNTER — PATIENT MESSAGE (OUTPATIENT)
Dept: ADMINISTRATIVE | Facility: OTHER | Age: 79
End: 2019-10-01

## 2019-10-02 ENCOUNTER — OFFICE VISIT (OUTPATIENT)
Dept: INTERNAL MEDICINE | Facility: CLINIC | Age: 79
End: 2019-10-02
Payer: MEDICARE

## 2019-10-02 ENCOUNTER — LAB VISIT (OUTPATIENT)
Dept: LAB | Facility: HOSPITAL | Age: 79
End: 2019-10-02
Attending: INTERNAL MEDICINE
Payer: MEDICARE

## 2019-10-02 VITALS
HEART RATE: 76 BPM | SYSTOLIC BLOOD PRESSURE: 120 MMHG | BODY MASS INDEX: 27.71 KG/M2 | HEIGHT: 67 IN | WEIGHT: 176.56 LBS | OXYGEN SATURATION: 98 % | DIASTOLIC BLOOD PRESSURE: 70 MMHG

## 2019-10-02 DIAGNOSIS — E78.2 MIXED HYPERLIPIDEMIA: Chronic | ICD-10-CM

## 2019-10-02 DIAGNOSIS — R10.13 DYSPEPSIA: ICD-10-CM

## 2019-10-02 DIAGNOSIS — I10 ESSENTIAL HYPERTENSION: Chronic | ICD-10-CM

## 2019-10-02 DIAGNOSIS — E11.22 TYPE 2 DIABETES MELLITUS WITH STAGE 2 CHRONIC KIDNEY DISEASE, WITHOUT LONG-TERM CURRENT USE OF INSULIN: Primary | Chronic | ICD-10-CM

## 2019-10-02 DIAGNOSIS — N18.2 TYPE 2 DIABETES MELLITUS WITH STAGE 2 CHRONIC KIDNEY DISEASE, WITHOUT LONG-TERM CURRENT USE OF INSULIN: Primary | Chronic | ICD-10-CM

## 2019-10-02 DIAGNOSIS — E11.22 TYPE 2 DIABETES MELLITUS WITH STAGE 2 CHRONIC KIDNEY DISEASE, WITHOUT LONG-TERM CURRENT USE OF INSULIN: Chronic | ICD-10-CM

## 2019-10-02 DIAGNOSIS — N18.2 TYPE 2 DIABETES MELLITUS WITH STAGE 2 CHRONIC KIDNEY DISEASE, WITHOUT LONG-TERM CURRENT USE OF INSULIN: Chronic | ICD-10-CM

## 2019-10-02 LAB
ALBUMIN SERPL BCP-MCNC: 4.4 G/DL (ref 3.5–5.2)
ALP SERPL-CCNC: 68 U/L (ref 55–135)
ALT SERPL W/O P-5'-P-CCNC: 20 U/L (ref 10–44)
ANION GAP SERPL CALC-SCNC: 9 MMOL/L (ref 8–16)
AST SERPL-CCNC: 17 U/L (ref 10–40)
BILIRUB SERPL-MCNC: 0.3 MG/DL (ref 0.1–1)
BUN SERPL-MCNC: 22 MG/DL (ref 8–23)
CALCIUM SERPL-MCNC: 9.1 MG/DL (ref 8.7–10.5)
CHLORIDE SERPL-SCNC: 102 MMOL/L (ref 95–110)
CO2 SERPL-SCNC: 28 MMOL/L (ref 23–29)
CREAT SERPL-MCNC: 1.3 MG/DL (ref 0.5–1.4)
EST. GFR  (AFRICAN AMERICAN): >60 ML/MIN/1.73 M^2
EST. GFR  (NON AFRICAN AMERICAN): 53 ML/MIN/1.73 M^2
GLUCOSE SERPL-MCNC: 174 MG/DL (ref 70–110)
POTASSIUM SERPL-SCNC: 3.9 MMOL/L (ref 3.5–5.1)
PROT SERPL-MCNC: 7.8 G/DL (ref 6–8.4)
SODIUM SERPL-SCNC: 139 MMOL/L (ref 136–145)

## 2019-10-02 PROCEDURE — 3074F SYST BP LT 130 MM HG: CPT | Mod: HCNC,CPTII,S$GLB, | Performed by: INTERNAL MEDICINE

## 2019-10-02 PROCEDURE — 99999 PR PBB SHADOW E&M-EST. PATIENT-LVL III: CPT | Mod: PBBFAC,HCNC,, | Performed by: INTERNAL MEDICINE

## 2019-10-02 PROCEDURE — 83036 HEMOGLOBIN GLYCOSYLATED A1C: CPT | Mod: HCNC

## 2019-10-02 PROCEDURE — 1101F PT FALLS ASSESS-DOCD LE1/YR: CPT | Mod: HCNC,CPTII,S$GLB, | Performed by: INTERNAL MEDICINE

## 2019-10-02 PROCEDURE — 99214 OFFICE O/P EST MOD 30 MIN: CPT | Mod: HCNC,S$GLB,, | Performed by: INTERNAL MEDICINE

## 2019-10-02 PROCEDURE — 99214 PR OFFICE/OUTPT VISIT, EST, LEVL IV, 30-39 MIN: ICD-10-PCS | Mod: HCNC,S$GLB,, | Performed by: INTERNAL MEDICINE

## 2019-10-02 PROCEDURE — 99499 RISK ADDL DX/OHS AUDIT: ICD-10-PCS | Mod: S$GLB,,, | Performed by: INTERNAL MEDICINE

## 2019-10-02 PROCEDURE — 3078F DIAST BP <80 MM HG: CPT | Mod: HCNC,CPTII,S$GLB, | Performed by: INTERNAL MEDICINE

## 2019-10-02 PROCEDURE — 36415 COLL VENOUS BLD VENIPUNCTURE: CPT | Mod: HCNC

## 2019-10-02 PROCEDURE — 3078F PR MOST RECENT DIASTOLIC BLOOD PRESSURE < 80 MM HG: ICD-10-PCS | Mod: HCNC,CPTII,S$GLB, | Performed by: INTERNAL MEDICINE

## 2019-10-02 PROCEDURE — 99499 UNLISTED E&M SERVICE: CPT | Mod: S$GLB,,, | Performed by: INTERNAL MEDICINE

## 2019-10-02 PROCEDURE — 1101F PR PT FALLS ASSESS DOC 0-1 FALLS W/OUT INJ PAST YR: ICD-10-PCS | Mod: HCNC,CPTII,S$GLB, | Performed by: INTERNAL MEDICINE

## 2019-10-02 PROCEDURE — 99999 PR PBB SHADOW E&M-EST. PATIENT-LVL III: ICD-10-PCS | Mod: PBBFAC,HCNC,, | Performed by: INTERNAL MEDICINE

## 2019-10-02 PROCEDURE — 80053 COMPREHEN METABOLIC PANEL: CPT | Mod: HCNC

## 2019-10-02 PROCEDURE — 3074F PR MOST RECENT SYSTOLIC BLOOD PRESSURE < 130 MM HG: ICD-10-PCS | Mod: HCNC,CPTII,S$GLB, | Performed by: INTERNAL MEDICINE

## 2019-10-02 RX ORDER — PANTOPRAZOLE SODIUM 40 MG/1
40 TABLET, DELAYED RELEASE ORAL DAILY
Qty: 90 TABLET | Refills: 3 | Status: SHIPPED | OUTPATIENT
Start: 2019-10-02 | End: 2020-04-17 | Stop reason: SDUPTHER

## 2019-10-02 RX ORDER — METFORMIN HYDROCHLORIDE 1000 MG/1
1000 TABLET ORAL 2 TIMES DAILY WITH MEALS
Qty: 180 TABLET | Refills: 3 | Status: SHIPPED | OUTPATIENT
Start: 2019-10-02 | End: 2020-06-12 | Stop reason: SDUPTHER

## 2019-10-02 RX ORDER — LOSARTAN POTASSIUM AND HYDROCHLOROTHIAZIDE 25; 100 MG/1; MG/1
1 TABLET ORAL EVERY MORNING
Qty: 90 TABLET | Refills: 3 | Status: SHIPPED | OUTPATIENT
Start: 2019-10-02 | End: 2020-06-12 | Stop reason: SDUPTHER

## 2019-10-02 RX ORDER — METOPROLOL SUCCINATE 25 MG/1
25 TABLET, EXTENDED RELEASE ORAL DAILY
Qty: 90 TABLET | Refills: 3 | Status: SHIPPED | OUTPATIENT
Start: 2019-10-02 | End: 2020-06-12 | Stop reason: SDUPTHER

## 2019-10-03 ENCOUNTER — PATIENT OUTREACH (OUTPATIENT)
Dept: OTHER | Facility: OTHER | Age: 79
End: 2019-10-03

## 2019-10-03 DIAGNOSIS — E11.9 TYPE 2 DIABETES MELLITUS: ICD-10-CM

## 2019-10-03 LAB
ESTIMATED AVG GLUCOSE: 226 MG/DL (ref 68–131)
ESTIMATED AVG GLUCOSE: 226 MG/DL (ref 68–131)
HBA1C MFR BLD HPLC: 9.5 % (ref 4–5.6)
HBA1C MFR BLD HPLC: 9.5 % (ref 4–5.6)

## 2019-10-04 ENCOUNTER — TELEPHONE (OUTPATIENT)
Dept: INTERNAL MEDICINE | Facility: CLINIC | Age: 79
End: 2019-10-04

## 2019-10-04 NOTE — TELEPHONE ENCOUNTER
Please schedule an appointment for this patient.      Dear Mr. Gil,  I would like for you to make an appointment to discuss adding an additional medication to control your blood sugar.  Sincerely, Dr. Laverne Zaidi

## 2019-10-05 NOTE — PROGRESS NOTES
Subjective:       Patient ID: Bryant Gil is a 79 y.o. male.    Chief Complaint: Follow-up (6 month f/u )  HPI   His stomach is upset again.  This dictation was performed using using MModal.    He likes hot seizing.  Season a  He stop taking iron tablet.  He is back taking bear back and body about 3 times a week.  This is the only remedy that really helps relieve his pain.  Review of Systems   Constitutional: Negative for activity change and unexpected weight change.   HENT: Negative for hearing loss, rhinorrhea and trouble swallowing.    Eyes: Negative for discharge and visual disturbance.   Respiratory: Negative for chest tightness and wheezing.    Cardiovascular: Negative for chest pain and palpitations.   Gastrointestinal: Negative for blood in stool, constipation, diarrhea and vomiting.   Endocrine: Negative for polydipsia and polyuria.   Genitourinary: Negative for difficulty urinating, hematuria and urgency.   Musculoskeletal: Negative for arthralgias, joint swelling and neck pain.   Neurological: Negative for weakness and headaches.   Psychiatric/Behavioral: Negative for confusion and dysphoric mood.       Objective:      Physical Exam   Constitutional: He is oriented to person, place, and time. He appears well-developed and well-nourished. No distress.   HENT:   Head: Atraumatic.   Mouth/Throat: No oropharyngeal exudate.   Eyes: Conjunctivae are normal. No scleral icterus.   Cardiovascular: Normal rate, regular rhythm and normal heart sounds.   Pulmonary/Chest: Effort normal and breath sounds normal.   Abdominal: Soft. There is no tenderness.   Musculoskeletal: He exhibits no edema.   Lymphadenopathy:     He has no cervical adenopathy.   Neurological: He is alert and oriented to person, place, and time.   Skin: Skin is warm and dry.   Psychiatric: He has a normal mood and affect. His behavior is normal.       Assessment:       1. Type 2 diabetes mellitus with stage 2 chronic kidney disease, without  long-term current use of insulin    2. Dyspepsia    3. Essential hypertension    4. Mixed hyperlipidemia        Plan:   Bryant was seen today for follow-up.    Diagnoses and all orders for this visit:    Type 2 diabetes mellitus with stage 2 chronic kidney disease, without long-term current use of insulin  -     Hemoglobin A1c; Future  -     Comprehensive metabolic panel; Future  -     Hemoglobin A1c; Future    Dyspepsia, stop taking Shruthi Back and Body.  Go with Tylenol.    Essential hypertension  -     metoprolol succinate (TOPROL-XL) 25 MG 24 hr tablet; Take 1 tablet (25 mg total) by mouth once daily. Blood pressure    Mixed hyperlipidemia    Other orders  -     losartan-hydrochlorothiazide 100-25 mg (HYZAAR) 100-25 mg per tablet; Take 1 tablet by mouth every morning. Blood pressure  -     pantoprazole (PROTONIX) 40 MG tablet; Take 1 tablet (40 mg total) by mouth once daily. Stomach  -     metFORMIN (GLUCOPHAGE) 1000 MG tablet; Take 1 tablet (1,000 mg total) by mouth 2 (two) times daily with meals.  -     ranitidine (ZANTAC) 300 MG tablet; Take 1 tablet (300 mg total) by mouth every evening. Stomach    Follow up in about 27 weeks (around 4/8/2020) for after labs.

## 2019-10-07 ENCOUNTER — TELEPHONE (OUTPATIENT)
Dept: INTERNAL MEDICINE | Facility: CLINIC | Age: 79
End: 2019-10-07

## 2019-10-07 ENCOUNTER — PATIENT MESSAGE (OUTPATIENT)
Dept: INTERNAL MEDICINE | Facility: CLINIC | Age: 79
End: 2019-10-07

## 2019-10-17 ENCOUNTER — OFFICE VISIT (OUTPATIENT)
Dept: INTERNAL MEDICINE | Facility: CLINIC | Age: 79
End: 2019-10-17
Payer: MEDICARE

## 2019-10-17 VITALS
BODY MASS INDEX: 27.55 KG/M2 | HEIGHT: 67 IN | OXYGEN SATURATION: 97 % | HEART RATE: 73 BPM | DIASTOLIC BLOOD PRESSURE: 64 MMHG | WEIGHT: 175.5 LBS | SYSTOLIC BLOOD PRESSURE: 130 MMHG

## 2019-10-17 DIAGNOSIS — N18.2 TYPE 2 DIABETES MELLITUS WITH STAGE 2 CHRONIC KIDNEY DISEASE, WITHOUT LONG-TERM CURRENT USE OF INSULIN: Chronic | ICD-10-CM

## 2019-10-17 DIAGNOSIS — E11.42 DIABETIC POLYNEUROPATHY ASSOCIATED WITH TYPE 2 DIABETES MELLITUS: ICD-10-CM

## 2019-10-17 DIAGNOSIS — E11.22 TYPE 2 DIABETES MELLITUS WITH STAGE 2 CHRONIC KIDNEY DISEASE, WITHOUT LONG-TERM CURRENT USE OF INSULIN: Chronic | ICD-10-CM

## 2019-10-17 DIAGNOSIS — E11.42 TYPE 2 DIABETES MELLITUS WITH DIABETIC POLYNEUROPATHY, WITHOUT LONG-TERM CURRENT USE OF INSULIN: Primary | Chronic | ICD-10-CM

## 2019-10-17 PROCEDURE — 3075F SYST BP GE 130 - 139MM HG: CPT | Mod: HCNC,CPTII,S$GLB, | Performed by: INTERNAL MEDICINE

## 2019-10-17 PROCEDURE — 99214 OFFICE O/P EST MOD 30 MIN: CPT | Mod: HCNC,S$GLB,, | Performed by: INTERNAL MEDICINE

## 2019-10-17 PROCEDURE — 99999 PR PBB SHADOW E&M-EST. PATIENT-LVL III: ICD-10-PCS | Mod: PBBFAC,HCNC,, | Performed by: INTERNAL MEDICINE

## 2019-10-17 PROCEDURE — 1101F PR PT FALLS ASSESS DOC 0-1 FALLS W/OUT INJ PAST YR: ICD-10-PCS | Mod: HCNC,CPTII,S$GLB, | Performed by: INTERNAL MEDICINE

## 2019-10-17 PROCEDURE — 99214 PR OFFICE/OUTPT VISIT, EST, LEVL IV, 30-39 MIN: ICD-10-PCS | Mod: HCNC,S$GLB,, | Performed by: INTERNAL MEDICINE

## 2019-10-17 PROCEDURE — 3078F PR MOST RECENT DIASTOLIC BLOOD PRESSURE < 80 MM HG: ICD-10-PCS | Mod: HCNC,CPTII,S$GLB, | Performed by: INTERNAL MEDICINE

## 2019-10-17 PROCEDURE — 3075F PR MOST RECENT SYSTOLIC BLOOD PRESS GE 130-139MM HG: ICD-10-PCS | Mod: HCNC,CPTII,S$GLB, | Performed by: INTERNAL MEDICINE

## 2019-10-17 PROCEDURE — 3078F DIAST BP <80 MM HG: CPT | Mod: HCNC,CPTII,S$GLB, | Performed by: INTERNAL MEDICINE

## 2019-10-17 PROCEDURE — 99999 PR PBB SHADOW E&M-EST. PATIENT-LVL III: CPT | Mod: PBBFAC,HCNC,, | Performed by: INTERNAL MEDICINE

## 2019-10-17 PROCEDURE — 1101F PT FALLS ASSESS-DOCD LE1/YR: CPT | Mod: HCNC,CPTII,S$GLB, | Performed by: INTERNAL MEDICINE

## 2019-10-17 RX ORDER — GLIPIZIDE 5 MG/1
5 TABLET, FILM COATED, EXTENDED RELEASE ORAL
Qty: 90 TABLET | Refills: 3 | Status: SHIPPED | OUTPATIENT
Start: 2019-10-17 | End: 2020-06-12 | Stop reason: SDUPTHER

## 2019-10-17 NOTE — PATIENT INSTRUCTIONS
Glipizide tablets  What is this medicine?  GLIPIZIDE (GLIP i zide) helps to treat type 2 diabetes. Treatment is combined with diet and exercise. The medicine helps your body to use insulin better.  How should I use this medicine?  Take this medicine by mouth. Swallow with a drink of water. Do not take with food. Take it 30 minutes before a meal. Follow the directions on the prescription label. If you take this medicine once a day, take it 30 minutes before breakfast. Take your doses at the same time each day. Do not take more often than directed.  Talk to your pediatrician regarding the use of this medicine in children. Special care may be needed.  Elderly patients over 65 years old may have a stronger reaction and need a smaller dose.  What side effects may I notice from receiving this medicine?  Side effects that you should report to your doctor or health care professional as soon as possible:  · allergic reactions like skin rash, itching or hives, swelling of the face, lips, or tongue  · breathing problems  · dark urine  · fever, chills, sore throat  · signs and symptoms of low blood sugar such as feeling anxious, confusion, dizziness, increased hunger, unusually weak or tired, sweating, shakiness, cold, irritable, headache, blurred vision, fast heartbeat, loss of consciousness  · unusual bleeding or bruising  · yellowing of the eyes or skin  Side effects that usually do not require medical attention (report to your doctor or health care professional if they continue or are bothersome):  · diarrhea  · dizziness  · headache  · heartburn  · nausea  · stomach gas  What may interact with this medicine?  · bosentan  · chloramphenicol  · cisapride  · clarithromycin  · medicines for fungal or yeast infections  · metoclopramide  · probenecid  · warfarin  Many medications may cause an increase or decrease in blood sugar, these include:  · alcohol containing beverages  · aspirin and aspirin-like  drugs  · chloramphenicol  · chromium  · diuretics  · female hormones, like estrogens or progestins and birth control pills  · heart medicines  · isoniazid  · male hormones or anabolic steroids  · medicines for weight loss  · medicines for allergies, asthma, cold, or cough  · medicines for mental problems  · medicines called MAO Inhibitors like Nardil, Parnate, Marplan, Eldepryl  · niacin  · NSAIDs, medicines for pain and inflammation, like ibuprofen or naproxen  · pentamidine  · phenytoin  · probenecid  · quinolone antibiotics like ciprofloxacin, levofloxacin, ofloxacin  · some herbal dietary supplements  · steroid medicines like prednisone or cortisone  · thyroid medicine  What if I miss a dose?  If you miss a dose, take it as soon as you can. If it is almost time for your next dose, take only that dose. Do not take double or extra doses.  Where should I keep my medicine?  Keep out of the reach of children.  Store at room temperature below 30 degrees C (86 degrees F). Throw away any unused medicine after the expiration date.  What should I tell my health care provider before I take this medicine?  They need to know if you have any of these conditions:  · diabetic ketoacidosis  · glucose-6-phosphate dehydrogenase deficiency  · heart disease  · kidney disease  · liver disease  · porphyria  · severe infection or injury  · thyroid disease  · an unusual or allergic reaction to glipizide, sulfa drugs, other medicines, foods, dyes, or preservatives  · pregnant or trying to get pregnant  · breast-feeding  What should I watch for while using this medicine?  Visit your doctor or health care professional for regular checks on your progress.  A test called the HbA1C (A1C) will be monitored. This is a simple blood test. It measures your blood sugar control over the last 2 to 3 months. You will receive this test every 3 to 6 months.  Learn how to check your blood sugar. Learn the symptoms of low and high blood sugar and how to  manage them.  Always carry a quick-source of sugar with you in case you have symptoms of low blood sugar. Examples include hard sugar candy or glucose tablets. Make sure others know that you can choke if you eat or drink when you develop serious symptoms of low blood sugar, such as seizures or unconsciousness. They must get medical help at once.  Tell your doctor or health care professional if you have high blood sugar. You might need to change the dose of your medicine. If you are sick or exercising more than usual, you might need to change the dose of your medicine.  Do not skip meals. Ask your doctor or health care professional if you should avoid alcohol. Many nonprescription cough and cold products contain sugar or alcohol. These can affect blood sugar.  This medicine can make you more sensitive to the sun. Keep out of the sun. If you cannot avoid being in the sun, wear protective clothing and use sunscreen. Do not use sun lamps or tanning beds/booths.  Wear a medical ID bracelet or chain, and carry a card that describes your disease and details of your medicine and dosage times.  NOTE:This sheet is a summary. It may not cover all possible information. If you have questions about this medicine, talk to your doctor, pharmacist, or health care provider. Copyright© 2017 [employer]

## 2019-10-18 NOTE — PROGRESS NOTES
Subjective:       Patient ID: Bryant Gil is a 79 y.o. male.    Chief Complaint: Follow-up (medication review, per pcp)   His most recent A1c 2 weeks ago was 9.5  Seven months ago was 7.7  He is taking metformin a 1000 mg twice a day  His diet is not the best  Been sedentary but tries to get over to the gym or use the treadmill at his house  He stop taken Zantac  Does not have any family history of thyroid cancer  He is currently taking an aspirin statin and ARB as well  Diabetes Management Status    Statin: Taking  ACE/ARB: Taking    Screening or Prevention Patient's value Goal Complete/Controlled?   HgA1C Testing and Control   Lab Results   Component Value Date    HGBA1C 9.5 (H) 10/02/2019    HGBA1C 9.5 (H) 10/02/2019      Annually/Less than 8% No   Lipid profile : 02/06/2019 Annually Yes   LDL control Lab Results   Component Value Date    LDLCALC 107.0 02/06/2019    Annually/Less than 100 mg/dl  No   Nephropathy screening Lab Results   Component Value Date    LABMICR 17.0 02/06/2018     Lab Results   Component Value Date    PROTEINUA Negative 02/06/2018    Annually No   Blood pressure BP Readings from Last 1 Encounters:   10/17/19 130/64    Less than 140/90 Yes   Dilated retinal exam : 10/23/2018 Annually Yes   Foot exam   : 02/06/2019 Annually Yes       HPI  Review of Systems   Constitutional: Negative for activity change and unexpected weight change.   HENT: Negative for hearing loss, rhinorrhea and trouble swallowing.    Eyes: Negative for discharge and visual disturbance.   Respiratory: Negative for chest tightness and wheezing.    Cardiovascular: Negative for chest pain and palpitations.   Gastrointestinal: Negative for blood in stool, constipation, diarrhea and vomiting.   Endocrine: Negative for polydipsia and polyuria.   Genitourinary: Negative for difficulty urinating, hematuria and urgency.   Musculoskeletal: Negative for arthralgias, joint swelling and neck pain.   Neurological: Negative for weakness  and headaches.   Psychiatric/Behavioral: Negative for confusion and dysphoric mood.       Objective:      Physical Exam   Constitutional: He is oriented to person, place, and time. He appears well-developed and well-nourished. No distress.   HENT:   Head: Atraumatic.   Mouth/Throat: No oropharyngeal exudate.   Eyes: Conjunctivae are normal. No scleral icterus.   Cardiovascular: Normal rate, regular rhythm and normal heart sounds.   Pulmonary/Chest: Effort normal and breath sounds normal.   Abdominal: Soft. There is no tenderness.   Musculoskeletal: He exhibits no edema.   Lymphadenopathy:     He has no cervical adenopathy.   Neurological: He is alert and oriented to person, place, and time.   Skin: Skin is warm and dry.   Psychiatric: He has a normal mood and affect. His behavior is normal.       Assessment:       1. Type 2 diabetes mellitus with diabetic polyneuropathy, without long-term current use of insulin    2. Type 2 diabetes mellitus with stage 2 chronic kidney disease, without long-term current use of insulin    3. Diabetic polyneuropathy associated with type 2 diabetes mellitus        Plan:   Bryant was seen today for follow-up.    Diagnoses and all orders for this visit:    Type 2 diabetes mellitus with diabetic polyneuropathy, without long-term current use of insulin  -     Hemoglobin A1c; Future    Type 2 diabetes mellitus with stage 2 chronic kidney disease, without long-term current use of insulin    Diabetic polyneuropathy associated with type 2 diabetes mellitus    Other orders  -     glipiZIDE (GLUCOTROL) 5 MG TR24; Take 1 tablet (5 mg total) by mouth daily with breakfast.     Due the to the cost of medication, he would prefer to try a sulfonylurea  Follow up in about 4 months (around 2/17/2020).

## 2019-11-12 NOTE — PROGRESS NOTES
Digital Medicine: Health  Follow-Up        Follow Up  Follow-up reason(s): routine education      Routine Education Topics: physical activity  Patient is very pleased with his BP readings and he stated that he feels great.       Intervention/Plan        Topic    Urine Protein Check     Eye Exam        Last 5 Patient Entered Readings                                      Current 30 Day Average: 126/71     Recent Readings 11/11/2019 11/10/2019 11/9/2019 11/6/2019 11/5/2019    SBP (mmHg) 124 132 125 125 129    DBP (mmHg) 70 75 69 69 73    Pulse 73 85 79 71 76                      Diet Screening   No change to diet.      Physical Activity Screening   When asked if exercising, patient responded: yes2 day(s) a week.  His level of intensity when exercising is moderate.    Patient participates in the following activities: biking and walking    Medication Adherence Screening   He misses doses: never      Patient identified the following reasons for non-compliance: none    Patient is doing well on his current BP medication regimen. He denies symptoms/side effects.       SDOH

## 2019-11-22 ENCOUNTER — PATIENT MESSAGE (OUTPATIENT)
Dept: ADMINISTRATIVE | Facility: OTHER | Age: 79
End: 2019-11-22

## 2020-01-06 ENCOUNTER — PATIENT OUTREACH (OUTPATIENT)
Dept: OTHER | Facility: OTHER | Age: 80
End: 2020-01-06

## 2020-01-06 NOTE — PROGRESS NOTES
Digital Medicine: Health  Follow-Up    The history is provided by the patient.     Follow Up  Follow-up reason(s): routine education    Brief follow up completed with the patient. He stated that he is out with his wife but that he is doing well and denies any questions or concerns at this time. He is still taking all of his BP medication everyday.       Intervention/Plan        Topic    Urine Protein Check     Eye Exam        Last 5 Patient Entered Readings                                      Current 30 Day Average: 125/72     Recent Readings 1/6/2020 12/30/2019 12/30/2019 12/28/2019 12/26/2019    SBP (mmHg) 127 119 140 117 127    DBP (mmHg) 75 69 73 68 76    Pulse 71 78 79 69 88                      Medication Adherence Screening   He misses doses: never      Patient identified the following reasons for non-compliance: none      SDOH

## 2020-01-08 ENCOUNTER — LAB VISIT (OUTPATIENT)
Dept: LAB | Facility: HOSPITAL | Age: 80
End: 2020-01-08
Attending: INTERNAL MEDICINE
Payer: MEDICARE

## 2020-01-08 ENCOUNTER — OFFICE VISIT (OUTPATIENT)
Dept: INTERNAL MEDICINE | Facility: CLINIC | Age: 80
End: 2020-01-08
Payer: MEDICARE

## 2020-01-08 VITALS
HEIGHT: 67 IN | BODY MASS INDEX: 28.41 KG/M2 | HEART RATE: 75 BPM | WEIGHT: 181 LBS | DIASTOLIC BLOOD PRESSURE: 60 MMHG | SYSTOLIC BLOOD PRESSURE: 114 MMHG | OXYGEN SATURATION: 95 %

## 2020-01-08 DIAGNOSIS — N18.2 TYPE 2 DIABETES MELLITUS WITH STAGE 2 CHRONIC KIDNEY DISEASE, WITHOUT LONG-TERM CURRENT USE OF INSULIN: Chronic | ICD-10-CM

## 2020-01-08 DIAGNOSIS — R10.13 DYSPEPSIA: ICD-10-CM

## 2020-01-08 DIAGNOSIS — K21.9 GASTROESOPHAGEAL REFLUX DISEASE WITHOUT ESOPHAGITIS: ICD-10-CM

## 2020-01-08 DIAGNOSIS — E11.22 TYPE 2 DIABETES MELLITUS WITH STAGE 2 CHRONIC KIDNEY DISEASE, WITHOUT LONG-TERM CURRENT USE OF INSULIN: Chronic | ICD-10-CM

## 2020-01-08 DIAGNOSIS — N18.2 TYPE 2 DIABETES MELLITUS WITH STAGE 2 CHRONIC KIDNEY DISEASE, WITHOUT LONG-TERM CURRENT USE OF INSULIN: Primary | Chronic | ICD-10-CM

## 2020-01-08 DIAGNOSIS — E11.22 TYPE 2 DIABETES MELLITUS WITH STAGE 2 CHRONIC KIDNEY DISEASE, WITHOUT LONG-TERM CURRENT USE OF INSULIN: Primary | Chronic | ICD-10-CM

## 2020-01-08 DIAGNOSIS — I10 ESSENTIAL HYPERTENSION: Chronic | ICD-10-CM

## 2020-01-08 LAB
ALBUMIN SERPL BCP-MCNC: 4 G/DL (ref 3.5–5.2)
ALP SERPL-CCNC: 56 U/L (ref 55–135)
ALT SERPL W/O P-5'-P-CCNC: 17 U/L (ref 10–44)
ANION GAP SERPL CALC-SCNC: 10 MMOL/L (ref 8–16)
AST SERPL-CCNC: 15 U/L (ref 10–40)
BASOPHILS # BLD AUTO: 0.04 K/UL (ref 0–0.2)
BASOPHILS NFR BLD: 0.9 % (ref 0–1.9)
BILIRUB SERPL-MCNC: 0.3 MG/DL (ref 0.1–1)
BUN SERPL-MCNC: 18 MG/DL (ref 8–23)
CALCIUM SERPL-MCNC: 8.3 MG/DL (ref 8.7–10.5)
CHLORIDE SERPL-SCNC: 104 MMOL/L (ref 95–110)
CO2 SERPL-SCNC: 27 MMOL/L (ref 23–29)
CREAT SERPL-MCNC: 1.2 MG/DL (ref 0.5–1.4)
DIFFERENTIAL METHOD: ABNORMAL
EOSINOPHIL # BLD AUTO: 0.1 K/UL (ref 0–0.5)
EOSINOPHIL NFR BLD: 1.6 % (ref 0–8)
ERYTHROCYTE [DISTWIDTH] IN BLOOD BY AUTOMATED COUNT: 13.7 % (ref 11.5–14.5)
EST. GFR  (AFRICAN AMERICAN): >60 ML/MIN/1.73 M^2
EST. GFR  (NON AFRICAN AMERICAN): 58 ML/MIN/1.73 M^2
ESTIMATED AVG GLUCOSE: 177 MG/DL (ref 68–131)
GLUCOSE SERPL-MCNC: 173 MG/DL (ref 70–110)
HBA1C MFR BLD HPLC: 7.8 % (ref 4–5.6)
HCT VFR BLD AUTO: 39.1 % (ref 40–54)
HGB BLD-MCNC: 13.3 G/DL (ref 14–18)
LIPASE SERPL-CCNC: 63 U/L (ref 4–60)
LYMPHOCYTES # BLD AUTO: 1.6 K/UL (ref 1–4.8)
LYMPHOCYTES NFR BLD: 36.1 % (ref 18–48)
MCH RBC QN AUTO: 28.4 PG (ref 27–31)
MCHC RBC AUTO-ENTMCNC: 34 G/DL (ref 32–36)
MCV RBC AUTO: 84 FL (ref 82–98)
MONOCYTES # BLD AUTO: 0.5 K/UL (ref 0.3–1)
MONOCYTES NFR BLD: 11.9 % (ref 4–15)
NEUTROPHILS # BLD AUTO: 2.2 K/UL (ref 1.8–7.7)
NEUTROPHILS NFR BLD: 49.5 % (ref 38–73)
PLATELET # BLD AUTO: 257 K/UL (ref 150–350)
PMV BLD AUTO: 11.7 FL (ref 9.2–12.9)
POTASSIUM SERPL-SCNC: 3.5 MMOL/L (ref 3.5–5.1)
PROT SERPL-MCNC: 7.1 G/DL (ref 6–8.4)
RBC # BLD AUTO: 4.68 M/UL (ref 4.6–6.2)
SODIUM SERPL-SCNC: 141 MMOL/L (ref 136–145)
WBC # BLD AUTO: 4.38 K/UL (ref 3.9–12.7)

## 2020-01-08 PROCEDURE — 80053 COMPREHEN METABOLIC PANEL: CPT | Mod: HCNC

## 2020-01-08 PROCEDURE — 3078F DIAST BP <80 MM HG: CPT | Mod: HCNC,CPTII,S$GLB, | Performed by: INTERNAL MEDICINE

## 2020-01-08 PROCEDURE — 3074F SYST BP LT 130 MM HG: CPT | Mod: HCNC,CPTII,S$GLB, | Performed by: INTERNAL MEDICINE

## 2020-01-08 PROCEDURE — 3074F PR MOST RECENT SYSTOLIC BLOOD PRESSURE < 130 MM HG: ICD-10-PCS | Mod: HCNC,CPTII,S$GLB, | Performed by: INTERNAL MEDICINE

## 2020-01-08 PROCEDURE — 99214 PR OFFICE/OUTPT VISIT, EST, LEVL IV, 30-39 MIN: ICD-10-PCS | Mod: HCNC,S$GLB,, | Performed by: INTERNAL MEDICINE

## 2020-01-08 PROCEDURE — 83690 ASSAY OF LIPASE: CPT | Mod: HCNC

## 2020-01-08 PROCEDURE — 99999 PR PBB SHADOW E&M-EST. PATIENT-LVL III: CPT | Mod: PBBFAC,HCNC,, | Performed by: INTERNAL MEDICINE

## 2020-01-08 PROCEDURE — 99499 RISK ADDL DX/OHS AUDIT: ICD-10-PCS | Mod: HCNC,S$GLB,, | Performed by: INTERNAL MEDICINE

## 2020-01-08 PROCEDURE — 1101F PR PT FALLS ASSESS DOC 0-1 FALLS W/OUT INJ PAST YR: ICD-10-PCS | Mod: HCNC,CPTII,S$GLB, | Performed by: INTERNAL MEDICINE

## 2020-01-08 PROCEDURE — 99214 OFFICE O/P EST MOD 30 MIN: CPT | Mod: HCNC,S$GLB,, | Performed by: INTERNAL MEDICINE

## 2020-01-08 PROCEDURE — 1126F PR PAIN SEVERITY QUANTIFIED, NO PAIN PRESENT: ICD-10-PCS | Mod: HCNC,S$GLB,, | Performed by: INTERNAL MEDICINE

## 2020-01-08 PROCEDURE — 1126F AMNT PAIN NOTED NONE PRSNT: CPT | Mod: HCNC,S$GLB,, | Performed by: INTERNAL MEDICINE

## 2020-01-08 PROCEDURE — 85025 COMPLETE CBC W/AUTO DIFF WBC: CPT | Mod: HCNC

## 2020-01-08 PROCEDURE — 1159F MED LIST DOCD IN RCRD: CPT | Mod: HCNC,S$GLB,, | Performed by: INTERNAL MEDICINE

## 2020-01-08 PROCEDURE — 1159F PR MEDICATION LIST DOCUMENTED IN MEDICAL RECORD: ICD-10-PCS | Mod: HCNC,S$GLB,, | Performed by: INTERNAL MEDICINE

## 2020-01-08 PROCEDURE — 3078F PR MOST RECENT DIASTOLIC BLOOD PRESSURE < 80 MM HG: ICD-10-PCS | Mod: HCNC,CPTII,S$GLB, | Performed by: INTERNAL MEDICINE

## 2020-01-08 PROCEDURE — 99999 PR PBB SHADOW E&M-EST. PATIENT-LVL III: ICD-10-PCS | Mod: PBBFAC,HCNC,, | Performed by: INTERNAL MEDICINE

## 2020-01-08 PROCEDURE — 83036 HEMOGLOBIN GLYCOSYLATED A1C: CPT | Mod: HCNC

## 2020-01-08 PROCEDURE — 1101F PT FALLS ASSESS-DOCD LE1/YR: CPT | Mod: HCNC,CPTII,S$GLB, | Performed by: INTERNAL MEDICINE

## 2020-01-08 PROCEDURE — 99499 UNLISTED E&M SERVICE: CPT | Mod: HCNC,S$GLB,, | Performed by: INTERNAL MEDICINE

## 2020-01-08 PROCEDURE — 36415 COLL VENOUS BLD VENIPUNCTURE: CPT | Mod: HCNC

## 2020-01-08 NOTE — PATIENT INSTRUCTIONS
Stop taking ranitidine (Zantac) 300 mg in the evening for your stomach.  Continue taking Pantoprazole 40 mg first thing every morning on an empty stomach, then eat 45 minutes to activate this medication which reduces stomach acid production and prevents acid reflux 24 hours. You may need this medication long term. You cannot take ibuprofen such as Advil, Motrim or Aleve or any naprosyn. No Asprin no Shruthi Back and Body.  The only over the counter pain remedy that is safe for your stomach is Tylenol.

## 2020-01-11 NOTE — PROGRESS NOTES
Subjective:       Patient ID: Bryant Gil is a 79 y.o. male.    Chief Complaint: Follow-up and upset stomach (stomach issues, comes and goes no pain )  This dictation was performed using using MModal.  He continues to have an uneasy feeling intermittently in left upper quadrant of the abdomen.  He has had no weight loss.  His stools are normal.  He has not changed his diet.  He has been taking ibuprofen on and off.  HPI  Review of Systems   Constitutional: Negative for activity change and unexpected weight change.   HENT: Negative for hearing loss, rhinorrhea and trouble swallowing.    Eyes: Negative for discharge and visual disturbance.   Respiratory: Negative for chest tightness and wheezing.    Cardiovascular: Negative for chest pain and palpitations.   Gastrointestinal: Negative for blood in stool, constipation, diarrhea and vomiting.   Endocrine: Negative for polydipsia and polyuria.   Genitourinary: Negative for difficulty urinating, hematuria and urgency.   Musculoskeletal: Negative for arthralgias, joint swelling and neck pain.   Neurological: Negative for weakness and headaches.   Psychiatric/Behavioral: Negative for confusion and dysphoric mood.       Objective:      Physical Exam   Constitutional: He is oriented to person, place, and time. He appears well-developed and well-nourished. No distress.   HENT:   Head: Atraumatic.   Mouth/Throat: No oropharyngeal exudate.   Eyes: Conjunctivae are normal. No scleral icterus.   Cardiovascular: Normal rate, regular rhythm and normal heart sounds.   Pulmonary/Chest: Effort normal and breath sounds normal.   Abdominal: Soft. There is no tenderness.   Musculoskeletal: He exhibits no edema.   Lymphadenopathy:     He has no cervical adenopathy.   Neurological: He is alert and oriented to person, place, and time.   Skin: Skin is warm and dry.   Psychiatric: He has a normal mood and affect. His behavior is normal.   Nursing note and vitals reviewed.      Assessment:        1. Type 2 diabetes mellitus with stage 2 chronic kidney disease, without long-term current use of insulin    2. Essential hypertension    3. Gastroesophageal reflux disease without esophagitis    4. Dyspepsia        Plan:   Bryant was seen today for follow-up and upset stomach.    Diagnoses and all orders for this visit:    Type 2 diabetes mellitus with stage 2 chronic kidney disease, without long-term current use of insulin  -     CBC auto differential; Future  -     Comprehensive metabolic panel; Future  -     Hemoglobin A1c; Future    Essential hypertension    Gastroesophageal reflux disease without esophagitis  -     CBC auto differential; Future  -     Lipase; Future    Dyspepsia  -     CBC auto differential; Future  -     Lipase; Future      Stop taking ranitidine (Zantac) 300 mg in the evening for your stomach.  Continue taking Pantoprazole 40 mg first thing every morning on an empty stomach, then eat 45 minutes to activate this medication which reduces stomach acid production and prevents acid reflux 24 hours. You may need this medication long term. You cannot take ibuprofen such as Advil, Motrim or Aleve or any naprosyn. No Asprin no Shruthi Back and Body.  The only over the counter pain remedy that is safe for your stomach is Tylenol.

## 2020-01-29 ENCOUNTER — PATIENT MESSAGE (OUTPATIENT)
Dept: INTERNAL MEDICINE | Facility: CLINIC | Age: 80
End: 2020-01-29

## 2020-01-29 DIAGNOSIS — R10.13 DYSPEPSIA: Primary | ICD-10-CM

## 2020-01-29 DIAGNOSIS — E11.42 DIABETIC POLYNEUROPATHY ASSOCIATED WITH TYPE 2 DIABETES MELLITUS: ICD-10-CM

## 2020-01-29 DIAGNOSIS — K29.50 CHRONIC GASTRITIS WITHOUT BLEEDING, UNSPECIFIED GASTRITIS TYPE: ICD-10-CM

## 2020-01-30 PROBLEM — K29.50 CHRONIC GASTRITIS WITHOUT BLEEDING: Status: ACTIVE | Noted: 2020-01-30

## 2020-02-13 ENCOUNTER — PATIENT OUTREACH (OUTPATIENT)
Dept: OTHER | Facility: OTHER | Age: 80
End: 2020-02-13

## 2020-02-13 NOTE — PROGRESS NOTES
Digital Medicine: Health  Follow-Up    The history is provided by the patient.     Follow Up  Follow-up reason(s): routine education      Routine Education Topics: eating patterns and physical activity  Patient stated that he is doing well and he is pleased with his BP readings at this time.       Intervention/Plan        Topic    Urine Protein Check     Eye Exam     Lipid (Cholesterol) Test        Last 5 Patient Entered Readings                                      Current 30 Day Average: 129/72     Recent Readings 2/12/2020 2/10/2020 2/9/2020 2/8/2020 2/6/2020    SBP (mmHg) 127 129 135 128 127    DBP (mmHg) 71 71 69 71 69    Pulse 68 74 70 75 77                      Diet Screening   No change to diet.  He has the following dietary restrictions: low sodium dietHe cooks for self and has meals prepared by family.    Patient does the shopping for groceries and lets family grocery shop.  He gets groceries from the grocery store.      Patient stated that he and his wife were eating out more the past few weeks. Recently, they have started cooking meals at home (his wife does the cooking) and eating at home rather then going out so much. He stated that they continue to focus on the sodium content in foods and his wife is very aware of the low sodium diet the patient has to follow.     Physical Activity Screening   When asked if exercising, patient responded: yes2 day(s) a week.  His level of intensity when exercising is moderate.    Patient participates in the following activities: biking and walking    Patient continues exercising at least twice a week and he rides his stationary bike and sometimes, he walks.     Medication Adherence Screening   He did not miss a dose this month.    Patient identified the following reasons for non-compliance: None      SDOH

## 2020-02-28 ENCOUNTER — PATIENT OUTREACH (OUTPATIENT)
Dept: ADMINISTRATIVE | Facility: OTHER | Age: 80
End: 2020-02-28

## 2020-02-28 DIAGNOSIS — E11.42 TYPE 2 DIABETES MELLITUS WITH DIABETIC POLYNEUROPATHY, WITHOUT LONG-TERM CURRENT USE OF INSULIN: Primary | Chronic | ICD-10-CM

## 2020-02-28 NOTE — PROGRESS NOTES
Chart reviewed.   Requested updates from Care Everywhere.  Immunizations reconciled.   Order placed for diabetic eye screening photo.   updated.

## 2020-03-02 ENCOUNTER — PATIENT OUTREACH (OUTPATIENT)
Dept: OTHER | Facility: OTHER | Age: 80
End: 2020-03-02

## 2020-03-02 ENCOUNTER — OFFICE VISIT (OUTPATIENT)
Dept: GASTROENTEROLOGY | Facility: CLINIC | Age: 80
End: 2020-03-02
Payer: MEDICARE

## 2020-03-02 VITALS
SYSTOLIC BLOOD PRESSURE: 136 MMHG | BODY MASS INDEX: 27.55 KG/M2 | DIASTOLIC BLOOD PRESSURE: 77 MMHG | WEIGHT: 175.94 LBS

## 2020-03-02 DIAGNOSIS — K31.A0 INTESTINAL METAPLASIA OF GASTRIC CARDIA: ICD-10-CM

## 2020-03-02 DIAGNOSIS — R10.13 DYSPEPSIA: Primary | ICD-10-CM

## 2020-03-02 PROCEDURE — 3075F PR MOST RECENT SYSTOLIC BLOOD PRESS GE 130-139MM HG: ICD-10-PCS | Mod: HCNC,CPTII,S$GLB, | Performed by: INTERNAL MEDICINE

## 2020-03-02 PROCEDURE — 1126F PR PAIN SEVERITY QUANTIFIED, NO PAIN PRESENT: ICD-10-PCS | Mod: HCNC,S$GLB,, | Performed by: INTERNAL MEDICINE

## 2020-03-02 PROCEDURE — 99214 OFFICE O/P EST MOD 30 MIN: CPT | Mod: HCNC,S$GLB,, | Performed by: INTERNAL MEDICINE

## 2020-03-02 PROCEDURE — 3078F PR MOST RECENT DIASTOLIC BLOOD PRESSURE < 80 MM HG: ICD-10-PCS | Mod: HCNC,CPTII,S$GLB, | Performed by: INTERNAL MEDICINE

## 2020-03-02 PROCEDURE — 3078F DIAST BP <80 MM HG: CPT | Mod: HCNC,CPTII,S$GLB, | Performed by: INTERNAL MEDICINE

## 2020-03-02 PROCEDURE — 1101F PT FALLS ASSESS-DOCD LE1/YR: CPT | Mod: HCNC,CPTII,S$GLB, | Performed by: INTERNAL MEDICINE

## 2020-03-02 PROCEDURE — 99214 PR OFFICE/OUTPT VISIT, EST, LEVL IV, 30-39 MIN: ICD-10-PCS | Mod: HCNC,S$GLB,, | Performed by: INTERNAL MEDICINE

## 2020-03-02 PROCEDURE — 99999 PR PBB SHADOW E&M-EST. PATIENT-LVL III: ICD-10-PCS | Mod: PBBFAC,HCNC,, | Performed by: INTERNAL MEDICINE

## 2020-03-02 PROCEDURE — 3075F SYST BP GE 130 - 139MM HG: CPT | Mod: HCNC,CPTII,S$GLB, | Performed by: INTERNAL MEDICINE

## 2020-03-02 PROCEDURE — 99999 PR PBB SHADOW E&M-EST. PATIENT-LVL III: CPT | Mod: PBBFAC,HCNC,, | Performed by: INTERNAL MEDICINE

## 2020-03-02 PROCEDURE — 1126F AMNT PAIN NOTED NONE PRSNT: CPT | Mod: HCNC,S$GLB,, | Performed by: INTERNAL MEDICINE

## 2020-03-02 PROCEDURE — 1159F PR MEDICATION LIST DOCUMENTED IN MEDICAL RECORD: ICD-10-PCS | Mod: HCNC,S$GLB,, | Performed by: INTERNAL MEDICINE

## 2020-03-02 PROCEDURE — 1101F PR PT FALLS ASSESS DOC 0-1 FALLS W/OUT INJ PAST YR: ICD-10-PCS | Mod: HCNC,CPTII,S$GLB, | Performed by: INTERNAL MEDICINE

## 2020-03-02 PROCEDURE — 1159F MED LIST DOCD IN RCRD: CPT | Mod: HCNC,S$GLB,, | Performed by: INTERNAL MEDICINE

## 2020-03-02 NOTE — PROGRESS NOTES
Digital Medicine: Clinician Follow-Up    I spoke with the patient today to f/u on his BP readings and meds. He says he is doing well. No questions or concerns. BP at goal.    No  was used.     Follow Up  Follow-up reason(s): reading review        BMP  Lab Results   Component Value Date     01/08/2020    K 3.5 01/08/2020     01/08/2020    CO2 27 01/08/2020    BUN 18 01/08/2020    CREATININE 1.2 01/08/2020    CALCIUM 8.3 (L) 01/08/2020    ANIONGAP 10 01/08/2020    ESTGFRAFRICA >60 01/08/2020    EGFRNONAA 58 (A) 01/08/2020         INTERVENTION(S)  reviewed appropriate dose schedule and encouragement/support    PLAN  patient verbalizes understanding and continue monitoring    Avr Bp at goal 125/70    No med change. F/u in 3-6 months          Topic    Urine Protein Check     Eye Exam     Lipid (Cholesterol) Test        Last 5 Patient Entered Readings                                      Current 30 Day Average: 125/70     Recent Readings 3/2/2020 3/1/2020 2/29/2020 2/27/2020 2/26/2020    SBP (mmHg) 126 116 128 128 122    DBP (mmHg) 72 69 70 70 71    Pulse 72 77 73 72 77             Hypertension Medications             amLODIPine (NORVASC) 10 MG tablet TAKE 1 TABLET ONE TIME DAILY    losartan-hydrochlorothiazide 100-25 mg (HYZAAR) 100-25 mg per tablet Take 1 tablet by mouth every morning. Blood pressure    metoprolol succinate (TOPROL-XL) 25 MG 24 hr tablet Take 1 tablet (25 mg total) by mouth once daily. Blood pressure                 Screenings

## 2020-03-02 NOTE — PROGRESS NOTES
Subjective:       Patient ID: Bryant Gil is a 79 y.o. male.    Chief Complaint: Abdominal Cramping    This is a 79-year-old male here for a follow-up visit regarding upper GI symptoms.  He has noted persistent upper abdominal symptoms, occurring on a daily basis described as a bloating sensation and dyspepsia.  He has tried H2 blockers and PPI therapy with recurrent symptoms.  He denies change in appetite, dark urine, jaundice, weight loss, nausea or vomiting. Some associated nausea. No fevers, chills. No NSAID usage.  He does report a prior history of suspected neuropathy related to his diabetes which she has had for over 10 years.    The following portions of the patient's history were reviewed and updated as appropriate: allergies, current medications, past family history, past medical history, past social history, past surgical history and problem list.    (Portions of this note were dictated using voice recognition software and may contain dictation related errors in spelling/grammar/syntax not found on text review)  HPI  Review of Systems   Constitutional: Negative for appetite change and unexpected weight change.   Respiratory: Negative for apnea and chest tightness.    Cardiovascular: Negative for chest pain and palpitations.   Gastrointestinal: Positive for abdominal distention and abdominal pain. Negative for diarrhea and nausea.         Objective:      Physical Exam   Constitutional: He is oriented to person, place, and time. He appears well-developed and well-nourished. No distress.   HENT:   Head: Normocephalic and atraumatic.   Eyes: Conjunctivae are normal. No scleral icterus.   Neck: No tracheal deviation present. No thyromegaly present.   Cardiovascular: Normal rate. Exam reveals no gallop and no friction rub.   Pulmonary/Chest: Effort normal and breath sounds normal. He has no wheezes. He has no rales.   Abdominal: Soft. Bowel sounds are normal. He exhibits no distension. There is no tenderness.  There is no rebound and no guarding.   Musculoskeletal: Normal range of motion. He exhibits no edema or tenderness.   Neurological: He is alert and oriented to person, place, and time.   Skin: He is not diaphoretic.   Psychiatric: He has a normal mood and affect. His behavior is normal.   Nursing note and vitals reviewed.        Labs/imaging; reviewed  Assessment:       1. Dyspepsia    2. Intestinal metaplasia of gastric cardia        Plan:   1. Gastric emptying study  2. Continue current medications

## 2020-03-02 NOTE — LETTER
March 2, 2020      Laverne Zaidi MD  [address]  [facility] [address]           [place] [facility]  [address]  [facility] [address]  Phone: [phone]          Patient: Bryant Gil   MR Number: [id number]   YOB: 1940   Date of Visit: 3/2/2020       Dear Dr. Laverne Zaidi:    Thank you for referring Bryant Gil to me for evaluation. Attached you will find relevant portions of my assessment and plan of care.    If you have questions, please do not hesitate to call me. I look forward to following Bryant Gil along with you.    Sincerely,    Rachel Burrows MD    Enclosure  CC:  No Recipients    If you would like to receive this communication electronically, please contact [email or web address] or [phone] to request more information on [employer] access.    For providers and/or their staff who would like to refer a patient to [facility], please contact us through our one-stop-shop provider referral line, [facility] , at [phone].    If you feel you have received this communication in error or would no longer like to receive these types of communications, please e-mail [email or web address]

## 2020-03-16 ENCOUNTER — HOSPITAL ENCOUNTER (OUTPATIENT)
Dept: RADIOLOGY | Facility: HOSPITAL | Age: 80
Discharge: HOME | End: 2020-03-16
Attending: INTERNAL MEDICINE
Payer: MEDICARE

## 2020-03-16 DIAGNOSIS — R10.13 DYSPEPSIA: ICD-10-CM

## 2020-03-16 PROCEDURE — 78264 NM GASTRIC EMPTYING: ICD-10-PCS | Mod: 26,HCNC,, | Performed by: RADIOLOGY

## 2020-03-16 PROCEDURE — 78264 GASTRIC EMPTYING IMG STUDY: CPT | Mod: 26,HCNC,, | Performed by: RADIOLOGY

## 2020-03-16 PROCEDURE — 78264 GASTRIC EMPTYING IMG STUDY: CPT | Mod: TC,HCNC

## 2020-03-16 PROCEDURE — A9541 TC99M SULFUR COLLOID: HCPCS | Mod: HCNC

## 2020-03-17 ENCOUNTER — PATIENT OUTREACH (OUTPATIENT)
Dept: OTHER | Facility: OTHER | Age: 80
End: 2020-03-17

## 2020-03-17 NOTE — PROGRESS NOTES
Digital Medicine: Health  Follow-Up    The history is provided by the patient.     Follow Up  Follow-up reason(s): routine education      Routine Education Topics: eating patterns and physical activity  Pateint stated that he is doing well and is feeling good. He will not be working during the covid-19 situation.       Intervention/Plan        Topic    Urine Protein Check     Eye Exam     Lipid (Cholesterol) Test        Last 5 Patient Entered Readings                                      Current 30 Day Average: 124/69     Recent Readings 3/17/2020 3/13/2020 3/12/2020 3/11/2020 3/9/2020    SBP (mmHg) 128 127 126 129 123    DBP (mmHg) 70 68 68 71 69    Pulse 68 72 69 70 68                      Diet Screening   No change to diet.  He has the following dietary restrictions: low sodium dietHe cooks for self.    Patient does the shopping for groceries.  He gets groceries from the grocery store.      Physical Activity Screening   When asked if exercising, patient responded: yesHis level of intensity when exercising is moderate.    Patient participates in the following activities: walking and biking    Medication Adherence Screening   He did not miss a dose this month.    Patient identified the following reasons for non-compliance: None      SDOH

## 2020-03-18 ENCOUNTER — PATIENT MESSAGE (OUTPATIENT)
Dept: GASTROENTEROLOGY | Facility: CLINIC | Age: 80
End: 2020-03-18

## 2020-03-20 ENCOUNTER — PATIENT MESSAGE (OUTPATIENT)
Dept: ADMINISTRATIVE | Facility: OTHER | Age: 80
End: 2020-03-20

## 2020-04-06 ENCOUNTER — PATIENT MESSAGE (OUTPATIENT)
Dept: INTERNAL MEDICINE | Facility: CLINIC | Age: 80
End: 2020-04-06

## 2020-04-06 DIAGNOSIS — Z85.46 HISTORY OF PROSTATE CANCER: Chronic | ICD-10-CM

## 2020-04-06 DIAGNOSIS — N18.2 TYPE 2 DIABETES MELLITUS WITH STAGE 2 CHRONIC KIDNEY DISEASE, WITHOUT LONG-TERM CURRENT USE OF INSULIN: Chronic | ICD-10-CM

## 2020-04-06 DIAGNOSIS — E11.22 TYPE 2 DIABETES MELLITUS WITH STAGE 2 CHRONIC KIDNEY DISEASE, WITHOUT LONG-TERM CURRENT USE OF INSULIN: Chronic | ICD-10-CM

## 2020-04-06 DIAGNOSIS — Z20.822 SUSPECTED COVID-19 VIRUS INFECTION: ICD-10-CM

## 2020-04-06 DIAGNOSIS — H90.5 SENSORINEURAL HEARING LOSS (SNHL), UNSPECIFIED LATERALITY: ICD-10-CM

## 2020-04-06 DIAGNOSIS — I35.1 NONRHEUMATIC AORTIC VALVE INSUFFICIENCY: ICD-10-CM

## 2020-04-06 DIAGNOSIS — K29.50 CHRONIC GASTRITIS WITHOUT BLEEDING, UNSPECIFIED GASTRITIS TYPE: ICD-10-CM

## 2020-04-06 DIAGNOSIS — E11.42 TYPE 2 DIABETES MELLITUS WITH DIABETIC POLYNEUROPATHY, WITHOUT LONG-TERM CURRENT USE OF INSULIN: Primary | Chronic | ICD-10-CM

## 2020-04-07 ENCOUNTER — TELEPHONE (OUTPATIENT)
Dept: INTERNAL MEDICINE | Facility: CLINIC | Age: 80
End: 2020-04-07

## 2020-04-08 ENCOUNTER — CLINICAL SUPPORT (OUTPATIENT)
Dept: INTERNAL MEDICINE | Facility: CLINIC | Age: 80
End: 2020-04-08
Payer: MEDICARE

## 2020-04-08 ENCOUNTER — VITALS (OUTPATIENT)
Dept: INTERNAL MEDICINE | Facility: CLINIC | Age: 80
End: 2020-04-08
Payer: MEDICARE

## 2020-04-08 VITALS — RESPIRATION RATE: 20 BRPM | HEART RATE: 88 BPM | OXYGEN SATURATION: 94 %

## 2020-04-08 DIAGNOSIS — E11.22 TYPE 2 DIABETES MELLITUS WITH STAGE 2 CHRONIC KIDNEY DISEASE, WITHOUT LONG-TERM CURRENT USE OF INSULIN: Chronic | ICD-10-CM

## 2020-04-08 DIAGNOSIS — Z20.822 SUSPECTED COVID-19 VIRUS INFECTION: ICD-10-CM

## 2020-04-08 DIAGNOSIS — E11.42 TYPE 2 DIABETES MELLITUS WITH DIABETIC POLYNEUROPATHY, WITHOUT LONG-TERM CURRENT USE OF INSULIN: Chronic | ICD-10-CM

## 2020-04-08 DIAGNOSIS — N18.2 TYPE 2 DIABETES MELLITUS WITH STAGE 2 CHRONIC KIDNEY DISEASE, WITHOUT LONG-TERM CURRENT USE OF INSULIN: Chronic | ICD-10-CM

## 2020-04-08 DIAGNOSIS — I35.1 NONRHEUMATIC AORTIC VALVE INSUFFICIENCY: ICD-10-CM

## 2020-04-08 DIAGNOSIS — Z85.46 HISTORY OF PROSTATE CANCER: Chronic | ICD-10-CM

## 2020-04-08 LAB — SARS-COV-2 RNA RESP QL NAA+PROBE: NOT DETECTED

## 2020-04-08 PROCEDURE — 99999 PR PBB SHADOW E&M-EST. PATIENT-LVL II: ICD-10-PCS | Mod: PBBFAC,HCNC,,

## 2020-04-08 PROCEDURE — 99999 PR PBB SHADOW E&M-EST. PATIENT-LVL II: CPT | Mod: PBBFAC,HCNC,,

## 2020-04-08 PROCEDURE — U0002 COVID-19 LAB TEST NON-CDC: HCPCS | Mod: HCNC

## 2020-04-08 NOTE — PROGRESS NOTES
Patient drive through note:  Date: 4/8/2020  Referring Provider:Dr. Zaidi  COVID Status: positive    Narrative of symptoms: Patient states that the symptoms started 2 weeks ago.  He states overall he feels better but has continued cough and fatigue.  Denies any shortness of breath    Vitals:  Pulse:81  Pulse ox room air:93  Respirations: 16    Walk test done: yes O2:94%    Patient Assessment: stable, will forward to pcp

## 2020-04-08 NOTE — TELEPHONE ENCOUNTER
Called pt and set up Vitals and COVID19 testing at 8:40am this morning. He expressed thanks and understanding.    Ari Weaver

## 2020-04-09 ENCOUNTER — TELEPHONE (OUTPATIENT)
Dept: INTERNAL MEDICINE | Facility: CLINIC | Age: 80
End: 2020-04-09

## 2020-04-09 RX ORDER — AZITHROMYCIN 250 MG/1
TABLET, FILM COATED ORAL
Qty: 6 TABLET | Refills: 0 | Status: SHIPPED | OUTPATIENT
Start: 2020-04-09 | End: 2020-06-12 | Stop reason: ALTCHOICE

## 2020-04-09 RX ORDER — BENZONATATE 100 MG/1
100 CAPSULE ORAL 3 TIMES DAILY PRN
Qty: 30 CAPSULE | Refills: 0 | Status: SHIPPED | OUTPATIENT
Start: 2020-04-09 | End: 2020-04-19

## 2020-04-09 NOTE — TELEPHONE ENCOUNTER
Covid-19 negative  Still with cough   No fever  Try z-skip  [place] prn  Do drive thru   self quarantine  Call prn any worsening and call Monday if not better

## 2020-04-09 NOTE — TELEPHONE ENCOUNTER
----- Message from Josseline S Walt sent at 4/9/2020  3:18 PM CDT -----  Contact: Pt self Mobile [phone] or Home [phone]  Patient is calling in regards to him saying that he have a cough and congestion in his chest for two weeks now. Patient said that he was tested for the COVID-19 virus and it came back negative, he would like for you to write him a script for his symptoms and have it sent to...    [address] ([place], [place] [address] [address] [facility]  Phone # [phone] fax# [phone]

## 2020-04-12 ENCOUNTER — TELEPHONE (OUTPATIENT)
Dept: INTERNAL MEDICINE | Facility: CLINIC | Age: 80
End: 2020-04-12

## 2020-04-14 ENCOUNTER — OFFICE VISIT (OUTPATIENT)
Dept: INTERNAL MEDICINE | Facility: CLINIC | Age: 80
End: 2020-04-14
Payer: MEDICARE

## 2020-04-14 ENCOUNTER — TELEPHONE (OUTPATIENT)
Dept: INTERNAL MEDICINE | Facility: CLINIC | Age: 80
End: 2020-04-14

## 2020-04-14 DIAGNOSIS — I70.0 AORTIC ATHEROSCLEROSIS: ICD-10-CM

## 2020-04-14 DIAGNOSIS — K62.5 BRIGHT RED BLOOD PER RECTUM: ICD-10-CM

## 2020-04-14 DIAGNOSIS — E11.22 TYPE 2 DIABETES MELLITUS WITH STAGE 2 CHRONIC KIDNEY DISEASE, WITHOUT LONG-TERM CURRENT USE OF INSULIN: Chronic | ICD-10-CM

## 2020-04-14 DIAGNOSIS — J98.8 VIRAL RESPIRATORY INFECTION: Primary | ICD-10-CM

## 2020-04-14 DIAGNOSIS — B97.89 VIRAL RESPIRATORY INFECTION: Primary | ICD-10-CM

## 2020-04-14 DIAGNOSIS — Z85.46 HISTORY OF PROSTATE CANCER: Chronic | ICD-10-CM

## 2020-04-14 DIAGNOSIS — E11.42 DIABETIC POLYNEUROPATHY ASSOCIATED WITH TYPE 2 DIABETES MELLITUS: ICD-10-CM

## 2020-04-14 DIAGNOSIS — E11.42 TYPE 2 DIABETES MELLITUS WITH DIABETIC POLYNEUROPATHY, WITHOUT LONG-TERM CURRENT USE OF INSULIN: Chronic | ICD-10-CM

## 2020-04-14 DIAGNOSIS — N18.2 TYPE 2 DIABETES MELLITUS WITH STAGE 2 CHRONIC KIDNEY DISEASE, WITHOUT LONG-TERM CURRENT USE OF INSULIN: Chronic | ICD-10-CM

## 2020-04-14 PROCEDURE — 99443 PR PHYSICIAN TELEPHONE EVALUATION 21-30 MIN: ICD-10-PCS | Mod: HCNC,95,, | Performed by: INTERNAL MEDICINE

## 2020-04-14 PROCEDURE — 99443 PR PHYSICIAN TELEPHONE EVALUATION 21-30 MIN: CPT | Mod: HCNC,95,, | Performed by: INTERNAL MEDICINE

## 2020-04-14 NOTE — PROGRESS NOTES
Subjective:       Patient ID: Bryant Gil is a 79 y.o. male.    Chief Complaint: No chief complaint on file.  The patient location is:  home with his wife  The chief complaint leading to consultation is:  Follow-up  Visit type: audio only  Total time spent with patient: 30 min  Each patient to whom he or she provides medical services by telemedicine is:  (1) informed of the relationship between the physician and patient and the respective role of any other health care provider with respect to management of the patient; and (2) notified that he or she may decline to receive medical services by telemedicine and may withdraw from such care at any time.    Notes: This dictation was performed using using MModal.    The patient became miserable with a cough, some subjective fever but never recorded fever around March 27th.  He tested negative for COVID-19, April 6.  Today he says, I am making it .  His wife remains well.  He has been pretty miserable the past 8 days and has significant fatigue.  I thought about retesting but since he is on the mend, decided to let go.    We reviewed each of his medications.     has a past medical history of Cataract of both eyes, Chronic gastritis without bleeding, negative H. 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 (7/11/2013), Sepsis, same organism in urine grew in his blood, Klebsiella (7/11/2013), Sigmoid diverticulosis (03/19/2017), Tendinitis of both rotator cuffs (6/25/2013), Tortuous aorta (2/6/2018), Tubular adenoma of colon (01/10/2012), and Type II or unspecified type diabetes mellitus with neurological manifestations, not stated as uncontrolled(250.60) (10/9/2012).    I reviewed his chart in order necessary labs for late July and I would like to see him back after that.    HPI  Review of Systems   at this particular point in time it is mostly cough and fatigue.  Objective:      Physical Exam  his blood sugar this morning was 143  Assessment:       1. Viral respiratory infection    2. Aortic atherosclerosis    3. Diabetic polyneuropathy associated with type 2 diabetes mellitus    4. Type 2 diabetes mellitus with diabetic polyneuropathy, without long-term current use of insulin    5. Type 2 diabetes mellitus with stage 2 chronic kidney disease, without long-term current use of insulin    6. History of prostate cancer    7. Bright red blood per rectum        Plan:   Diagnoses and all orders for this visit:    Viral respiratory infection, improving.    Aortic atherosclerosis, monitor.    Diabetic polyneuropathy associated with type 2 diabetes mellitus    Type 2 diabetes mellitus with diabetic polyneuropathy, without long-term current use of insulin  -     Comprehensive metabolic panel; Future  -     Lipid panel; Future  -     Hemoglobin A1c; Future    Type 2 diabetes mellitus with stage 2 chronic kidney disease, without long-term current use of insulin  -     Comprehensive metabolic panel; Future  -     Lipid panel; Future  -     Hemoglobin A1c; Future    History of prostate cancer  -     PROSTATE SPECIFIC ANTIGEN, DIAGNOSTIC; Future    Bright red blood per rectum  -     CBC auto differential; Future      Follow up in about 3 months (around 7/29/2020) for after fasting labs.

## 2020-04-17 RX ORDER — PANTOPRAZOLE SODIUM 40 MG/1
40 TABLET, DELAYED RELEASE ORAL DAILY
Qty: 90 TABLET | Refills: 3 | Status: SHIPPED | OUTPATIENT
Start: 2020-04-17 | End: 2020-06-12 | Stop reason: SDUPTHER

## 2020-04-30 ENCOUNTER — PATIENT OUTREACH (OUTPATIENT)
Dept: OTHER | Facility: OTHER | Age: 80
End: 2020-04-30

## 2020-05-07 ENCOUNTER — TELEPHONE (OUTPATIENT)
Dept: GASTROENTEROLOGY | Facility: CLINIC | Age: 80
End: 2020-05-07

## 2020-05-07 DIAGNOSIS — R10.13 DYSPEPSIA: Primary | ICD-10-CM

## 2020-05-08 ENCOUNTER — PATIENT MESSAGE (OUTPATIENT)
Dept: GASTROENTEROLOGY | Facility: CLINIC | Age: 80
End: 2020-05-08

## 2020-05-12 ENCOUNTER — TELEPHONE (OUTPATIENT)
Dept: ENDOSCOPY | Facility: HOSPITAL | Age: 80
End: 2020-05-12

## 2020-05-12 ENCOUNTER — LAB VISIT (OUTPATIENT)
Dept: FAMILY MEDICINE | Facility: CLINIC | Age: 80
End: 2020-05-12
Attending: INTERNAL MEDICINE
Payer: MEDICARE

## 2020-05-12 DIAGNOSIS — R10.13 DYSPEPSIA: ICD-10-CM

## 2020-05-12 PROCEDURE — U0002 COVID-19 LAB TEST NON-CDC: HCPCS | Mod: HCNC

## 2020-05-12 NOTE — TELEPHONE ENCOUNTER
Spoke with patient about arrival time @ 0900.     NPO status reviewed: Patient must have nothing to eat after midnight.  Pt may have CLEAR liquids ONLY until completely NPO 3 hrs @ 0600.    Medications: Do not take Insulin or oral diabetic medications the day of the procedure.  Take as prescribed: heart, seizure and blood pressure medication in the morning with a sip of water (less than an ounce).  Take any breathing medications and bring inhalers to hospital with you Leave all valuables and jewelry at home.     Wear comfortable clothes to procedure to change into hospital gown You cannot drive for 24 hours after your procedure because you will receive sedation for your procedure to make you comfortable.  A ride must be provided at discharge.

## 2020-05-13 LAB — SARS-COV-2 RNA RESP QL NAA+PROBE: NOT DETECTED

## 2020-05-14 ENCOUNTER — ANESTHESIA EVENT (OUTPATIENT)
Dept: ENDOSCOPY | Facility: HOSPITAL | Age: 80
End: 2020-05-14
Payer: MEDICARE

## 2020-05-14 ENCOUNTER — HOSPITAL ENCOUNTER (OUTPATIENT)
Facility: HOSPITAL | Age: 80
Discharge: HOME | End: 2020-05-14
Attending: INTERNAL MEDICINE | Admitting: INTERNAL MEDICINE
Payer: MEDICARE

## 2020-05-14 ENCOUNTER — ANESTHESIA (OUTPATIENT)
Dept: ENDOSCOPY | Facility: HOSPITAL | Age: 80
End: 2020-05-14
Payer: MEDICARE

## 2020-05-14 VITALS
SYSTOLIC BLOOD PRESSURE: 128 MMHG | OXYGEN SATURATION: 97 % | TEMPERATURE: 98 F | DIASTOLIC BLOOD PRESSURE: 76 MMHG | HEART RATE: 67 BPM | HEIGHT: 67 IN | WEIGHT: 176 LBS | BODY MASS INDEX: 27.62 KG/M2 | RESPIRATION RATE: 18 BRPM

## 2020-05-14 DIAGNOSIS — R10.9 ABDOMINAL PAIN: ICD-10-CM

## 2020-05-14 LAB
GLUCOSE SERPL-MCNC: 197 MG/DL (ref 70–110)
POCT GLUCOSE: 197 MG/DL (ref 70–110)

## 2020-05-14 PROCEDURE — 43239 EGD BIOPSY SINGLE/MULTIPLE: CPT | Mod: HCNC,,, | Performed by: INTERNAL MEDICINE

## 2020-05-14 PROCEDURE — 88342 IMHCHEM/IMCYTCHM 1ST ANTB: CPT | Mod: 26,HCNC,, | Performed by: PATHOLOGY

## 2020-05-14 PROCEDURE — 88342 CHG IMMUNOCYTOCHEMISTRY: ICD-10-PCS | Mod: 26,HCNC,, | Performed by: PATHOLOGY

## 2020-05-14 PROCEDURE — 25000003 PHARM REV CODE 250: Mod: HCNC | Performed by: NURSE ANESTHETIST, CERTIFIED REGISTERED

## 2020-05-14 PROCEDURE — 37000008 HC ANESTHESIA 1ST 15 MINUTES: Mod: HCNC | Performed by: INTERNAL MEDICINE

## 2020-05-14 PROCEDURE — 27201012 HC FORCEPS, HOT/COLD, DISP: Mod: HCNC | Performed by: INTERNAL MEDICINE

## 2020-05-14 PROCEDURE — 88305 TISSUE EXAM BY PATHOLOGIST: ICD-10-PCS | Mod: 26,HCNC,, | Performed by: PATHOLOGY

## 2020-05-14 PROCEDURE — 63600175 PHARM REV CODE 636 W HCPCS: Mod: HCNC | Performed by: NURSE ANESTHETIST, CERTIFIED REGISTERED

## 2020-05-14 PROCEDURE — 43239 EGD BIOPSY SINGLE/MULTIPLE: CPT | Mod: HCNC | Performed by: INTERNAL MEDICINE

## 2020-05-14 PROCEDURE — 37000009 HC ANESTHESIA EA ADD 15 MINS: Mod: HCNC | Performed by: INTERNAL MEDICINE

## 2020-05-14 PROCEDURE — 43239 PR EGD, FLEX, W/BIOPSY, SGL/MULTI: ICD-10-PCS | Mod: HCNC,,, | Performed by: INTERNAL MEDICINE

## 2020-05-14 PROCEDURE — 82962 GLUCOSE BLOOD TEST: CPT | Mod: HCNC | Performed by: INTERNAL MEDICINE

## 2020-05-14 PROCEDURE — 88305 TISSUE EXAM BY PATHOLOGIST: CPT | Mod: 26,HCNC,, | Performed by: PATHOLOGY

## 2020-05-14 PROCEDURE — 88342 IMHCHEM/IMCYTCHM 1ST ANTB: CPT | Mod: HCNC | Performed by: PATHOLOGY

## 2020-05-14 PROCEDURE — 88305 TISSUE EXAM BY PATHOLOGIST: CPT | Mod: 59,HCNC | Performed by: PATHOLOGY

## 2020-05-14 RX ORDER — SODIUM CHLORIDE 9 MG/ML
INJECTION, SOLUTION INTRAVENOUS CONTINUOUS
Status: DISCONTINUED | OUTPATIENT
Start: 2020-05-14 | End: 2020-05-14 | Stop reason: HOSPADM

## 2020-05-14 RX ORDER — PROPOFOL 10 MG/ML
VIAL (ML) INTRAVENOUS
Status: DISCONTINUED | OUTPATIENT
Start: 2020-05-14 | End: 2020-05-14

## 2020-05-14 RX ORDER — LIDOCAINE HCL/PF 100 MG/5ML
SYRINGE (ML) INTRAVENOUS
Status: DISCONTINUED | OUTPATIENT
Start: 2020-05-14 | End: 2020-05-14

## 2020-05-14 RX ORDER — SODIUM CHLORIDE 0.9 % (FLUSH) 0.9 %
10 SYRINGE (ML) INJECTION
Status: DISCONTINUED | OUTPATIENT
Start: 2020-05-14 | End: 2020-05-14 | Stop reason: HOSPADM

## 2020-05-14 RX ORDER — SODIUM CHLORIDE 9 MG/ML
INJECTION, SOLUTION INTRAVENOUS CONTINUOUS PRN
Status: DISCONTINUED | OUTPATIENT
Start: 2020-05-14 | End: 2020-05-14

## 2020-05-14 RX ADMIN — PROPOFOL 30 MG: 10 INJECTION, EMULSION INTRAVENOUS at 10:05

## 2020-05-14 RX ADMIN — LIDOCAINE HYDROCHLORIDE 50 MG: 20 INJECTION, SOLUTION INTRAVENOUS at 10:05

## 2020-05-14 RX ADMIN — PROPOFOL 40 MG: 10 INJECTION, EMULSION INTRAVENOUS at 10:05

## 2020-05-14 RX ADMIN — SODIUM CHLORIDE: 0.9 INJECTION, SOLUTION INTRAVENOUS at 10:05

## 2020-05-14 RX ADMIN — PROPOFOL 70 MG: 10 INJECTION, EMULSION INTRAVENOUS at 10:05

## 2020-05-14 NOTE — ANESTHESIA POSTPROCEDURE EVALUATION
Anesthesia Post Evaluation    Patient: Bryant Gil    Procedure(s) Performed: Procedure(s) (LRB):  ESOPHAGOGASTRODUODENOSCOPY (EGD) (N/A)    Final Anesthesia Type: MAC    Patient location during evaluation: GI PACU  Patient participation: Yes- Able to Participate  Level of consciousness: awake and alert  Post-procedure vital signs: reviewed and stable  Pain management: adequate  Airway patency: patent    PONV status at discharge: No PONV  Anesthetic complications: no      Cardiovascular status: hemodynamically stable  Respiratory status: unassisted, spontaneous ventilation and room air  Hydration status: euvolemic  Follow-up not needed.          Vitals Value Taken Time   /78 5/14/2020  9:15 AM   Temp 36.1 °C (97 °F) 5/14/2020  9:15 AM   Pulse 72 5/14/2020  9:15 AM   Resp 20 5/14/2020  9:15 AM   SpO2 99 % 5/14/2020  9:15 AM         No case tracking events are documented in the log.      Pain/Luis Manuel Score: No data recorded

## 2020-05-14 NOTE — H&P
[facility]  Gastroenterology  H&P    Patient Name: Bryant Gil  MRN: [id number]  Admission Date: 5/14/2020  Code Status: Prior    Attending Provider: Rachel Burrows MD   Primary Care Physician: Laverne Roberson MD  Principal Problem:<principal problem not specified>    Subjective:     History of Present Illness: Dyspepsia, history of gastric intestinal metaplasia    Past Medical History:   Diagnosis Date    Cataract of both eyes     Chronic gastritis without bleeding, negative H. pylori Jaunuary 2019 EGD 1/30/2020    Diabetes mellitus     Diverticulosis     Ectatic aorta 2/6/2018    HTN (hypertension)     HTN (hypertension) 10/9/2012    OHT (ocular hypertension)     Prostate cancer     Pyelonephritis, right no stone on CT scan. 7/11/2013    Sepsis, same organism in urine grew in his blood, Klebsiella 7/11/2013    Sigmoid diverticulosis 03/19/2017    Tendinitis of both rotator cuffs 6/25/2013    Tortuous aorta 2/6/2018    Tubular adenoma of colon 01/10/2012    Type II or unspecified type diabetes mellitus with neurological manifestations, not stated as uncontrolled(250.60) 10/9/2012       Past Surgical History:   Procedure Laterality Date    CARPAL TUNNEL RELEASE Right 08/25/2015    CIRCUMCISION  04/13/2005    COLONOSCOPY N/A 3/29/2017    Procedure: COLONOSCOPY Golytely prep;  Surgeon: Kavitha Cleaning MD;  Location: [facility];  Service: Endoscopy;  Laterality: N/A;    COLONOSCOPY W/ POLYPECTOMY  01/10/2012    Repeat in 5 years     ESOPHAGOGASTRODUODENOSCOPY N/A 1/8/2019    Procedure: EGD (ESOPHAGOGASTRODUODENOSCOPY);  Surgeon: Rachel Burrows MD;  Location: [facility];  Service: Endoscopy;  Laterality: N/A;    FLEXIBLE SIGMOIDOSCOPY N/A 1/8/2019    Procedure: SIGMOIDOSCOPY, FLEXIBLE;  Surgeon: Rachel Burrows MD;  Location: [facility];  Service: Endoscopy;  Laterality: N/A;    PENILE PROSTHESIS IMPLANT      PROSTATE SURGERY  1999    Prostatectomy for prostate ca;  [facility]    TRIGGER FINGER RELEASE Right 2015       Review of patient's allergies indicates:  No Known Allergies  Family History     Problem Relation (Age of Onset)    Cancer Brother    Cataracts Sister    Diabetes Sister    Heart attack Brother    Hypertension Mother    No Known Problems Brother, Sister, Son, Daughter, Sister, Sister    Prostate cancer Brother        Tobacco Use    Smoking status: Former Smoker     Packs/day: 0.50     Years: 3.00     Pack years: 1.50     Types: Cigarettes    Smokeless tobacco: Never Used    Tobacco comment: smoked as a teenager   Substance and Sexual Activity    Alcohol use: Yes     Comment: social drinks a beer 1-2 x month    Drug use: No    Sexual activity: Yes     Partners: Female     Review of Systems   Constitutional: Negative for chills and unexpected weight change.   Respiratory: Negative for cough and wheezing.    Cardiovascular: Negative for chest pain and palpitations.   Gastrointestinal: Positive for abdominal pain. Negative for blood in stool.     Objective:     Vital Signs (Most Recent):  Temp: 97 °F (36.1 °C) (05/14/20 0915)  Pulse: 72 (05/14/20 0915)  Resp: 20 (05/14/20 0915)  BP: (!) 163/78 (05/14/20 0915)  SpO2: 99 % (05/14/20 0915) Vital Signs (24h Range):  Temp:  [97 °F (36.1 °C)] 97 °F (36.1 °C)  Pulse:  [72] 72  Resp:  [20] 20  SpO2:  [99 %] 99 %  BP: (163)/(78) 163/78     Weight: 79.8 kg (176 lb) (05/14/20 0915)  Body mass index is 27.57 kg/m².    No intake or output data in the 24 hours ending 05/14/20 1000    Lines/Drains/Airways     Peripheral Intravenous Line                 Peripheral IV - Single Lumen 05/14/20 0918 20 G Right Hand less than 1 day                Physical Exam   Constitutional: He is oriented to person, place, and time. He appears well-developed and well-nourished. No distress.   HENT:   Head: Normocephalic and atraumatic.   Eyes: Conjunctivae are normal. No scleral icterus.   Neck: No tracheal deviation present. No thyromegaly present.    Cardiovascular: Normal rate and regular rhythm. Exam reveals no gallop and no friction rub.   Pulmonary/Chest: Effort normal and breath sounds normal. He has no wheezes. He has no rales.   Abdominal: Soft. Bowel sounds are normal. He exhibits no distension. There is no tenderness. There is no rebound and no guarding.   Musculoskeletal: Normal range of motion. He exhibits no edema or tenderness.   Neurological: He is alert and oriented to person, place, and time.   Skin: He is not diaphoretic.   Psychiatric: He has a normal mood and affect. His behavior is normal.   Nursing note and vitals reviewed.        Assessment/Plan:   - Dyspepsia, gastric intestinal metaplasia    Plan  1. EGD    Due to the ongoing COVID pandemic, it is my medical opinion that this procedure should be performed, as delaying will put the patient at risk of potential harm.  This was explained and discussed with patient and they expressed understanding of potential risks and benefits.      Rachel Burrows MD  Gastroenterology  [facility]

## 2020-05-14 NOTE — ANESTHESIA PREPROCEDURE EVALUATION
05/14/2020  Bryant Gil is a 79 y.o., male presents for EGD under MAC.    COVID negative 5/12/20    Past Medical History:   Diagnosis Date    Cataract of both eyes     Chronic gastritis without bleeding, negative H. pylori Jaunuary 2019 EGD 1/30/2020    Diabetes mellitus     Diverticulosis     Ectatic aorta 2/6/2018    HTN (hypertension)     HTN (hypertension) 10/9/2012    OHT (ocular hypertension)     Prostate cancer     Pyelonephritis, right no stone on CT scan. 7/11/2013    Sepsis, same organism in urine grew in his blood, Klebsiella 7/11/2013    Sigmoid diverticulosis 03/19/2017    Tendinitis of both rotator cuffs 6/25/2013    Tortuous aorta 2/6/2018    Tubular adenoma of colon 01/10/2012    Type II or unspecified type diabetes mellitus with neurological manifestations, not stated as uncontrolled(250.60) 10/9/2012     Past Surgical History:   Procedure Laterality Date    CARPAL TUNNEL RELEASE Right 08/25/2015    CIRCUMCISION  04/13/2005    COLONOSCOPY N/A 3/29/2017    Procedure: COLONOSCOPY Golytely prep;  Surgeon: Kavitha Cleaning MD;  Location: [facility];  Service: Endoscopy;  Laterality: N/A;    COLONOSCOPY W/ POLYPECTOMY  01/10/2012    Repeat in 5 years     ESOPHAGOGASTRODUODENOSCOPY N/A 1/8/2019    Procedure: EGD (ESOPHAGOGASTRODUODENOSCOPY);  Surgeon: Rachel Burrows MD;  Location: [facility];  Service: Endoscopy;  Laterality: N/A;    FLEXIBLE SIGMOIDOSCOPY N/A 1/8/2019    Procedure: SIGMOIDOSCOPY, FLEXIBLE;  Surgeon: Rachel Burrows MD;  Location: [facility];  Service: Endoscopy;  Laterality: N/A;    PENILE PROSTHESIS IMPLANT      PROSTATE SURGERY  1999    Prostatectomy for prostate ca; [facility]    TRIGGER FINGER RELEASE Right 2015         Pre-op Assessment    I have reviewed the Patient Summary Reports.     I have reviewed the Nursing Notes.      Review of  Systems  Anesthesia Hx:  No problems with previous Anesthesia   Denies Personal Hx of Anesthesia complications.   Social:  Former Smoker    Hematology/Oncology:         -- Anemia: --  Cancer in past history:  Oncology Comments: Prostate CA     EENT/Dental:EENT/Dental Normal   Cardiovascular:   Exercise tolerance: good Hypertension, well controlled ECG has been reviewed.    Pulmonary:  Pulmonary Normal    Renal/:   Chronic Renal Disease, CRI    Hepatic/GI:  Hepatic/GI Normal    Musculoskeletal:  Musculoskeletal Normal    Neurological:  Neurology Normal    Endocrine:   Diabetes, type 2    Dermatological:  Skin Normal    Psych:  Psychiatric Normal         Lab Results   Component Value Date    WBC 4.38 01/08/2020    HGB 13.3 (L) 01/08/2020    HCT 39.1 (L) 01/08/2020    MCV 84 01/08/2020     01/08/2020       Chemistry        Component Value Date/Time     01/08/2020 1424    K 3.5 01/08/2020 1424     01/08/2020 1424    CO2 27 01/08/2020 1424    BUN 18 01/08/2020 1424    CREATININE 1.2 01/08/2020 1424     (H) 01/08/2020 1424        Component Value Date/Time    CALCIUM 8.3 (L) 01/08/2020 1424    ALKPHOS 56 01/08/2020 1424    AST 15 01/08/2020 1424    ALT 17 01/08/2020 1424    BILITOT 0.3 01/08/2020 1424    ESTGFRAFRICA >60 01/08/2020 1424    EGFRNONAA 58 (A) 01/08/2020 1424            Physical Exam  General:  Well nourished    Airway/Jaw/Neck:  Airway Findings: Mouth Opening: Normal Mallampati: II  TM Distance: Normal, at least 6 cm      Dental:  Dental Findings:    Chest/Lungs:  Chest/Lungs Findings: Clear to auscultation     Heart/Vascular:  Heart Findings: Rate: Normal  Rhythm: Regular Rhythm             Anesthesia Plan  Type of Anesthesia, risks & benefits discussed:  Anesthesia Type:  MAC  Patient's Preference:   Intra-op Monitoring Plan: standard ASA monitors  Intra-op Monitoring Plan Comments:   Post Op Pain Control Plan: multimodal analgesia  Post Op Pain Control Plan Comments:    Induction:   IV  Beta Blocker:         Informed Consent: Patient understands risks and agrees with Anesthesia plan.  Questions answered. Anesthesia consent signed with patient.  ASA Score: 2     Day of Surgery Review of History & Physical: I have interviewed and examined the patient. I have reviewed the patient's H&P dated:  There are no significant changes.  H&P update referred to the provider.  H&P completed by Anesthesiologist.       Ready For Surgery From Anesthesia Perspective.

## 2020-05-14 NOTE — TRANSFER OF CARE
"Anesthesia Transfer of Care Note    Patient: Bryant Gil    Procedure(s) Performed: Procedure(s) (LRB):  ESOPHAGOGASTRODUODENOSCOPY (EGD) (N/A)    Patient location: GI    Anesthesia Type: MAC    Transport from OR: Transported from OR on room air with adequate spontaneous ventilation    Post pain: adequate analgesia    Post assessment: no apparent anesthetic complications and tolerated procedure well    Post vital signs: stable    Level of consciousness: responds to stimulation and sedated    Nausea/Vomiting: no nausea/vomiting    Complications: none    Transfer of care protocol was followed      Last vitals:   Visit Vitals  BP (!) 163/78 (BP Location: Left arm)   Pulse 72   Temp 36.1 °C (97 °F) (Tympanic)   Resp 20   Ht 5' 7" (1.702 m)   Wt 79.8 kg (176 lb)   SpO2 99%   BMI 27.57 kg/m²     "

## 2020-05-19 LAB
FINAL PATHOLOGIC DIAGNOSIS: NORMAL
GROSS: NORMAL

## 2020-06-06 DIAGNOSIS — E11.22 TYPE 2 DIABETES MELLITUS WITH STAGE 2 CHRONIC KIDNEY DISEASE, WITHOUT LONG-TERM CURRENT USE OF INSULIN: Primary | Chronic | ICD-10-CM

## 2020-06-06 DIAGNOSIS — N18.2 TYPE 2 DIABETES MELLITUS WITH STAGE 2 CHRONIC KIDNEY DISEASE, WITHOUT LONG-TERM CURRENT USE OF INSULIN: Primary | Chronic | ICD-10-CM

## 2020-06-08 RX ORDER — CALCIUM CITRATE/VITAMIN D3 200MG-6.25
TABLET ORAL
Qty: 100 STRIP | Refills: 3 | Status: SHIPPED | OUTPATIENT
Start: 2020-06-08 | End: 2020-07-29 | Stop reason: SDUPTHER

## 2020-06-08 NOTE — PROGRESS NOTES
Refill Authorization Note    is requesting a refill authorization.    Brief assessment and rationale for refill: APPROVE: prr               Medication reconciliation completed: No                         Comments:      Requested Prescriptions   Signed Prescriptions Disp Refills    TRUE METRIX GLUCOSE TEST STRIP Strp 100 strip 3     Sig: TEST BLOOD SUGAR EVERY DAY       Endocrinology: Diabetes - Supplies Passed - 6/6/2020  9:05 PM        Passed - Patient is at least 18 years old        Passed - Office visit in past 12 months or future 90 days.     Recent Outpatient Visits            1 month ago Viral respiratory infection    [facility] Medicine Laverne Zaidi MD    3 months ago Dyspepsia    [facility] - Gastroenterology Rachel Burrows MD    5 months ago Type 2 diabetes mellitus with stage 2 chronic kidney disease, without long-term current use of insulin    [facility] Caprice Zaidi MD    7 months ago Type 2 diabetes mellitus with diabetic polyneuropathy, without long-term current use of insulin    [facility] Caprice Zaidi MD    8 months ago Type 2 diabetes mellitus with stage 2 chronic kidney disease, without long-term current use of insulin    [facility] Caprice Zaidi MD          Future Appointments              In 1 month LAB, [facility]    In 1 month MD Nik Boo [facility], [facility]                Passed - HBA1C is 7.9 or below and within 180 days     Hemoglobin A1C   Date Value Ref Range Status   01/08/2020 7.8 (H) 4.0 - 5.6 % Final     Comment:     ADA Screening Guidelines:  5.7-6.4%  Consistent with prediabetes  >or=6.5%  Consistent with diabetes  High levels of fetal hemoglobin interfere with the HbA1C  assay. Heterozygous hemoglobin variants (HbS, HgC, etc)do  not significantly interfere with this assay.   However, presence of  multiple variants may affect accuracy.     10/02/2019 9.5 (H) 4.0 - 5.6 % Final     Comment:     ADA Screening Guidelines:  5.7-6.4%  Consistent with prediabetes  >or=6.5%  Consistent with diabetes  High levels of fetal hemoglobin interfere with the HbA1C  assay. Heterozygous hemoglobin variants (HbS, HgC, etc)do  not significantly interfere with this assay.   However, presence of multiple variants may affect accuracy.     10/02/2019 9.5 (H) 4.0 - 5.6 % Final     Comment:     ADA Screening Guidelines:  5.7-6.4%  Consistent with prediabetes  >or=6.5%  Consistent with diabetes  High levels of fetal hemoglobin interfere with the HbA1C  assay. Heterozygous hemoglobin variants (HbS, HgC, etc)do  not significantly interfere with this assay.   However, presence of multiple variants may affect accuracy.                Powered by [employer] - 6/6/2020  9:05 PM        The requested medication is not on the active medication list.         Appointments  past 12m or future 3m with PCP    Date Provider   Last Visit   4/14/2020 Laverne Zaidi MD   Next Visit   7/22/2020 Laverne Zaidi MD   ED visits in past 90 days: 0     Note composed:2:28 PM 06/08/2020

## 2020-06-10 ENCOUNTER — PATIENT OUTREACH (OUTPATIENT)
Dept: OTHER | Facility: OTHER | Age: 80
End: 2020-06-10

## 2020-06-10 NOTE — PROGRESS NOTES
"Digital Medicine: Health  Follow-Up    The history is provided by the patient.   Follow Up  Follow-up reason(s): routine education    Patient stated that he is doing well overall and he is feeling good. He is still have some discrepancies with his BP readings. Patient takes the reading initially and it is running in the mid to high 130s. He clicks the retake button and when he takes it again, it trends back down to the 120 range (the higher readings are not transmitting because he is clicking on "retake"). He is going to keep a log of the initially readings so we can compare on our next outreach.    Patient has had his cuff for 3 years so he is opening to purchasing a new one if necessary. We will readdress on our next outreach.       Intervention/Plan        Topic    Urine Protein Check     Eye Exam     Lipid (Cholesterol) Test        Last 5 Patient Entered Readings                                      Current 30 Day Average: 127/71     Recent Readings 6/10/2020 6/8/2020 6/5/2020 6/4/2020 6/3/2020    SBP (mmHg) 127 127 128 129 132    DBP (mmHg) 71 71 75 73 73    Pulse 75 78 87 76 79                  Screenings    SDOH  "

## 2020-06-12 ENCOUNTER — TELEPHONE (OUTPATIENT)
Dept: INTERNAL MEDICINE | Facility: CLINIC | Age: 80
End: 2020-06-12

## 2020-06-12 DIAGNOSIS — I10 ESSENTIAL HYPERTENSION: ICD-10-CM

## 2020-06-12 DIAGNOSIS — H40.9 GLAUCOMA, UNSPECIFIED GLAUCOMA TYPE, UNSPECIFIED LATERALITY: Primary | ICD-10-CM

## 2020-06-12 RX ORDER — METOPROLOL SUCCINATE 25 MG/1
25 TABLET, EXTENDED RELEASE ORAL DAILY
Qty: 90 TABLET | Refills: 3 | Status: SHIPPED | OUTPATIENT
Start: 2020-06-12 | End: 2020-09-22

## 2020-06-12 RX ORDER — AMLODIPINE BESYLATE 10 MG/1
TABLET ORAL
Qty: 90 TABLET | Refills: 3 | Status: SHIPPED | OUTPATIENT
Start: 2020-06-12 | End: 2021-03-24 | Stop reason: SDUPTHER

## 2020-06-12 RX ORDER — METFORMIN HYDROCHLORIDE 1000 MG/1
1000 TABLET ORAL 2 TIMES DAILY WITH MEALS
Qty: 180 TABLET | Refills: 3 | Status: SHIPPED | OUTPATIENT
Start: 2020-06-12 | End: 2021-03-24 | Stop reason: SDUPTHER

## 2020-06-12 RX ORDER — LOSARTAN POTASSIUM AND HYDROCHLOROTHIAZIDE 25; 100 MG/1; MG/1
1 TABLET ORAL EVERY MORNING
Qty: 90 TABLET | Refills: 0 | Status: SHIPPED | OUTPATIENT
Start: 2020-06-12 | End: 2020-07-22 | Stop reason: SDUPTHER

## 2020-06-12 RX ORDER — ATORVASTATIN CALCIUM 20 MG/1
20 TABLET, FILM COATED ORAL DAILY
Qty: 90 TABLET | Refills: 0 | Status: SHIPPED | OUTPATIENT
Start: 2020-06-12 | End: 2020-07-22 | Stop reason: SDUPTHER

## 2020-06-16 DIAGNOSIS — H40.053 OCULAR HYPERTENSION, BILATERAL: ICD-10-CM

## 2020-06-16 RX ORDER — LATANOPROST 50 UG/ML
1 SOLUTION/ DROPS OPHTHALMIC DAILY
Qty: 3 BOTTLE | Refills: 3 | Status: SHIPPED | OUTPATIENT
Start: 2020-06-16 | End: 2020-07-15 | Stop reason: SDUPTHER

## 2020-06-16 NOTE — TELEPHONE ENCOUNTER
Patient of Dr. Enciso's. Refilled Latanoprost 1 gtt OU QHS today.     Patient is also overdue for glaucoma testing and annual eye exam. Will call to schedule exam.       Grace Forte, OD  06/16/2020

## 2020-07-08 ENCOUNTER — PATIENT OUTREACH (OUTPATIENT)
Dept: ADMINISTRATIVE | Facility: HOSPITAL | Age: 80
End: 2020-07-08

## 2020-07-08 NOTE — PROGRESS NOTES
Health Maintenance Due   Topic Date Due    Shingles Vaccine (2 of 3) 08/19/2016    Eye Exam  10/23/2019    Foot Exam  02/06/2020    Lipid Panel  02/06/2020    Hemoglobin A1c  07/08/2020     Patient has lab appointment scheduled on 7/15/2020.  Patient has an appointment with ophthalmology/optometry on 7/15/2020.

## 2020-07-14 ENCOUNTER — PATIENT OUTREACH (OUTPATIENT)
Dept: ADMINISTRATIVE | Facility: OTHER | Age: 80
End: 2020-07-14

## 2020-07-14 NOTE — PROGRESS NOTES
Chart reviewed.   Immunizations: updated  Orders placed: n/a  Upcoming appts to satisfy KOKI topics: Hemoglobin A1c & Optometry 7/15

## 2020-07-15 ENCOUNTER — CLINICAL SUPPORT (OUTPATIENT)
Dept: OPHTHALMOLOGY | Facility: CLINIC | Age: 80
End: 2020-07-15
Payer: MEDICARE

## 2020-07-15 ENCOUNTER — OFFICE VISIT (OUTPATIENT)
Dept: OPTOMETRY | Facility: CLINIC | Age: 80
End: 2020-07-15
Payer: MEDICARE

## 2020-07-15 ENCOUNTER — LAB VISIT (OUTPATIENT)
Dept: LAB | Facility: HOSPITAL | Age: 80
End: 2020-07-15
Attending: INTERNAL MEDICINE
Payer: MEDICARE

## 2020-07-15 DIAGNOSIS — Z85.46 HISTORY OF PROSTATE CANCER: Chronic | ICD-10-CM

## 2020-07-15 DIAGNOSIS — N18.2 TYPE 2 DIABETES MELLITUS WITH STAGE 2 CHRONIC KIDNEY DISEASE, WITHOUT LONG-TERM CURRENT USE OF INSULIN: Chronic | ICD-10-CM

## 2020-07-15 DIAGNOSIS — H40.9 GLAUCOMA, UNSPECIFIED GLAUCOMA TYPE, UNSPECIFIED LATERALITY: ICD-10-CM

## 2020-07-15 DIAGNOSIS — H25.13 NUCLEAR SCLEROTIC CATARACT OF BOTH EYES: ICD-10-CM

## 2020-07-15 DIAGNOSIS — H35.033 HYPERTENSIVE RETINOPATHY OF BOTH EYES: ICD-10-CM

## 2020-07-15 DIAGNOSIS — H40.053 OCULAR HYPERTENSION, BILATERAL: Primary | ICD-10-CM

## 2020-07-15 DIAGNOSIS — E11.42 TYPE 2 DIABETES MELLITUS WITH DIABETIC POLYNEUROPATHY, WITHOUT LONG-TERM CURRENT USE OF INSULIN: Chronic | ICD-10-CM

## 2020-07-15 DIAGNOSIS — H52.7 REFRACTIVE ERROR: ICD-10-CM

## 2020-07-15 DIAGNOSIS — K62.5 BRIGHT RED BLOOD PER RECTUM: ICD-10-CM

## 2020-07-15 DIAGNOSIS — E11.9 TYPE 2 DIABETES MELLITUS WITHOUT RETINOPATHY: ICD-10-CM

## 2020-07-15 DIAGNOSIS — E11.22 TYPE 2 DIABETES MELLITUS WITH STAGE 2 CHRONIC KIDNEY DISEASE, WITHOUT LONG-TERM CURRENT USE OF INSULIN: Chronic | ICD-10-CM

## 2020-07-15 LAB
ALBUMIN SERPL BCP-MCNC: 4.1 G/DL (ref 3.5–5.2)
ALP SERPL-CCNC: 58 U/L (ref 55–135)
ALT SERPL W/O P-5'-P-CCNC: 14 U/L (ref 10–44)
ANION GAP SERPL CALC-SCNC: 7 MMOL/L (ref 8–16)
AST SERPL-CCNC: 16 U/L (ref 10–40)
BASOPHILS # BLD AUTO: 0.05 K/UL (ref 0–0.2)
BASOPHILS NFR BLD: 1.3 % (ref 0–1.9)
BILIRUB SERPL-MCNC: 0.3 MG/DL (ref 0.1–1)
BUN SERPL-MCNC: 18 MG/DL (ref 8–23)
CALCIUM SERPL-MCNC: 9.9 MG/DL (ref 8.7–10.5)
CHLORIDE SERPL-SCNC: 104 MMOL/L (ref 95–110)
CHOLEST SERPL-MCNC: 169 MG/DL (ref 120–199)
CHOLEST/HDLC SERPL: 4.6 {RATIO} (ref 2–5)
CO2 SERPL-SCNC: 28 MMOL/L (ref 23–29)
COMPLEXED PSA SERPL-MCNC: <0.01 NG/ML (ref 0–4)
CREAT SERPL-MCNC: 1.2 MG/DL (ref 0.5–1.4)
DIFFERENTIAL METHOD: ABNORMAL
EOSINOPHIL # BLD AUTO: 0.1 K/UL (ref 0–0.5)
EOSINOPHIL NFR BLD: 2.1 % (ref 0–8)
ERYTHROCYTE [DISTWIDTH] IN BLOOD BY AUTOMATED COUNT: 13.5 % (ref 11.5–14.5)
EST. GFR  (AFRICAN AMERICAN): >60 ML/MIN/1.73 M^2
EST. GFR  (NON AFRICAN AMERICAN): 58 ML/MIN/1.73 M^2
ESTIMATED AVG GLUCOSE: 174 MG/DL (ref 68–131)
GLUCOSE SERPL-MCNC: 162 MG/DL (ref 70–110)
HBA1C MFR BLD HPLC: 7.7 % (ref 4–5.6)
HCT VFR BLD AUTO: 42.1 % (ref 40–54)
HDLC SERPL-MCNC: 37 MG/DL (ref 40–75)
HDLC SERPL: 21.9 % (ref 20–50)
HGB BLD-MCNC: 13.2 G/DL (ref 14–18)
IMM GRANULOCYTES # BLD AUTO: 0.01 K/UL (ref 0–0.04)
IMM GRANULOCYTES NFR BLD AUTO: 0.3 % (ref 0–0.5)
LDLC SERPL CALC-MCNC: 115.6 MG/DL (ref 63–159)
LYMPHOCYTES # BLD AUTO: 1.8 K/UL (ref 1–4.8)
LYMPHOCYTES NFR BLD: 46.3 % (ref 18–48)
MCH RBC QN AUTO: 27.5 PG (ref 27–31)
MCHC RBC AUTO-ENTMCNC: 31.4 G/DL (ref 32–36)
MCV RBC AUTO: 88 FL (ref 82–98)
MONOCYTES # BLD AUTO: 0.5 K/UL (ref 0.3–1)
MONOCYTES NFR BLD: 12.4 % (ref 4–15)
NEUTROPHILS # BLD AUTO: 1.5 K/UL (ref 1.8–7.7)
NEUTROPHILS NFR BLD: 37.6 % (ref 38–73)
NONHDLC SERPL-MCNC: 132 MG/DL
NRBC BLD-RTO: 0 /100 WBC
PLATELET # BLD AUTO: 252 K/UL (ref 150–350)
PMV BLD AUTO: 11.1 FL (ref 9.2–12.9)
POTASSIUM SERPL-SCNC: 4.1 MMOL/L (ref 3.5–5.1)
PROT SERPL-MCNC: 7.7 G/DL (ref 6–8.4)
RBC # BLD AUTO: 4.8 M/UL (ref 4.6–6.2)
SODIUM SERPL-SCNC: 139 MMOL/L (ref 136–145)
TRIGL SERPL-MCNC: 82 MG/DL (ref 30–150)
WBC # BLD AUTO: 3.87 K/UL (ref 3.9–12.7)

## 2020-07-15 PROCEDURE — 99499 RISK ADDL DX/OHS AUDIT: ICD-10-PCS | Mod: S$GLB,,, | Performed by: OPTOMETRIST

## 2020-07-15 PROCEDURE — 92133 CPTRZD OPH DX IMG PST SGM ON: CPT | Mod: HCNC,S$GLB,, | Performed by: OPTOMETRIST

## 2020-07-15 PROCEDURE — 99999 PR PBB SHADOW E&M-EST. PATIENT-LVL II: ICD-10-PCS | Mod: PBBFAC,HCNC,,

## 2020-07-15 PROCEDURE — 92082 HUMPHREY VISUAL FIELD-OU-INTERMEDIATE-BOTH EYES: ICD-10-PCS | Mod: HCNC,S$GLB,, | Performed by: OPTOMETRIST

## 2020-07-15 PROCEDURE — 99999 PR PBB SHADOW E&M-EST. PATIENT-LVL III: ICD-10-PCS | Mod: PBBFAC,HCNC,, | Performed by: OPTOMETRIST

## 2020-07-15 PROCEDURE — 84153 ASSAY OF PSA TOTAL: CPT | Mod: HCNC

## 2020-07-15 PROCEDURE — 99499 UNLISTED E&M SERVICE: CPT | Mod: S$GLB,,, | Performed by: OPTOMETRIST

## 2020-07-15 PROCEDURE — 92133 OCT, OPTIC NERVE - OU - BOTH EYES: ICD-10-PCS | Mod: HCNC,S$GLB,, | Performed by: OPTOMETRIST

## 2020-07-15 PROCEDURE — 80061 LIPID PANEL: CPT | Mod: HCNC

## 2020-07-15 PROCEDURE — 36415 COLL VENOUS BLD VENIPUNCTURE: CPT | Mod: HCNC

## 2020-07-15 PROCEDURE — 92082 INTERMEDIATE VISUAL FIELD XM: CPT | Mod: HCNC,S$GLB,, | Performed by: OPTOMETRIST

## 2020-07-15 PROCEDURE — 92014 PR EYE EXAM, EST PATIENT,COMPREHESV: ICD-10-PCS | Mod: HCNC,S$GLB,, | Performed by: OPTOMETRIST

## 2020-07-15 PROCEDURE — 80053 COMPREHEN METABOLIC PANEL: CPT | Mod: HCNC

## 2020-07-15 PROCEDURE — 85025 COMPLETE CBC W/AUTO DIFF WBC: CPT | Mod: HCNC

## 2020-07-15 PROCEDURE — 92015 PR REFRACTION: ICD-10-PCS | Mod: HCNC,S$GLB,, | Performed by: OPTOMETRIST

## 2020-07-15 PROCEDURE — 99999 PR PBB SHADOW E&M-EST. PATIENT-LVL III: CPT | Mod: PBBFAC,HCNC,, | Performed by: OPTOMETRIST

## 2020-07-15 PROCEDURE — 92015 DETERMINE REFRACTIVE STATE: CPT | Mod: HCNC,S$GLB,, | Performed by: OPTOMETRIST

## 2020-07-15 PROCEDURE — 83036 HEMOGLOBIN GLYCOSYLATED A1C: CPT | Mod: HCNC

## 2020-07-15 PROCEDURE — 99999 PR PBB SHADOW E&M-EST. PATIENT-LVL II: CPT | Mod: PBBFAC,HCNC,,

## 2020-07-15 PROCEDURE — 92014 COMPRE OPH EXAM EST PT 1/>: CPT | Mod: HCNC,S$GLB,, | Performed by: OPTOMETRIST

## 2020-07-15 RX ORDER — LATANOPROST 50 UG/ML
1 SOLUTION/ DROPS OPHTHALMIC DAILY
Qty: 3 BOTTLE | Refills: 6 | Status: SHIPPED | OUTPATIENT
Start: 2020-07-15 | End: 2021-07-30

## 2020-07-15 NOTE — PROGRESS NOTES
Visual field test done  Pt stated no latex allergies used coverlet patch           mrx    0.50+1.00x5  0.75+0.75x15      eh

## 2020-07-15 NOTE — PROGRESS NOTES
HPI     79 year old male here today for an annual diabetic dilated fundus exam   and HVF and OCT review. H/o ocular HTN.  Last BSL: 132 taken this morning.   Pt states he believes his vision is about the same since his last visit   with Dr Enciso in October of 2018.    Eye Meds:  Latanoprost QAM OU.      Hemoglobin A1C       Date                     Value               Ref Range             Status                01/08/2020               7.8 (H)             4.0 - 5.6 %           Final                 10/02/2019               9.5 (H)             4.0 - 5.6 %           Final       GWENDOLYN: 2017 with Dr. Enciso                Last edited by Grace Forte, OD on 7/15/2020 12:08 PM. (History)            Assessment /Plan     For exam results, see Encounter Report.    Ocular hypertension, bilateral  -     OCT, Optic Nerve - OU - Both Eyes  -     Cedeno Visual Field - Intermediate - OU - Both Eyes  -     latanoprost 0.005 % ophthalmic solution; Place 1 drop into both eyes once daily.  Dispense: 3 Bottle; Refill: 6    Type 2 diabetes mellitus without retinopathy    Hypertensive retinopathy of both eyes    Nuclear sclerotic cataract of both eyes    Refractive error      1. Educated pt on findings. Roughly stable, OU. No changes noted on fundus exam. No progression noted on RNFL OCT. Small field change OD but assume due to pt fatigue, no correlation with other testing/exam.   Continue latanoprost 1 gtt OU QHS. IOP minimally higher OS than desired target but discussed gtt needs to be taken QHS instead of QAM. Called in gtt to [facility], Monitor 6 months with IOP check.   See progress notes below.     GLAUCOMA HISTORY (updated 07/15/2020):  Diagnosis: Ocular hypertension OU  Date of Dx: 2008  (+)Family history of glaucoma: mother, sister  PreTx Tmax: OD 32mmHg, OS 27mmHg  Target IOP: OD </= 20mmHg, OS </= 18mmHg  Treatment history:   * Originally examined by Dr. Taniya Barney prior to 2004.   * In 2008, an early morning IOP  "spike of 32/27 was noted. He was then diagnosed with OHT and treated with Latanoprost. His IOP has been stable since then.  Started treatment on: 2008  Current treatment: Latanoprost QHS OU  Adverse effects: none  CCT: actual values unknown, but per prior note: "increased CCT with adjustment factors of -2 OD and -3 OS"    Interpretation of RNFL OCT (07/15/2020): WNL OU. Stable compared to 2018 OU.    Date ST T IT IN N SN G   OD 03/16 134 65 120 82 56 114 86    10/18 132 65 120 84 54 112 86     07/20 138 69 121 81 57 113 88 Stable.      OS 03/16 132 62 142 76 50 119 87    10/18 130 62 140 80 48 116 86     07/20 125 56 135 89 55 133 88 Stable.     Interpretation of Posterior Pole OCT (10/23/2018):   OD: Superior hemisphere slightly thinner than inferior. Normal foveal contour and macular morphology.   OS: Superior and inferior hemispheres relatively symmetrical. Normal foveal contour and macular morphology.    Interpretation of HVF 24-2ss (07/15/2020):   OD: Good reliability (fixation losses 1/10, false positives 0%, false negatives 2%). Early super arcuate with dense area directly superior. No correlation with OCT or fundus exam. Not seen previously. Recommend to repeat field to look for  Repeatability. GHT ONL. VFI: 96%.     OS: Good reliability (fixation losses 0/11, false positives 2%, false negatives 8%). Clear field. GHT borderline. VFI 99%.     Last DFE: 10/23/2018   Last gonio: never  Last stereo disc photos: 2017    2. No retinopathy noted, OU. Continue proper BS control and annual diabetic eye exams. Monitor yearly.     3. Vessel changes, OU. Newly noted since last exam (2018). CWS OD.   Discussed importance of BP control, taking medications as directed, and following-up with primary care. If changes noted in vision, RTC.   Monitor 6 months unless changes noted sooner.     4. Educated pt on findings. Impacting BCVA, however pt not interested in surgery at this time. Monitor yearly.     5. Updated SRx. " Minimal change OD, moderate change OS. Discussed cataracts playing a role in BCVA. Pt notes understanding. Monitor yearly.       RTC in 6 months for HTN ret f/u + IOP check or sooner if needed.

## 2020-07-22 ENCOUNTER — OFFICE VISIT (OUTPATIENT)
Dept: INTERNAL MEDICINE | Facility: CLINIC | Age: 80
End: 2020-07-22
Payer: MEDICARE

## 2020-07-22 VITALS
DIASTOLIC BLOOD PRESSURE: 70 MMHG | SYSTOLIC BLOOD PRESSURE: 124 MMHG | HEIGHT: 67 IN | WEIGHT: 193.81 LBS | OXYGEN SATURATION: 96 % | BODY MASS INDEX: 30.42 KG/M2 | HEART RATE: 78 BPM

## 2020-07-22 DIAGNOSIS — I10 ESSENTIAL HYPERTENSION: Primary | Chronic | ICD-10-CM

## 2020-07-22 DIAGNOSIS — E11.69 ONYCHOMYCOSIS OF MULTIPLE TOENAILS WITH TYPE 2 DIABETES MELLITUS: ICD-10-CM

## 2020-07-22 DIAGNOSIS — Z23 NEED FOR SHINGLES VACCINE: ICD-10-CM

## 2020-07-22 DIAGNOSIS — E78.2 MIXED HYPERLIPIDEMIA: Chronic | ICD-10-CM

## 2020-07-22 DIAGNOSIS — N18.2 TYPE 2 DIABETES MELLITUS WITH STAGE 2 CHRONIC KIDNEY DISEASE, WITHOUT LONG-TERM CURRENT USE OF INSULIN: Chronic | ICD-10-CM

## 2020-07-22 DIAGNOSIS — B35.1 ONYCHOMYCOSIS OF MULTIPLE TOENAILS WITH TYPE 2 DIABETES MELLITUS: ICD-10-CM

## 2020-07-22 DIAGNOSIS — K21.9 GASTROESOPHAGEAL REFLUX DISEASE WITHOUT ESOPHAGITIS: ICD-10-CM

## 2020-07-22 DIAGNOSIS — N18.2 STAGE 2 CHRONIC KIDNEY DISEASE: Chronic | ICD-10-CM

## 2020-07-22 DIAGNOSIS — Z85.46 HISTORY OF PROSTATE CANCER: Chronic | ICD-10-CM

## 2020-07-22 DIAGNOSIS — E11.22 TYPE 2 DIABETES MELLITUS WITH STAGE 2 CHRONIC KIDNEY DISEASE, WITHOUT LONG-TERM CURRENT USE OF INSULIN: Chronic | ICD-10-CM

## 2020-07-22 DIAGNOSIS — K29.50 CHRONIC GASTRITIS WITHOUT BLEEDING, UNSPECIFIED GASTRITIS TYPE: ICD-10-CM

## 2020-07-22 PROCEDURE — 1101F PT FALLS ASSESS-DOCD LE1/YR: CPT | Mod: HCNC,CPTII,S$GLB, | Performed by: INTERNAL MEDICINE

## 2020-07-22 PROCEDURE — 1101F PR PT FALLS ASSESS DOC 0-1 FALLS W/OUT INJ PAST YR: ICD-10-PCS | Mod: HCNC,CPTII,S$GLB, | Performed by: INTERNAL MEDICINE

## 2020-07-22 PROCEDURE — 3074F SYST BP LT 130 MM HG: CPT | Mod: HCNC,CPTII,S$GLB, | Performed by: INTERNAL MEDICINE

## 2020-07-22 PROCEDURE — 1126F AMNT PAIN NOTED NONE PRSNT: CPT | Mod: HCNC,S$GLB,, | Performed by: INTERNAL MEDICINE

## 2020-07-22 PROCEDURE — 99999 PR PBB SHADOW E&M-EST. PATIENT-LVL III: ICD-10-PCS | Mod: PBBFAC,HCNC,, | Performed by: INTERNAL MEDICINE

## 2020-07-22 PROCEDURE — 3051F HG A1C>EQUAL 7.0%<8.0%: CPT | Mod: HCNC,CPTII,S$GLB, | Performed by: INTERNAL MEDICINE

## 2020-07-22 PROCEDURE — 1126F PR PAIN SEVERITY QUANTIFIED, NO PAIN PRESENT: ICD-10-PCS | Mod: HCNC,S$GLB,, | Performed by: INTERNAL MEDICINE

## 2020-07-22 PROCEDURE — 3078F DIAST BP <80 MM HG: CPT | Mod: HCNC,CPTII,S$GLB, | Performed by: INTERNAL MEDICINE

## 2020-07-22 PROCEDURE — 99214 OFFICE O/P EST MOD 30 MIN: CPT | Mod: HCNC,S$GLB,, | Performed by: INTERNAL MEDICINE

## 2020-07-22 PROCEDURE — 3051F PR MOST RECENT HEMOGLOBIN A1C LEVEL 7.0 - < 8.0%: ICD-10-PCS | Mod: HCNC,CPTII,S$GLB, | Performed by: INTERNAL MEDICINE

## 2020-07-22 PROCEDURE — 99214 PR OFFICE/OUTPT VISIT, EST, LEVL IV, 30-39 MIN: ICD-10-PCS | Mod: HCNC,S$GLB,, | Performed by: INTERNAL MEDICINE

## 2020-07-22 PROCEDURE — 3074F PR MOST RECENT SYSTOLIC BLOOD PRESSURE < 130 MM HG: ICD-10-PCS | Mod: HCNC,CPTII,S$GLB, | Performed by: INTERNAL MEDICINE

## 2020-07-22 PROCEDURE — 1159F PR MEDICATION LIST DOCUMENTED IN MEDICAL RECORD: ICD-10-PCS | Mod: HCNC,S$GLB,, | Performed by: INTERNAL MEDICINE

## 2020-07-22 PROCEDURE — 1159F MED LIST DOCD IN RCRD: CPT | Mod: HCNC,S$GLB,, | Performed by: INTERNAL MEDICINE

## 2020-07-22 PROCEDURE — 99999 PR PBB SHADOW E&M-EST. PATIENT-LVL III: CPT | Mod: PBBFAC,HCNC,, | Performed by: INTERNAL MEDICINE

## 2020-07-22 PROCEDURE — 3078F PR MOST RECENT DIASTOLIC BLOOD PRESSURE < 80 MM HG: ICD-10-PCS | Mod: HCNC,CPTII,S$GLB, | Performed by: INTERNAL MEDICINE

## 2020-07-22 RX ORDER — LOSARTAN POTASSIUM AND HYDROCHLOROTHIAZIDE 25; 100 MG/1; MG/1
1 TABLET ORAL EVERY MORNING
Qty: 90 TABLET | Refills: 3 | Status: SHIPPED | OUTPATIENT
Start: 2020-07-22 | End: 2020-07-22 | Stop reason: SDUPTHER

## 2020-07-22 RX ORDER — CICLOPIROX 80 MG/ML
SOLUTION TOPICAL NIGHTLY
Qty: 6.6 ML | Refills: 2 | Status: SHIPPED | OUTPATIENT
Start: 2020-07-22 | End: 2021-09-29 | Stop reason: SDUPTHER

## 2020-07-22 RX ORDER — ATORVASTATIN CALCIUM 20 MG/1
20 TABLET, FILM COATED ORAL DAILY
Qty: 90 TABLET | Refills: 3 | Status: SHIPPED | OUTPATIENT
Start: 2020-07-22 | End: 2020-07-22 | Stop reason: SDUPTHER

## 2020-07-22 RX ORDER — GLIPIZIDE 5 MG/1
5 TABLET, FILM COATED, EXTENDED RELEASE ORAL
Qty: 90 TABLET | Refills: 3 | Status: SHIPPED | OUTPATIENT
Start: 2020-07-22 | End: 2021-03-24 | Stop reason: SDUPTHER

## 2020-07-22 RX ORDER — ATORVASTATIN CALCIUM 20 MG/1
20 TABLET, FILM COATED ORAL DAILY
Qty: 90 TABLET | Refills: 3 | Status: SHIPPED | OUTPATIENT
Start: 2020-07-22 | End: 2021-03-24 | Stop reason: SDUPTHER

## 2020-07-22 RX ORDER — LOSARTAN POTASSIUM AND HYDROCHLOROTHIAZIDE 25; 100 MG/1; MG/1
1 TABLET ORAL EVERY MORNING
Qty: 90 TABLET | Refills: 3 | Status: SHIPPED | OUTPATIENT
Start: 2020-07-22 | End: 2021-03-24 | Stop reason: SDUPTHER

## 2020-07-22 RX ORDER — GLIPIZIDE 5 MG/1
5 TABLET, FILM COATED, EXTENDED RELEASE ORAL
Qty: 90 TABLET | Refills: 3 | Status: SHIPPED | OUTPATIENT
Start: 2020-07-22 | End: 2020-07-22 | Stop reason: SDUPTHER

## 2020-07-23 NOTE — PROGRESS NOTES
Subjective:       Patient ID: Bryant Gil is a 79 y.o. male.    Chief Complaint: Follow-up   He has been doing well  His left upper quadrant abdominal pain was investigated with endoscopy with findings of gastritis that looks pretty chronic but no cancer and no H pylori.  He has cut back on eating spicy foods and has definitely made an improvement in his symptoms.    He is compliant with all of his medications.  Follow-up  Associated symptoms include arthralgias. Pertinent negatives include no chest pain, headaches, joint swelling, neck pain, vomiting or weakness.     Review of Systems   Constitutional: Negative for activity change and unexpected weight change.   HENT: Positive for hearing loss. Negative for rhinorrhea and trouble swallowing.    Eyes: Negative for discharge and visual disturbance.   Respiratory: Negative for chest tightness and wheezing.    Cardiovascular: Negative for chest pain and palpitations.   Gastrointestinal: Negative for blood in stool, constipation, diarrhea and vomiting.   Endocrine: Negative for polydipsia and polyuria.   Genitourinary: Negative for difficulty urinating, hematuria and urgency.   Musculoskeletal: Positive for arthralgias. Negative for joint swelling and neck pain.   Neurological: Negative for weakness and headaches.   Psychiatric/Behavioral: Negative for confusion and dysphoric mood.       Objective:      Physical Exam  Constitutional:       General: He is not in acute distress.  HENT:      Head: Atraumatic.   Eyes:      General: No scleral icterus.     Conjunctiva/sclera: Conjunctivae normal.   Neck:      Musculoskeletal: Neck supple.   Cardiovascular:      Rate and Rhythm: Normal rate and regular rhythm.   Pulmonary:      Effort: Pulmonary effort is normal.      Breath sounds: Normal breath sounds.   Abdominal:      Palpations: Abdomen is soft.      Tenderness: There is no abdominal tenderness.   Musculoskeletal:      Comments: Protective Sensation (w/ 10 gram  monofilament):  Right: Intact  Left: Intact    Visual Inspection:  Normal -  Bilateral except there might be some onychomycosis in the toenails    Pedal Pulses:   Right: Present  Left: Present    Posterior tibialis:   Right:Present  Left: Present     Lymphadenopathy:      Cervical: No cervical adenopathy.   Skin:     General: Skin is warm and dry.   Neurological:      Mental Status: He is alert and oriented to person, place, and time.   Psychiatric:         Behavior: Behavior normal.         Assessment:       1. Essential hypertension    2. Mixed hyperlipidemia    3. Type 2 diabetes mellitus with stage 2 chronic kidney disease, without long-term current use of insulin    4. Stage 2 chronic kidney disease    5. Onychomycosis of multiple toenails with type 2 diabetes mellitus    6. History of prostate cancer    7. Gastroesophageal reflux disease without esophagitis    8. Chronic gastritis without bleeding, unspecified gastritis type    9. Need for shingles vaccine        Plan:   Bryant was seen today for follow-up.    Diagnoses and all orders for this visit:    Essential hypertension    Mixed hyperlipidemia    Type 2 diabetes mellitus with stage 2 chronic kidney disease, without long-term current use of insulin  -     Comprehensive metabolic panel; Future  -     Hemoglobin A1C; Future    Stage 2 chronic kidney disease  -     Comprehensive metabolic panel; Future  -     Hemoglobin A1C; Future    Onychomycosis of multiple toenails with type 2 diabetes mellitus  -     ciclopirox (PENLAC) 8 % Soln; Apply topically nightly.    History of prostate cancer    Gastroesophageal reflux disease without esophagitis    Chronic gastritis without bleeding, unspecified gastritis type    Need for shingles vaccine    Other orders  -     losartan-hydrochlorothiazide 100-25 mg (HYZAAR) 100-25 mg per tablet; Take 1 tablet by mouth every morning. Blood pressure  -     glipiZIDE (GLUCOTROL) 5 MG TR24; Take 1 tablet (5 mg total) by mouth  daily with breakfast.  -     atorvastatin (LIPITOR) 20 MG tablet; Take 1 tablet (20 mg total) by mouth once daily.      Follow up in about 6 months (around 1/22/2021) for after labs for physical.

## 2020-07-30 ENCOUNTER — PATIENT OUTREACH (OUTPATIENT)
Dept: OTHER | Facility: OTHER | Age: 80
End: 2020-07-30

## 2020-07-31 PROCEDURE — 99454 PR REMOTE MNTR, PHYS PARAM, INITIAL, EA 30 DAYS: ICD-10-PCS | Mod: S$GLB,,, | Performed by: INTERNAL MEDICINE

## 2020-07-31 PROCEDURE — 99454 REM MNTR PHYSIOL PARAM 16-30: CPT | Mod: S$GLB,,, | Performed by: INTERNAL MEDICINE

## 2020-08-10 ENCOUNTER — PES CALL (OUTPATIENT)
Dept: ADMINISTRATIVE | Facility: CLINIC | Age: 80
End: 2020-08-10

## 2020-08-17 DIAGNOSIS — N18.2 TYPE 2 DIABETES MELLITUS WITH STAGE 2 CHRONIC KIDNEY DISEASE, WITHOUT LONG-TERM CURRENT USE OF INSULIN: Chronic | ICD-10-CM

## 2020-08-17 DIAGNOSIS — E11.22 TYPE 2 DIABETES MELLITUS WITH STAGE 2 CHRONIC KIDNEY DISEASE, WITHOUT LONG-TERM CURRENT USE OF INSULIN: Chronic | ICD-10-CM

## 2020-08-17 RX ORDER — DEXTROSE 4 G
TABLET,CHEWABLE ORAL
Qty: 1 EACH | Refills: 0 | Status: SHIPPED | OUTPATIENT
Start: 2020-08-17 | End: 2022-01-14 | Stop reason: SDUPTHER

## 2020-08-17 RX ORDER — BLOOD-GLUCOSE CONTROL, NORMAL
EACH MISCELLANEOUS
Qty: 100 EACH | Refills: 6 | Status: SHIPPED | OUTPATIENT
Start: 2020-08-17 | End: 2021-04-01

## 2020-08-17 NOTE — TELEPHONE ENCOUNTER
No new care gaps identified.  Powered by [employer]. Reference number: [id number]. 8/17/2020 2:38:53 PM CDT

## 2020-09-16 ENCOUNTER — PES CALL (OUTPATIENT)
Dept: ADMINISTRATIVE | Facility: CLINIC | Age: 80
End: 2020-09-16

## 2020-09-21 DIAGNOSIS — I10 ESSENTIAL HYPERTENSION: ICD-10-CM

## 2020-09-21 NOTE — TELEPHONE ENCOUNTER
No new care gaps identified.  Powered by [employer]. Reference number: [id number]. 9/21/2020 1:55:14 AM CDT

## 2020-09-22 RX ORDER — METOPROLOL SUCCINATE 25 MG/1
TABLET, EXTENDED RELEASE ORAL
Qty: 90 TABLET | Refills: 3 | Status: SHIPPED | OUTPATIENT
Start: 2020-09-22 | End: 2021-03-24 | Stop reason: SDUPTHER

## 2020-09-22 NOTE — PROGRESS NOTES
Refill Authorization Note   Bryant Gil is requesting a refill authorization.     Brief assessment and rationale for refill: APPROVE: prr          Medication Therapy Plan: [facility]    Medication reconciliation completed: No                    Comments:      Orders Placed This Encounter    metoprolol succinate (TOPROL-XL) 25 MG 24 hr tablet      Requested Prescriptions   Signed Prescriptions Disp Refills    metoprolol succinate (TOPROL-XL) 25 MG 24 hr tablet 90 tablet 3     Sig: TAKE 1 TABLET ONCE DAILY FOR BLOOD PRESSURE       Cardiovascular:  Beta Blockers Passed - 9/21/2020  1:54 AM        Passed - Patient is at least 18 years old        Passed - Last BP in normal range within 360 days.     BP Readings from Last 3 Encounters:   07/22/20 124/70   05/14/20 128/76   03/02/20 136/77              Passed - Last Heart Rate in normal range within 360 days.     Pulse Readings from Last 3 Encounters:   07/22/20 78   05/14/20 67   04/08/20 88             Passed - Office visit in past 12 months or future 90 days.     Recent Outpatient Visits            2 months ago Essential hypertension    [facility] Laverne Zaidi MD    2 months ago Ocular hypertension, bilateral    [facility]    5 months ago Viral respiratory infection    [facility] Laverne Zaidi MD    6 months ago Dyspepsia    [facility] Rachel Burrows MD    8 months ago Type 2 diabetes mellitus with stage 2 chronic kidney disease, without long-term current use of insulin    [facility] Laverne Zaidi MD          Future Appointments              In 2 weeks GABBY [place] [facility], [facility]    [facility] 3 months [facility]    In 4 months MD Nik [facility]                    Appointments   past 12m or future 3m with PCP    Date Provider   Last Visit   7/22/2020 Laverne Zaidi MD   Next Visit   1/26/2021 Laverne Zaidi MD   ED visits in past 90 days: 0     Note composed:7:29 AM 09/22/2020

## 2020-09-23 NOTE — PROGRESS NOTES
Digital Medicine: Health  Follow-Up    The history is provided by the patient.             Reason for review: Blood pressure at goal        Topics Covered on Call: physical activity    Additional Follow-up details: Patient stated that he is doing well and feeling good. He denies any major issues at this time regarding his pressure. He is pleased with his readings overall.           Diet-Not assessed          Physical Activity-Change  3 day(s) a week.     He added treadmill to His physical activity routine.      Medication Adherence-Medication adherence was assessed.      Substance, Sleep, Stress-Not assessed      Continue current diet/physical activity routine.       Addressed patient questions and patient has my contact information if needed prior to next outreach.   Explained the importance of self-monitoring and medication adherence. Encouraged the patient to communicate with their health  for lifestyle modifications to help improve or maintain a healthy lifestyle.                Topic    Urine Protein Check        Last 5 Patient Entered Readings                                      Current 30 Day Average: 127/72     Recent Readings 9/20/2020 9/17/2020 9/16/2020 9/14/2020 9/10/2020    SBP (mmHg) 136 125 127 126 121    DBP (mmHg) 71 72 72 71 67    Pulse 79 - 80 80 73

## 2020-10-12 ENCOUNTER — OFFICE VISIT (OUTPATIENT)
Dept: INTERNAL MEDICINE | Facility: CLINIC | Age: 80
End: 2020-10-12
Payer: MEDICARE

## 2020-10-12 VITALS
HEIGHT: 67 IN | DIASTOLIC BLOOD PRESSURE: 76 MMHG | HEART RATE: 76 BPM | SYSTOLIC BLOOD PRESSURE: 138 MMHG | BODY MASS INDEX: 28.6 KG/M2 | WEIGHT: 182.19 LBS

## 2020-10-12 DIAGNOSIS — K57.30 SIGMOID DIVERTICULOSIS: Chronic | ICD-10-CM

## 2020-10-12 DIAGNOSIS — Z85.46 HISTORY OF PROSTATE CANCER: Chronic | ICD-10-CM

## 2020-10-12 DIAGNOSIS — K21.9 GASTROESOPHAGEAL REFLUX DISEASE WITHOUT ESOPHAGITIS: ICD-10-CM

## 2020-10-12 DIAGNOSIS — E78.2 MIXED HYPERLIPIDEMIA: Chronic | ICD-10-CM

## 2020-10-12 DIAGNOSIS — H40.053 BILATERAL OCULAR HYPERTENSION: ICD-10-CM

## 2020-10-12 DIAGNOSIS — E11.42 TYPE 2 DIABETES MELLITUS WITH DIABETIC POLYNEUROPATHY, WITHOUT LONG-TERM CURRENT USE OF INSULIN: Chronic | ICD-10-CM

## 2020-10-12 DIAGNOSIS — I70.0 AORTIC ATHEROSCLEROSIS: ICD-10-CM

## 2020-10-12 DIAGNOSIS — E11.42 DIABETIC POLYNEUROPATHY ASSOCIATED WITH TYPE 2 DIABETES MELLITUS: ICD-10-CM

## 2020-10-12 DIAGNOSIS — N18.2 TYPE 2 DIABETES MELLITUS WITH STAGE 2 CHRONIC KIDNEY DISEASE, WITHOUT LONG-TERM CURRENT USE OF INSULIN: Chronic | ICD-10-CM

## 2020-10-12 DIAGNOSIS — E11.22 TYPE 2 DIABETES MELLITUS WITH STAGE 2 CHRONIC KIDNEY DISEASE, WITHOUT LONG-TERM CURRENT USE OF INSULIN: Chronic | ICD-10-CM

## 2020-10-12 DIAGNOSIS — Z00.00 ENCOUNTER FOR PREVENTIVE HEALTH EXAMINATION: Primary | ICD-10-CM

## 2020-10-12 DIAGNOSIS — N18.2 STAGE 2 CHRONIC KIDNEY DISEASE: Chronic | ICD-10-CM

## 2020-10-12 DIAGNOSIS — Z11.59 NEED FOR HEPATITIS C SCREENING TEST: ICD-10-CM

## 2020-10-12 DIAGNOSIS — I10 ESSENTIAL HYPERTENSION: Chronic | ICD-10-CM

## 2020-10-12 PROCEDURE — 99499 UNLISTED E&M SERVICE: CPT | Mod: S$GLB,,, | Performed by: NURSE PRACTITIONER

## 2020-10-12 PROCEDURE — 3075F SYST BP GE 130 - 139MM HG: CPT | Mod: HCNC,CPTII,S$GLB, | Performed by: NURSE PRACTITIONER

## 2020-10-12 PROCEDURE — 3051F HG A1C>EQUAL 7.0%<8.0%: CPT | Mod: HCNC,CPTII,S$GLB, | Performed by: NURSE PRACTITIONER

## 2020-10-12 PROCEDURE — 99999 PR PBB SHADOW E&M-EST. PATIENT-LVL V: CPT | Mod: PBBFAC,HCNC,, | Performed by: NURSE PRACTITIONER

## 2020-10-12 PROCEDURE — 3078F DIAST BP <80 MM HG: CPT | Mod: HCNC,CPTII,S$GLB, | Performed by: NURSE PRACTITIONER

## 2020-10-12 PROCEDURE — 3051F PR MOST RECENT HEMOGLOBIN A1C LEVEL 7.0 - < 8.0%: ICD-10-PCS | Mod: HCNC,CPTII,S$GLB, | Performed by: NURSE PRACTITIONER

## 2020-10-12 PROCEDURE — 3075F PR MOST RECENT SYSTOLIC BLOOD PRESS GE 130-139MM HG: ICD-10-PCS | Mod: HCNC,CPTII,S$GLB, | Performed by: NURSE PRACTITIONER

## 2020-10-12 PROCEDURE — G0439 PR MEDICARE ANNUAL WELLNESS SUBSEQUENT VISIT: ICD-10-PCS | Mod: HCNC,S$GLB,, | Performed by: NURSE PRACTITIONER

## 2020-10-12 PROCEDURE — G0439 PPPS, SUBSEQ VISIT: HCPCS | Mod: HCNC,S$GLB,, | Performed by: NURSE PRACTITIONER

## 2020-10-12 PROCEDURE — 99499 RISK ADDL DX/OHS AUDIT: ICD-10-PCS | Mod: S$GLB,,, | Performed by: NURSE PRACTITIONER

## 2020-10-12 PROCEDURE — 99999 PR PBB SHADOW E&M-EST. PATIENT-LVL V: ICD-10-PCS | Mod: PBBFAC,HCNC,, | Performed by: NURSE PRACTITIONER

## 2020-10-12 PROCEDURE — 3078F PR MOST RECENT DIASTOLIC BLOOD PRESSURE < 80 MM HG: ICD-10-PCS | Mod: HCNC,CPTII,S$GLB, | Performed by: NURSE PRACTITIONER

## 2020-10-12 NOTE — PROGRESS NOTES
"  Bryant Gil presented for a  Medicare AWV and comprehensive Health Risk Assessment today. The following components were reviewed and updated:    · Medical history  · Family History  · Social history  · Allergies and Current Medications  · Health Risk Assessment  · Health Maintenance  · Care Team     ** See Completed Assessments for Annual Wellness Visit within the encounter summary.**         The following assessments were completed:  · Living Situation  · CAGE  · Depression Screening  · Timed Get Up and Go  · Whisper Test  · Cognitive Function Screening  · Nutrition Screening  · ADL Screening  · PAQ Screening        Vitals:    10/12/20 0740   BP: 138/76   BP Location: Left arm   Patient Position: Sitting   Pulse: 76   Weight: 82.6 kg (182 lb 3.4 oz)   Height: 5' 7" (1.702 m)     Body mass index is 28.54 kg/m².     Physical Exam  Constitutional:       General: He is not in acute distress.     Appearance: He is well-developed. He is not ill-appearing or diaphoretic.   HENT:      Head: Normocephalic and atraumatic.   Eyes:      General: No scleral icterus.     Conjunctiva/sclera: Conjunctivae normal.   Neck:      Musculoskeletal: Normal range of motion and neck supple.   Cardiovascular:      Rate and Rhythm: Normal rate and regular rhythm.      Pulses: Normal pulses.      Heart sounds: Normal heart sounds.   Pulmonary:      Effort: Pulmonary effort is normal. No respiratory distress.      Breath sounds: Normal breath sounds.   Abdominal:      General: There is no distension.   Musculoskeletal: Normal range of motion.   Skin:     General: Skin is warm and dry.   Neurological:      Mental Status: He is alert and oriented to person, place, and time.   Psychiatric:         Behavior: Behavior normal.           Diagnoses and health risks identified today and associated recommendations/orders:    1. Encounter for preventive health examination  Assessments completed  Preventive health recommendations reviewed    2. Type 2 " diabetes mellitus with diabetic polyneuropathy, without long-term current use of insulin  Stable. Last A1C 7.7  Controlled with current medical therapy  Followed by PCP.     3. Diabetic polyneuropathy associated with type 2 diabetes mellitus  Stable.   Controlled with current medical therapy  Followed by PCP.     4. Type 2 diabetes mellitus with stage 2 chronic kidney disease, without long-term current use of insulin  Stable. Last A1C 7.7.  Controlled with current medical therapy  Followed by PCP.     5. Stage 2 chronic kidney disease  Stable.   Followed by PCP.     6. Aortic atherosclerosis  Stable. Seen on CT from 01/08/19  Controlled with current medical therapy  Followed by PCP.     7. Essential hypertension  Stable.   Controlled with current medical therapy  Followed by PCP.     8. Mixed hyperlipidemia  Stable.   Controlled with current medical therapy  Followed by PCP.     9. Bilateral ocular hypertension  Stable.   Followed by optometry.     10. Gastroesophageal reflux disease without esophagitis  Stable.   Controlled with current medical therapy  Followed by PCP.     11. Sigmoid diverticulosis  Stable.   Followed by PCP.     12. History of prostate cancer  Stable.   Followed by PCP.     13. Need for hepatitis C screening test  - Hepatitis C Antibody; Future    Provided Bryant with a 5-10 year written screening schedule and personal prevention plan. Recommendations were developed using the USPSTF age appropriate recommendations. Education, counseling, and referrals were provided as needed. After Visit Summary printed and given to patient which includes a list of additional screenings\tests needed.    Follow up in 3 months (on 1/26/2021) for a routine visit with your primary care provider or sooner if problems arise.    Adelina Stephen NP

## 2020-10-12 NOTE — PROGRESS NOTES
I offered to discuss end of life issues, including information on how to make advance directives that the patient could use to name someone who would make medical decisions on their behalf if they became too ill to make themselves.    ___Patient declined  _X_Patient has advance directives on file

## 2020-12-18 ENCOUNTER — PATIENT OUTREACH (OUTPATIENT)
Dept: OTHER | Facility: OTHER | Age: 80
End: 2020-12-18

## 2020-12-18 NOTE — PROGRESS NOTES
Digital Medicine: Health  Follow-Up    The history is provided by the patient.             Reason for review: Blood pressure at goal        Topics Covered on Call: Diet    Additional Follow-up details: Patient stated that he is doing well and feeling good overall. He denies any major issues or concerns at this time. He will reach out if any arise.             Diet-Change      Dietary Improvements:Patient has been eating meals at home more rather then going out to eat. His wife is monitoring his sodium intake and cooking lower sodium meal options.           Dietary Indiscretions:Patient stated that he still looks for salt when his wife cooks without it.       Physical Activity-Not assessed    Medication Adherence-Medication adherence was assessed.      Substance, Sleep, Stress-Not assessed      Continue current diet/physical activity routine.  Provided patient education.       Addressed patient questions and patient has my contact information if needed prior to next outreach. Patient verbalizes understanding.      Explained the importance of self-monitoring and medication adherence. Encouraged the patient to communicate with their health  for lifestyle modifications to help improve or maintain a healthy lifestyle.                   Topic    Urine Protein Check          Last 5 Patient Entered Readings                                      Current 30 Day Average: 126/70     Recent Readings 12/15/2020 12/12/2020 12/11/2020 12/11/2020 12/6/2020    SBP (mmHg) 132 128 144 142 121    DBP (mmHg) 72 69 76 72 68    Pulse 73 70 82 80 73

## 2020-12-19 ENCOUNTER — PATIENT MESSAGE (OUTPATIENT)
Dept: ADMINISTRATIVE | Facility: OTHER | Age: 80
End: 2020-12-19

## 2021-01-11 ENCOUNTER — HOSPITAL ENCOUNTER (OUTPATIENT)
Facility: HOSPITAL | Age: 81
Discharge: HOME | End: 2021-01-12
Attending: EMERGENCY MEDICINE | Admitting: HOSPITALIST
Payer: MEDICARE

## 2021-01-11 ENCOUNTER — ANESTHESIA EVENT (OUTPATIENT)
Dept: ENDOSCOPY | Facility: HOSPITAL | Age: 81
End: 2021-01-11
Payer: MEDICARE

## 2021-01-11 DIAGNOSIS — K92.2 LOWER GI BLEEDING: Primary | ICD-10-CM

## 2021-01-11 DIAGNOSIS — R42 LIGHTHEADEDNESS: ICD-10-CM

## 2021-01-11 DIAGNOSIS — K62.5 RECTAL BLEEDING: ICD-10-CM

## 2021-01-11 LAB
ABO + RH BLD: NORMAL
ALBUMIN SERPL BCP-MCNC: 4.1 G/DL (ref 3.5–5.2)
ALP SERPL-CCNC: 54 U/L (ref 55–135)
ALT SERPL W/O P-5'-P-CCNC: 14 U/L (ref 10–44)
ANION GAP SERPL CALC-SCNC: 11 MMOL/L (ref 8–16)
APTT BLDCRRT: 29.6 SEC (ref 21–32)
AST SERPL-CCNC: 13 U/L (ref 10–40)
BASOPHILS # BLD AUTO: 0.04 K/UL (ref 0–0.2)
BASOPHILS # BLD AUTO: 0.05 K/UL (ref 0–0.2)
BASOPHILS NFR BLD: 0.8 % (ref 0–1.9)
BASOPHILS NFR BLD: 1.2 % (ref 0–1.9)
BILIRUB SERPL-MCNC: 0.2 MG/DL (ref 0.1–1)
BLD GP AB SCN CELLS X3 SERPL QL: NORMAL
BUN SERPL-MCNC: 25 MG/DL (ref 8–23)
CALCIUM SERPL-MCNC: 9.2 MG/DL (ref 8.7–10.5)
CHLORIDE SERPL-SCNC: 108 MMOL/L (ref 95–110)
CO2 SERPL-SCNC: 22 MMOL/L (ref 23–29)
CREAT SERPL-MCNC: 1.1 MG/DL (ref 0.5–1.4)
CTP QC/QA: YES
DIFFERENTIAL METHOD: ABNORMAL
DIFFERENTIAL METHOD: ABNORMAL
EOSINOPHIL # BLD AUTO: 0 K/UL (ref 0–0.5)
EOSINOPHIL # BLD AUTO: 0 K/UL (ref 0–0.5)
EOSINOPHIL NFR BLD: 0.8 % (ref 0–8)
EOSINOPHIL NFR BLD: 0.9 % (ref 0–8)
ERYTHROCYTE [DISTWIDTH] IN BLOOD BY AUTOMATED COUNT: 12.2 % (ref 11.5–14.5)
ERYTHROCYTE [DISTWIDTH] IN BLOOD BY AUTOMATED COUNT: 12.2 % (ref 11.5–14.5)
EST. GFR  (AFRICAN AMERICAN): >60 ML/MIN/1.73 M^2
EST. GFR  (NON AFRICAN AMERICAN): >60 ML/MIN/1.73 M^2
ESTIMATED AVG GLUCOSE: 177 MG/DL (ref 68–131)
GLUCOSE SERPL-MCNC: 204 MG/DL (ref 70–110)
HBA1C MFR BLD HPLC: 7.8 % (ref 4–5.6)
HCT VFR BLD AUTO: 35.1 % (ref 40–54)
HCT VFR BLD AUTO: 38.8 % (ref 40–54)
HGB BLD-MCNC: 11.8 G/DL (ref 14–18)
HGB BLD-MCNC: 13 G/DL (ref 14–18)
IMM GRANULOCYTES # BLD AUTO: 0.01 K/UL (ref 0–0.04)
IMM GRANULOCYTES # BLD AUTO: 0.01 K/UL (ref 0–0.04)
IMM GRANULOCYTES NFR BLD AUTO: 0.2 % (ref 0–0.5)
IMM GRANULOCYTES NFR BLD AUTO: 0.2 % (ref 0–0.5)
INR PPP: 1 (ref 0.8–1.2)
LACTATE SERPL-SCNC: 2 MMOL/L (ref 0.5–2.2)
LYMPHOCYTES # BLD AUTO: 1.3 K/UL (ref 1–4.8)
LYMPHOCYTES # BLD AUTO: 1.4 K/UL (ref 1–4.8)
LYMPHOCYTES NFR BLD: 29.6 % (ref 18–48)
LYMPHOCYTES NFR BLD: 29.9 % (ref 18–48)
MCH RBC QN AUTO: 29.1 PG (ref 27–31)
MCH RBC QN AUTO: 29.2 PG (ref 27–31)
MCHC RBC AUTO-ENTMCNC: 33.5 G/DL (ref 32–36)
MCHC RBC AUTO-ENTMCNC: 33.6 G/DL (ref 32–36)
MCV RBC AUTO: 87 FL (ref 82–98)
MCV RBC AUTO: 87 FL (ref 82–98)
MONOCYTES # BLD AUTO: 0.4 K/UL (ref 0.3–1)
MONOCYTES # BLD AUTO: 0.5 K/UL (ref 0.3–1)
MONOCYTES NFR BLD: 10.4 % (ref 4–15)
MONOCYTES NFR BLD: 8.8 % (ref 4–15)
NEUTROPHILS # BLD AUTO: 2.6 K/UL (ref 1.8–7.7)
NEUTROPHILS # BLD AUTO: 2.8 K/UL (ref 1.8–7.7)
NEUTROPHILS NFR BLD: 58.2 % (ref 38–73)
NEUTROPHILS NFR BLD: 59 % (ref 38–73)
NRBC BLD-RTO: 0 /100 WBC
NRBC BLD-RTO: 0 /100 WBC
OB PNL STL: POSITIVE
PLATELET # BLD AUTO: 255 K/UL (ref 150–350)
PLATELET # BLD AUTO: 282 K/UL (ref 150–350)
PMV BLD AUTO: 10.5 FL (ref 9.2–12.9)
PMV BLD AUTO: 10.6 FL (ref 9.2–12.9)
POTASSIUM SERPL-SCNC: 3.9 MMOL/L (ref 3.5–5.1)
PROT SERPL-MCNC: 7.3 G/DL (ref 6–8.4)
PROTHROMBIN TIME: 10.6 SEC (ref 9–12.5)
RBC # BLD AUTO: 4.05 M/UL (ref 4.6–6.2)
RBC # BLD AUTO: 4.45 M/UL (ref 4.6–6.2)
SARS-COV-2 RDRP RESP QL NAA+PROBE: NEGATIVE
SODIUM SERPL-SCNC: 141 MMOL/L (ref 136–145)
TROPONIN I SERPL DL<=0.01 NG/ML-MCNC: 0.02 NG/ML (ref 0–0.03)
WBC # BLD AUTO: 4.32 K/UL (ref 3.9–12.7)
WBC # BLD AUTO: 4.73 K/UL (ref 3.9–12.7)

## 2021-01-11 PROCEDURE — G0378 HOSPITAL OBSERVATION PER HR: HCPCS

## 2021-01-11 PROCEDURE — 83036 HEMOGLOBIN GLYCOSYLATED A1C: CPT | Mod: HCNC

## 2021-01-11 PROCEDURE — 86900 BLOOD TYPING SEROLOGIC ABO: CPT

## 2021-01-11 PROCEDURE — 96361 HYDRATE IV INFUSION ADD-ON: CPT

## 2021-01-11 PROCEDURE — 80053 COMPREHEN METABOLIC PANEL: CPT

## 2021-01-11 PROCEDURE — 84484 ASSAY OF TROPONIN QUANT: CPT

## 2021-01-11 PROCEDURE — 25000003 PHARM REV CODE 250: Performed by: INTERNAL MEDICINE

## 2021-01-11 PROCEDURE — 99285 EMERGENCY DEPT VISIT HI MDM: CPT | Mod: 25,HCNC

## 2021-01-11 PROCEDURE — 85025 COMPLETE CBC W/AUTO DIFF WBC: CPT | Mod: 91,HCNC

## 2021-01-11 PROCEDURE — 93010 ELECTROCARDIOGRAM REPORT: CPT | Mod: ,,, | Performed by: INTERNAL MEDICINE

## 2021-01-11 PROCEDURE — 85730 THROMBOPLASTIN TIME PARTIAL: CPT

## 2021-01-11 PROCEDURE — 63600175 PHARM REV CODE 636 W HCPCS: Performed by: ORTHOPAEDIC SURGERY

## 2021-01-11 PROCEDURE — 83605 ASSAY OF LACTIC ACID: CPT

## 2021-01-11 PROCEDURE — 25000003 PHARM REV CODE 250: Performed by: ORTHOPAEDIC SURGERY

## 2021-01-11 PROCEDURE — 93010 EKG 12-LEAD: ICD-10-PCS | Mod: ,,, | Performed by: INTERNAL MEDICINE

## 2021-01-11 PROCEDURE — 99215 PR OFFICE/OUTPT VISIT, EST, LEVL V, 40-54 MIN: ICD-10-PCS | Mod: ,,, | Performed by: INTERNAL MEDICINE

## 2021-01-11 PROCEDURE — C9113 INJ PANTOPRAZOLE SODIUM, VIA: HCPCS | Performed by: ORTHOPAEDIC SURGERY

## 2021-01-11 PROCEDURE — U0002 COVID-19 LAB TEST NON-CDC: HCPCS | Performed by: EMERGENCY MEDICINE

## 2021-01-11 PROCEDURE — 96374 THER/PROPH/DIAG INJ IV PUSH: CPT

## 2021-01-11 PROCEDURE — 85610 PROTHROMBIN TIME: CPT

## 2021-01-11 PROCEDURE — 82272 OCCULT BLD FECES 1-3 TESTS: CPT

## 2021-01-11 PROCEDURE — 93005 ELECTROCARDIOGRAM TRACING: CPT | Mod: HCNC

## 2021-01-11 PROCEDURE — 99215 OFFICE O/P EST HI 40 MIN: CPT | Mod: ,,, | Performed by: INTERNAL MEDICINE

## 2021-01-11 RX ORDER — GLUCAGON 1 MG
1 KIT INJECTION
Status: DISCONTINUED | OUTPATIENT
Start: 2021-01-11 | End: 2021-01-12 | Stop reason: HOSPADM

## 2021-01-11 RX ORDER — PANTOPRAZOLE SODIUM 40 MG/10ML
40 INJECTION, POWDER, LYOPHILIZED, FOR SOLUTION INTRAVENOUS 2 TIMES DAILY
Status: DISCONTINUED | OUTPATIENT
Start: 2021-01-11 | End: 2021-01-12 | Stop reason: HOSPADM

## 2021-01-11 RX ORDER — SODIUM CHLORIDE 9 MG/ML
INJECTION, SOLUTION INTRAVENOUS CONTINUOUS
Status: DISCONTINUED | OUTPATIENT
Start: 2021-01-11 | End: 2021-01-12 | Stop reason: HOSPADM

## 2021-01-11 RX ORDER — ATORVASTATIN CALCIUM 20 MG/1
20 TABLET, FILM COATED ORAL DAILY
Status: DISCONTINUED | OUTPATIENT
Start: 2021-01-11 | End: 2021-01-12 | Stop reason: HOSPADM

## 2021-01-11 RX ORDER — METOPROLOL SUCCINATE 25 MG/1
25 TABLET, EXTENDED RELEASE ORAL DAILY
Status: DISCONTINUED | OUTPATIENT
Start: 2021-01-11 | End: 2021-01-12 | Stop reason: HOSPADM

## 2021-01-11 RX ORDER — POLYETHYLENE GLYCOL 3350, SODIUM SULFATE ANHYDROUS, SODIUM BICARBONATE, SODIUM CHLORIDE, POTASSIUM CHLORIDE 236; 22.74; 6.74; 5.86; 2.97 G/4L; G/4L; G/4L; G/4L; G/4L
4000 POWDER, FOR SOLUTION ORAL ONCE
Status: COMPLETED | OUTPATIENT
Start: 2021-01-11 | End: 2021-01-11

## 2021-01-11 RX ORDER — LATANOPROST 50 UG/ML
1 SOLUTION/ DROPS OPHTHALMIC DAILY
Status: DISCONTINUED | OUTPATIENT
Start: 2021-01-11 | End: 2021-01-12 | Stop reason: HOSPADM

## 2021-01-11 RX ORDER — INSULIN ASPART 100 [IU]/ML
1-10 INJECTION, SOLUTION INTRAVENOUS; SUBCUTANEOUS EVERY 6 HOURS PRN
Status: DISCONTINUED | OUTPATIENT
Start: 2021-01-11 | End: 2021-01-12 | Stop reason: HOSPADM

## 2021-01-11 RX ORDER — AMLODIPINE BESYLATE 5 MG/1
10 TABLET ORAL DAILY
Status: DISCONTINUED | OUTPATIENT
Start: 2021-01-11 | End: 2021-01-12 | Stop reason: HOSPADM

## 2021-01-11 RX ADMIN — SODIUM CHLORIDE 100 ML/HR: 0.9 INJECTION, SOLUTION INTRAVENOUS at 03:01

## 2021-01-11 RX ADMIN — POLYETHYLENE GLYCOL 3350, SODIUM SULFATE ANHYDROUS, SODIUM BICARBONATE, SODIUM CHLORIDE, POTASSIUM CHLORIDE 4000 ML: 236; 22.74; 6.74; 5.86; 2.97 POWDER, FOR SOLUTION ORAL at 08:01

## 2021-01-11 RX ADMIN — PANTOPRAZOLE SODIUM 40 MG: 40 INJECTION, POWDER, FOR SOLUTION INTRAVENOUS at 09:01

## 2021-01-12 ENCOUNTER — TELEPHONE (OUTPATIENT)
Dept: INTERNAL MEDICINE | Facility: CLINIC | Age: 81
End: 2021-01-12

## 2021-01-12 ENCOUNTER — ANESTHESIA (OUTPATIENT)
Dept: ENDOSCOPY | Facility: HOSPITAL | Age: 81
End: 2021-01-12
Payer: MEDICARE

## 2021-01-12 VITALS
TEMPERATURE: 97 F | BODY MASS INDEX: 27.37 KG/M2 | OXYGEN SATURATION: 99 % | HEART RATE: 70 BPM | RESPIRATION RATE: 18 BRPM | HEIGHT: 67 IN | DIASTOLIC BLOOD PRESSURE: 79 MMHG | SYSTOLIC BLOOD PRESSURE: 158 MMHG | WEIGHT: 174.38 LBS

## 2021-01-12 LAB
ANION GAP SERPL CALC-SCNC: 9 MMOL/L (ref 8–16)
BASOPHILS # BLD AUTO: 0.04 K/UL (ref 0–0.2)
BASOPHILS NFR BLD: 1 % (ref 0–1.9)
BASOPHILS NFR BLD: 1 % (ref 0–1.9)
BASOPHILS NFR BLD: 1.1 % (ref 0–1.9)
BUN SERPL-MCNC: 14 MG/DL (ref 8–23)
CALCIUM SERPL-MCNC: 8.5 MG/DL (ref 8.7–10.5)
CHLORIDE SERPL-SCNC: 107 MMOL/L (ref 95–110)
CO2 SERPL-SCNC: 25 MMOL/L (ref 23–29)
CREAT SERPL-MCNC: 1 MG/DL (ref 0.5–1.4)
DIFFERENTIAL METHOD: ABNORMAL
EOSINOPHIL # BLD AUTO: 0 K/UL (ref 0–0.5)
EOSINOPHIL # BLD AUTO: 0.1 K/UL (ref 0–0.5)
EOSINOPHIL # BLD AUTO: 0.1 K/UL (ref 0–0.5)
EOSINOPHIL NFR BLD: 1 % (ref 0–8)
EOSINOPHIL NFR BLD: 1.6 % (ref 0–8)
EOSINOPHIL NFR BLD: 1.8 % (ref 0–8)
ERYTHROCYTE [DISTWIDTH] IN BLOOD BY AUTOMATED COUNT: 12.1 % (ref 11.5–14.5)
ERYTHROCYTE [DISTWIDTH] IN BLOOD BY AUTOMATED COUNT: 12.1 % (ref 11.5–14.5)
ERYTHROCYTE [DISTWIDTH] IN BLOOD BY AUTOMATED COUNT: 12.3 % (ref 11.5–14.5)
EST. GFR  (AFRICAN AMERICAN): >60 ML/MIN/1.73 M^2
EST. GFR  (NON AFRICAN AMERICAN): >60 ML/MIN/1.73 M^2
GLUCOSE SERPL-MCNC: 172 MG/DL (ref 70–110)
HCT VFR BLD AUTO: 34.5 % (ref 40–54)
HCT VFR BLD AUTO: 34.8 % (ref 40–54)
HCT VFR BLD AUTO: 34.9 % (ref 40–54)
HGB BLD-MCNC: 11.4 G/DL (ref 14–18)
HGB BLD-MCNC: 11.5 G/DL (ref 14–18)
HGB BLD-MCNC: 11.6 G/DL (ref 14–18)
IMM GRANULOCYTES # BLD AUTO: 0 K/UL (ref 0–0.04)
IMM GRANULOCYTES # BLD AUTO: 0.01 K/UL (ref 0–0.04)
IMM GRANULOCYTES # BLD AUTO: 0.01 K/UL (ref 0–0.04)
IMM GRANULOCYTES NFR BLD AUTO: 0 % (ref 0–0.5)
IMM GRANULOCYTES NFR BLD AUTO: 0.3 % (ref 0–0.5)
IMM GRANULOCYTES NFR BLD AUTO: 0.3 % (ref 0–0.5)
LYMPHOCYTES # BLD AUTO: 1.2 K/UL (ref 1–4.8)
LYMPHOCYTES # BLD AUTO: 1.3 K/UL (ref 1–4.8)
LYMPHOCYTES # BLD AUTO: 1.6 K/UL (ref 1–4.8)
LYMPHOCYTES NFR BLD: 33 % (ref 18–48)
LYMPHOCYTES NFR BLD: 34.2 % (ref 18–48)
LYMPHOCYTES NFR BLD: 40.6 % (ref 18–48)
MCH RBC QN AUTO: 29 PG (ref 27–31)
MCH RBC QN AUTO: 29 PG (ref 27–31)
MCH RBC QN AUTO: 29.1 PG (ref 27–31)
MCHC RBC AUTO-ENTMCNC: 33 G/DL (ref 32–36)
MCHC RBC AUTO-ENTMCNC: 33 G/DL (ref 32–36)
MCHC RBC AUTO-ENTMCNC: 33.2 G/DL (ref 32–36)
MCV RBC AUTO: 88 FL (ref 82–98)
MONOCYTES # BLD AUTO: 0.4 K/UL (ref 0.3–1)
MONOCYTES # BLD AUTO: 0.4 K/UL (ref 0.3–1)
MONOCYTES # BLD AUTO: 0.5 K/UL (ref 0.3–1)
MONOCYTES NFR BLD: 10.5 % (ref 4–15)
MONOCYTES NFR BLD: 11.5 % (ref 4–15)
MONOCYTES NFR BLD: 11.7 % (ref 4–15)
NEUTROPHILS # BLD AUTO: 1.8 K/UL (ref 1.8–7.7)
NEUTROPHILS # BLD AUTO: 2 K/UL (ref 1.8–7.7)
NEUTROPHILS # BLD AUTO: 2 K/UL (ref 1.8–7.7)
NEUTROPHILS NFR BLD: 46.9 % (ref 38–73)
NEUTROPHILS NFR BLD: 51.2 % (ref 38–73)
NEUTROPHILS NFR BLD: 52.3 % (ref 38–73)
NRBC BLD-RTO: 0 /100 WBC
PLATELET # BLD AUTO: 241 K/UL (ref 150–350)
PLATELET # BLD AUTO: 242 K/UL (ref 150–350)
PLATELET # BLD AUTO: 263 K/UL (ref 150–350)
PMV BLD AUTO: 10.2 FL (ref 9.2–12.9)
PMV BLD AUTO: 10.5 FL (ref 9.2–12.9)
PMV BLD AUTO: 10.9 FL (ref 9.2–12.9)
POCT GLUCOSE: 130 MG/DL (ref 70–110)
POCT GLUCOSE: 154 MG/DL (ref 70–110)
POCT GLUCOSE: 154 MG/DL (ref 70–110)
POCT GLUCOSE: 162 MG/DL (ref 70–110)
POTASSIUM SERPL-SCNC: 3.5 MMOL/L (ref 3.5–5.1)
RBC # BLD AUTO: 3.93 M/UL (ref 4.6–6.2)
RBC # BLD AUTO: 3.97 M/UL (ref 4.6–6.2)
RBC # BLD AUTO: 3.98 M/UL (ref 4.6–6.2)
SODIUM SERPL-SCNC: 141 MMOL/L (ref 136–145)
WBC # BLD AUTO: 3.76 K/UL (ref 3.9–12.7)
WBC # BLD AUTO: 3.89 K/UL (ref 3.9–12.7)
WBC # BLD AUTO: 3.92 K/UL (ref 3.9–12.7)

## 2021-01-12 PROCEDURE — 25000003 PHARM REV CODE 250: Mod: HCNC | Performed by: FAMILY MEDICINE

## 2021-01-12 PROCEDURE — 63600175 PHARM REV CODE 636 W HCPCS: Mod: HCNC | Performed by: ORTHOPAEDIC SURGERY

## 2021-01-12 PROCEDURE — 96376 TX/PRO/DX INJ SAME DRUG ADON: CPT | Mod: 59

## 2021-01-12 PROCEDURE — 63600175 PHARM REV CODE 636 W HCPCS: Mod: HCNC | Performed by: NURSE ANESTHETIST, CERTIFIED REGISTERED

## 2021-01-12 PROCEDURE — 96361 HYDRATE IV INFUSION ADD-ON: CPT

## 2021-01-12 PROCEDURE — G0378 HOSPITAL OBSERVATION PER HR: HCPCS | Mod: HCNC

## 2021-01-12 PROCEDURE — 25000003 PHARM REV CODE 250: Mod: HCNC | Performed by: INTERNAL MEDICINE

## 2021-01-12 PROCEDURE — C9113 INJ PANTOPRAZOLE SODIUM, VIA: HCPCS | Mod: HCNC | Performed by: ORTHOPAEDIC SURGERY

## 2021-01-12 PROCEDURE — 25000003 PHARM REV CODE 250: Mod: HCNC | Performed by: NURSE ANESTHETIST, CERTIFIED REGISTERED

## 2021-01-12 PROCEDURE — 37000009 HC ANESTHESIA EA ADD 15 MINS: Mod: HCNC | Performed by: INTERNAL MEDICINE

## 2021-01-12 PROCEDURE — 36415 COLL VENOUS BLD VENIPUNCTURE: CPT | Mod: HCNC

## 2021-01-12 PROCEDURE — 45378 PR COLONOSCOPY,DIAGNOSTIC: ICD-10-PCS | Mod: HCNC,,, | Performed by: INTERNAL MEDICINE

## 2021-01-12 PROCEDURE — 45378 DIAGNOSTIC COLONOSCOPY: CPT | Mod: HCNC | Performed by: INTERNAL MEDICINE

## 2021-01-12 PROCEDURE — 25000003 PHARM REV CODE 250: Mod: HCNC | Performed by: ORTHOPAEDIC SURGERY

## 2021-01-12 PROCEDURE — 45378 DIAGNOSTIC COLONOSCOPY: CPT | Mod: HCNC,,, | Performed by: INTERNAL MEDICINE

## 2021-01-12 PROCEDURE — 80048 BASIC METABOLIC PNL TOTAL CA: CPT | Mod: HCNC

## 2021-01-12 PROCEDURE — 96372 THER/PROPH/DIAG INJ SC/IM: CPT | Mod: 59

## 2021-01-12 PROCEDURE — 85025 COMPLETE CBC W/AUTO DIFF WBC: CPT | Mod: HCNC

## 2021-01-12 PROCEDURE — 37000008 HC ANESTHESIA 1ST 15 MINUTES: Mod: HCNC | Performed by: INTERNAL MEDICINE

## 2021-01-12 RX ORDER — LOSARTAN POTASSIUM 50 MG/1
50 TABLET ORAL DAILY
Status: DISCONTINUED | OUTPATIENT
Start: 2021-01-12 | End: 2021-01-12 | Stop reason: HOSPADM

## 2021-01-12 RX ORDER — LIDOCAINE HCL/PF 100 MG/5ML
SYRINGE (ML) INTRAVENOUS
Status: DISCONTINUED | OUTPATIENT
Start: 2021-01-12 | End: 2021-01-12

## 2021-01-12 RX ORDER — DEXTROMETHORPHAN/PSEUDOEPHED 2.5-7.5/.8
DROPS ORAL
Status: COMPLETED | OUTPATIENT
Start: 2021-01-12 | End: 2021-01-12

## 2021-01-12 RX ORDER — PROPOFOL 10 MG/ML
VIAL (ML) INTRAVENOUS CONTINUOUS PRN
Status: DISCONTINUED | OUTPATIENT
Start: 2021-01-12 | End: 2021-01-12

## 2021-01-12 RX ORDER — PROPOFOL 10 MG/ML
VIAL (ML) INTRAVENOUS
Status: DISCONTINUED | OUTPATIENT
Start: 2021-01-12 | End: 2021-01-12

## 2021-01-12 RX ADMIN — SIMETHICONE 40 MG: 20 SUSPENSION/ DROPS ORAL at 11:01

## 2021-01-12 RX ADMIN — PANTOPRAZOLE SODIUM 40 MG: 40 INJECTION, POWDER, FOR SOLUTION INTRAVENOUS at 08:01

## 2021-01-12 RX ADMIN — LOSARTAN POTASSIUM 50 MG: 50 TABLET ORAL at 10:01

## 2021-01-12 RX ADMIN — AMLODIPINE BESYLATE 10 MG: 5 TABLET ORAL at 08:01

## 2021-01-12 RX ADMIN — PROPOFOL 70 MG: 10 INJECTION, EMULSION INTRAVENOUS at 11:01

## 2021-01-12 RX ADMIN — SODIUM CHLORIDE 100 ML/HR: 0.9 INJECTION, SOLUTION INTRAVENOUS at 05:01

## 2021-01-12 RX ADMIN — PROPOFOL 125 MCG/KG/MIN: 10 INJECTION, EMULSION INTRAVENOUS at 11:01

## 2021-01-12 RX ADMIN — METOPROLOL SUCCINATE 25 MG: 25 TABLET, EXTENDED RELEASE ORAL at 08:01

## 2021-01-12 RX ADMIN — ATORVASTATIN CALCIUM 20 MG: 20 TABLET, FILM COATED ORAL at 08:01

## 2021-01-12 RX ADMIN — INSULIN ASPART 2 UNITS: 100 INJECTION, SOLUTION INTRAVENOUS; SUBCUTANEOUS at 08:01

## 2021-01-12 RX ADMIN — PROPOFOL 30 MG: 10 INJECTION, EMULSION INTRAVENOUS at 11:01

## 2021-01-12 RX ADMIN — LIDOCAINE HYDROCHLORIDE 75 MG: 20 INJECTION, SOLUTION INTRAVENOUS at 11:01

## 2021-01-20 ENCOUNTER — IMMUNIZATION (OUTPATIENT)
Dept: PHARMACY | Facility: CLINIC | Age: 81
End: 2021-01-20
Payer: MEDICARE

## 2021-01-20 DIAGNOSIS — Z23 NEED FOR VACCINATION: Primary | ICD-10-CM

## 2021-01-22 ENCOUNTER — PATIENT OUTREACH (OUTPATIENT)
Dept: ADMINISTRATIVE | Facility: HOSPITAL | Age: 81
End: 2021-01-22

## 2021-01-29 ENCOUNTER — LAB VISIT (OUTPATIENT)
Dept: LAB | Facility: HOSPITAL | Age: 81
End: 2021-01-29
Attending: INTERNAL MEDICINE
Payer: MEDICARE

## 2021-01-29 DIAGNOSIS — N18.2 TYPE 2 DIABETES MELLITUS WITH STAGE 2 CHRONIC KIDNEY DISEASE, WITHOUT LONG-TERM CURRENT USE OF INSULIN: Chronic | ICD-10-CM

## 2021-01-29 DIAGNOSIS — N18.2 STAGE 2 CHRONIC KIDNEY DISEASE: Chronic | ICD-10-CM

## 2021-01-29 DIAGNOSIS — E11.22 TYPE 2 DIABETES MELLITUS WITH STAGE 2 CHRONIC KIDNEY DISEASE, WITHOUT LONG-TERM CURRENT USE OF INSULIN: Chronic | ICD-10-CM

## 2021-01-29 DIAGNOSIS — Z11.59 NEED FOR HEPATITIS C SCREENING TEST: ICD-10-CM

## 2021-01-29 LAB
ALBUMIN SERPL BCP-MCNC: 4.1 G/DL (ref 3.5–5.2)
ALP SERPL-CCNC: 63 U/L (ref 55–135)
ALT SERPL W/O P-5'-P-CCNC: 15 U/L (ref 10–44)
ANION GAP SERPL CALC-SCNC: 8 MMOL/L (ref 8–16)
AST SERPL-CCNC: 16 U/L (ref 10–40)
BILIRUB SERPL-MCNC: 0.3 MG/DL (ref 0.1–1)
BUN SERPL-MCNC: 17 MG/DL (ref 8–23)
CALCIUM SERPL-MCNC: 9 MG/DL (ref 8.7–10.5)
CHLORIDE SERPL-SCNC: 107 MMOL/L (ref 95–110)
CO2 SERPL-SCNC: 25 MMOL/L (ref 23–29)
CREAT SERPL-MCNC: 1.2 MG/DL (ref 0.5–1.4)
EST. GFR  (AFRICAN AMERICAN): >60 ML/MIN/1.73 M^2
EST. GFR  (NON AFRICAN AMERICAN): 57.6 ML/MIN/1.73 M^2
ESTIMATED AVG GLUCOSE: 171 MG/DL (ref 68–131)
GLUCOSE SERPL-MCNC: 156 MG/DL (ref 70–110)
HBA1C MFR BLD: 7.6 % (ref 4–5.6)
HCV AB SERPL QL IA: NEGATIVE
POTASSIUM SERPL-SCNC: 3.7 MMOL/L (ref 3.5–5.1)
PROT SERPL-MCNC: 7.3 G/DL (ref 6–8.4)
SODIUM SERPL-SCNC: 140 MMOL/L (ref 136–145)

## 2021-01-29 PROCEDURE — 36415 COLL VENOUS BLD VENIPUNCTURE: CPT | Mod: HCNC

## 2021-01-29 PROCEDURE — 80053 COMPREHEN METABOLIC PANEL: CPT | Mod: HCNC

## 2021-01-29 PROCEDURE — 83036 HEMOGLOBIN GLYCOSYLATED A1C: CPT | Mod: HCNC

## 2021-01-29 PROCEDURE — 86803 HEPATITIS C AB TEST: CPT | Mod: HCNC

## 2021-02-05 ENCOUNTER — OFFICE VISIT (OUTPATIENT)
Dept: INTERNAL MEDICINE | Facility: CLINIC | Age: 81
End: 2021-02-05
Payer: MEDICARE

## 2021-02-05 ENCOUNTER — LAB VISIT (OUTPATIENT)
Dept: LAB | Facility: HOSPITAL | Age: 81
End: 2021-02-05
Attending: INTERNAL MEDICINE
Payer: MEDICARE

## 2021-02-05 VITALS
SYSTOLIC BLOOD PRESSURE: 128 MMHG | HEART RATE: 74 BPM | WEIGHT: 182.31 LBS | BODY MASS INDEX: 28.61 KG/M2 | DIASTOLIC BLOOD PRESSURE: 80 MMHG | OXYGEN SATURATION: 96 % | HEIGHT: 67 IN

## 2021-02-05 DIAGNOSIS — R55 NEAR SYNCOPE: ICD-10-CM

## 2021-02-05 DIAGNOSIS — Z09 HOSPITAL DISCHARGE FOLLOW-UP: Primary | ICD-10-CM

## 2021-02-05 DIAGNOSIS — K29.50 CHRONIC GASTRITIS WITHOUT BLEEDING, UNSPECIFIED GASTRITIS TYPE: ICD-10-CM

## 2021-02-05 DIAGNOSIS — K92.2 LOWER GI BLEED: ICD-10-CM

## 2021-02-05 DIAGNOSIS — K57.30 DIVERTICULOSIS OF COLON WITHOUT DIVERTICULITIS: ICD-10-CM

## 2021-02-05 DIAGNOSIS — E11.22 TYPE 2 DIABETES MELLITUS WITH STAGE 2 CHRONIC KIDNEY DISEASE, WITHOUT LONG-TERM CURRENT USE OF INSULIN: Chronic | ICD-10-CM

## 2021-02-05 DIAGNOSIS — H53.9 TRANSIENT VISION DISTURBANCE: ICD-10-CM

## 2021-02-05 DIAGNOSIS — R06.02 SOB (SHORTNESS OF BREATH): ICD-10-CM

## 2021-02-05 DIAGNOSIS — N18.2 TYPE 2 DIABETES MELLITUS WITH STAGE 2 CHRONIC KIDNEY DISEASE, WITHOUT LONG-TERM CURRENT USE OF INSULIN: Chronic | ICD-10-CM

## 2021-02-05 DIAGNOSIS — I70.0 AORTIC ATHEROSCLEROSIS: ICD-10-CM

## 2021-02-05 LAB
BASOPHILS # BLD AUTO: 0.06 K/UL (ref 0–0.2)
BASOPHILS NFR BLD: 1.2 % (ref 0–1.9)
DIFFERENTIAL METHOD: ABNORMAL
EOSINOPHIL # BLD AUTO: 0.1 K/UL (ref 0–0.5)
EOSINOPHIL NFR BLD: 1 % (ref 0–8)
ERYTHROCYTE [DISTWIDTH] IN BLOOD BY AUTOMATED COUNT: 12.5 % (ref 11.5–14.5)
HCT VFR BLD AUTO: 38 % (ref 40–54)
HGB BLD-MCNC: 11.8 G/DL (ref 14–18)
IMM GRANULOCYTES # BLD AUTO: 0.01 K/UL (ref 0–0.04)
IMM GRANULOCYTES NFR BLD AUTO: 0.2 % (ref 0–0.5)
LYMPHOCYTES # BLD AUTO: 1.8 K/UL (ref 1–4.8)
LYMPHOCYTES NFR BLD: 37.6 % (ref 18–48)
MCH RBC QN AUTO: 27.6 PG (ref 27–31)
MCHC RBC AUTO-ENTMCNC: 31.1 G/DL (ref 32–36)
MCV RBC AUTO: 89 FL (ref 82–98)
MONOCYTES # BLD AUTO: 0.5 K/UL (ref 0.3–1)
MONOCYTES NFR BLD: 10.1 % (ref 4–15)
NEUTROPHILS # BLD AUTO: 2.4 K/UL (ref 1.8–7.7)
NEUTROPHILS NFR BLD: 49.9 % (ref 38–73)
NRBC BLD-RTO: 0 /100 WBC
PLATELET # BLD AUTO: 330 K/UL (ref 150–350)
PMV BLD AUTO: 10.9 FL (ref 9.2–12.9)
RBC # BLD AUTO: 4.27 M/UL (ref 4.6–6.2)
WBC # BLD AUTO: 4.87 K/UL (ref 3.9–12.7)

## 2021-02-05 PROCEDURE — 1157F PR ADVANCE CARE PLAN OR EQUIV PRESENT IN MEDICAL RECORD: ICD-10-PCS | Mod: S$GLB,,, | Performed by: INTERNAL MEDICINE

## 2021-02-05 PROCEDURE — 3288F PR FALLS RISK ASSESSMENT DOCUMENTED: ICD-10-PCS | Mod: CPTII,S$GLB,, | Performed by: INTERNAL MEDICINE

## 2021-02-05 PROCEDURE — 3051F HG A1C>EQUAL 7.0%<8.0%: CPT | Mod: CPTII,S$GLB,, | Performed by: INTERNAL MEDICINE

## 2021-02-05 PROCEDURE — 85025 COMPLETE CBC W/AUTO DIFF WBC: CPT

## 2021-02-05 PROCEDURE — 3051F PR MOST RECENT HEMOGLOBIN A1C LEVEL 7.0 - < 8.0%: ICD-10-PCS | Mod: CPTII,S$GLB,, | Performed by: INTERNAL MEDICINE

## 2021-02-05 PROCEDURE — 1126F PR PAIN SEVERITY QUANTIFIED, NO PAIN PRESENT: ICD-10-PCS | Mod: S$GLB,,, | Performed by: INTERNAL MEDICINE

## 2021-02-05 PROCEDURE — 1101F PT FALLS ASSESS-DOCD LE1/YR: CPT | Mod: CPTII,S$GLB,, | Performed by: INTERNAL MEDICINE

## 2021-02-05 PROCEDURE — 99215 OFFICE O/P EST HI 40 MIN: CPT | Mod: S$GLB,,, | Performed by: INTERNAL MEDICINE

## 2021-02-05 PROCEDURE — 3079F PR MOST RECENT DIASTOLIC BLOOD PRESSURE 80-89 MM HG: ICD-10-PCS | Mod: CPTII,S$GLB,, | Performed by: INTERNAL MEDICINE

## 2021-02-05 PROCEDURE — 1126F AMNT PAIN NOTED NONE PRSNT: CPT | Mod: S$GLB,,, | Performed by: INTERNAL MEDICINE

## 2021-02-05 PROCEDURE — 1159F MED LIST DOCD IN RCRD: CPT | Mod: S$GLB,,, | Performed by: INTERNAL MEDICINE

## 2021-02-05 PROCEDURE — 3074F PR MOST RECENT SYSTOLIC BLOOD PRESSURE < 130 MM HG: ICD-10-PCS | Mod: CPTII,S$GLB,, | Performed by: INTERNAL MEDICINE

## 2021-02-05 PROCEDURE — 1157F ADVNC CARE PLAN IN RCRD: CPT | Mod: S$GLB,,, | Performed by: INTERNAL MEDICINE

## 2021-02-05 PROCEDURE — 3079F DIAST BP 80-89 MM HG: CPT | Mod: CPTII,S$GLB,, | Performed by: INTERNAL MEDICINE

## 2021-02-05 PROCEDURE — 99999 PR PBB SHADOW E&M-EST. PATIENT-LVL IV: CPT | Mod: PBBFAC,,, | Performed by: INTERNAL MEDICINE

## 2021-02-05 PROCEDURE — 99215 PR OFFICE/OUTPT VISIT, EST, LEVL V, 40-54 MIN: ICD-10-PCS | Mod: S$GLB,,, | Performed by: INTERNAL MEDICINE

## 2021-02-05 PROCEDURE — 1159F PR MEDICATION LIST DOCUMENTED IN MEDICAL RECORD: ICD-10-PCS | Mod: S$GLB,,, | Performed by: INTERNAL MEDICINE

## 2021-02-05 PROCEDURE — 1101F PR PT FALLS ASSESS DOC 0-1 FALLS W/OUT INJ PAST YR: ICD-10-PCS | Mod: CPTII,S$GLB,, | Performed by: INTERNAL MEDICINE

## 2021-02-05 PROCEDURE — 3288F FALL RISK ASSESSMENT DOCD: CPT | Mod: CPTII,S$GLB,, | Performed by: INTERNAL MEDICINE

## 2021-02-05 PROCEDURE — 99999 PR PBB SHADOW E&M-EST. PATIENT-LVL IV: ICD-10-PCS | Mod: PBBFAC,,, | Performed by: INTERNAL MEDICINE

## 2021-02-05 PROCEDURE — 36415 COLL VENOUS BLD VENIPUNCTURE: CPT

## 2021-02-05 PROCEDURE — 3074F SYST BP LT 130 MM HG: CPT | Mod: CPTII,S$GLB,, | Performed by: INTERNAL MEDICINE

## 2021-02-11 ENCOUNTER — HOSPITAL ENCOUNTER (OUTPATIENT)
Dept: CARDIOLOGY | Facility: HOSPITAL | Age: 81
Discharge: HOME | End: 2021-02-11
Attending: INTERNAL MEDICINE
Payer: MEDICARE

## 2021-02-11 DIAGNOSIS — H53.9 TRANSIENT VISION DISTURBANCE: ICD-10-CM

## 2021-02-11 DIAGNOSIS — R06.02 SOB (SHORTNESS OF BREATH): ICD-10-CM

## 2021-02-11 DIAGNOSIS — R55 NEAR SYNCOPE: ICD-10-CM

## 2021-02-11 LAB
ASCENDING AORTA: 4.1 CM
CV ECHO LV RWT: 0.39 CM
CV STRESS BASE HR: 73 BPM
DIASTOLIC BLOOD PRESSURE: 65 MMHG
DOP CALC LVOT AREA: 3.5 CM2
DOP CALC LVOT DIAMETER: 2.11 CM
DOP CALC LVOT PEAK VEL: 0.89 M/S
DOP CALC LVOT STROKE VOLUME: 65.39 CM3
DOP CALCLVOT PEAK VEL VTI: 18.71 CM
ECHO LV POSTERIOR WALL: 0.93 CM (ref 0.6–1.1)
FRACTIONAL SHORTENING: 36 % (ref 28–44)
INTERVENTRICULAR SEPTUM: 0.74 CM (ref 0.6–1.1)
LA MAJOR: 4.36 CM
LA MINOR: 4.72 CM
LA WIDTH: 3.73 CM
LEFT ARM DIASTOLIC BLOOD PRESSURE: 70 MMHG
LEFT ARM SYSTOLIC BLOOD PRESSURE: 124 MMHG
LEFT ATRIUM SIZE: 3.97 CM
LEFT ATRIUM VOLUME MOD: 36.77 CM3
LEFT ATRIUM VOLUME: 57.05 CM3
LEFT CBA DIAS: 11 CM/S
LEFT CBA SYS: 50 CM/S
LEFT CCA DIST DIAS: 19 CM/S
LEFT CCA DIST SYS: 67 CM/S
LEFT CCA MID DIAS: 19 CM/S
LEFT CCA MID SYS: 87 CM/S
LEFT CCA PROX DIAS: 15 CM/S
LEFT CCA PROX SYS: 91 CM/S
LEFT ECA DIAS: 10 CM/S
LEFT ECA SYS: 82 CM/S
LEFT ICA DIST DIAS: 25 CM/S
LEFT ICA DIST SYS: 70 CM/S
LEFT ICA MID DIAS: 23 CM/S
LEFT ICA MID SYS: 72 CM/S
LEFT ICA PROX DIAS: 16 CM/S
LEFT ICA PROX SYS: 62 CM/S
LEFT INTERNAL DIMENSION IN SYSTOLE: 3.04 CM (ref 2.1–4)
LEFT VENTRICLE DIASTOLIC VOLUME: 103.58 ML
LEFT VENTRICLE SYSTOLIC VOLUME: 36.28 ML
LEFT VENTRICULAR INTERNAL DIMENSION IN DIASTOLE: 4.72 CM (ref 3.5–6)
LEFT VENTRICULAR MASS: 130.19 G
LEFT VERTEBRAL DIAS: 16 CM/S
LEFT VERTEBRAL SYS: 53 CM/S
MV A" WAVE DURATION": 14.27 MSEC
OHS CV CAROTID RIGHT ICA EDV HIGHEST: 22
OHS CV CAROTID ULTRASOUND LEFT ICA/CCA RATIO: 1.07
OHS CV CAROTID ULTRASOUND RIGHT ICA/CCA RATIO: 0.8
OHS CV CPX 1 MINUTE RECOVERY HEART RATE: 121 BPM
OHS CV CPX 85 PERCENT MAX PREDICTED HEART RATE MALE: 121
OHS CV CPX ESTIMATED METS: 11
OHS CV CPX MAX PREDICTED HEART RATE: 142
OHS CV CPX PATIENT IS FEMALE: 0
OHS CV CPX PATIENT IS MALE: 1
OHS CV CPX PEAK DIASTOLIC BLOOD PRESSURE: 64 MMHG
OHS CV CPX PEAK HEAR RATE: 146 BPM
OHS CV CPX PEAK RATE PRESSURE PRODUCT: NORMAL
OHS CV CPX PEAK SYSTOLIC BLOOD PRESSURE: 167 MMHG
OHS CV CPX PERCENT MAX PREDICTED HEART RATE ACHIEVED: 103
OHS CV CPX RATE PRESSURE PRODUCT PRESENTING: 9417
OHS CV PV CAROTID LEFT HIGHEST CCA: 91
OHS CV PV CAROTID LEFT HIGHEST ICA: 72
OHS CV PV CAROTID RIGHT HIGHEST CCA: 97
OHS CV PV CAROTID RIGHT HIGHEST ICA: 67
OHS CV US CAROTID LEFT HIGHEST EDV: 25
PISA TR MAX VEL: 2.53 M/S
PULM VEIN S/D RATIO: 1.75
PV PEAK D VEL: 0.36 M/S
PV PEAK S VEL: 0.63 M/S
RA MAJOR: 4.16 CM
RA PRESSURE: 3 MMHG
RA WIDTH: 2.71 CM
RIGHT ARM DIASTOLIC BLOOD PRESSURE: 80 MMHG
RIGHT ARM SYSTOLIC BLOOD PRESSURE: 128 MMHG
RIGHT CBA DIAS: 10 CM/S
RIGHT CBA SYS: 70 CM/S
RIGHT CCA DIST DIAS: 14 CM/S
RIGHT CCA DIST SYS: 84 CM/S
RIGHT CCA MID DIAS: 12 CM/S
RIGHT CCA MID SYS: 97 CM/S
RIGHT CCA PROX DIAS: 12 CM/S
RIGHT CCA PROX SYS: 94 CM/S
RIGHT ECA DIAS: 8 CM/S
RIGHT ECA SYS: 91 CM/S
RIGHT ICA DIST DIAS: 22 CM/S
RIGHT ICA DIST SYS: 67 CM/S
RIGHT ICA MID DIAS: 19 CM/S
RIGHT ICA MID SYS: 59 CM/S
RIGHT ICA PROX DIAS: 15 CM/S
RIGHT ICA PROX SYS: 60 CM/S
RIGHT VENTRICULAR END-DIASTOLIC DIMENSION: 3.28 CM
RIGHT VERTEBRAL DIAS: 9 CM/S
RIGHT VERTEBRAL SYS: 39 CM/S
RV TISSUE DOPPLER FREE WALL SYSTOLIC VELOCITY 1 (APICAL 4 CHAMBER VIEW): 15.46 CM/S
SINUS: 3.52 CM
STJ: 3.55 CM
STRESS ECHO POST EXERCISE DUR MIN: 6 MINUTES
STRESS ECHO POST EXERCISE DUR SEC: 33 SECONDS
SYSTOLIC BLOOD PRESSURE: 129 MMHG
TDI LATERAL: 0.08 M/S
TDI SEPTAL: 0.05 M/S
TDI: 0.07 M/S
TR MAX PG: 26 MMHG
TRICUSPID ANNULAR PLANE SYSTOLIC EXCURSION: 1.93 CM
TV REST PULMONARY ARTERY PRESSURE: 29 MMHG

## 2021-02-11 PROCEDURE — 93880 CV US DOPPLER CAROTID (CUPID ONLY): ICD-10-PCS | Mod: 26,,, | Performed by: INTERNAL MEDICINE

## 2021-02-11 PROCEDURE — 93351 STRESS TTE COMPLETE: CPT | Mod: 26,,, | Performed by: INTERNAL MEDICINE

## 2021-02-11 PROCEDURE — 93880 EXTRACRANIAL BILAT STUDY: CPT | Mod: 26,,, | Performed by: INTERNAL MEDICINE

## 2021-02-11 PROCEDURE — 93351 STRESS ECHO (CUPID ONLY): ICD-10-PCS | Mod: 26,,, | Performed by: INTERNAL MEDICINE

## 2021-02-11 PROCEDURE — 93351 STRESS TTE COMPLETE: CPT

## 2021-02-11 PROCEDURE — 93880 EXTRACRANIAL BILAT STUDY: CPT

## 2021-02-17 ENCOUNTER — IMMUNIZATION (OUTPATIENT)
Dept: PHARMACY | Facility: CLINIC | Age: 81
End: 2021-02-17
Payer: MEDICARE

## 2021-02-17 DIAGNOSIS — Z23 NEED FOR VACCINATION: Primary | ICD-10-CM

## 2021-02-18 ENCOUNTER — CLINICAL SUPPORT (OUTPATIENT)
Dept: CARDIOLOGY | Facility: HOSPITAL | Age: 81
End: 2021-02-18
Attending: INTERNAL MEDICINE
Payer: MEDICARE

## 2021-02-18 DIAGNOSIS — R06.02 SOB (SHORTNESS OF BREATH): ICD-10-CM

## 2021-02-18 DIAGNOSIS — R55 NEAR SYNCOPE: ICD-10-CM

## 2021-02-18 PROCEDURE — 93227 HOLTER MONITOR - 48 HOUR (CUPID ONLY): ICD-10-PCS | Mod: ,,, | Performed by: INTERNAL MEDICINE

## 2021-02-18 PROCEDURE — 93225 XTRNL ECG REC<48 HRS REC: CPT

## 2021-02-18 PROCEDURE — 93227 XTRNL ECG REC<48 HR R&I: CPT | Mod: ,,, | Performed by: INTERNAL MEDICINE

## 2021-02-23 LAB
OHS CV EVENT MONITOR DAY: 0
OHS CV HOLTER LENGTH DECIMAL HOURS: 47.98
OHS CV HOLTER LENGTH HOURS: 47
OHS CV HOLTER LENGTH MINUTES: 59

## 2021-02-24 ENCOUNTER — PATIENT MESSAGE (OUTPATIENT)
Dept: INTERNAL MEDICINE | Facility: CLINIC | Age: 81
End: 2021-02-24

## 2021-02-25 ENCOUNTER — PATIENT MESSAGE (OUTPATIENT)
Dept: INTERNAL MEDICINE | Facility: CLINIC | Age: 81
End: 2021-02-25

## 2021-02-26 ENCOUNTER — PATIENT MESSAGE (OUTPATIENT)
Dept: INTERNAL MEDICINE | Facility: CLINIC | Age: 81
End: 2021-02-26

## 2021-03-24 ENCOUNTER — OFFICE VISIT (OUTPATIENT)
Dept: INTERNAL MEDICINE | Facility: CLINIC | Age: 81
End: 2021-03-24
Payer: MEDICARE

## 2021-03-24 VITALS
DIASTOLIC BLOOD PRESSURE: 68 MMHG | SYSTOLIC BLOOD PRESSURE: 134 MMHG | BODY MASS INDEX: 28.23 KG/M2 | HEIGHT: 67 IN | OXYGEN SATURATION: 96 % | HEART RATE: 86 BPM | WEIGHT: 179.88 LBS

## 2021-03-24 DIAGNOSIS — R41.3 MEMORY LOSS: ICD-10-CM

## 2021-03-24 DIAGNOSIS — K92.2 LOWER GI BLEEDING: ICD-10-CM

## 2021-03-24 DIAGNOSIS — I10 ESSENTIAL HYPERTENSION: Chronic | ICD-10-CM

## 2021-03-24 DIAGNOSIS — Z85.46 HISTORY OF PROSTATE CANCER: Chronic | ICD-10-CM

## 2021-03-24 DIAGNOSIS — E11.22 TYPE 2 DIABETES MELLITUS WITH STAGE 2 CHRONIC KIDNEY DISEASE, WITHOUT LONG-TERM CURRENT USE OF INSULIN: Chronic | ICD-10-CM

## 2021-03-24 DIAGNOSIS — I49.8 FLUTTERING HEART: Primary | ICD-10-CM

## 2021-03-24 DIAGNOSIS — N18.2 TYPE 2 DIABETES MELLITUS WITH STAGE 2 CHRONIC KIDNEY DISEASE, WITHOUT LONG-TERM CURRENT USE OF INSULIN: Chronic | ICD-10-CM

## 2021-03-24 DIAGNOSIS — K29.50 CHRONIC GASTRITIS WITHOUT BLEEDING, UNSPECIFIED GASTRITIS TYPE: ICD-10-CM

## 2021-03-24 LAB
ALBUMIN/CREAT UR: 11 UG/MG (ref 0–30)
CREAT UR-MCNC: 191 MG/DL (ref 23–375)
GLUCOSE SERPL-MCNC: 95 MG/DL (ref 70–110)
MICROALBUMIN UR DL<=1MG/L-MCNC: 21 UG/ML

## 2021-03-24 PROCEDURE — 93010 ELECTROCARDIOGRAM REPORT: CPT | Mod: S$GLB,,, | Performed by: INTERNAL MEDICINE

## 2021-03-24 PROCEDURE — 1101F PT FALLS ASSESS-DOCD LE1/YR: CPT | Mod: CPTII,S$GLB,, | Performed by: INTERNAL MEDICINE

## 2021-03-24 PROCEDURE — 82570 ASSAY OF URINE CREATININE: CPT | Performed by: INTERNAL MEDICINE

## 2021-03-24 PROCEDURE — 1126F PR PAIN SEVERITY QUANTIFIED, NO PAIN PRESENT: ICD-10-PCS | Mod: S$GLB,,, | Performed by: INTERNAL MEDICINE

## 2021-03-24 PROCEDURE — 93005 EKG 12-LEAD: ICD-10-PCS | Mod: S$GLB,,, | Performed by: INTERNAL MEDICINE

## 2021-03-24 PROCEDURE — 99214 OFFICE O/P EST MOD 30 MIN: CPT | Mod: S$GLB,,, | Performed by: INTERNAL MEDICINE

## 2021-03-24 PROCEDURE — 1157F ADVNC CARE PLAN IN RCRD: CPT | Mod: S$GLB,,, | Performed by: INTERNAL MEDICINE

## 2021-03-24 PROCEDURE — 93010 EKG 12-LEAD: ICD-10-PCS | Mod: S$GLB,,, | Performed by: INTERNAL MEDICINE

## 2021-03-24 PROCEDURE — 99999 PR PBB SHADOW E&M-EST. PATIENT-LVL IV: CPT | Mod: PBBFAC,,, | Performed by: INTERNAL MEDICINE

## 2021-03-24 PROCEDURE — 1159F PR MEDICATION LIST DOCUMENTED IN MEDICAL RECORD: ICD-10-PCS | Mod: S$GLB,,, | Performed by: INTERNAL MEDICINE

## 2021-03-24 PROCEDURE — 82962 POCT GLUCOSE, HAND-HELD DEVICE: ICD-10-PCS | Mod: S$GLB,,, | Performed by: INTERNAL MEDICINE

## 2021-03-24 PROCEDURE — 99214 PR OFFICE/OUTPT VISIT, EST, LEVL IV, 30-39 MIN: ICD-10-PCS | Mod: S$GLB,,, | Performed by: INTERNAL MEDICINE

## 2021-03-24 PROCEDURE — 3078F PR MOST RECENT DIASTOLIC BLOOD PRESSURE < 80 MM HG: ICD-10-PCS | Mod: CPTII,S$GLB,, | Performed by: INTERNAL MEDICINE

## 2021-03-24 PROCEDURE — 3051F HG A1C>EQUAL 7.0%<8.0%: CPT | Mod: CPTII,S$GLB,, | Performed by: INTERNAL MEDICINE

## 2021-03-24 PROCEDURE — 3288F PR FALLS RISK ASSESSMENT DOCUMENTED: ICD-10-PCS | Mod: CPTII,S$GLB,, | Performed by: INTERNAL MEDICINE

## 2021-03-24 PROCEDURE — 1101F PR PT FALLS ASSESS DOC 0-1 FALLS W/OUT INJ PAST YR: ICD-10-PCS | Mod: CPTII,S$GLB,, | Performed by: INTERNAL MEDICINE

## 2021-03-24 PROCEDURE — 1157F PR ADVANCE CARE PLAN OR EQUIV PRESENT IN MEDICAL RECORD: ICD-10-PCS | Mod: S$GLB,,, | Performed by: INTERNAL MEDICINE

## 2021-03-24 PROCEDURE — 99999 PR PBB SHADOW E&M-EST. PATIENT-LVL IV: ICD-10-PCS | Mod: PBBFAC,,, | Performed by: INTERNAL MEDICINE

## 2021-03-24 PROCEDURE — 3051F PR MOST RECENT HEMOGLOBIN A1C LEVEL 7.0 - < 8.0%: ICD-10-PCS | Mod: CPTII,S$GLB,, | Performed by: INTERNAL MEDICINE

## 2021-03-24 PROCEDURE — 3288F FALL RISK ASSESSMENT DOCD: CPT | Mod: CPTII,S$GLB,, | Performed by: INTERNAL MEDICINE

## 2021-03-24 PROCEDURE — 82043 UR ALBUMIN QUANTITATIVE: CPT | Performed by: INTERNAL MEDICINE

## 2021-03-24 PROCEDURE — 3075F PR MOST RECENT SYSTOLIC BLOOD PRESS GE 130-139MM HG: ICD-10-PCS | Mod: CPTII,S$GLB,, | Performed by: INTERNAL MEDICINE

## 2021-03-24 PROCEDURE — 82962 GLUCOSE BLOOD TEST: CPT | Mod: S$GLB,,, | Performed by: INTERNAL MEDICINE

## 2021-03-24 PROCEDURE — 1126F AMNT PAIN NOTED NONE PRSNT: CPT | Mod: S$GLB,,, | Performed by: INTERNAL MEDICINE

## 2021-03-24 PROCEDURE — 3078F DIAST BP <80 MM HG: CPT | Mod: CPTII,S$GLB,, | Performed by: INTERNAL MEDICINE

## 2021-03-24 PROCEDURE — 1159F MED LIST DOCD IN RCRD: CPT | Mod: S$GLB,,, | Performed by: INTERNAL MEDICINE

## 2021-03-24 PROCEDURE — 93005 ELECTROCARDIOGRAM TRACING: CPT | Mod: S$GLB,,, | Performed by: INTERNAL MEDICINE

## 2021-03-24 PROCEDURE — 3075F SYST BP GE 130 - 139MM HG: CPT | Mod: CPTII,S$GLB,, | Performed by: INTERNAL MEDICINE

## 2021-03-24 RX ORDER — ATORVASTATIN CALCIUM 20 MG/1
20 TABLET, FILM COATED ORAL DAILY
Qty: 90 TABLET | Refills: 3 | Status: SHIPPED | OUTPATIENT
Start: 2021-03-24 | End: 2021-09-29 | Stop reason: SDUPTHER

## 2021-03-24 RX ORDER — METOPROLOL SUCCINATE 25 MG/1
25 TABLET, EXTENDED RELEASE ORAL DAILY
Qty: 90 TABLET | Refills: 3 | Status: SHIPPED | OUTPATIENT
Start: 2021-03-24 | End: 2021-04-07 | Stop reason: DRUGHIGH

## 2021-03-24 RX ORDER — LOSARTAN POTASSIUM AND HYDROCHLOROTHIAZIDE 25; 100 MG/1; MG/1
1 TABLET ORAL EVERY MORNING
Qty: 90 TABLET | Refills: 3 | Status: SHIPPED | OUTPATIENT
Start: 2021-03-24 | End: 2021-06-15

## 2021-03-24 RX ORDER — GLIPIZIDE 5 MG/1
5 TABLET, FILM COATED, EXTENDED RELEASE ORAL
Qty: 90 TABLET | Refills: 3 | Status: SHIPPED | OUTPATIENT
Start: 2021-03-24 | End: 2021-09-29 | Stop reason: SDUPTHER

## 2021-03-24 RX ORDER — METFORMIN HYDROCHLORIDE 1000 MG/1
1000 TABLET ORAL 2 TIMES DAILY WITH MEALS
Qty: 180 TABLET | Refills: 3 | Status: SHIPPED | OUTPATIENT
Start: 2021-03-24 | End: 2021-09-29 | Stop reason: SDUPTHER

## 2021-03-24 RX ORDER — AMLODIPINE BESYLATE 10 MG/1
TABLET ORAL
Qty: 90 TABLET | Refills: 3 | Status: SHIPPED | OUTPATIENT
Start: 2021-03-24 | End: 2022-01-20 | Stop reason: SDUPTHER

## 2021-03-24 RX ORDER — PANTOPRAZOLE SODIUM 40 MG/1
40 TABLET, DELAYED RELEASE ORAL DAILY
Qty: 90 TABLET | Refills: 3 | Status: SHIPPED | OUTPATIENT
Start: 2021-03-24 | End: 2021-05-04

## 2021-04-07 ENCOUNTER — OFFICE VISIT (OUTPATIENT)
Dept: CARDIOLOGY | Facility: CLINIC | Age: 81
End: 2021-04-07
Payer: MEDICARE

## 2021-04-07 ENCOUNTER — LAB VISIT (OUTPATIENT)
Dept: LAB | Facility: HOSPITAL | Age: 81
End: 2021-04-07
Payer: MEDICARE

## 2021-04-07 ENCOUNTER — PATIENT MESSAGE (OUTPATIENT)
Dept: CARDIOLOGY | Facility: CLINIC | Age: 81
End: 2021-04-07

## 2021-04-07 VITALS
SYSTOLIC BLOOD PRESSURE: 154 MMHG | HEART RATE: 82 BPM | HEIGHT: 67 IN | OXYGEN SATURATION: 96 % | DIASTOLIC BLOOD PRESSURE: 75 MMHG | WEIGHT: 181 LBS | BODY MASS INDEX: 28.41 KG/M2

## 2021-04-07 DIAGNOSIS — I10 ESSENTIAL HYPERTENSION: Chronic | ICD-10-CM

## 2021-04-07 DIAGNOSIS — E11.42 TYPE 2 DIABETES MELLITUS WITH DIABETIC POLYNEUROPATHY, WITHOUT LONG-TERM CURRENT USE OF INSULIN: Chronic | ICD-10-CM

## 2021-04-07 DIAGNOSIS — E78.2 MIXED HYPERLIPIDEMIA: Chronic | ICD-10-CM

## 2021-04-07 DIAGNOSIS — I49.3 PVC (PREMATURE VENTRICULAR CONTRACTION): ICD-10-CM

## 2021-04-07 DIAGNOSIS — I47.19 ATRIAL TACHYCARDIA, PAROXYSMAL: ICD-10-CM

## 2021-04-07 DIAGNOSIS — I49.8 FLUTTERING HEART: ICD-10-CM

## 2021-04-07 LAB
T4 FREE SERPL-MCNC: 0.99 NG/DL (ref 0.71–1.51)
TSH SERPL DL<=0.005 MIU/L-ACNC: 1.18 UIU/ML (ref 0.4–4)

## 2021-04-07 PROCEDURE — 1159F MED LIST DOCD IN RCRD: CPT | Mod: GC,S$GLB,, | Performed by: STUDENT IN AN ORGANIZED HEALTH CARE EDUCATION/TRAINING PROGRAM

## 2021-04-07 PROCEDURE — 36415 COLL VENOUS BLD VENIPUNCTURE: CPT | Performed by: STUDENT IN AN ORGANIZED HEALTH CARE EDUCATION/TRAINING PROGRAM

## 2021-04-07 PROCEDURE — 1159F PR MEDICATION LIST DOCUMENTED IN MEDICAL RECORD: ICD-10-PCS | Mod: GC,S$GLB,, | Performed by: STUDENT IN AN ORGANIZED HEALTH CARE EDUCATION/TRAINING PROGRAM

## 2021-04-07 PROCEDURE — 99999 PR PBB SHADOW E&M-EST. PATIENT-LVL V: CPT | Mod: PBBFAC,GC,, | Performed by: STUDENT IN AN ORGANIZED HEALTH CARE EDUCATION/TRAINING PROGRAM

## 2021-04-07 PROCEDURE — 99204 PR OFFICE/OUTPT VISIT, NEW, LEVL IV, 45-59 MIN: ICD-10-PCS | Mod: GC,S$GLB,, | Performed by: STUDENT IN AN ORGANIZED HEALTH CARE EDUCATION/TRAINING PROGRAM

## 2021-04-07 PROCEDURE — 84443 ASSAY THYROID STIM HORMONE: CPT | Performed by: STUDENT IN AN ORGANIZED HEALTH CARE EDUCATION/TRAINING PROGRAM

## 2021-04-07 PROCEDURE — 3077F SYST BP >= 140 MM HG: CPT | Mod: CPTII,GC,S$GLB, | Performed by: STUDENT IN AN ORGANIZED HEALTH CARE EDUCATION/TRAINING PROGRAM

## 2021-04-07 PROCEDURE — 3078F DIAST BP <80 MM HG: CPT | Mod: CPTII,GC,S$GLB, | Performed by: STUDENT IN AN ORGANIZED HEALTH CARE EDUCATION/TRAINING PROGRAM

## 2021-04-07 PROCEDURE — 3077F PR MOST RECENT SYSTOLIC BLOOD PRESSURE >= 140 MM HG: ICD-10-PCS | Mod: CPTII,GC,S$GLB, | Performed by: STUDENT IN AN ORGANIZED HEALTH CARE EDUCATION/TRAINING PROGRAM

## 2021-04-07 PROCEDURE — 84439 ASSAY OF FREE THYROXINE: CPT | Performed by: STUDENT IN AN ORGANIZED HEALTH CARE EDUCATION/TRAINING PROGRAM

## 2021-04-07 PROCEDURE — 99999 PR PBB SHADOW E&M-EST. PATIENT-LVL V: ICD-10-PCS | Mod: PBBFAC,GC,, | Performed by: STUDENT IN AN ORGANIZED HEALTH CARE EDUCATION/TRAINING PROGRAM

## 2021-04-07 PROCEDURE — 99204 OFFICE O/P NEW MOD 45 MIN: CPT | Mod: GC,S$GLB,, | Performed by: STUDENT IN AN ORGANIZED HEALTH CARE EDUCATION/TRAINING PROGRAM

## 2021-04-07 PROCEDURE — 3078F PR MOST RECENT DIASTOLIC BLOOD PRESSURE < 80 MM HG: ICD-10-PCS | Mod: CPTII,GC,S$GLB, | Performed by: STUDENT IN AN ORGANIZED HEALTH CARE EDUCATION/TRAINING PROGRAM

## 2021-04-07 PROCEDURE — 3051F HG A1C>EQUAL 7.0%<8.0%: CPT | Mod: CPTII,GC,S$GLB, | Performed by: STUDENT IN AN ORGANIZED HEALTH CARE EDUCATION/TRAINING PROGRAM

## 2021-04-07 PROCEDURE — 3051F PR MOST RECENT HEMOGLOBIN A1C LEVEL 7.0 - < 8.0%: ICD-10-PCS | Mod: CPTII,GC,S$GLB, | Performed by: STUDENT IN AN ORGANIZED HEALTH CARE EDUCATION/TRAINING PROGRAM

## 2021-04-07 RX ORDER — METOPROLOL SUCCINATE 100 MG/1
100 TABLET, EXTENDED RELEASE ORAL DAILY
Qty: 90 TABLET | Refills: 3 | Status: SHIPPED | OUTPATIENT
Start: 2021-04-07 | End: 2022-01-20 | Stop reason: SDUPTHER

## 2021-06-16 RX ORDER — LOSARTAN POTASSIUM AND HYDROCHLOROTHIAZIDE 25; 100 MG/1; MG/1
TABLET ORAL
Qty: 90 TABLET | Refills: 2 | Status: SHIPPED | OUTPATIENT
Start: 2021-06-16 | End: 2021-09-29 | Stop reason: SDUPTHER

## 2021-06-17 DIAGNOSIS — N18.2 TYPE 2 DIABETES MELLITUS WITH STAGE 2 CHRONIC KIDNEY DISEASE, WITHOUT LONG-TERM CURRENT USE OF INSULIN: Chronic | ICD-10-CM

## 2021-06-17 DIAGNOSIS — E11.22 TYPE 2 DIABETES MELLITUS WITH STAGE 2 CHRONIC KIDNEY DISEASE, WITHOUT LONG-TERM CURRENT USE OF INSULIN: Chronic | ICD-10-CM

## 2021-06-17 RX ORDER — CALCIUM CITRATE/VITAMIN D3 200MG-6.25
TABLET ORAL
Qty: 100 STRIP | Refills: 3 | Status: SHIPPED | OUTPATIENT
Start: 2021-06-17 | End: 2022-01-14 | Stop reason: SDUPTHER

## 2021-06-21 ENCOUNTER — PES CALL (OUTPATIENT)
Dept: ADMINISTRATIVE | Facility: CLINIC | Age: 81
End: 2021-06-21

## 2021-06-23 ENCOUNTER — HOSPITAL ENCOUNTER (EMERGENCY)
Facility: HOSPITAL | Age: 81
Discharge: HOME | End: 2021-06-23
Attending: EMERGENCY MEDICINE
Payer: MEDICARE

## 2021-06-23 ENCOUNTER — TELEPHONE (OUTPATIENT)
Dept: INTERNAL MEDICINE | Facility: CLINIC | Age: 81
End: 2021-06-23

## 2021-06-23 ENCOUNTER — OFFICE VISIT (OUTPATIENT)
Dept: FAMILY MEDICINE | Facility: CLINIC | Age: 81
End: 2021-06-23
Payer: MEDICARE

## 2021-06-23 VITALS
WEIGHT: 181.44 LBS | SYSTOLIC BLOOD PRESSURE: 146 MMHG | OXYGEN SATURATION: 96 % | HEART RATE: 72 BPM | BODY MASS INDEX: 28.48 KG/M2 | DIASTOLIC BLOOD PRESSURE: 74 MMHG | TEMPERATURE: 99 F | HEIGHT: 67 IN

## 2021-06-23 VITALS
SYSTOLIC BLOOD PRESSURE: 138 MMHG | RESPIRATION RATE: 15 BRPM | TEMPERATURE: 98 F | HEIGHT: 67 IN | DIASTOLIC BLOOD PRESSURE: 67 MMHG | HEART RATE: 61 BPM | BODY MASS INDEX: 28.41 KG/M2 | WEIGHT: 181 LBS | OXYGEN SATURATION: 97 %

## 2021-06-23 DIAGNOSIS — Z85.46 HISTORY OF PROSTATE CANCER: Chronic | ICD-10-CM

## 2021-06-23 DIAGNOSIS — K57.30 SIGMOID DIVERTICULOSIS: Chronic | ICD-10-CM

## 2021-06-23 DIAGNOSIS — K21.9 GASTROESOPHAGEAL REFLUX DISEASE WITHOUT ESOPHAGITIS: ICD-10-CM

## 2021-06-23 DIAGNOSIS — I70.0 AORTIC ATHEROSCLEROSIS: ICD-10-CM

## 2021-06-23 DIAGNOSIS — R42 LIGHTHEADEDNESS: ICD-10-CM

## 2021-06-23 DIAGNOSIS — R10.10 PAIN OF UPPER ABDOMEN: Primary | ICD-10-CM

## 2021-06-23 DIAGNOSIS — E78.2 MIXED HYPERLIPIDEMIA: ICD-10-CM

## 2021-06-23 DIAGNOSIS — R41.3 MEMORY LOSS: ICD-10-CM

## 2021-06-23 DIAGNOSIS — I12.9 TYPE 2 DM WITH CKD STAGE 2 AND HYPERTENSION: ICD-10-CM

## 2021-06-23 DIAGNOSIS — N18.2 TYPE 2 DM WITH CKD STAGE 2 AND HYPERTENSION: ICD-10-CM

## 2021-06-23 DIAGNOSIS — Z00.00 ENCOUNTER FOR PREVENTIVE HEALTH EXAMINATION: Primary | ICD-10-CM

## 2021-06-23 DIAGNOSIS — R07.9 CHEST PAIN: ICD-10-CM

## 2021-06-23 DIAGNOSIS — R10.9 ABDOMINAL PAIN: ICD-10-CM

## 2021-06-23 DIAGNOSIS — I10 ESSENTIAL HYPERTENSION: Chronic | ICD-10-CM

## 2021-06-23 DIAGNOSIS — E11.22 TYPE 2 DM WITH CKD STAGE 2 AND HYPERTENSION: ICD-10-CM

## 2021-06-23 DIAGNOSIS — H90.5 SENSORINEURAL HEARING LOSS (SNHL), UNSPECIFIED LATERALITY: ICD-10-CM

## 2021-06-23 DIAGNOSIS — R10.13 EPIGASTRIC PAIN: ICD-10-CM

## 2021-06-23 DIAGNOSIS — E11.42 TYPE 2 DIABETES MELLITUS WITH DIABETIC POLYNEUROPATHY, WITHOUT LONG-TERM CURRENT USE OF INSULIN: Chronic | ICD-10-CM

## 2021-06-23 PROBLEM — K62.5 RECTAL BLEEDING: Status: RESOLVED | Noted: 2021-01-11 | Resolved: 2021-06-23

## 2021-06-23 PROBLEM — K92.2 LOWER GI BLEEDING: Status: RESOLVED | Noted: 2021-01-11 | Resolved: 2021-06-23

## 2021-06-23 PROBLEM — R55 NEAR SYNCOPE: Status: RESOLVED | Noted: 2021-02-05 | Resolved: 2021-06-23

## 2021-06-23 PROBLEM — R06.02 SOB (SHORTNESS OF BREATH): Status: RESOLVED | Noted: 2021-02-05 | Resolved: 2021-06-23

## 2021-06-23 LAB
ALBUMIN SERPL BCP-MCNC: 4.2 G/DL (ref 3.5–5.2)
ALP SERPL-CCNC: 62 U/L (ref 55–135)
ALT SERPL W/O P-5'-P-CCNC: 17 U/L (ref 10–44)
ANION GAP SERPL CALC-SCNC: 11 MMOL/L (ref 8–16)
AST SERPL-CCNC: 16 U/L (ref 10–40)
BASOPHILS # BLD AUTO: 0.06 K/UL (ref 0–0.2)
BASOPHILS NFR BLD: 1.3 % (ref 0–1.9)
BILIRUB SERPL-MCNC: 0.3 MG/DL (ref 0.1–1)
BILIRUB UR QL STRIP: NEGATIVE
BNP SERPL-MCNC: 130 PG/ML (ref 0–99)
BUN SERPL-MCNC: 16 MG/DL (ref 8–23)
CALCIUM SERPL-MCNC: 9.5 MG/DL (ref 8.7–10.5)
CHLORIDE SERPL-SCNC: 106 MMOL/L (ref 95–110)
CLARITY UR: CLEAR
CO2 SERPL-SCNC: 21 MMOL/L (ref 23–29)
COLOR UR: COLORLESS
CREAT SERPL-MCNC: 1.2 MG/DL (ref 0.5–1.4)
DIFFERENTIAL METHOD: ABNORMAL
EOSINOPHIL # BLD AUTO: 0.1 K/UL (ref 0–0.5)
EOSINOPHIL NFR BLD: 2.9 % (ref 0–8)
ERYTHROCYTE [DISTWIDTH] IN BLOOD BY AUTOMATED COUNT: 15 % (ref 11.5–14.5)
EST. GFR  (AFRICAN AMERICAN): >60 ML/MIN/1.73 M^2
EST. GFR  (NON AFRICAN AMERICAN): 58 ML/MIN/1.73 M^2
GLUCOSE SERPL-MCNC: 141 MG/DL (ref 70–110)
GLUCOSE UR QL STRIP: NEGATIVE
HCT VFR BLD AUTO: 37 % (ref 40–54)
HGB BLD-MCNC: 11.9 G/DL (ref 14–18)
HGB UR QL STRIP: NEGATIVE
IMM GRANULOCYTES # BLD AUTO: 0.02 K/UL (ref 0–0.04)
IMM GRANULOCYTES NFR BLD AUTO: 0.4 % (ref 0–0.5)
KETONES UR QL STRIP: NEGATIVE
LEUKOCYTE ESTERASE UR QL STRIP: NEGATIVE
LIPASE SERPL-CCNC: 73 U/L (ref 4–60)
LYMPHOCYTES # BLD AUTO: 1.6 K/UL (ref 1–4.8)
LYMPHOCYTES NFR BLD: 35.5 % (ref 18–48)
MCH RBC QN AUTO: 26 PG (ref 27–31)
MCHC RBC AUTO-ENTMCNC: 32.2 G/DL (ref 32–36)
MCV RBC AUTO: 81 FL (ref 82–98)
MONOCYTES # BLD AUTO: 0.6 K/UL (ref 0.3–1)
MONOCYTES NFR BLD: 12.4 % (ref 4–15)
NEUTROPHILS # BLD AUTO: 2.1 K/UL (ref 1.8–7.7)
NEUTROPHILS NFR BLD: 47.5 % (ref 38–73)
NITRITE UR QL STRIP: NEGATIVE
NRBC BLD-RTO: 0 /100 WBC
OB PNL STL: NEGATIVE
PH UR STRIP: 7 [PH] (ref 5–8)
PLATELET # BLD AUTO: 269 K/UL (ref 150–450)
PMV BLD AUTO: 10.1 FL (ref 9.2–12.9)
POTASSIUM SERPL-SCNC: 3.8 MMOL/L (ref 3.5–5.1)
PROT SERPL-MCNC: 7.6 G/DL (ref 6–8.4)
PROT UR QL STRIP: NEGATIVE
RBC # BLD AUTO: 4.58 M/UL (ref 4.6–6.2)
SODIUM SERPL-SCNC: 138 MMOL/L (ref 136–145)
SP GR UR STRIP: 1.01 (ref 1–1.03)
TROPONIN I SERPL DL<=0.01 NG/ML-MCNC: 0.02 NG/ML (ref 0–0.03)
TROPONIN I SERPL DL<=0.01 NG/ML-MCNC: 0.02 NG/ML (ref 0–0.03)
URN SPEC COLLECT METH UR: ABNORMAL
UROBILINOGEN UR STRIP-ACNC: NEGATIVE EU/DL
WBC # BLD AUTO: 4.45 K/UL (ref 3.9–12.7)

## 2021-06-23 PROCEDURE — 99999 PR PBB SHADOW E&M-EST. PATIENT-LVL V: ICD-10-PCS | Mod: PBBFAC,,, | Performed by: NURSE PRACTITIONER

## 2021-06-23 PROCEDURE — 1125F AMNT PAIN NOTED PAIN PRSNT: CPT | Mod: S$GLB,,, | Performed by: NURSE PRACTITIONER

## 2021-06-23 PROCEDURE — 93010 ELECTROCARDIOGRAM REPORT: CPT | Mod: ,,, | Performed by: INTERNAL MEDICINE

## 2021-06-23 PROCEDURE — 99499 UNLISTED E&M SERVICE: CPT | Mod: HCNC,S$GLB,, | Performed by: NURSE PRACTITIONER

## 2021-06-23 PROCEDURE — 85025 COMPLETE CBC W/AUTO DIFF WBC: CPT | Performed by: EMERGENCY MEDICINE

## 2021-06-23 PROCEDURE — 25000003 PHARM REV CODE 250: Performed by: EMERGENCY MEDICINE

## 2021-06-23 PROCEDURE — 96361 HYDRATE IV INFUSION ADD-ON: CPT

## 2021-06-23 PROCEDURE — 93005 ELECTROCARDIOGRAM TRACING: CPT

## 2021-06-23 PROCEDURE — 3051F HG A1C>EQUAL 7.0%<8.0%: CPT | Mod: CPTII,S$GLB,, | Performed by: NURSE PRACTITIONER

## 2021-06-23 PROCEDURE — 1123F ACP DISCUSS/DSCN MKR DOCD: CPT | Mod: S$GLB,,, | Performed by: NURSE PRACTITIONER

## 2021-06-23 PROCEDURE — G0439 PPPS, SUBSEQ VISIT: HCPCS | Mod: S$GLB,,, | Performed by: NURSE PRACTITIONER

## 2021-06-23 PROCEDURE — 3288F PR FALLS RISK ASSESSMENT DOCUMENTED: ICD-10-PCS | Mod: CPTII,S$GLB,, | Performed by: NURSE PRACTITIONER

## 2021-06-23 PROCEDURE — 3288F FALL RISK ASSESSMENT DOCD: CPT | Mod: CPTII,S$GLB,, | Performed by: NURSE PRACTITIONER

## 2021-06-23 PROCEDURE — 63600175 PHARM REV CODE 636 W HCPCS: Performed by: EMERGENCY MEDICINE

## 2021-06-23 PROCEDURE — 3051F PR MOST RECENT HEMOGLOBIN A1C LEVEL 7.0 - < 8.0%: ICD-10-PCS | Mod: CPTII,S$GLB,, | Performed by: NURSE PRACTITIONER

## 2021-06-23 PROCEDURE — 99285 EMERGENCY DEPT VISIT HI MDM: CPT | Mod: 25

## 2021-06-23 PROCEDURE — 82272 OCCULT BLD FECES 1-3 TESTS: CPT | Performed by: EMERGENCY MEDICINE

## 2021-06-23 PROCEDURE — G0439 PR MEDICARE ANNUAL WELLNESS SUBSEQUENT VISIT: ICD-10-PCS | Mod: S$GLB,,, | Performed by: NURSE PRACTITIONER

## 2021-06-23 PROCEDURE — 99499 RISK ADDL DX/OHS AUDIT: ICD-10-PCS | Mod: HCNC,S$GLB,, | Performed by: NURSE PRACTITIONER

## 2021-06-23 PROCEDURE — 1123F PR ADV CARE PLAN DISCUSSED, PLAN OR SURROGATE DOCUMENTED: ICD-10-PCS | Mod: S$GLB,,, | Performed by: NURSE PRACTITIONER

## 2021-06-23 PROCEDURE — 99999 PR PBB SHADOW E&M-EST. PATIENT-LVL V: CPT | Mod: PBBFAC,,, | Performed by: NURSE PRACTITIONER

## 2021-06-23 PROCEDURE — 1125F PR PAIN SEVERITY QUANTIFIED, PAIN PRESENT: ICD-10-PCS | Mod: S$GLB,,, | Performed by: NURSE PRACTITIONER

## 2021-06-23 PROCEDURE — 93010 EKG 12-LEAD: ICD-10-PCS | Mod: ,,, | Performed by: INTERNAL MEDICINE

## 2021-06-23 PROCEDURE — 80053 COMPREHEN METABOLIC PANEL: CPT | Performed by: EMERGENCY MEDICINE

## 2021-06-23 PROCEDURE — 83880 ASSAY OF NATRIURETIC PEPTIDE: CPT | Performed by: EMERGENCY MEDICINE

## 2021-06-23 PROCEDURE — 96374 THER/PROPH/DIAG INJ IV PUSH: CPT

## 2021-06-23 PROCEDURE — 81003 URINALYSIS AUTO W/O SCOPE: CPT | Performed by: EMERGENCY MEDICINE

## 2021-06-23 PROCEDURE — 84484 ASSAY OF TROPONIN QUANT: CPT | Performed by: EMERGENCY MEDICINE

## 2021-06-23 PROCEDURE — 1101F PR PT FALLS ASSESS DOC 0-1 FALLS W/OUT INJ PAST YR: ICD-10-PCS | Mod: CPTII,S$GLB,, | Performed by: NURSE PRACTITIONER

## 2021-06-23 PROCEDURE — 1101F PT FALLS ASSESS-DOCD LE1/YR: CPT | Mod: CPTII,S$GLB,, | Performed by: NURSE PRACTITIONER

## 2021-06-23 PROCEDURE — 83690 ASSAY OF LIPASE: CPT | Performed by: EMERGENCY MEDICINE

## 2021-06-23 PROCEDURE — 96375 TX/PRO/DX INJ NEW DRUG ADDON: CPT

## 2021-06-23 RX ORDER — ASPIRIN 81 MG/1
81 TABLET ORAL DAILY
COMMUNITY

## 2021-06-23 RX ORDER — METOCLOPRAMIDE HYDROCHLORIDE 5 MG/ML
10 INJECTION INTRAMUSCULAR; INTRAVENOUS
Status: COMPLETED | OUTPATIENT
Start: 2021-06-23 | End: 2021-06-23

## 2021-06-23 RX ORDER — KETOROLAC TROMETHAMINE 30 MG/ML
15 INJECTION, SOLUTION INTRAMUSCULAR; INTRAVENOUS
Status: COMPLETED | OUTPATIENT
Start: 2021-06-23 | End: 2021-06-23

## 2021-06-23 RX ORDER — LACTULOSE 10 G/15ML
10 SOLUTION ORAL 2 TIMES DAILY PRN
Qty: 150 ML | Refills: 0 | Status: SHIPPED | OUTPATIENT
Start: 2021-06-23 | End: 2021-07-23

## 2021-06-23 RX ORDER — DICYCLOMINE HYDROCHLORIDE 20 MG/1
20 TABLET ORAL 3 TIMES DAILY PRN
Qty: 14 TABLET | Refills: 0 | Status: SHIPPED | OUTPATIENT
Start: 2021-06-23 | End: 2021-07-24

## 2021-06-23 RX ORDER — METOCLOPRAMIDE 10 MG/1
10 TABLET ORAL EVERY 6 HOURS PRN
Qty: 14 TABLET | Refills: 0 | Status: SHIPPED | OUTPATIENT
Start: 2021-06-23 | End: 2022-01-20

## 2021-06-23 RX ADMIN — KETOROLAC TROMETHAMINE 15 MG: 30 INJECTION, SOLUTION INTRAMUSCULAR; INTRAVENOUS at 04:06

## 2021-06-23 RX ADMIN — METOCLOPRAMIDE 10 MG: 5 INJECTION, SOLUTION INTRAMUSCULAR; INTRAVENOUS at 03:06

## 2021-06-23 RX ADMIN — SODIUM CHLORIDE 1000 ML: 0.9 INJECTION, SOLUTION INTRAVENOUS at 03:06

## 2021-07-23 ENCOUNTER — OFFICE VISIT (OUTPATIENT)
Dept: INTERNAL MEDICINE | Facility: CLINIC | Age: 81
End: 2021-07-23
Payer: MEDICARE

## 2021-07-23 ENCOUNTER — LAB VISIT (OUTPATIENT)
Dept: LAB | Facility: HOSPITAL | Age: 81
End: 2021-07-23
Attending: INTERNAL MEDICINE
Payer: MEDICARE

## 2021-07-23 VITALS
BODY MASS INDEX: 28.52 KG/M2 | HEIGHT: 67 IN | DIASTOLIC BLOOD PRESSURE: 70 MMHG | SYSTOLIC BLOOD PRESSURE: 116 MMHG | HEART RATE: 77 BPM | OXYGEN SATURATION: 98 % | WEIGHT: 181.69 LBS

## 2021-07-23 DIAGNOSIS — E11.42 TYPE 2 DIABETES MELLITUS WITH DIABETIC POLYNEUROPATHY, WITHOUT LONG-TERM CURRENT USE OF INSULIN: ICD-10-CM

## 2021-07-23 DIAGNOSIS — K92.1 STOOL COLOR BLACK: ICD-10-CM

## 2021-07-23 DIAGNOSIS — K29.50 CHRONIC GASTRITIS WITHOUT BLEEDING, UNSPECIFIED GASTRITIS TYPE: ICD-10-CM

## 2021-07-23 DIAGNOSIS — K57.30 DIVERTICULOSIS OF COLON WITHOUT DIVERTICULITIS: ICD-10-CM

## 2021-07-23 DIAGNOSIS — H40.10X0 OPEN-ANGLE GLAUCOMA, UNSPECIFIED GLAUCOMA STAGE, UNSPECIFIED LATERALITY, UNSPECIFIED OPEN-ANGLE GLAUCOMA TYPE: ICD-10-CM

## 2021-07-23 DIAGNOSIS — K21.9 GASTROESOPHAGEAL REFLUX DISEASE WITHOUT ESOPHAGITIS: ICD-10-CM

## 2021-07-23 DIAGNOSIS — K92.1 STOOL COLOR BLACK: Primary | ICD-10-CM

## 2021-07-23 DIAGNOSIS — I10 ESSENTIAL HYPERTENSION: ICD-10-CM

## 2021-07-23 DIAGNOSIS — E78.2 MIXED HYPERLIPIDEMIA: ICD-10-CM

## 2021-07-23 DIAGNOSIS — R10.13 DYSPEPSIA: ICD-10-CM

## 2021-07-23 LAB
ALBUMIN SERPL BCP-MCNC: 4.2 G/DL (ref 3.5–5.2)
ALP SERPL-CCNC: 75 U/L (ref 55–135)
ALT SERPL W/O P-5'-P-CCNC: 14 U/L (ref 10–44)
ANION GAP SERPL CALC-SCNC: 12 MMOL/L (ref 8–16)
AST SERPL-CCNC: 16 U/L (ref 10–40)
BASOPHILS # BLD AUTO: 0.06 K/UL (ref 0–0.2)
BASOPHILS # BLD AUTO: 0.06 K/UL (ref 0–0.2)
BASOPHILS NFR BLD: 1 % (ref 0–1.9)
BASOPHILS NFR BLD: 1 % (ref 0–1.9)
BILIRUB SERPL-MCNC: 0.4 MG/DL (ref 0.1–1)
BUN SERPL-MCNC: 19 MG/DL (ref 8–23)
CALCIUM SERPL-MCNC: 9.9 MG/DL (ref 8.7–10.5)
CHLORIDE SERPL-SCNC: 104 MMOL/L (ref 95–110)
CO2 SERPL-SCNC: 23 MMOL/L (ref 23–29)
CREAT SERPL-MCNC: 1.3 MG/DL (ref 0.5–1.4)
DIFFERENTIAL METHOD: ABNORMAL
DIFFERENTIAL METHOD: ABNORMAL
EOSINOPHIL # BLD AUTO: 0.2 K/UL (ref 0–0.5)
EOSINOPHIL # BLD AUTO: 0.2 K/UL (ref 0–0.5)
EOSINOPHIL NFR BLD: 3.1 % (ref 0–8)
EOSINOPHIL NFR BLD: 3.1 % (ref 0–8)
ERYTHROCYTE [DISTWIDTH] IN BLOOD BY AUTOMATED COUNT: 15.5 % (ref 11.5–14.5)
ERYTHROCYTE [DISTWIDTH] IN BLOOD BY AUTOMATED COUNT: 15.5 % (ref 11.5–14.5)
EST. GFR  (AFRICAN AMERICAN): >60 ML/MIN/1.73 M^2
EST. GFR  (NON AFRICAN AMERICAN): 52.3 ML/MIN/1.73 M^2
ESTIMATED AVG GLUCOSE: 166 MG/DL (ref 68–131)
FERRITIN SERPL-MCNC: 15 NG/ML (ref 20–300)
GLUCOSE SERPL-MCNC: 160 MG/DL (ref 70–110)
HBA1C MFR BLD: 7.4 % (ref 4–5.6)
HCT VFR BLD AUTO: 39.5 % (ref 40–54)
HCT VFR BLD AUTO: 39.5 % (ref 40–54)
HGB BLD-MCNC: 12.4 G/DL (ref 14–18)
HGB BLD-MCNC: 12.4 G/DL (ref 14–18)
IMM GRANULOCYTES # BLD AUTO: 0.02 K/UL (ref 0–0.04)
IMM GRANULOCYTES # BLD AUTO: 0.02 K/UL (ref 0–0.04)
IMM GRANULOCYTES NFR BLD AUTO: 0.3 % (ref 0–0.5)
IMM GRANULOCYTES NFR BLD AUTO: 0.3 % (ref 0–0.5)
IRON SERPL-MCNC: 53 UG/DL (ref 45–160)
LYMPHOCYTES # BLD AUTO: 1.9 K/UL (ref 1–4.8)
LYMPHOCYTES # BLD AUTO: 1.9 K/UL (ref 1–4.8)
LYMPHOCYTES NFR BLD: 31.8 % (ref 18–48)
LYMPHOCYTES NFR BLD: 31.8 % (ref 18–48)
MCH RBC QN AUTO: 25.8 PG (ref 27–31)
MCH RBC QN AUTO: 25.8 PG (ref 27–31)
MCHC RBC AUTO-ENTMCNC: 31.4 G/DL (ref 32–36)
MCHC RBC AUTO-ENTMCNC: 31.4 G/DL (ref 32–36)
MCV RBC AUTO: 82 FL (ref 82–98)
MCV RBC AUTO: 82 FL (ref 82–98)
MONOCYTES # BLD AUTO: 0.5 K/UL (ref 0.3–1)
MONOCYTES # BLD AUTO: 0.5 K/UL (ref 0.3–1)
MONOCYTES NFR BLD: 8.9 % (ref 4–15)
MONOCYTES NFR BLD: 8.9 % (ref 4–15)
NEUTROPHILS # BLD AUTO: 3.2 K/UL (ref 1.8–7.7)
NEUTROPHILS # BLD AUTO: 3.2 K/UL (ref 1.8–7.7)
NEUTROPHILS NFR BLD: 54.9 % (ref 38–73)
NEUTROPHILS NFR BLD: 54.9 % (ref 38–73)
NRBC BLD-RTO: 0 /100 WBC
NRBC BLD-RTO: 0 /100 WBC
PLATELET # BLD AUTO: 291 K/UL (ref 150–450)
PLATELET # BLD AUTO: 291 K/UL (ref 150–450)
PMV BLD AUTO: 11 FL (ref 9.2–12.9)
PMV BLD AUTO: 11 FL (ref 9.2–12.9)
POTASSIUM SERPL-SCNC: 4.1 MMOL/L (ref 3.5–5.1)
PROT SERPL-MCNC: 7.8 G/DL (ref 6–8.4)
RBC # BLD AUTO: 4.8 M/UL (ref 4.6–6.2)
RBC # BLD AUTO: 4.8 M/UL (ref 4.6–6.2)
SATURATED IRON: 12 % (ref 20–50)
SODIUM SERPL-SCNC: 139 MMOL/L (ref 136–145)
TOTAL IRON BINDING CAPACITY: 429 UG/DL (ref 250–450)
TRANSFERRIN SERPL-MCNC: 290 MG/DL (ref 200–375)
WBC # BLD AUTO: 5.85 K/UL (ref 3.9–12.7)
WBC # BLD AUTO: 5.85 K/UL (ref 3.9–12.7)

## 2021-07-23 PROCEDURE — 3051F PR MOST RECENT HEMOGLOBIN A1C LEVEL 7.0 - < 8.0%: ICD-10-PCS | Mod: CPTII,S$GLB,, | Performed by: INTERNAL MEDICINE

## 2021-07-23 PROCEDURE — 99499 RISK ADDL DX/OHS AUDIT: ICD-10-PCS | Mod: HCNC,S$GLB,, | Performed by: INTERNAL MEDICINE

## 2021-07-23 PROCEDURE — 1159F PR MEDICATION LIST DOCUMENTED IN MEDICAL RECORD: ICD-10-PCS | Mod: CPTII,S$GLB,, | Performed by: INTERNAL MEDICINE

## 2021-07-23 PROCEDURE — 1159F MED LIST DOCD IN RCRD: CPT | Mod: CPTII,S$GLB,, | Performed by: INTERNAL MEDICINE

## 2021-07-23 PROCEDURE — 3288F FALL RISK ASSESSMENT DOCD: CPT | Mod: CPTII,S$GLB,, | Performed by: INTERNAL MEDICINE

## 2021-07-23 PROCEDURE — 80053 COMPREHEN METABOLIC PANEL: CPT | Performed by: INTERNAL MEDICINE

## 2021-07-23 PROCEDURE — 99215 PR OFFICE/OUTPT VISIT, EST, LEVL V, 40-54 MIN: ICD-10-PCS | Mod: S$GLB,,, | Performed by: INTERNAL MEDICINE

## 2021-07-23 PROCEDURE — 1157F ADVNC CARE PLAN IN RCRD: CPT | Mod: CPTII,S$GLB,, | Performed by: INTERNAL MEDICINE

## 2021-07-23 PROCEDURE — 36415 COLL VENOUS BLD VENIPUNCTURE: CPT | Performed by: INTERNAL MEDICINE

## 2021-07-23 PROCEDURE — 3288F PR FALLS RISK ASSESSMENT DOCUMENTED: ICD-10-PCS | Mod: CPTII,S$GLB,, | Performed by: INTERNAL MEDICINE

## 2021-07-23 PROCEDURE — 1126F PR PAIN SEVERITY QUANTIFIED, NO PAIN PRESENT: ICD-10-PCS | Mod: CPTII,S$GLB,, | Performed by: INTERNAL MEDICINE

## 2021-07-23 PROCEDURE — 99499 UNLISTED E&M SERVICE: CPT | Mod: HCNC,S$GLB,, | Performed by: INTERNAL MEDICINE

## 2021-07-23 PROCEDURE — 3078F DIAST BP <80 MM HG: CPT | Mod: CPTII,S$GLB,, | Performed by: INTERNAL MEDICINE

## 2021-07-23 PROCEDURE — 83036 HEMOGLOBIN GLYCOSYLATED A1C: CPT | Performed by: INTERNAL MEDICINE

## 2021-07-23 PROCEDURE — 1101F PR PT FALLS ASSESS DOC 0-1 FALLS W/OUT INJ PAST YR: ICD-10-PCS | Mod: CPTII,S$GLB,, | Performed by: INTERNAL MEDICINE

## 2021-07-23 PROCEDURE — 3051F HG A1C>EQUAL 7.0%<8.0%: CPT | Mod: CPTII,S$GLB,, | Performed by: INTERNAL MEDICINE

## 2021-07-23 PROCEDURE — 3074F SYST BP LT 130 MM HG: CPT | Mod: CPTII,S$GLB,, | Performed by: INTERNAL MEDICINE

## 2021-07-23 PROCEDURE — 1126F AMNT PAIN NOTED NONE PRSNT: CPT | Mod: CPTII,S$GLB,, | Performed by: INTERNAL MEDICINE

## 2021-07-23 PROCEDURE — 1157F PR ADVANCE CARE PLAN OR EQUIV PRESENT IN MEDICAL RECORD: ICD-10-PCS | Mod: CPTII,S$GLB,, | Performed by: INTERNAL MEDICINE

## 2021-07-23 PROCEDURE — 3078F PR MOST RECENT DIASTOLIC BLOOD PRESSURE < 80 MM HG: ICD-10-PCS | Mod: CPTII,S$GLB,, | Performed by: INTERNAL MEDICINE

## 2021-07-23 PROCEDURE — 3074F PR MOST RECENT SYSTOLIC BLOOD PRESSURE < 130 MM HG: ICD-10-PCS | Mod: CPTII,S$GLB,, | Performed by: INTERNAL MEDICINE

## 2021-07-23 PROCEDURE — 1101F PT FALLS ASSESS-DOCD LE1/YR: CPT | Mod: CPTII,S$GLB,, | Performed by: INTERNAL MEDICINE

## 2021-07-23 PROCEDURE — 83540 ASSAY OF IRON: CPT | Performed by: INTERNAL MEDICINE

## 2021-07-23 PROCEDURE — 82728 ASSAY OF FERRITIN: CPT | Performed by: INTERNAL MEDICINE

## 2021-07-23 PROCEDURE — 85025 COMPLETE CBC W/AUTO DIFF WBC: CPT | Performed by: INTERNAL MEDICINE

## 2021-07-23 PROCEDURE — 99999 PR PBB SHADOW E&M-EST. PATIENT-LVL V: CPT | Mod: PBBFAC,,, | Performed by: INTERNAL MEDICINE

## 2021-07-23 PROCEDURE — 99215 OFFICE O/P EST HI 40 MIN: CPT | Mod: S$GLB,,, | Performed by: INTERNAL MEDICINE

## 2021-07-23 PROCEDURE — 99999 PR PBB SHADOW E&M-EST. PATIENT-LVL V: ICD-10-PCS | Mod: PBBFAC,,, | Performed by: INTERNAL MEDICINE

## 2021-07-23 RX ORDER — PANTOPRAZOLE SODIUM 40 MG/1
40 TABLET, DELAYED RELEASE ORAL 2 TIMES DAILY
Qty: 180 TABLET | Refills: 0 | Status: SHIPPED | OUTPATIENT
Start: 2021-07-23 | End: 2021-11-02

## 2021-07-23 RX ORDER — DOCUSATE SODIUM 100 MG/1
100 CAPSULE, LIQUID FILLED ORAL 3 TIMES DAILY PRN
Qty: 100 CAPSULE | Refills: 0 | Status: SHIPPED | OUTPATIENT
Start: 2021-07-23 | End: 2023-09-06

## 2021-07-30 DIAGNOSIS — H40.053 OCULAR HYPERTENSION, BILATERAL: ICD-10-CM

## 2021-07-30 RX ORDER — LATANOPROST 50 UG/ML
SOLUTION/ DROPS OPHTHALMIC
Qty: 2.5 ML | Refills: 10 | Status: SHIPPED | OUTPATIENT
Start: 2021-07-30 | End: 2023-05-22 | Stop reason: SDUPTHER

## 2021-09-27 ENCOUNTER — PATIENT MESSAGE (OUTPATIENT)
Dept: INTERNAL MEDICINE | Facility: CLINIC | Age: 81
End: 2021-09-27

## 2021-09-28 ENCOUNTER — PATIENT MESSAGE (OUTPATIENT)
Dept: INTERNAL MEDICINE | Facility: CLINIC | Age: 81
End: 2021-09-28

## 2021-09-28 DIAGNOSIS — E11.69 ONYCHOMYCOSIS OF MULTIPLE TOENAILS WITH TYPE 2 DIABETES MELLITUS: ICD-10-CM

## 2021-09-28 DIAGNOSIS — B35.1 ONYCHOMYCOSIS OF MULTIPLE TOENAILS WITH TYPE 2 DIABETES MELLITUS: ICD-10-CM

## 2021-09-29 RX ORDER — METFORMIN HYDROCHLORIDE 1000 MG/1
1000 TABLET ORAL 2 TIMES DAILY WITH MEALS
Qty: 180 TABLET | Refills: 3 | Status: SHIPPED | OUTPATIENT
Start: 2021-09-29 | End: 2022-01-20 | Stop reason: SDUPTHER

## 2021-09-29 RX ORDER — ATORVASTATIN CALCIUM 20 MG/1
20 TABLET, FILM COATED ORAL DAILY
Qty: 90 TABLET | Refills: 3 | Status: SHIPPED | OUTPATIENT
Start: 2021-09-29 | End: 2022-01-20 | Stop reason: SDUPTHER

## 2021-09-29 RX ORDER — LOSARTAN POTASSIUM AND HYDROCHLOROTHIAZIDE 25; 100 MG/1; MG/1
1 TABLET ORAL EVERY MORNING
Qty: 90 TABLET | Refills: 2 | Status: SHIPPED | OUTPATIENT
Start: 2021-09-29 | End: 2022-02-01 | Stop reason: SDUPTHER

## 2021-09-29 RX ORDER — CICLOPIROX 80 MG/ML
SOLUTION TOPICAL NIGHTLY
Qty: 6.6 ML | Refills: 2 | Status: SHIPPED | OUTPATIENT
Start: 2021-09-29 | End: 2021-12-06

## 2021-09-29 RX ORDER — GLIPIZIDE 5 MG/1
5 TABLET, FILM COATED, EXTENDED RELEASE ORAL
Qty: 90 TABLET | Refills: 3 | Status: SHIPPED | OUTPATIENT
Start: 2021-09-29 | End: 2022-01-20 | Stop reason: SDUPTHER

## 2021-10-20 DIAGNOSIS — H40.053 OCULAR HYPERTENSION, BILATERAL: Primary | ICD-10-CM

## 2021-10-21 ENCOUNTER — CLINICAL SUPPORT (OUTPATIENT)
Dept: OPHTHALMOLOGY | Facility: CLINIC | Age: 81
End: 2021-10-21
Payer: MEDICARE

## 2021-10-21 DIAGNOSIS — H40.053 OCULAR HYPERTENSION, BILATERAL: ICD-10-CM

## 2021-10-22 ENCOUNTER — OFFICE VISIT (OUTPATIENT)
Dept: OPTOMETRY | Facility: CLINIC | Age: 81
End: 2021-10-22
Payer: MEDICARE

## 2021-10-22 DIAGNOSIS — H52.7 REFRACTIVE ERROR: ICD-10-CM

## 2021-10-22 DIAGNOSIS — H25.13 NUCLEAR SCLEROTIC CATARACT OF BOTH EYES: ICD-10-CM

## 2021-10-22 DIAGNOSIS — H40.053 OCULAR HYPERTENSION, BILATERAL: ICD-10-CM

## 2021-10-22 DIAGNOSIS — E11.42 TYPE 2 DIABETES MELLITUS WITH DIABETIC POLYNEUROPATHY, WITHOUT LONG-TERM CURRENT USE OF INSULIN: ICD-10-CM

## 2021-10-22 DIAGNOSIS — H02.051 TRICHIASIS OF RIGHT UPPER EYELID: ICD-10-CM

## 2021-10-22 DIAGNOSIS — H35.033 HYPERTENSIVE RETINOPATHY OF BOTH EYES: ICD-10-CM

## 2021-10-22 DIAGNOSIS — E11.9 TYPE 2 DIABETES MELLITUS WITHOUT RETINOPATHY: Primary | ICD-10-CM

## 2021-10-22 PROCEDURE — 99999 PR PBB SHADOW E&M-EST. PATIENT-LVL III: ICD-10-PCS | Mod: PBBFAC,HCNC,, | Performed by: OPTOMETRIST

## 2021-10-22 PROCEDURE — 1159F MED LIST DOCD IN RCRD: CPT | Mod: HCNC,CPTII,S$GLB, | Performed by: OPTOMETRIST

## 2021-10-22 PROCEDURE — 92014 PR EYE EXAM, EST PATIENT,COMPREHESV: ICD-10-PCS | Mod: 25,HCNC,S$GLB, | Performed by: OPTOMETRIST

## 2021-10-22 PROCEDURE — 99499 UNLISTED E&M SERVICE: CPT | Mod: S$GLB,,, | Performed by: OPTOMETRIST

## 2021-10-22 PROCEDURE — 3288F FALL RISK ASSESSMENT DOCD: CPT | Mod: HCNC,CPTII,S$GLB, | Performed by: OPTOMETRIST

## 2021-10-22 PROCEDURE — 1160F PR REVIEW ALL MEDS BY PRESCRIBER/CLIN PHARMACIST DOCUMENTED: ICD-10-PCS | Mod: HCNC,CPTII,S$GLB, | Performed by: OPTOMETRIST

## 2021-10-22 PROCEDURE — 99499 RISK ADDL DX/OHS AUDIT: ICD-10-PCS | Mod: S$GLB,,, | Performed by: OPTOMETRIST

## 2021-10-22 PROCEDURE — 1159F PR MEDICATION LIST DOCUMENTED IN MEDICAL RECORD: ICD-10-PCS | Mod: HCNC,CPTII,S$GLB, | Performed by: OPTOMETRIST

## 2021-10-22 PROCEDURE — 92014 COMPRE OPH EXAM EST PT 1/>: CPT | Mod: 25,HCNC,S$GLB, | Performed by: OPTOMETRIST

## 2021-10-22 PROCEDURE — 1157F ADVNC CARE PLAN IN RCRD: CPT | Mod: HCNC,CPTII,S$GLB, | Performed by: OPTOMETRIST

## 2021-10-22 PROCEDURE — 1157F PR ADVANCE CARE PLAN OR EQUIV PRESENT IN MEDICAL RECORD: ICD-10-PCS | Mod: HCNC,CPTII,S$GLB, | Performed by: OPTOMETRIST

## 2021-10-22 PROCEDURE — 99999 PR PBB SHADOW E&M-EST. PATIENT-LVL III: CPT | Mod: PBBFAC,HCNC,, | Performed by: OPTOMETRIST

## 2021-10-22 PROCEDURE — 1126F PR PAIN SEVERITY QUANTIFIED, NO PAIN PRESENT: ICD-10-PCS | Mod: HCNC,CPTII,S$GLB, | Performed by: OPTOMETRIST

## 2021-10-22 PROCEDURE — 3288F PR FALLS RISK ASSESSMENT DOCUMENTED: ICD-10-PCS | Mod: HCNC,CPTII,S$GLB, | Performed by: OPTOMETRIST

## 2021-10-22 PROCEDURE — 1126F AMNT PAIN NOTED NONE PRSNT: CPT | Mod: HCNC,CPTII,S$GLB, | Performed by: OPTOMETRIST

## 2021-10-22 PROCEDURE — 1101F PT FALLS ASSESS-DOCD LE1/YR: CPT | Mod: HCNC,CPTII,S$GLB, | Performed by: OPTOMETRIST

## 2021-10-22 PROCEDURE — 67820 REVISE EYELASHES: CPT | Mod: HCNC,RT,S$GLB, | Performed by: OPTOMETRIST

## 2021-10-22 PROCEDURE — 1160F RVW MEDS BY RX/DR IN RCRD: CPT | Mod: HCNC,CPTII,S$GLB, | Performed by: OPTOMETRIST

## 2021-10-22 PROCEDURE — 1101F PR PT FALLS ASSESS DOC 0-1 FALLS W/OUT INJ PAST YR: ICD-10-PCS | Mod: HCNC,CPTII,S$GLB, | Performed by: OPTOMETRIST

## 2021-10-22 PROCEDURE — 67820 PR REVISE EYELASHES,FORCEPS: ICD-10-PCS | Mod: HCNC,RT,S$GLB, | Performed by: OPTOMETRIST

## 2021-10-29 PROCEDURE — 99454 PR REMOTE MNTR, PHYS PARAM, INITIAL, EA 30 DAYS: ICD-10-PCS | Mod: S$GLB,,, | Performed by: INTERNAL MEDICINE

## 2021-10-29 PROCEDURE — 99454 REM MNTR PHYSIOL PARAM 16-30: CPT | Mod: S$GLB,,, | Performed by: INTERNAL MEDICINE

## 2021-11-02 RX ORDER — PANTOPRAZOLE SODIUM 40 MG/1
40 TABLET, DELAYED RELEASE ORAL 2 TIMES DAILY
Qty: 180 TABLET | Refills: 2 | Status: SHIPPED | OUTPATIENT
Start: 2021-11-02 | End: 2022-01-20 | Stop reason: SDUPTHER

## 2021-12-06 DIAGNOSIS — E11.69 ONYCHOMYCOSIS OF MULTIPLE TOENAILS WITH TYPE 2 DIABETES MELLITUS: ICD-10-CM

## 2021-12-06 DIAGNOSIS — B35.1 ONYCHOMYCOSIS OF MULTIPLE TOENAILS WITH TYPE 2 DIABETES MELLITUS: ICD-10-CM

## 2021-12-06 RX ORDER — CICLOPIROX 80 MG/ML
SOLUTION TOPICAL NIGHTLY
Qty: 1 EACH | Refills: 1 | Status: SHIPPED | OUTPATIENT
Start: 2021-12-06 | End: 2023-09-06

## 2022-01-03 ENCOUNTER — PATIENT MESSAGE (OUTPATIENT)
Dept: INTERNAL MEDICINE | Facility: CLINIC | Age: 82
End: 2022-01-03
Payer: MEDICARE

## 2022-01-03 ENCOUNTER — PATIENT MESSAGE (OUTPATIENT)
Dept: OPTOMETRY | Facility: CLINIC | Age: 82
End: 2022-01-03
Payer: MEDICARE

## 2022-01-10 ENCOUNTER — TELEPHONE (OUTPATIENT)
Dept: FAMILY MEDICINE | Facility: CLINIC | Age: 82
End: 2022-01-10
Payer: MEDICARE

## 2022-01-10 NOTE — TELEPHONE ENCOUNTER
----- Message from Anika Sullivan sent at 1/10/2022  1:57 PM CST -----  Type: Requesting to speak with nurse         Who Called: Pt's wife Dina   Regarding: Needing to get scheduled please advise  Would the patient rather a call back or a response via [facility]? Call back  Best Call Back Number:[phone]  Additional Information: N/A

## 2022-01-10 NOTE — TELEPHONE ENCOUNTER
Spoke to pt's wife, Dina, who is a pt of Dr. Koenig. Pamela would like for her  to become a pt of Dr. Mcgee since his physician is retiring.

## 2022-01-12 ENCOUNTER — TELEPHONE (OUTPATIENT)
Dept: FAMILY MEDICINE | Facility: CLINIC | Age: 82
End: 2022-01-12
Payer: MEDICARE

## 2022-01-12 NOTE — TELEPHONE ENCOUNTER
Spoke to pt's wife and wanted Dr. Mcgee to accept her  as a pt. Per Dr. Mcgee, pt was scheduled for an appt.

## 2022-01-14 DIAGNOSIS — N18.2 TYPE 2 DIABETES MELLITUS WITH STAGE 2 CHRONIC KIDNEY DISEASE, WITHOUT LONG-TERM CURRENT USE OF INSULIN: Chronic | ICD-10-CM

## 2022-01-14 DIAGNOSIS — E11.22 TYPE 2 DIABETES MELLITUS WITH STAGE 2 CHRONIC KIDNEY DISEASE, WITHOUT LONG-TERM CURRENT USE OF INSULIN: Chronic | ICD-10-CM

## 2022-01-14 NOTE — TELEPHONE ENCOUNTER
Care Due:                  Date            Visit Type   Department     Provider  --------------------------------------------------------------------------------    Last Visit: None Found      None         None Found                                           [facility]    Luke Ayala  Next Visit: 01-      Ariana Mcgee                                                            Last  Test          Frequency    Reason                     Performed    Due Date  --------------------------------------------------------------------------------    HBA1C.......  6 months...  glipiZIDE, metFORMIN.....  07- 01-    Powered by [email or web address]. Reference number: [id number].   1/14/2022 2:36:33 PM CST

## 2022-01-14 NOTE — TELEPHONE ENCOUNTER
----- Message from Jade Lou sent at 1/14/2022  2:25 PM CST -----  Contact: [employer] [facility] [employer] [place] [phone]  Please send a script to [facility] at fax [phone]. A new glucometer plus supplies with frequency of testing. Also, recent clinicals and Medical Necessity form.    Thank you

## 2022-01-18 RX ORDER — BLOOD-GLUCOSE CONTROL, NORMAL
EACH MISCELLANEOUS
Qty: 200 EACH | Refills: 3 | Status: SHIPPED | OUTPATIENT
Start: 2022-01-18

## 2022-01-18 RX ORDER — DEXTROSE 4 G
TABLET,CHEWABLE ORAL
Qty: 1 EACH | Refills: 0 | Status: SHIPPED | OUTPATIENT
Start: 2022-01-18

## 2022-01-20 ENCOUNTER — OFFICE VISIT (OUTPATIENT)
Dept: FAMILY MEDICINE | Facility: CLINIC | Age: 82
End: 2022-01-20
Attending: FAMILY MEDICINE
Payer: MEDICARE

## 2022-01-20 ENCOUNTER — LAB VISIT (OUTPATIENT)
Dept: LAB | Facility: HOSPITAL | Age: 82
End: 2022-01-20
Attending: FAMILY MEDICINE
Payer: MEDICARE

## 2022-01-20 DIAGNOSIS — E11.42 TYPE 2 DIABETES MELLITUS WITH DIABETIC POLYNEUROPATHY, WITHOUT LONG-TERM CURRENT USE OF INSULIN: ICD-10-CM

## 2022-01-20 DIAGNOSIS — Z00.00 ROUTINE GENERAL MEDICAL EXAMINATION AT HEALTH CARE FACILITY: Primary | ICD-10-CM

## 2022-01-20 DIAGNOSIS — E78.2 MIXED HYPERLIPIDEMIA: ICD-10-CM

## 2022-01-20 DIAGNOSIS — I70.0 AORTIC ATHEROSCLEROSIS: ICD-10-CM

## 2022-01-20 DIAGNOSIS — Z00.00 ROUTINE GENERAL MEDICAL EXAMINATION AT HEALTH CARE FACILITY: ICD-10-CM

## 2022-01-20 DIAGNOSIS — I47.19 ATRIAL TACHYCARDIA, PAROXYSMAL: ICD-10-CM

## 2022-01-20 DIAGNOSIS — I10 ESSENTIAL HYPERTENSION: ICD-10-CM

## 2022-01-20 DIAGNOSIS — K21.9 GASTROESOPHAGEAL REFLUX DISEASE, UNSPECIFIED WHETHER ESOPHAGITIS PRESENT: ICD-10-CM

## 2022-01-20 LAB
ALBUMIN/CREAT UR: 9.4 UG/MG (ref 0–30)
BILIRUB UR QL STRIP: NEGATIVE
CLARITY UR: CLEAR
COLOR UR: YELLOW
CREAT UR-MCNC: 255 MG/DL (ref 23–375)
GLUCOSE UR QL STRIP: NEGATIVE
HGB UR QL STRIP: NEGATIVE
KETONES UR QL STRIP: NEGATIVE
LEUKOCYTE ESTERASE UR QL STRIP: NEGATIVE
MICROALBUMIN UR DL<=1MG/L-MCNC: 24 UG/ML
NITRITE UR QL STRIP: NEGATIVE
PH UR STRIP: 6 [PH] (ref 5–8)
PROT UR QL STRIP: ABNORMAL
SP GR UR STRIP: 1.02 (ref 1–1.03)
URN SPEC COLLECT METH UR: ABNORMAL
UROBILINOGEN UR STRIP-ACNC: NEGATIVE EU/DL

## 2022-01-20 PROCEDURE — 1101F PT FALLS ASSESS-DOCD LE1/YR: CPT | Mod: CPTII,S$GLB,, | Performed by: FAMILY MEDICINE

## 2022-01-20 PROCEDURE — 3075F SYST BP GE 130 - 139MM HG: CPT | Mod: CPTII,S$GLB,, | Performed by: FAMILY MEDICINE

## 2022-01-20 PROCEDURE — 99214 OFFICE O/P EST MOD 30 MIN: CPT | Mod: S$GLB,,, | Performed by: FAMILY MEDICINE

## 2022-01-20 PROCEDURE — 3078F DIAST BP <80 MM HG: CPT | Mod: CPTII,S$GLB,, | Performed by: FAMILY MEDICINE

## 2022-01-20 PROCEDURE — 99999 PR PBB SHADOW E&M-EST. PATIENT-LVL IV: ICD-10-PCS | Mod: PBBFAC,,, | Performed by: FAMILY MEDICINE

## 2022-01-20 PROCEDURE — 1159F PR MEDICATION LIST DOCUMENTED IN MEDICAL RECORD: ICD-10-PCS | Mod: CPTII,S$GLB,, | Performed by: FAMILY MEDICINE

## 2022-01-20 PROCEDURE — 1159F MED LIST DOCD IN RCRD: CPT | Mod: CPTII,S$GLB,, | Performed by: FAMILY MEDICINE

## 2022-01-20 PROCEDURE — 1157F ADVNC CARE PLAN IN RCRD: CPT | Mod: CPTII,S$GLB,, | Performed by: FAMILY MEDICINE

## 2022-01-20 PROCEDURE — 3072F PR LOW RISK FOR RETINOPATHY: ICD-10-PCS | Mod: CPTII,S$GLB,, | Performed by: FAMILY MEDICINE

## 2022-01-20 PROCEDURE — 82043 UR ALBUMIN QUANTITATIVE: CPT | Performed by: FAMILY MEDICINE

## 2022-01-20 PROCEDURE — 81003 URINALYSIS AUTO W/O SCOPE: CPT | Performed by: FAMILY MEDICINE

## 2022-01-20 PROCEDURE — 1126F AMNT PAIN NOTED NONE PRSNT: CPT | Mod: CPTII,S$GLB,, | Performed by: FAMILY MEDICINE

## 2022-01-20 PROCEDURE — 99499 RISK ADDL DX/OHS AUDIT: ICD-10-PCS | Mod: S$GLB,,, | Performed by: FAMILY MEDICINE

## 2022-01-20 PROCEDURE — 3075F PR MOST RECENT SYSTOLIC BLOOD PRESS GE 130-139MM HG: ICD-10-PCS | Mod: CPTII,S$GLB,, | Performed by: FAMILY MEDICINE

## 2022-01-20 PROCEDURE — 3051F PR MOST RECENT HEMOGLOBIN A1C LEVEL 7.0 - < 8.0%: ICD-10-PCS | Mod: CPTII,S$GLB,, | Performed by: FAMILY MEDICINE

## 2022-01-20 PROCEDURE — 3051F HG A1C>EQUAL 7.0%<8.0%: CPT | Mod: CPTII,S$GLB,, | Performed by: FAMILY MEDICINE

## 2022-01-20 PROCEDURE — 82570 ASSAY OF URINE CREATININE: CPT | Performed by: FAMILY MEDICINE

## 2022-01-20 PROCEDURE — 1160F PR REVIEW ALL MEDS BY PRESCRIBER/CLIN PHARMACIST DOCUMENTED: ICD-10-PCS | Mod: CPTII,S$GLB,, | Performed by: FAMILY MEDICINE

## 2022-01-20 PROCEDURE — 99999 PR PBB SHADOW E&M-EST. PATIENT-LVL IV: CPT | Mod: PBBFAC,,, | Performed by: FAMILY MEDICINE

## 2022-01-20 PROCEDURE — 1126F PR PAIN SEVERITY QUANTIFIED, NO PAIN PRESENT: ICD-10-PCS | Mod: CPTII,S$GLB,, | Performed by: FAMILY MEDICINE

## 2022-01-20 PROCEDURE — 3288F FALL RISK ASSESSMENT DOCD: CPT | Mod: CPTII,S$GLB,, | Performed by: FAMILY MEDICINE

## 2022-01-20 PROCEDURE — 3078F PR MOST RECENT DIASTOLIC BLOOD PRESSURE < 80 MM HG: ICD-10-PCS | Mod: CPTII,S$GLB,, | Performed by: FAMILY MEDICINE

## 2022-01-20 PROCEDURE — 99499 UNLISTED E&M SERVICE: CPT | Mod: S$GLB,,, | Performed by: FAMILY MEDICINE

## 2022-01-20 PROCEDURE — 3072F LOW RISK FOR RETINOPATHY: CPT | Mod: CPTII,S$GLB,, | Performed by: FAMILY MEDICINE

## 2022-01-20 PROCEDURE — 1157F PR ADVANCE CARE PLAN OR EQUIV PRESENT IN MEDICAL RECORD: ICD-10-PCS | Mod: CPTII,S$GLB,, | Performed by: FAMILY MEDICINE

## 2022-01-20 PROCEDURE — 1101F PR PT FALLS ASSESS DOC 0-1 FALLS W/OUT INJ PAST YR: ICD-10-PCS | Mod: CPTII,S$GLB,, | Performed by: FAMILY MEDICINE

## 2022-01-20 PROCEDURE — 3288F PR FALLS RISK ASSESSMENT DOCUMENTED: ICD-10-PCS | Mod: CPTII,S$GLB,, | Performed by: FAMILY MEDICINE

## 2022-01-20 PROCEDURE — 1160F RVW MEDS BY RX/DR IN RCRD: CPT | Mod: CPTII,S$GLB,, | Performed by: FAMILY MEDICINE

## 2022-01-20 PROCEDURE — 99214 PR OFFICE/OUTPT VISIT, EST, LEVL IV, 30-39 MIN: ICD-10-PCS | Mod: S$GLB,,, | Performed by: FAMILY MEDICINE

## 2022-01-20 RX ORDER — ATORVASTATIN CALCIUM 20 MG/1
20 TABLET, FILM COATED ORAL DAILY
Qty: 90 TABLET | Refills: 3 | Status: SHIPPED | OUTPATIENT
Start: 2022-01-20 | End: 2022-11-15

## 2022-01-20 RX ORDER — METOPROLOL SUCCINATE 100 MG/1
100 TABLET, EXTENDED RELEASE ORAL DAILY
Qty: 90 TABLET | Refills: 3 | Status: SHIPPED | OUTPATIENT
Start: 2022-01-20 | End: 2022-11-13

## 2022-01-20 RX ORDER — GLIPIZIDE 5 MG/1
5 TABLET, FILM COATED, EXTENDED RELEASE ORAL
Qty: 90 TABLET | Refills: 3 | Status: SHIPPED | OUTPATIENT
Start: 2022-01-20 | End: 2022-11-13 | Stop reason: SDUPTHER

## 2022-01-20 RX ORDER — AMLODIPINE BESYLATE 10 MG/1
TABLET ORAL
Qty: 90 TABLET | Refills: 3 | Status: SHIPPED | OUTPATIENT
Start: 2022-01-20 | End: 2022-11-13

## 2022-01-20 RX ORDER — PANTOPRAZOLE SODIUM 40 MG/1
40 TABLET, DELAYED RELEASE ORAL 2 TIMES DAILY
Qty: 180 TABLET | Refills: 2 | Status: SHIPPED | OUTPATIENT
Start: 2022-01-20 | End: 2022-08-15

## 2022-01-20 RX ORDER — METFORMIN HYDROCHLORIDE 1000 MG/1
1000 TABLET ORAL 2 TIMES DAILY WITH MEALS
Qty: 180 TABLET | Refills: 3 | Status: SHIPPED | OUTPATIENT
Start: 2022-01-20 | End: 2022-11-13 | Stop reason: SDUPTHER

## 2022-01-21 VITALS
BODY MASS INDEX: 28.34 KG/M2 | DIASTOLIC BLOOD PRESSURE: 64 MMHG | WEIGHT: 180.56 LBS | OXYGEN SATURATION: 98 % | HEART RATE: 72 BPM | SYSTOLIC BLOOD PRESSURE: 130 MMHG | HEIGHT: 67 IN

## 2022-01-21 NOTE — PROGRESS NOTES
Subjective:       Patient ID: Bryant Gil is a 81 y.o. male.    Chief Complaint: Annual Exam    81 yr old pleasant male with DM II, HTN, HLD, atrial tachycardia, presents today as a new patient to me and for establishing primary care and also his annual wellness check and lab work. No complaints today.      DM Ii - controlled -    HGBA1C                   7.6 (H)             01/20/2022      - on metformin n glipizide - compliant - no hypoglycemic symptoms      HTN - controlled - on HYZAAR, amlodipine and metoprolol      HLD - improving - on statin -     LDLCALC                  129.4               01/20/2022            History as below - reviewed             Diabetes  He presents for his follow-up diabetic visit. He has type 2 diabetes mellitus. His disease course has been stable. Pertinent negatives for hypoglycemia include no dizziness, hunger, nervousness/anxiousness, seizures, speech difficulty or tremors. Pertinent negatives for diabetes include no blurred vision, no chest pain, no foot paresthesias, no foot ulcerations, no polydipsia, no polyuria, no visual change, no weakness and no weight loss. Pertinent negatives for hypoglycemia complications include no blackouts, no nocturnal hypoglycemia and no required assistance. Symptoms are stable. Pertinent negatives for diabetic complications include no autonomic neuropathy, impotence, nephropathy, peripheral neuropathy or retinopathy. Risk factors for coronary artery disease include diabetes mellitus, dyslipidemia and male sex. Current diabetic treatment includes oral agent (dual therapy). He is compliant with treatment all of the time. Meal planning includes avoidance of concentrated sweets. He has not had a previous visit with a dietitian. He rarely participates in exercise. There is no change in his home blood glucose trend. An ACE inhibitor/angiotensin II receptor blocker is being taken. He does not see a podiatrist.Eye exam is not current.    Hypertension  This is a chronic problem. The current episode started more than 1 year ago. The problem has been gradually improving since onset. The problem is controlled. Pertinent negatives include no blurred vision, chest pain or palpitations. There are no associated agents to hypertension. Risk factors for coronary artery disease include dyslipidemia, male gender and diabetes mellitus. Past treatments include angiotensin blockers, diuretics, calcium channel blockers and beta blockers. The current treatment provides significant improvement. There are no compliance problems.  There is no history of angina, CAD/MI or retinopathy. There is no history of chronic renal disease, hyperaldosteronism, hyperparathyroidism, a hypertension causing med, renovascular disease or a thyroid problem.   Hyperlipidemia  This is a chronic problem. The current episode started more than 1 year ago. The problem is controlled. Recent lipid tests were reviewed and are normal. He has no history of chronic renal disease. There are no known factors aggravating his hyperlipidemia. Pertinent negatives include no chest pain or myalgias. Current antihyperlipidemic treatment includes statins. The current treatment provides significant improvement of lipids. There are no compliance problems.  Risk factors for coronary artery disease include diabetes mellitus, dyslipidemia, hypertension and male sex.     Review of Systems   Constitutional: Negative.  Negative for activity change, diaphoresis, unexpected weight change and weight loss.   HENT: Negative.  Negative for nasal congestion, ear pain, mouth sores, rhinorrhea and voice change.    Eyes: Negative.  Negative for blurred vision, pain, discharge and visual disturbance.   Respiratory: Negative.  Negative for apnea, cough and wheezing.    Cardiovascular: Negative.  Negative for chest pain and palpitations.   Gastrointestinal: Negative.  Negative for abdominal distention, anal bleeding, diarrhea  and vomiting.   Endocrine: Negative.  Negative for cold intolerance, polydipsia and polyuria.   Genitourinary: Negative.  Negative for decreased urine volume, difficulty urinating, discharge, frequency, impotence and scrotal swelling.   Musculoskeletal: Negative.  Negative for back pain, myalgias and neck stiffness.   Integumentary:  Negative for color change and rash. Negative.   Allergic/Immunologic: Negative.  Negative for environmental allergies.   Neurological: Negative.  Negative for dizziness, tremors, seizures, speech difficulty, weakness and light-headedness.   Hematological: Negative.    Psychiatric/Behavioral: Negative.  Negative for agitation, dysphoric mood and suicidal ideas. The patient is not nervous/anxious.          Active Ambulatory Problems     Diagnosis Date Noted    Ocular hypertension - Both Eyes 10/09/2012    Essential hypertension 10/09/2012    History of prostate cancer 01/29/2013    Mixed hyperlipidemia 01/29/2013    [place] (hard of hearing) 06/25/2013    Type 2 diabetes mellitus with diabetic polyneuropathy, without long-term current use of insulin 03/12/2014    Diabetic polyneuropathy 03/12/2014    Trigger finger 07/27/2015    Carpal tunnel syndrome, left 08/25/2015    Aortic atherosclerosis 11/11/2015    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 01/25/2017    Stage 2 chronic kidney disease 01/25/2017    Personal history of colonic polyps 03/29/2017    Nonrheumatic aortic valve insufficiency 02/06/2018    Vitamin B 12 deficiency 02/06/2018    Bright red blood per rectum 01/08/2019    Sigmoid diverticulosis 03/19/2017    Hepatic cyst 01/08/2019    Gastroesophageal reflux disease without esophagitis 01/08/2020    Chronic gastritis without bleeding, negative H. pylori Jaunuary 2019 and also May of 2020 EGD 01/30/2020    Abdominal pain 05/14/2020    Memory loss 03/24/2021    Atrial tachycardia, paroxysmal 04/07/2021    PVC (premature ventricular contraction) 04/07/2021     BMI 28.0-28.9,adult 06/23/2021     Resolved Ambulatory Problems     Diagnosis Date Noted    Type II or unspecified type diabetes mellitus with neurological manifestations, not stated as uncontrolled(250.60) 10/09/2012    Malignant neoplasm of colon 01/29/2013    Type II diabetes mellitus with neurological manifestations 01/29/2013    Type II diabetes mellitus with renal manifestations 01/29/2013    Type II diabetes mellitus 01/29/2013    Hereditary and idiopathic peripheral neuropathy 01/29/2013    Tubular adenoma of colon 01/10/2012    CTS (carpal tunnel syndrome) 06/25/2013    Tendinitis of both rotator cuffs 06/25/2013    Pyelonephritis, right no stone on CT scan. 07/11/2013    Sepsis, same organism in urine grew in his blood, Klebsiella 07/11/2013    Cancer 01/22/2015    Impingement syndrome of both shoulders 11/03/2016    Tortuous aorta 02/06/2018    Ectatic aorta 02/06/2018    Dyspepsia 10/02/2019    Rectal bleeding 01/11/2021    Lower GI bleeding 01/11/2021    Near syncope 02/05/2021    SOB (shortness of breath) 02/05/2021     Past Medical History:   Diagnosis Date    Cataract of both eyes     Diabetes mellitus     Diverticulosis     HTN (hypertension)     HTN (hypertension) 10/9/2012    OHT (ocular hypertension)     Prostate cancer     Sepsis, same organism in urine grew in his blood, Klebsiella 7/11/2013     Past Surgical History:   Procedure Laterality Date    CARPAL TUNNEL RELEASE Right 08/25/2015    CIRCUMCISION  04/13/2005    COLONOSCOPY N/A 3/29/2017    Procedure: COLONOSCOPY Golytely prep;  Surgeon: Kavitha Cleaning MD;  Location: [facility] ENDO;  Service: Endoscopy;  Laterality: N/A;    COLONOSCOPY N/A 1/12/2021    Procedure: COLONOSCOPY;  Surgeon: Dung Panda MD;  Location: [facility] ENDO;  Service: Endoscopy;  Laterality: N/A;    COLONOSCOPY W/ POLYPECTOMY  01/10/2012    Repeat in 5 years     ESOPHAGOGASTRODUODENOSCOPY N/A 1/8/2019    Procedure: EGD  (ESOPHAGOGASTRODUODENOSCOPY);  Surgeon: Rachel Burrows MD;  Location: [facility] ENDO;  Service: Endoscopy;  Laterality: N/A;    ESOPHAGOGASTRODUODENOSCOPY N/A 2020    Procedure: ESOPHAGOGASTRODUODENOSCOPY (EGD);  Surgeon: Rachel Burrows MD;  Location: [facility] ENDO;  Service: Endoscopy;  Laterality: N/A;  Dr. Luis Angel Burrows if possible.    FLEXIBLE SIGMOIDOSCOPY N/A 2019    Procedure: SIGMOIDOSCOPY, FLEXIBLE;  Surgeon: Rachel Burrows MD;  Location: [facility] ENDO;  Service: Endoscopy;  Laterality: N/A;    PENILE PROSTHESIS IMPLANT      PROSTATE SURGERY      Prostatectomy for prostate ca; [facility]    TRIGGER FINGER RELEASE Right 2015     Family History   Problem Relation Age of Onset    Diabetes Sister     Cataracts Sister     Hypertension Mother     No Known Problems Brother     No Known Problems Sister     No Known Problems Son     No Known Problems Daughter     Heart attack Brother     No Known Problems Sister     No Known Problems Sister     Blindness Neg Hx     Amblyopia Neg Hx     Glaucoma Neg Hx     Retinal detachment Neg Hx     Strabismus Neg Hx     Macular degeneration Neg Hx     Stroke Neg Hx     Thyroid disease Neg Hx     Colon cancer Neg Hx     Esophageal cancer Neg Hx      Social History     Socioeconomic History    Marital status:    Tobacco Use    Smoking status: Former Smoker     Packs/day: 0.50     Years: 3.00     Pack years: 1.50     Types: Cigarettes     Quit date: 1960     Years since quittin.0    Smokeless tobacco: Never Used    Tobacco comment: smoked as a teenager   Substance and Sexual Activity    Alcohol use: Yes     Comment: social drinks a beer 1-2 x month    Drug use: No    Sexual activity: Yes     Partners: Female   Social History Narrative    Works at [employer] in [place]     Social Determinants of Health     Financial Resource Strain: Low Risk     Difficulty of Paying Living Expenses: Not hard at all   Food Insecurity: No Food Insecurity     Worried About Running Out of Food in the Last Year: Never true    Ran Out of Food in the Last Year: Never true   Transportation Needs: No Transportation Needs    Lack of Transportation (Medical): No    Lack of Transportation (Non-Medical): No   Physical Activity: Inactive    Days of Exercise per Week: 0 days    Minutes of Exercise per Session: 0 min   Stress: No Stress Concern Present    Feeling of Stress : Only a little   Social Connections: Unknown    Frequency of Communication with Friends and Family: More than three times a week    Frequency of Social Gatherings with Friends and Family: Once a week    Attends Jain Services: More than 4 times per year    Active Member of Clubs or Organizations: Patient refused    Attends Club or Organization Meetings: Patient refused    Marital Status:    Housing Stability: Low Risk     Unable to Pay for Housing in the Last Year: No    Number of Places Lived in the Last Year: 1    Unstable Housing in the Last Year: No     Review of patient's allergies indicates:  No Known Allergies  Current Outpatient Medications on File Prior to Visit   Medication Sig Dispense Refill    aspirin (ECOTRIN) 81 MG EC tablet Take 81 mg by mouth once daily.      BD ALCOHOL SWABS PadM USE FOR HOME GLUCOSE MONITORING DAILY 100 each 3    blood sugar diagnostic (TRUE METRIX GLUCOSE TEST STRIP) Strp Test Blood Sugar twice daily 100 strip 3    blood-glucose meter (TRUE METRIX GLUCOSE METER) Misc Test blood sugar twice a day 1 each 0    ciclopirox (PENLAC) 8 % Soln APPLY TOPICALLY NIGHTLY. 1 each 1    docusate sodium (COLACE) 100 MG capsule Take 1 capsule (100 mg total) by mouth 3 (three) times daily as needed for Constipation (stoole softener). 100 capsule 0    lancets (TRUEPLUS LANCETS) 30 gauge Misc Check sugar twice daily 200 each 3    latanoprost 0.005 % ophthalmic solution INSTILL 1 DROP INTO BOTH EYES ONE TIME DAILY 2.5 mL 10    losartan-hydrochlorothiazide 100-25  mg (HYZAAR) 100-25 mg per tablet Take 1 tablet by mouth every morning. 90 tablet 2     No current facility-administered medications on file prior to visit.       Objective:       Vitals:    01/20/22 0855   BP: 130/64   Pulse: 72   '  Physical Exam  Constitutional:       Appearance: He is well-developed and well-nourished.   HENT:      Head: Normocephalic and atraumatic.      Right Ear: External ear normal.      Left Ear: External ear normal.      Nose: Nose normal.      Mouth/Throat:      Mouth: Oropharynx is clear and moist.      Pharynx: No oropharyngeal exudate.   Eyes:      General: No scleral icterus.        Right eye: No discharge.         Left eye: No discharge.      Extraocular Movements: EOM normal.      Conjunctiva/sclera: Conjunctivae normal.      Pupils: Pupils are equal, round, and reactive to light.   Neck:      Thyroid: No thyromegaly.      Vascular: No JVD.      Trachea: No tracheal deviation.   Cardiovascular:      Rate and Rhythm: Normal rate and regular rhythm.      Pulses: Intact distal pulses.      Heart sounds: Normal heart sounds. No murmur heard.  No friction rub. No gallop.    Pulmonary:      Effort: Pulmonary effort is normal. No respiratory distress.      Breath sounds: Normal breath sounds. No stridor. No wheezing or rales.   Chest:      Chest wall: No tenderness.   Abdominal:      General: Bowel sounds are normal. There is no distension.      Palpations: Abdomen is soft. There is no mass.      Tenderness: There is no abdominal tenderness. There is no guarding or rebound.      Hernia: No hernia is present.   Musculoskeletal:         General: No tenderness or edema. Normal range of motion.      Cervical back: Normal range of motion and neck supple.   Lymphadenopathy:      Cervical: No cervical adenopathy.   Skin:     General: Skin is warm and dry.      Coloration: Skin is not pale.      Findings: No erythema or rash.   Neurological:      Mental Status: He is alert and oriented to person,  place, and time.      Cranial Nerves: No cranial nerve deficit.      Motor: No abnormal muscle tone.      Coordination: Coordination normal.      Deep Tendon Reflexes: Reflexes are normal and symmetric. Reflexes normal.   Psychiatric:         Mood and Affect: Mood and affect normal.         Behavior: Behavior normal.         Thought Content: Thought content normal.         Judgment: Judgment normal.         Assessment:       Problem List Items Addressed This Visit     Type 2 diabetes mellitus with diabetic polyneuropathy, without long-term current use of insulin (Chronic)    Relevant Medications    glipiZIDE (GLUCOTROL) 5 MG TR24    metFORMIN (GLUCOPHAGE) 1000 MG tablet    Other Relevant Orders    CBC Auto Differential (Completed)    Comprehensive Metabolic Panel (Completed)    Lipid Panel (Completed)    Hemoglobin A1C (Completed)    Urinalysis (Completed)    Microalbumin/Creatinine Ratio, Urine (Completed)    Mixed hyperlipidemia (Chronic)    Relevant Medications    atorvastatin (LIPITOR) 20 MG tablet    Other Relevant Orders    CBC Auto Differential (Completed)    Comprehensive Metabolic Panel (Completed)    Lipid Panel (Completed)    Essential hypertension (Chronic)    Relevant Medications    amLODIPine (NORVASC) 10 MG tablet    metoprolol succinate (TOPROL-XL) 100 MG 24 hr tablet    Other Relevant Orders    CBC Auto Differential (Completed)    Comprehensive Metabolic Panel (Completed)    Lipid Panel (Completed)    Atrial tachycardia, paroxysmal    Aortic atherosclerosis      Other Visit Diagnoses     Routine general medical examination at health care facility    -  Primary    Relevant Orders    CBC Auto Differential (Completed)    Comprehensive Metabolic Panel (Completed)    Lipid Panel (Completed)    Hemoglobin A1C (Completed)    Urinalysis (Completed)    Microalbumin/Creatinine Ratio, Urine (Completed)    Gastroesophageal reflux disease, unspecified whether esophagitis present        Relevant Medications     pantoprazole (PROTONIX) 40 MG tablet          Plan:           Bryant was seen today for annual exam.    Diagnoses and all orders for this visit:    Routine general medical examination at health care facility  -     CBC Auto Differential; Future  -     Comprehensive Metabolic Panel; Future  -     Lipid Panel; Future  -     Hemoglobin A1C; Future  -     Urinalysis; Future  -     Microalbumin/Creatinine Ratio, Urine; Future    Type 2 diabetes mellitus with diabetic polyneuropathy, without long-term current use of insulin  -     CBC Auto Differential; Future  -     Comprehensive Metabolic Panel; Future  -     Lipid Panel; Future  -     Hemoglobin A1C; Future  -     Urinalysis; Future  -     Microalbumin/Creatinine Ratio, Urine; Future  -     glipiZIDE (GLUCOTROL) 5 MG TR24; Take 1 tablet (5 mg total) by mouth daily with breakfast.  -     metFORMIN (GLUCOPHAGE) 1000 MG tablet; Take 1 tablet (1,000 mg total) by mouth 2 (two) times daily with meals.    Essential hypertension  -     CBC Auto Differential; Future  -     Comprehensive Metabolic Panel; Future  -     Lipid Panel; Future  -     amLODIPine (NORVASC) 10 MG tablet; TAKE 1 TABLET ONE TIME DAILY  -     metoprolol succinate (TOPROL-XL) 100 MG 24 hr tablet; Take 1 tablet (100 mg total) by mouth once daily.    Mixed hyperlipidemia  -     CBC Auto Differential; Future  -     Comprehensive Metabolic Panel; Future  -     Lipid Panel; Future  -     atorvastatin (LIPITOR) 20 MG tablet; Take 1 tablet (20 mg total) by mouth once daily.    Gastroesophageal reflux disease, unspecified whether esophagitis present  -     pantoprazole (PROTONIX) 40 MG tablet; Take 1 tablet (40 mg total) by mouth 2 (two) times daily.    Atrial tachycardia, paroxysmal    Aortic atherosclerosis      Wellness check  -normal exam  -labs    HTN  -controlled    DM II  -controlled    HLD  -not at goal  -diet control    Spent adequate time in obtaining history and explaining differentials    Follow up in  about 6 months (around 7/20/2022), or if symptoms worsen or fail to improve.

## 2022-01-31 ENCOUNTER — PATIENT MESSAGE (OUTPATIENT)
Dept: FAMILY MEDICINE | Facility: CLINIC | Age: 82
End: 2022-01-31
Payer: MEDICARE

## 2022-02-01 RX ORDER — LOSARTAN POTASSIUM AND HYDROCHLOROTHIAZIDE 25; 100 MG/1; MG/1
1 TABLET ORAL EVERY MORNING
Qty: 90 TABLET | Refills: 2 | Status: SHIPPED | OUTPATIENT
Start: 2022-02-01 | End: 2022-08-15

## 2022-02-01 NOTE — TELEPHONE ENCOUNTER
No new care gaps identified.  Powered by [employer]. Reference number: [id number].   2/01/2022 10:08:36 AM CST

## 2022-05-30 ENCOUNTER — TELEPHONE (OUTPATIENT)
Dept: FAMILY MEDICINE | Facility: CLINIC | Age: 82
End: 2022-05-30
Payer: MEDICARE

## 2022-06-23 ENCOUNTER — OFFICE VISIT (OUTPATIENT)
Dept: FAMILY MEDICINE | Facility: CLINIC | Age: 82
End: 2022-06-23
Attending: FAMILY MEDICINE
Payer: MEDICARE

## 2022-06-23 VITALS
HEIGHT: 67 IN | DIASTOLIC BLOOD PRESSURE: 76 MMHG | BODY MASS INDEX: 27.51 KG/M2 | OXYGEN SATURATION: 97 % | SYSTOLIC BLOOD PRESSURE: 130 MMHG | HEART RATE: 80 BPM | WEIGHT: 175.25 LBS

## 2022-06-23 DIAGNOSIS — I10 ESSENTIAL HYPERTENSION: ICD-10-CM

## 2022-06-23 DIAGNOSIS — E78.2 MIXED HYPERLIPIDEMIA: ICD-10-CM

## 2022-06-23 DIAGNOSIS — L29.9 ITCHING: ICD-10-CM

## 2022-06-23 DIAGNOSIS — K21.9 GASTROESOPHAGEAL REFLUX DISEASE, UNSPECIFIED WHETHER ESOPHAGITIS PRESENT: ICD-10-CM

## 2022-06-23 DIAGNOSIS — E11.42 TYPE 2 DIABETES MELLITUS WITH DIABETIC POLYNEUROPATHY, WITHOUT LONG-TERM CURRENT USE OF INSULIN: ICD-10-CM

## 2022-06-23 DIAGNOSIS — Z00.00 ROUTINE GENERAL MEDICAL EXAMINATION AT HEALTH CARE FACILITY: Primary | ICD-10-CM

## 2022-06-23 PROCEDURE — 99999 PR PBB SHADOW E&M-EST. PATIENT-LVL IV: CPT | Mod: PBBFAC,,, | Performed by: FAMILY MEDICINE

## 2022-06-23 PROCEDURE — 3072F PR LOW RISK FOR RETINOPATHY: ICD-10-PCS | Mod: CPTII,S$GLB,, | Performed by: FAMILY MEDICINE

## 2022-06-23 PROCEDURE — 3051F HG A1C>EQUAL 7.0%<8.0%: CPT | Mod: CPTII,S$GLB,, | Performed by: FAMILY MEDICINE

## 2022-06-23 PROCEDURE — 1160F PR REVIEW ALL MEDS BY PRESCRIBER/CLIN PHARMACIST DOCUMENTED: ICD-10-PCS | Mod: CPTII,S$GLB,, | Performed by: FAMILY MEDICINE

## 2022-06-23 PROCEDURE — 99999 PR PBB SHADOW E&M-EST. PATIENT-LVL IV: ICD-10-PCS | Mod: PBBFAC,,, | Performed by: FAMILY MEDICINE

## 2022-06-23 PROCEDURE — 1126F AMNT PAIN NOTED NONE PRSNT: CPT | Mod: CPTII,S$GLB,, | Performed by: FAMILY MEDICINE

## 2022-06-23 PROCEDURE — 3072F LOW RISK FOR RETINOPATHY: CPT | Mod: CPTII,S$GLB,, | Performed by: FAMILY MEDICINE

## 2022-06-23 PROCEDURE — 3288F FALL RISK ASSESSMENT DOCD: CPT | Mod: CPTII,S$GLB,, | Performed by: FAMILY MEDICINE

## 2022-06-23 PROCEDURE — 1101F PT FALLS ASSESS-DOCD LE1/YR: CPT | Mod: CPTII,S$GLB,, | Performed by: FAMILY MEDICINE

## 2022-06-23 PROCEDURE — 1101F PR PT FALLS ASSESS DOC 0-1 FALLS W/OUT INJ PAST YR: ICD-10-PCS | Mod: CPTII,S$GLB,, | Performed by: FAMILY MEDICINE

## 2022-06-23 PROCEDURE — 1157F ADVNC CARE PLAN IN RCRD: CPT | Mod: CPTII,S$GLB,, | Performed by: FAMILY MEDICINE

## 2022-06-23 PROCEDURE — 3075F PR MOST RECENT SYSTOLIC BLOOD PRESS GE 130-139MM HG: ICD-10-PCS | Mod: CPTII,S$GLB,, | Performed by: FAMILY MEDICINE

## 2022-06-23 PROCEDURE — 3078F DIAST BP <80 MM HG: CPT | Mod: CPTII,S$GLB,, | Performed by: FAMILY MEDICINE

## 2022-06-23 PROCEDURE — 1157F PR ADVANCE CARE PLAN OR EQUIV PRESENT IN MEDICAL RECORD: ICD-10-PCS | Mod: CPTII,S$GLB,, | Performed by: FAMILY MEDICINE

## 2022-06-23 PROCEDURE — 99397 PER PM REEVAL EST PAT 65+ YR: CPT | Mod: GZ,S$GLB,, | Performed by: FAMILY MEDICINE

## 2022-06-23 PROCEDURE — 3051F PR MOST RECENT HEMOGLOBIN A1C LEVEL 7.0 - < 8.0%: ICD-10-PCS | Mod: CPTII,S$GLB,, | Performed by: FAMILY MEDICINE

## 2022-06-23 PROCEDURE — 99499 RISK ADDL DX/OHS AUDIT: ICD-10-PCS | Mod: S$GLB,,, | Performed by: FAMILY MEDICINE

## 2022-06-23 PROCEDURE — 3075F SYST BP GE 130 - 139MM HG: CPT | Mod: CPTII,S$GLB,, | Performed by: FAMILY MEDICINE

## 2022-06-23 PROCEDURE — 1126F PR PAIN SEVERITY QUANTIFIED, NO PAIN PRESENT: ICD-10-PCS | Mod: CPTII,S$GLB,, | Performed by: FAMILY MEDICINE

## 2022-06-23 PROCEDURE — 1159F PR MEDICATION LIST DOCUMENTED IN MEDICAL RECORD: ICD-10-PCS | Mod: CPTII,S$GLB,, | Performed by: FAMILY MEDICINE

## 2022-06-23 PROCEDURE — 1159F MED LIST DOCD IN RCRD: CPT | Mod: CPTII,S$GLB,, | Performed by: FAMILY MEDICINE

## 2022-06-23 PROCEDURE — 3288F PR FALLS RISK ASSESSMENT DOCUMENTED: ICD-10-PCS | Mod: CPTII,S$GLB,, | Performed by: FAMILY MEDICINE

## 2022-06-23 PROCEDURE — 99499 UNLISTED E&M SERVICE: CPT | Mod: S$GLB,,, | Performed by: FAMILY MEDICINE

## 2022-06-23 PROCEDURE — 1160F RVW MEDS BY RX/DR IN RCRD: CPT | Mod: CPTII,S$GLB,, | Performed by: FAMILY MEDICINE

## 2022-06-23 PROCEDURE — 99397 PR PREVENTIVE VISIT,EST,65 & OVER: ICD-10-PCS | Mod: GZ,S$GLB,, | Performed by: FAMILY MEDICINE

## 2022-06-23 PROCEDURE — 3078F PR MOST RECENT DIASTOLIC BLOOD PRESSURE < 80 MM HG: ICD-10-PCS | Mod: CPTII,S$GLB,, | Performed by: FAMILY MEDICINE

## 2022-06-23 RX ORDER — TRIAMCINOLONE ACETONIDE 5 MG/G
OINTMENT TOPICAL 2 TIMES DAILY
Qty: 30 G | Refills: 2 | Status: SHIPPED | OUTPATIENT
Start: 2022-06-23

## 2022-06-23 NOTE — PROGRESS NOTES
Subjective:       Patient ID: Bryant Gil is a 81 y.o. male.    Chief Complaint: Annual Exam, Keloid on Chest, and Medication Refill    81 yr old pleasant male with DM II, HTN, HLD, atrial tachycardia, presents today as a new patient to me and for establishing primary care and also his annual wellness check and lab work. C/o itching in keloids chest wall.      DM Ii - controlled -    HGBA1C                   7.6 (H)             01/20/2022      - on metformin n glipizide - compliant - no hypoglycemic symptoms      HTN - controlled - on HYZAAR, amlodipine and metoprolol      HLD - improving - on statin -     LDLCALC                  129.4               01/20/2022            History as below - reviewed             Medication Refill  Pertinent negatives include no chest pain, congestion, coughing, diaphoresis, myalgias, rash, visual change, vomiting or weakness.   Diabetes  He presents for his follow-up diabetic visit. He has type 2 diabetes mellitus. His disease course has been stable. Pertinent negatives for hypoglycemia include no dizziness, hunger, nervousness/anxiousness, seizures, speech difficulty or tremors. Pertinent negatives for diabetes include no blurred vision, no chest pain, no foot paresthesias, no foot ulcerations, no polydipsia, no polyuria, no visual change, no weakness and no weight loss. Pertinent negatives for hypoglycemia complications include no blackouts, no nocturnal hypoglycemia and no required assistance. Symptoms are stable. Pertinent negatives for diabetic complications include no autonomic neuropathy, impotence, nephropathy, peripheral neuropathy or retinopathy. Risk factors for coronary artery disease include diabetes mellitus, dyslipidemia and male sex. Current diabetic treatment includes oral agent (dual therapy). He is compliant with treatment all of the time. Meal planning includes avoidance of concentrated sweets. He has not had a previous visit with a dietitian. He rarely  participates in exercise. There is no change in his home blood glucose trend. An ACE inhibitor/angiotensin II receptor blocker is being taken. He does not see a podiatrist.Eye exam is not current.   Hypertension  This is a chronic problem. The current episode started more than 1 year ago. The problem has been gradually improving since onset. The problem is controlled. Pertinent negatives include no blurred vision, chest pain or palpitations. There are no associated agents to hypertension. Risk factors for coronary artery disease include dyslipidemia, male gender and diabetes mellitus. Past treatments include angiotensin blockers, diuretics, calcium channel blockers and beta blockers. The current treatment provides significant improvement. There are no compliance problems.  There is no history of angina, CAD/MI or retinopathy. There is no history of chronic renal disease, hyperaldosteronism, hyperparathyroidism, a hypertension causing med, renovascular disease or a thyroid problem.   Hyperlipidemia  This is a chronic problem. The current episode started more than 1 year ago. The problem is controlled. Recent lipid tests were reviewed and are normal. He has no history of chronic renal disease. There are no known factors aggravating his hyperlipidemia. Pertinent negatives include no chest pain or myalgias. Current antihyperlipidemic treatment includes statins. The current treatment provides significant improvement of lipids. There are no compliance problems.  Risk factors for coronary artery disease include diabetes mellitus, dyslipidemia, hypertension and male sex.     Review of Systems   Constitutional: Negative.  Negative for activity change, diaphoresis, unexpected weight change and weight loss.   HENT: Negative.  Negative for nasal congestion, ear pain, mouth sores, rhinorrhea and voice change.    Eyes: Negative.  Negative for blurred vision, pain, discharge and visual disturbance.   Respiratory: Negative.   Negative for apnea, cough and wheezing.    Cardiovascular: Negative.  Negative for chest pain and palpitations.   Gastrointestinal: Negative.  Negative for abdominal distention, anal bleeding, diarrhea and vomiting.   Endocrine: Negative.  Negative for cold intolerance, polydipsia and polyuria.   Genitourinary: Negative.  Negative for decreased urine volume, difficulty urinating, discharge, frequency, impotence and scrotal swelling.   Musculoskeletal: Negative.  Negative for back pain, myalgias and neck stiffness.   Integumentary:  Negative for color change and rash. Negative.   Allergic/Immunologic: Negative.  Negative for environmental allergies.   Neurological: Negative.  Negative for dizziness, tremors, seizures, speech difficulty, weakness and light-headedness.   Hematological: Negative.    Psychiatric/Behavioral: Negative.  Negative for agitation, dysphoric mood and suicidal ideas. The patient is not nervous/anxious.          PMH/PSH/FH/SH/MED/ALLERGY reviewed    Objective:       Vitals:    06/23/22 0841   BP: 130/76   Pulse: 80       Physical Exam  Constitutional:       Appearance: He is well-developed.   HENT:      Head: Normocephalic and atraumatic.      Right Ear: External ear normal.      Left Ear: External ear normal.      Nose: Nose normal.      Mouth/Throat:      Pharynx: No oropharyngeal exudate.   Eyes:      General: No scleral icterus.        Right eye: No discharge.         Left eye: No discharge.      Conjunctiva/sclera: Conjunctivae normal.      Pupils: Pupils are equal, round, and reactive to light.   Neck:      Thyroid: No thyromegaly.      Vascular: No JVD.      Trachea: No tracheal deviation.   Cardiovascular:      Rate and Rhythm: Normal rate and regular rhythm.      Heart sounds: Normal heart sounds. No murmur heard.    No friction rub. No gallop.   Pulmonary:      Effort: Pulmonary effort is normal. No respiratory distress.      Breath sounds: Normal breath sounds. No stridor. No wheezing  or rales.   Chest:      Chest wall: No tenderness.   Abdominal:      General: Bowel sounds are normal. There is no distension.      Palpations: Abdomen is soft. There is no mass.      Tenderness: There is no abdominal tenderness. There is no guarding or rebound.      Hernia: No hernia is present.   Musculoskeletal:         General: No tenderness. Normal range of motion.      Cervical back: Normal range of motion and neck supple.   Lymphadenopathy:      Cervical: No cervical adenopathy.   Skin:     General: Skin is warm and dry.      Coloration: Skin is not pale.      Findings: No erythema or rash.   Neurological:      Mental Status: He is alert and oriented to person, place, and time.      Cranial Nerves: No cranial nerve deficit.      Motor: No abnormal muscle tone.      Coordination: Coordination normal.      Deep Tendon Reflexes: Reflexes are normal and symmetric. Reflexes normal.   Psychiatric:         Behavior: Behavior normal.         Thought Content: Thought content normal.         Judgment: Judgment normal.         Assessment:       Problem List Items Addressed This Visit     Type 2 diabetes mellitus with diabetic polyneuropathy, without long-term current use of insulin (Chronic)    Relevant Orders    CBC Auto Differential    Comprehensive Metabolic Panel    Lipid Panel    Hemoglobin A1C    Mixed hyperlipidemia (Chronic)    Essential hypertension (Chronic)      Other Visit Diagnoses     Routine general medical examination at health care facility    -  Primary    Relevant Orders    CBC Auto Differential    Comprehensive Metabolic Panel    Lipid Panel    Hemoglobin A1C    Gastroesophageal reflux disease, unspecified whether esophagitis present        Itching        Relevant Medications    triamcinolone (KENALOG) 0.5 % ointment          Plan:           Bryant was seen today for annual exam, keloid on chest and medication refill.    Diagnoses and all orders for this visit:    Routine general medical examination  at health care facility  -     CBC Auto Differential; Future  -     Comprehensive Metabolic Panel; Future  -     Lipid Panel; Future  -     Hemoglobin A1C; Future    Type 2 diabetes mellitus with diabetic polyneuropathy, without long-term current use of insulin  -     CBC Auto Differential; Future  -     Comprehensive Metabolic Panel; Future  -     Lipid Panel; Future  -     Hemoglobin A1C; Future    Essential hypertension    Mixed hyperlipidemia    Gastroesophageal reflux disease, unspecified whether esophagitis present    Itching  -     triamcinolone (KENALOG) 0.5 % ointment; Apply topically 2 (two) times daily.      Wellness check  -normal exam  -labs    HTN  -controlled    DM II  -controlled    HLD  -not at goal  -diet control n statin    Itching  -kenalog as directed    Spent adequate time in obtaining history and explaining differentials      Follow up in about 6 months (around 12/23/2022), or if symptoms worsen or fail to improve.

## 2022-07-10 NOTE — [employer] PATIENT INSTRUCTIONS
Discharge Summary/Instructions after an Endoscopic Procedure  Patient Name: Bryant Gil  Patient MRN: [id number]  Patient YOB: 1940  Thursday, May 14, 2020  Rachel Burrows MD  Your health is very important to us during the Covid Crisis. Following your   procedure today, you will receive a daily text for 2 weeks asking about   signs or symptoms of Covid 19.  Please respond to this text when you   receive it so we can follow up and keep you as safe as possible.   RESTRICTIONS:  During your procedure today, you received medications for sedation.  These   medications may affect your judgment, balance and coordination.  Therefore,   for 24 hours, you have the following restrictions:   - DO NOT drive a car, operate machinery, make legal/financial decisions,   sign important papers or drink alcohol.    ACTIVITY:  Today: no heavy lifting, straining or running due to procedural   sedation/anesthesia.  The following day: return to full activity including work.  DIET:  Eat and drink normally unless instructed otherwise.     TREATMENT FOR COMMON SIDE EFFECTS:  - Mild abdominal pain, nausea, belching, bloating or excessive gas:  rest,   eat lightly and use a heating pad.  - Sore Throat: treat with throat lozenges and/or gargle with warm salt   water.  - Because air was used during the procedure, expelling large amounts of air   from your rectum or belching is normal.  - If a bowel prep was taken, you may not have a bowel movement for 1-3 days.    This is normal.  SYMPTOMS TO WATCH FOR AND REPORT TO YOUR PHYSICIAN:  1. Abdominal pain or bloating, other than gas cramps.  2. Chest pain.  3. Back pain.  4. Signs of infection such as: chills or fever occurring within 24 hours   after the procedure.  5. Rectal bleeding, which would show as bright red, maroon, or black stools.   (A tablespoon of blood from the rectum is not serious, especially if   hemorrhoids are present.)  6. Vomiting.  7. Weakness or dizziness.  GO  DIRECTLY TO THE NEAREST EMERGENCY ROOM IF YOU HAVE ANY OF THE FOLLOWING:      Difficulty breathing              Chills and/or fever over 101 F   Persistent vomiting and/or vomiting blood   Severe abdominal pain   Severe chest pain   Black, tarry stools   Bleeding- more than one tablespoon   Any other symptom or condition that you feel may need urgent attention  Your doctor recommends these additional instructions:  If any biopsies were taken, your doctors clinic will contact you in 1 to 2   weeks with any results.  - Discharge patient to home (via wheelchair).   - Patient has a contact number available for emergencies.  The signs and   symptoms of potential delayed complications were discussed with the   patient.  Return to normal activities tomorrow.  Written discharge   instructions were provided to the patient.   - Resume previous diet.   - Continue present medications.   - Await pathology results.  For questions, problems or results please call your physician - Rachel Burrows MD at Work:  [phone].  EMERGENCY PHONE NUMBER: [phone],  LAB RESULTS: [phone]  IF A COMPLICATION OR EMERGENCY SITUATION ARISES AND YOU ARE UNABLE TO REACH   YOUR PHYSICIAN - GO DIRECTLY TO THE EMERGENCY ROOM.  Rachel Burrows MD  5/14/2020 11:35:41 AM  This report has been verified and signed electronically.  [employer]   Not fully oriented...

## 2022-07-12 ENCOUNTER — IMMUNIZATION (OUTPATIENT)
Dept: PRIMARY CARE CLINIC | Facility: CLINIC | Age: 82
End: 2022-07-12
Payer: MEDICARE

## 2022-07-12 DIAGNOSIS — Z23 NEED FOR VACCINATION: Primary | ICD-10-CM

## 2022-07-12 PROCEDURE — 91306 COVID-19, MRNA, LNP-S, PF, 100 MCG/0.25 ML DOSE VACCINE (MODERNA BOOSTER): CPT | Mod: PBBFAC | Performed by: INTERNAL MEDICINE

## 2022-07-12 PROCEDURE — 0064A COVID-19, MRNA, LNP-S, PF, 100 MCG/0.25 ML DOSE VACCINE (MODERNA BOOSTER): CPT | Mod: CV19,PBBFAC | Performed by: INTERNAL MEDICINE

## 2022-07-18 ENCOUNTER — LAB VISIT (OUTPATIENT)
Dept: LAB | Facility: HOSPITAL | Age: 82
End: 2022-07-18
Attending: FAMILY MEDICINE
Payer: MEDICARE

## 2022-07-18 DIAGNOSIS — E11.42 TYPE 2 DIABETES MELLITUS WITH DIABETIC POLYNEUROPATHY, WITHOUT LONG-TERM CURRENT USE OF INSULIN: ICD-10-CM

## 2022-07-18 DIAGNOSIS — Z00.00 ROUTINE GENERAL MEDICAL EXAMINATION AT HEALTH CARE FACILITY: ICD-10-CM

## 2022-07-18 LAB
ALBUMIN SERPL BCP-MCNC: 3.8 G/DL (ref 3.5–5.2)
ALP SERPL-CCNC: 55 U/L (ref 55–135)
ALT SERPL W/O P-5'-P-CCNC: 21 U/L (ref 10–44)
ANION GAP SERPL CALC-SCNC: 9 MMOL/L (ref 8–16)
AST SERPL-CCNC: 18 U/L (ref 10–40)
BASOPHILS # BLD AUTO: 0.03 K/UL (ref 0–0.2)
BASOPHILS NFR BLD: 0.7 % (ref 0–1.9)
BILIRUB SERPL-MCNC: 0.4 MG/DL (ref 0.1–1)
BUN SERPL-MCNC: 17 MG/DL (ref 8–23)
CALCIUM SERPL-MCNC: 9.7 MG/DL (ref 8.7–10.5)
CHLORIDE SERPL-SCNC: 106 MMOL/L (ref 95–110)
CHOLEST SERPL-MCNC: 165 MG/DL (ref 120–199)
CHOLEST/HDLC SERPL: 4.2 {RATIO} (ref 2–5)
CO2 SERPL-SCNC: 28 MMOL/L (ref 23–29)
CREAT SERPL-MCNC: 1.3 MG/DL (ref 0.5–1.4)
DIFFERENTIAL METHOD: ABNORMAL
EOSINOPHIL # BLD AUTO: 0.1 K/UL (ref 0–0.5)
EOSINOPHIL NFR BLD: 2.2 % (ref 0–8)
ERYTHROCYTE [DISTWIDTH] IN BLOOD BY AUTOMATED COUNT: 13.4 % (ref 11.5–14.5)
EST. GFR  (AFRICAN AMERICAN): 60 ML/MIN/1.73 M^2
EST. GFR  (NON AFRICAN AMERICAN): 52 ML/MIN/1.73 M^2
ESTIMATED AVG GLUCOSE: 189 MG/DL (ref 68–131)
GLUCOSE SERPL-MCNC: 166 MG/DL (ref 70–110)
HBA1C MFR BLD: 8.2 % (ref 4–5.6)
HCT VFR BLD AUTO: 38.3 % (ref 40–54)
HDLC SERPL-MCNC: 39 MG/DL (ref 40–75)
HDLC SERPL: 23.6 % (ref 20–50)
HGB BLD-MCNC: 12.8 G/DL (ref 14–18)
IMM GRANULOCYTES # BLD AUTO: 0.01 K/UL (ref 0–0.04)
IMM GRANULOCYTES NFR BLD AUTO: 0.2 % (ref 0–0.5)
LDLC SERPL CALC-MCNC: 110.8 MG/DL (ref 63–159)
LYMPHOCYTES # BLD AUTO: 1.9 K/UL (ref 1–4.8)
LYMPHOCYTES NFR BLD: 42.9 % (ref 18–48)
MCH RBC QN AUTO: 28.5 PG (ref 27–31)
MCHC RBC AUTO-ENTMCNC: 33.4 G/DL (ref 32–36)
MCV RBC AUTO: 85 FL (ref 82–98)
MONOCYTES # BLD AUTO: 0.4 K/UL (ref 0.3–1)
MONOCYTES NFR BLD: 9.6 % (ref 4–15)
NEUTROPHILS # BLD AUTO: 2 K/UL (ref 1.8–7.7)
NEUTROPHILS NFR BLD: 44.4 % (ref 38–73)
NONHDLC SERPL-MCNC: 126 MG/DL
NRBC BLD-RTO: 0 /100 WBC
PLATELET # BLD AUTO: 232 K/UL (ref 150–450)
PMV BLD AUTO: 10.5 FL (ref 9.2–12.9)
POTASSIUM SERPL-SCNC: 4.1 MMOL/L (ref 3.5–5.1)
PROT SERPL-MCNC: 7.2 G/DL (ref 6–8.4)
RBC # BLD AUTO: 4.49 M/UL (ref 4.6–6.2)
SODIUM SERPL-SCNC: 143 MMOL/L (ref 136–145)
TRIGL SERPL-MCNC: 76 MG/DL (ref 30–150)
WBC # BLD AUTO: 4.48 K/UL (ref 3.9–12.7)

## 2022-07-18 PROCEDURE — 80061 LIPID PANEL: CPT | Performed by: FAMILY MEDICINE

## 2022-07-18 PROCEDURE — 80053 COMPREHEN METABOLIC PANEL: CPT | Performed by: FAMILY MEDICINE

## 2022-07-18 PROCEDURE — 36415 COLL VENOUS BLD VENIPUNCTURE: CPT | Performed by: FAMILY MEDICINE

## 2022-07-18 PROCEDURE — 85025 COMPLETE CBC W/AUTO DIFF WBC: CPT | Performed by: FAMILY MEDICINE

## 2022-07-18 PROCEDURE — 83036 HEMOGLOBIN GLYCOSYLATED A1C: CPT | Performed by: FAMILY MEDICINE

## 2022-08-02 ENCOUNTER — PES CALL (OUTPATIENT)
Dept: ADMINISTRATIVE | Facility: OTHER | Age: 82
End: 2022-08-02
Payer: MEDICARE

## 2022-08-08 ENCOUNTER — TELEPHONE (OUTPATIENT)
Dept: DERMATOLOGY | Facility: CLINIC | Age: 82
End: 2022-08-08
Payer: MEDICARE

## 2022-08-08 NOTE — TELEPHONE ENCOUNTER
----- Message from Josee Hood MA sent at 8/8/2022  4:35 PM CDT -----  Contact: Dina - wife    ----- Message -----  From: Yuki Carroll  Sent: 8/8/2022   4:16 PM CDT  To: [facility] Clinical Staff    Pt's wife requesting call back re: scheduling appt. Nothing in epic. Caller is requesting Dr. Keenan if possible, but would just like to get in soon.     Confirmed contact below:  Contact Name:Bryant Gil  Phone Number: [phone]

## 2022-08-09 ENCOUNTER — PES CALL (OUTPATIENT)
Dept: ADMINISTRATIVE | Facility: OTHER | Age: 82
End: 2022-08-09
Payer: MEDICARE

## 2022-08-09 ENCOUNTER — TELEPHONE (OUTPATIENT)
Dept: DERMATOLOGY | Facility: CLINIC | Age: 82
End: 2022-08-09
Payer: MEDICARE

## 2022-08-09 NOTE — TELEPHONE ENCOUNTER
Scheduled patient appointment per message received in the Dermatology department. Patient acknowledges appointment made and confirms.     RD        ----- Message from Josee Hood MA sent at 8/8/2022  5:15 PM CDT -----  Contact: Shahrzad wife [phone]    ----- Message -----  From: Rene Muñiz  Sent: 8/8/2022   4:56 PM CDT  To: Asya Houser Staff    Pt's wife is calling in, she is wanting to know if the Dr treats keloids she would like a call back, thanks

## 2022-08-19 ENCOUNTER — LAB VISIT (OUTPATIENT)
Dept: LAB | Facility: HOSPITAL | Age: 82
End: 2022-08-19
Attending: FAMILY MEDICINE
Payer: MEDICARE

## 2022-08-19 DIAGNOSIS — E11.42 TYPE 2 DIABETES MELLITUS WITH DIABETIC POLYNEUROPATHY, WITHOUT LONG-TERM CURRENT USE OF INSULIN: ICD-10-CM

## 2022-08-19 LAB
ALBUMIN SERPL BCP-MCNC: 4 G/DL (ref 3.5–5.2)
ALP SERPL-CCNC: 56 U/L (ref 55–135)
ALT SERPL W/O P-5'-P-CCNC: 19 U/L (ref 10–44)
ANION GAP SERPL CALC-SCNC: 11 MMOL/L (ref 8–16)
AST SERPL-CCNC: 20 U/L (ref 10–40)
BILIRUB SERPL-MCNC: 0.4 MG/DL (ref 0.1–1)
BUN SERPL-MCNC: 25 MG/DL (ref 8–23)
CALCIUM SERPL-MCNC: 9.7 MG/DL (ref 8.7–10.5)
CHLORIDE SERPL-SCNC: 106 MMOL/L (ref 95–110)
CO2 SERPL-SCNC: 24 MMOL/L (ref 23–29)
CREAT SERPL-MCNC: 1.6 MG/DL (ref 0.5–1.4)
EST. GFR  (NO RACE VARIABLE): 43 ML/MIN/1.73 M^2
ESTIMATED AVG GLUCOSE: 160 MG/DL (ref 68–131)
GLUCOSE SERPL-MCNC: 143 MG/DL (ref 70–110)
HBA1C MFR BLD: 7.2 % (ref 4–5.6)
POTASSIUM SERPL-SCNC: 4.1 MMOL/L (ref 3.5–5.1)
PROT SERPL-MCNC: 7.8 G/DL (ref 6–8.4)
SODIUM SERPL-SCNC: 141 MMOL/L (ref 136–145)

## 2022-08-19 PROCEDURE — 80053 COMPREHEN METABOLIC PANEL: CPT | Performed by: FAMILY MEDICINE

## 2022-08-19 PROCEDURE — 36415 COLL VENOUS BLD VENIPUNCTURE: CPT | Performed by: FAMILY MEDICINE

## 2022-08-19 PROCEDURE — 83036 HEMOGLOBIN GLYCOSYLATED A1C: CPT | Performed by: FAMILY MEDICINE

## 2022-09-30 ENCOUNTER — PES CALL (OUTPATIENT)
Dept: ADMINISTRATIVE | Facility: CLINIC | Age: 82
End: 2022-09-30
Payer: MEDICARE

## 2022-10-02 NOTE — PROGRESS NOTES
Bryant Gil presented for a follow-up Medicare AWV today. The following components were reviewed and updated:  Very pleasant gentleman.      Medical history  Family History  Social history  Allergies and Current Medications  Health Risk Assessment  Health Maintenance  Care Team    **See Completed Assessments for Annual Wellness visit with in the encounter summary    The following assessments were completed:  Depression Screening  Cognitive function Screening      Timed Get Up Test  Whisper Test    Wt Readings from Last 3 Encounters:   10/07/22 79 kg (174 lb 2.6 oz)   06/23/22 79.5 kg (175 lb 4.3 oz)   01/20/22 81.9 kg (180 lb 8.9 oz)     Temp Readings from Last 3 Encounters:   06/23/21 98.3 °F (36.8 °C) (Oral)   06/23/21 98.6 °F (37 °C) (Oral)   01/12/21 96.7 °F (35.9 °C) (Oral)     BP Readings from Last 3 Encounters:   10/07/22 132/80   06/23/22 130/76   01/20/22 130/64     Pulse Readings from Last 3 Encounters:   10/07/22 85   06/23/22 80   01/20/22 72       General: Well developed, well nourished. No distress.  HEENT: Head is normocephalic, atraumatic; ears are normal, hearing aid to right ear  Eyes: Clear conjunctiva.  Neck: Supple, symmetrical neck; trachea midline.  Lungs: Clear to auscultation bilaterally and normal respiratory effort.  Cardiovascular: Heart with regular rate and rhythm. No murmurs, gallops or rubs  Extremities: No LE edema. Pulses 2+ and symmetric.   Abdomen: Abdomen is soft, non-tender non-distended with normal bowel sounds.  Skin: Skin color, texture, turgor normal. No rashes.  Musculoskeletal: Normal gait.   Lymph Nodes: No cervical or supraclavicular adenopathy.  Neurologic: Normal strength and tone. No focal numbness or weakness.   Psychiatric: Not depressed.      Diagnoses and health risks identified today and associated recommendations/orders:  Encounter for preventive health examination  Here for Health Risk Assessment/Annual Wellness Visit.  Health maintenance reviewed and  updated. Follow up in one year.     2. Type 2 diabetes mellitus with diabetic polyneuropathy, without long-term current use of insulin  Lab Results   Component Value Date    HGBA1C 7.2 (H) 08/19/2022   Chronic, stable on current treatment plan; followed by PCP     3. 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 Aortic atherosclerosis  Chronic, stable on current treatment plan, followed by PCP     5. Memory loss  Chronic, stable, followed by PCP     6. Sensorineural hearing loss (SNHL), unspecified laterality  Wears hearing aid  Chronic, stable, followed by PCP     7. Essential hypertension  Chronic, stable on current treatment plan, followed by  PCP     8. Sigmoid diverticulosis  Chronic, stable on current treatment plan, followed by PCP     9. Gastroesophageal reflux disease without esophagitis  Chronic, stable on PPI therapy, followed by PCP    10. History of prostate cancer  Chronic, stable on current treatment plan, followed by PCP     11. Mixed hyperlipidemia  Chronic, stable on current treatment plan, followed by up PCP     12. BMI 27.28 kg  Diet and exercise discussed.       Review for Opioid Screening: Pt does not have Rx for Opioids        Review for Substance Use Disorders: Patient does not use substance       Vaccines:   Flu and COVID Booster will be done at his pharmacy.     Provided Bryant with a 5-10 year written screening schedule and personal prevention plan. Recommendations were developed using the USPSTF age appropriate recommendations. Education, counseling, and referrals were provided as needed.  After Visit Summary printed and given to patient which includes a list of additional screenings\tests needed.    Future Appointments   Date Time Provider Department Center   11/14/2022  9:30 AM Mik Sky MD [facility]   12/16/2022  8:30 AM Paige Snyder MA [facility]   12/16/2022  9:20 AM Yissel Alfonso  MD [facility]   12/23/2022  9:00 AM Luke Mcgee MD [facility]          Medication List            Accurate as of October 7, 2022  7:35 AM. If you have any questions, ask your nurse or doctor.                CONTINUE taking these medications      amLODIPine 10 MG tablet  Commonly known as: NORVASC  TAKE 1 TABLET ONE TIME DAILY     aspirin 81 MG EC tablet  Commonly known as: ECOTRIN     atorvastatin 20 MG tablet  Commonly known as: LIPITOR  Take 1 tablet (20 mg total) by mouth once daily.     BD ALCOHOL SWABS Padm  Generic drug: alcohol swabs  USE FOR HOME GLUCOSE MONITORING DAILY     blood sugar diagnostic Strp  Commonly known as: TRUE METRIX GLUCOSE TEST STRIP  Test Blood Sugar twice daily     blood-glucose meter Misc  Commonly known as: TRUE METRIX GLUCOSE METER  Test blood sugar twice a day     ciclopirox 8 % Soln  Commonly known as: PENLAC  APPLY TOPICALLY NIGHTLY.     docusate sodium 100 MG capsule  Commonly known as: COLACE  Take 1 capsule (100 mg total) by mouth 3 (three) times daily as needed for Constipation (stoole softener).     glipiZIDE 5 MG Tr24  Take 1 tablet (5 mg total) by mouth daily with breakfast.     lancets 30 gauge Misc  Commonly known as: TRUEPLUS LANCETS  Check sugar twice daily     latanoprost 0.005 % ophthalmic solution  INSTILL 1 DROP INTO BOTH EYES ONE TIME DAILY     losartan-hydrochlorothiazide 100-25 mg 100-25 mg per tablet  Commonly known as: HYZAAR  TAKE 1 TABLET BY MOUTH  DAILY IN THE MORNING     metFORMIN 1000 MG tablet  Commonly known as: GLUCOPHAGE  Take 1 tablet (1,000 mg total) by mouth 2 (two) times daily with meals.     metoprolol succinate 100 MG 24 hr tablet  Commonly known as: TOPROL-XL  Take 1 tablet (100 mg total) by mouth once daily.     pantoprazole 40 MG tablet  Commonly known as: PROTONIX  Take 1 tablet (40 mg total) by mouth 2 (two) times daily.     triamcinolone 0.5 % ointment  Commonly known as: KENALOG  Apply topically 2  (two) times daily.              LAURA Khan      I offered to discuss advanced care planning, including how to pick a person who would make decisions for you if you were unable to make them for yourself, called a health care power of , and what kind of decisions you might make such as use of life sustaining treatments such as ventilators and tube feeding when faced with a life limiting illness recorded on a living will that they will need to know. (How you want to be cared for as you near the end of your natural life)     X Patient is interested in learning more about how to make advanced directives.  I provided them paperwork and offered to discuss this with them. (Wife: Dina is designated proxy)

## 2022-10-05 ENCOUNTER — TELEPHONE (OUTPATIENT)
Dept: ADMINISTRATIVE | Facility: CLINIC | Age: 82
End: 2022-10-05
Payer: MEDICARE

## 2022-10-07 ENCOUNTER — OFFICE VISIT (OUTPATIENT)
Dept: INTERNAL MEDICINE | Facility: CLINIC | Age: 82
End: 2022-10-07
Payer: MEDICARE

## 2022-10-07 VITALS
OXYGEN SATURATION: 97 % | HEART RATE: 85 BPM | WEIGHT: 174.19 LBS | DIASTOLIC BLOOD PRESSURE: 80 MMHG | HEIGHT: 67 IN | SYSTOLIC BLOOD PRESSURE: 132 MMHG | BODY MASS INDEX: 27.34 KG/M2

## 2022-10-07 DIAGNOSIS — E78.2 MIXED HYPERLIPIDEMIA: Chronic | ICD-10-CM

## 2022-10-07 DIAGNOSIS — E11.42 TYPE 2 DIABETES MELLITUS WITH DIABETIC POLYNEUROPATHY, WITHOUT LONG-TERM CURRENT USE OF INSULIN: Primary | Chronic | ICD-10-CM

## 2022-10-07 DIAGNOSIS — K57.30 SIGMOID DIVERTICULOSIS: Chronic | ICD-10-CM

## 2022-10-07 DIAGNOSIS — I70.0 AORTIC ATHEROSCLEROSIS: ICD-10-CM

## 2022-10-07 DIAGNOSIS — H90.5 SENSORINEURAL HEARING LOSS (SNHL), UNSPECIFIED LATERALITY: ICD-10-CM

## 2022-10-07 DIAGNOSIS — K21.9 GASTROESOPHAGEAL REFLUX DISEASE WITHOUT ESOPHAGITIS: ICD-10-CM

## 2022-10-07 DIAGNOSIS — R41.3 MEMORY LOSS: ICD-10-CM

## 2022-10-07 DIAGNOSIS — Z00.00 ENCOUNTER FOR PREVENTIVE HEALTH EXAMINATION: ICD-10-CM

## 2022-10-07 DIAGNOSIS — I10 ESSENTIAL HYPERTENSION: Chronic | ICD-10-CM

## 2022-10-07 DIAGNOSIS — Z85.46 HISTORY OF PROSTATE CANCER: Chronic | ICD-10-CM

## 2022-10-07 PROCEDURE — G0439 PR MEDICARE ANNUAL WELLNESS SUBSEQUENT VISIT: ICD-10-PCS | Mod: S$GLB,,, | Performed by: NURSE PRACTITIONER

## 2022-10-07 PROCEDURE — G0439 PPPS, SUBSEQ VISIT: HCPCS | Mod: S$GLB,,, | Performed by: NURSE PRACTITIONER

## 2022-10-07 PROCEDURE — 1160F PR REVIEW ALL MEDS BY PRESCRIBER/CLIN PHARMACIST DOCUMENTED: ICD-10-PCS | Mod: CPTII,S$GLB,, | Performed by: NURSE PRACTITIONER

## 2022-10-07 PROCEDURE — 1101F PR PT FALLS ASSESS DOC 0-1 FALLS W/OUT INJ PAST YR: ICD-10-PCS | Mod: CPTII,S$GLB,, | Performed by: NURSE PRACTITIONER

## 2022-10-07 PROCEDURE — 1126F PR PAIN SEVERITY QUANTIFIED, NO PAIN PRESENT: ICD-10-PCS | Mod: CPTII,S$GLB,, | Performed by: NURSE PRACTITIONER

## 2022-10-07 PROCEDURE — 3288F PR FALLS RISK ASSESSMENT DOCUMENTED: ICD-10-PCS | Mod: CPTII,S$GLB,, | Performed by: NURSE PRACTITIONER

## 2022-10-07 PROCEDURE — 1157F PR ADVANCE CARE PLAN OR EQUIV PRESENT IN MEDICAL RECORD: ICD-10-PCS | Mod: CPTII,S$GLB,, | Performed by: NURSE PRACTITIONER

## 2022-10-07 PROCEDURE — 3079F DIAST BP 80-89 MM HG: CPT | Mod: CPTII,S$GLB,, | Performed by: NURSE PRACTITIONER

## 2022-10-07 PROCEDURE — 1159F MED LIST DOCD IN RCRD: CPT | Mod: CPTII,S$GLB,, | Performed by: NURSE PRACTITIONER

## 2022-10-07 PROCEDURE — 3072F LOW RISK FOR RETINOPATHY: CPT | Mod: CPTII,S$GLB,, | Performed by: NURSE PRACTITIONER

## 2022-10-07 PROCEDURE — 3075F SYST BP GE 130 - 139MM HG: CPT | Mod: CPTII,S$GLB,, | Performed by: NURSE PRACTITIONER

## 2022-10-07 PROCEDURE — 1159F PR MEDICATION LIST DOCUMENTED IN MEDICAL RECORD: ICD-10-PCS | Mod: CPTII,S$GLB,, | Performed by: NURSE PRACTITIONER

## 2022-10-07 PROCEDURE — 3075F PR MOST RECENT SYSTOLIC BLOOD PRESS GE 130-139MM HG: ICD-10-PCS | Mod: CPTII,S$GLB,, | Performed by: NURSE PRACTITIONER

## 2022-10-07 PROCEDURE — 3072F PR LOW RISK FOR RETINOPATHY: ICD-10-PCS | Mod: CPTII,S$GLB,, | Performed by: NURSE PRACTITIONER

## 2022-10-07 PROCEDURE — 3288F FALL RISK ASSESSMENT DOCD: CPT | Mod: CPTII,S$GLB,, | Performed by: NURSE PRACTITIONER

## 2022-10-07 PROCEDURE — 1160F RVW MEDS BY RX/DR IN RCRD: CPT | Mod: CPTII,S$GLB,, | Performed by: NURSE PRACTITIONER

## 2022-10-07 PROCEDURE — 1101F PT FALLS ASSESS-DOCD LE1/YR: CPT | Mod: CPTII,S$GLB,, | Performed by: NURSE PRACTITIONER

## 2022-10-07 PROCEDURE — 1126F AMNT PAIN NOTED NONE PRSNT: CPT | Mod: CPTII,S$GLB,, | Performed by: NURSE PRACTITIONER

## 2022-10-07 PROCEDURE — 3079F PR MOST RECENT DIASTOLIC BLOOD PRESSURE 80-89 MM HG: ICD-10-PCS | Mod: CPTII,S$GLB,, | Performed by: NURSE PRACTITIONER

## 2022-10-07 PROCEDURE — 99999 PR PBB SHADOW E&M-EST. PATIENT-LVL V: ICD-10-PCS | Mod: PBBFAC,,, | Performed by: NURSE PRACTITIONER

## 2022-10-07 PROCEDURE — 99499 UNLISTED E&M SERVICE: CPT | Mod: S$GLB,,, | Performed by: NURSE PRACTITIONER

## 2022-10-07 PROCEDURE — 99499 RISK ADDL DX/OHS AUDIT: ICD-10-PCS | Mod: S$GLB,,, | Performed by: NURSE PRACTITIONER

## 2022-10-07 PROCEDURE — 1170F PR FUNCTIONAL STATUS ASSESSED: ICD-10-PCS | Mod: CPTII,S$GLB,, | Performed by: NURSE PRACTITIONER

## 2022-10-07 PROCEDURE — 1157F ADVNC CARE PLAN IN RCRD: CPT | Mod: CPTII,S$GLB,, | Performed by: NURSE PRACTITIONER

## 2022-10-07 PROCEDURE — 99999 PR PBB SHADOW E&M-EST. PATIENT-LVL V: CPT | Mod: PBBFAC,,, | Performed by: NURSE PRACTITIONER

## 2022-10-07 PROCEDURE — 1170F FXNL STATUS ASSESSED: CPT | Mod: CPTII,S$GLB,, | Performed by: NURSE PRACTITIONER

## 2022-11-14 ENCOUNTER — OFFICE VISIT (OUTPATIENT)
Dept: DERMATOLOGY | Facility: CLINIC | Age: 82
End: 2022-11-14
Payer: MEDICARE

## 2022-11-14 DIAGNOSIS — L91.0 KELOID: Primary | ICD-10-CM

## 2022-11-14 DIAGNOSIS — L90.5 SCAR: ICD-10-CM

## 2022-11-14 DIAGNOSIS — L29.9 ITCH: ICD-10-CM

## 2022-11-14 PROCEDURE — 1157F ADVNC CARE PLAN IN RCRD: CPT | Mod: CPTII,S$GLB,, | Performed by: DERMATOLOGY

## 2022-11-14 PROCEDURE — 1157F PR ADVANCE CARE PLAN OR EQUIV PRESENT IN MEDICAL RECORD: ICD-10-PCS | Mod: CPTII,S$GLB,, | Performed by: DERMATOLOGY

## 2022-11-14 PROCEDURE — 99999 PR PBB SHADOW E&M-EST. PATIENT-LVL III: ICD-10-PCS | Mod: PBBFAC,,, | Performed by: DERMATOLOGY

## 2022-11-14 PROCEDURE — 1101F PR PT FALLS ASSESS DOC 0-1 FALLS W/OUT INJ PAST YR: ICD-10-PCS | Mod: CPTII,S$GLB,, | Performed by: DERMATOLOGY

## 2022-11-14 PROCEDURE — 3288F PR FALLS RISK ASSESSMENT DOCUMENTED: ICD-10-PCS | Mod: CPTII,S$GLB,, | Performed by: DERMATOLOGY

## 2022-11-14 PROCEDURE — 1101F PT FALLS ASSESS-DOCD LE1/YR: CPT | Mod: CPTII,S$GLB,, | Performed by: DERMATOLOGY

## 2022-11-14 PROCEDURE — 99202 PR OFFICE/OUTPT VISIT, NEW, LEVL II, 15-29 MIN: ICD-10-PCS | Mod: 25,S$GLB,, | Performed by: DERMATOLOGY

## 2022-11-14 PROCEDURE — 11901 PR INJECTION, SKIN LESIONS, 8 OR MORE: ICD-10-PCS | Mod: S$GLB,,, | Performed by: DERMATOLOGY

## 2022-11-14 PROCEDURE — 11901 INJECT SKIN LESIONS >7: CPT | Mod: S$GLB,,, | Performed by: DERMATOLOGY

## 2022-11-14 PROCEDURE — 99999 PR PBB SHADOW E&M-EST. PATIENT-LVL III: CPT | Mod: PBBFAC,,, | Performed by: DERMATOLOGY

## 2022-11-14 PROCEDURE — 1160F PR REVIEW ALL MEDS BY PRESCRIBER/CLIN PHARMACIST DOCUMENTED: ICD-10-PCS | Mod: CPTII,S$GLB,, | Performed by: DERMATOLOGY

## 2022-11-14 PROCEDURE — 99202 OFFICE O/P NEW SF 15 MIN: CPT | Mod: 25,S$GLB,, | Performed by: DERMATOLOGY

## 2022-11-14 PROCEDURE — 1126F PR PAIN SEVERITY QUANTIFIED, NO PAIN PRESENT: ICD-10-PCS | Mod: CPTII,S$GLB,, | Performed by: DERMATOLOGY

## 2022-11-14 PROCEDURE — 1160F RVW MEDS BY RX/DR IN RCRD: CPT | Mod: CPTII,S$GLB,, | Performed by: DERMATOLOGY

## 2022-11-14 PROCEDURE — 3072F PR LOW RISK FOR RETINOPATHY: ICD-10-PCS | Mod: CPTII,S$GLB,, | Performed by: DERMATOLOGY

## 2022-11-14 PROCEDURE — 1159F PR MEDICATION LIST DOCUMENTED IN MEDICAL RECORD: ICD-10-PCS | Mod: CPTII,S$GLB,, | Performed by: DERMATOLOGY

## 2022-11-14 PROCEDURE — 3072F LOW RISK FOR RETINOPATHY: CPT | Mod: CPTII,S$GLB,, | Performed by: DERMATOLOGY

## 2022-11-14 PROCEDURE — 3288F FALL RISK ASSESSMENT DOCD: CPT | Mod: CPTII,S$GLB,, | Performed by: DERMATOLOGY

## 2022-11-14 PROCEDURE — 1126F AMNT PAIN NOTED NONE PRSNT: CPT | Mod: CPTII,S$GLB,, | Performed by: DERMATOLOGY

## 2022-11-14 PROCEDURE — 1159F MED LIST DOCD IN RCRD: CPT | Mod: CPTII,S$GLB,, | Performed by: DERMATOLOGY

## 2022-11-14 RX ORDER — CLOBETASOL PROPIONATE 0.5 MG/G
CREAM TOPICAL 2 TIMES DAILY
Qty: 45 G | Refills: 0 | Status: SHIPPED | OUTPATIENT
Start: 2022-11-14 | End: 2023-09-15 | Stop reason: SDUPTHER

## 2022-11-14 NOTE — PROGRESS NOTES
Subjective:       Patient ID:  Bryant Gil is a 82 y.o. male who presents for   Chief Complaint   Patient presents with    Keloid     Chest, X15yrs, itchy Tx triamcinolone      Keloid    Review of Systems   Constitutional:  Negative for fever.   HENT:  Negative for sore throat.    Respiratory:  Negative for cough.    Skin:  Positive for itching.      Objective:    Physical Exam   Constitutional: He appears well-developed and well-nourished.   Eyes: No conjunctival no injection.   Neurological: He is alert and oriented to person, place, and time.   Psychiatric: He has a normal mood and affect.   Skin:                   Diagram Legend     Erythematous scaling macule/papule c/w actinic keratosis       Vascular papule c/w angioma      Pigmented verrucoid papule/plaque c/w seborrheic keratosis      Yellow umbilicated papule c/w sebaceous hyperplasia      Irregularly shaped tan macule c/w lentigo     1-2 mm smooth white papules consistent with Milia      Movable subcutaneous cyst with punctum c/w epidermal inclusion cyst      Subcutaneous movable cyst c/w pilar cyst      Firm pink to brown papule c/w dermatofibroma      Pedunculated fleshy papule(s) c/w skin tag(s)      Evenly pigmented macule c/w junctional nevus     Mildly variegated pigmented, slightly irregular-bordered macule c/w mildly atypical nevus      Flesh colored to evenly pigmented papule c/w intradermal nevus       Pink pearly papule/plaque c/w basal cell carcinoma      Erythematous hyperkeratotic cursted plaque c/w SCC      Surgical scar with no sign of skin cancer recurrence      Open and closed comedones      Inflammatory papules and pustules      Verrucoid papule consistent consistent with wart     Erythematous eczematous patches and plaques     Dystrophic onycholytic nail with subungual debris c/w onychomycosis     Umbilicated papule    Erythematous-base heme-crusted tan verrucoid plaque consistent with inflamed seborrheic keratosis     Erythematous  Silvery Scaling Plaque c/w Psoriasis     See annotation            Assessment / Plan:        Keloid  -     clobetasoL (TEMOVATE) 0.05 % cream; Apply topically 2 (two) times daily. Focally to thick scars of the chest 3 weeks after shots  Dispense: 45 g; Refill: 0  [facility] and or records and notes reviewed and considered for their impact on our clinical decision making today.  Independent historian was in exam room or on virtual today to provide information and assist in delivering therapy and treatment at home.  Reviewed with patient different treatment options and associated risks.  Discussed with patient the etiology and pathogenesis of the disease or skin lesion(s) and possible treatments and aggravators.    Intralesional Kenalog 10mg/cc (0.25 cc total) injected into 12 lesions on the chest today after obtaining verbal consent including risk of surrounding hypopigmentation. Patient tolerated procedure well.    Units: 1  NDC for Kenalog 10mg/cc:  9828-1832-56    Discussed with patient the risks of topical and injectable steroids, including, but not limited, to atrophy, rosacea, acne, glaucoma, cataracts, adrenal suppression, striae.  Do not touch eyes with medication.  Patient warned of risks of denting or dimpling of the skin due to atrophy caused by steroid effects.    Cryosurgery procedure note:    Verbal consent from the patient is obtained including, but not limited to, risk of hypopigmentation/hyperpigmentation, scar, recurrence of lesion. Liquid nitrogen cryosurgery is applied to 12 lesions to produce a freeze injury. The patient is aware that blisters may form and is instructed on wound care with gentle cleansing and use of vaseline ointment to keep moist until healed. The patient is supplied a handout on cryosurgery and is instructed to call if lesions do not completely resolve.    Scar  Do not treat flat scar areas.  Discussed with patient the benign nature of these lesions and that no  treatment is indicated.  Chronic nature of this condition discussed with patient.    Itch  -     clobetasoL (TEMOVATE) 0.05 % cream; Apply topically 2 (two) times daily. Focally to thick scars of the chest 3 weeks after shots  Dispense: 45 g; Refill: 0  Reviewed with patient different treatment options and associated risks.  Proper application of medications and or care for affected area(s) and condition(s) reviewed.  Discussed with patient the risks of topical and injectable steroids, including, but not limited, to atrophy, rosacea, acne, glaucoma, cataracts, adrenal suppression, striae.  Do not touch eyes with medication.           Follow up if symptoms worsen or fail to improve.

## 2022-11-14 NOTE — PATIENT INSTRUCTIONS

## 2022-12-13 NOTE — PROGRESS NOTES
Last 5 Patient Entered Readings                                                               Current 30 Day Average: 147/84     Recent Readings 4/3/2017 4/3/2017 4/1/2017 4/1/2017 3/30/2017    Systolic BP (mmHg) 149 149 154 154 139    Diastolic BP (mmHg) 83 83 86 86 85    Pulse 81 81 81 81 87        Mr. Gil's BP remains uncontrolled despite increasing amlodipine to 10 mg QD. He has no side effects since increasing dose. Will increase lisinopril-HCTZ 20-12.5 mg to 2 tablets QD for a dose of lisinopril 40 mg and HCTZ 25 mg.     Patient's BP is not at goal. Patient denies any symptoms.      Current HTN regimen:  Hypertension Medications             amlodipine (NORVASC) 5 MG tablet Take 2 tablets (10 mg total) by mouth once daily.    lisinopril-hydrochlorothiazide (PRINZIDE,ZESTORETIC) 20-12.5 mg per tablet Take 2 tablets by mouth once daily.          Will continue to monitor regularly. Will follow up in 2-3 weeks, sooner if BP begins to trend upward or downward.    Patient has my contact information and knows to call with any concerns or clinical changes.      Scc Pigmented Histology Text: There were aggregates of squamous cells with pigment.

## 2023-02-04 ENCOUNTER — HOSPITAL ENCOUNTER (EMERGENCY)
Facility: HOSPITAL | Age: 83
Discharge: HOME | End: 2023-02-04
Attending: EMERGENCY MEDICINE
Payer: MEDICARE

## 2023-02-04 VITALS
DIASTOLIC BLOOD PRESSURE: 67 MMHG | RESPIRATION RATE: 20 BRPM | TEMPERATURE: 98 F | HEART RATE: 77 BPM | BODY MASS INDEX: 27.25 KG/M2 | WEIGHT: 174 LBS | SYSTOLIC BLOOD PRESSURE: 133 MMHG | OXYGEN SATURATION: 98 %

## 2023-02-04 DIAGNOSIS — H92.01 OTALGIA OF RIGHT EAR: Primary | ICD-10-CM

## 2023-02-04 DIAGNOSIS — H65.01 RIGHT ACUTE SEROUS OTITIS MEDIA, RECURRENCE NOT SPECIFIED: ICD-10-CM

## 2023-02-04 PROCEDURE — 25000003 PHARM REV CODE 250: Performed by: EMERGENCY MEDICINE

## 2023-02-04 PROCEDURE — 99283 EMERGENCY DEPT VISIT LOW MDM: CPT

## 2023-02-04 RX ORDER — AMOXICILLIN 875 MG/1
875 TABLET, FILM COATED ORAL 2 TIMES DAILY
Qty: 14 TABLET | Refills: 0 | Status: SHIPPED | OUTPATIENT
Start: 2023-02-04 | End: 2023-03-16

## 2023-02-04 RX ORDER — LIDOCAINE HYDROCHLORIDE 20 MG/ML
JELLY TOPICAL
Status: COMPLETED | OUTPATIENT
Start: 2023-02-04 | End: 2023-02-04

## 2023-02-04 RX ORDER — AMOXICILLIN 250 MG/1
1000 CAPSULE ORAL
Status: COMPLETED | OUTPATIENT
Start: 2023-02-04 | End: 2023-02-04

## 2023-02-04 RX ADMIN — LIDOCAINE HYDROCHLORIDE 5 ML: 20 JELLY TOPICAL at 04:02

## 2023-02-04 RX ADMIN — AMOXICILLIN 1000 MG: 250 CAPSULE ORAL at 04:02

## 2023-02-04 NOTE — ED PROVIDER NOTES
Encounter Date: 2/4/2023       History     Chief Complaint   Patient presents with    Otalgia     Right ear pain x 4-5 days. Denies drainage or fever. States he does use q tips.     HPI    82-year-old male presents the ER for evaluation of 4-5 days progressively worsening right ear pain.  No fever chills.  No improvement with over-the-counter medication came the ER for further evaluation.    Review of patient's allergies indicates:  No Known Allergies  Past Medical History:   Diagnosis Date    Cataract of both eyes     Chronic gastritis without bleeding, negative H. pylori Jaunuary 2019 EGD 1/30/2020    Diabetes mellitus     Diverticulosis     Ectatic aorta 2/6/2018    HTN (hypertension)     HTN (hypertension) 10/9/2012    Lower GI bleeding 1/11/2021    Near syncope 2/5/2021    OHT (ocular hypertension)     Prostate cancer     Pyelonephritis, right no stone on CT scan. 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    Tubular adenoma of colon 01/10/2012    Type II or unspecified type diabetes mellitus with neurological manifestations, not stated as uncontrolled(250.60) 10/9/2012     Past Surgical History:   Procedure Laterality Date    CARPAL TUNNEL RELEASE Right 08/25/2015    CIRCUMCISION  04/13/2005    COLONOSCOPY N/A 3/29/2017    Procedure: COLONOSCOPY Golytely prep;  Surgeon: Kavitha Cleaning MD;  Location: [facility];  Service: Endoscopy;  Laterality: N/A;    COLONOSCOPY N/A 1/12/2021    Procedure: COLONOSCOPY;  Surgeon: Dung Panda MD;  Location: [facility];  Service: Endoscopy;  Laterality: N/A;    COLONOSCOPY W/ POLYPECTOMY  01/10/2012    Repeat in 5 years     ESOPHAGOGASTRODUODENOSCOPY N/A 1/8/2019    Procedure: EGD (ESOPHAGOGASTRODUODENOSCOPY);  Surgeon: Rachel Burrows MD;  Location:  [facility] ENDO;  Service: Endoscopy;  Laterality: N/A;    ESOPHAGOGASTRODUODENOSCOPY N/A 2020    Procedure: ESOPHAGOGASTRODUODENOSCOPY (EGD);  Surgeon: Rachel Burrows MD;  Location: [facility];  Service: Endoscopy;  Laterality: N/A;  Dr. Luis Angel Burrows if possible.    FLEXIBLE SIGMOIDOSCOPY N/A 2019    Procedure: SIGMOIDOSCOPY, FLEXIBLE;  Surgeon: Rachel Burrows MD;  Location: [facility] ENDO;  Service: Endoscopy;  Laterality: N/A;    PENILE PROSTHESIS IMPLANT      PROSTATE SURGERY      Prostatectomy for prostate ca; [facility]    TRIGGER FINGER RELEASE Right 2015     Family History   Problem Relation Age of Onset    Diabetes Sister     Cataracts Sister     Hypertension Mother     No Known Problems Brother     No Known Problems Sister     No Known Problems Son     No Known Problems Daughter     Heart attack Brother     No Known Problems Sister     No Known Problems Sister     Blindness Neg Hx     Amblyopia Neg Hx     Glaucoma Neg Hx     Retinal detachment Neg Hx     Strabismus Neg Hx     Macular degeneration Neg Hx     Stroke Neg Hx     Thyroid disease Neg Hx     Colon cancer Neg Hx     Esophageal cancer Neg Hx      Social History     Tobacco Use    Smoking status: Former     Packs/day: 0.50     Years: 3.00     Pack years: 1.50     Types: Cigarettes     Quit date:      Years since quittin.1    Smokeless tobacco: Never    Tobacco comments:     smoked as a teenager   Substance Use Topics    Alcohol use: Yes     Comment: social drinks a beer 1-2 x month    Drug use: No     Review of Systems   HENT:  Positive for ear discharge.    All other systems reviewed and are negative.    Physical Exam     Initial Vitals [23 0116]   BP Pulse Resp Temp SpO2   (!) 142/76 76 18 97.9 °F (36.6 °C) 99 %      MAP       --         Physical Exam    Constitutional: He appears well-developed and well-nourished.   HENT:   Head: Normocephalic and atraumatic.   Left tympanic membrane normal     Serous effusion noted on right  tympanic membrane   Eyes: Pupils are equal, round, and reactive to light.   Musculoskeletal:         General: Normal range of motion.     Neurological: He is alert and oriented to person, place, and time. He has normal strength. GCS score is 15. GCS eye subscore is 4. GCS verbal subscore is 5. GCS motor subscore is 6.   Skin: Skin is warm and dry.       ED Course   Procedures  Labs Reviewed - No data to display       Imaging Results    None          Medications   LIDOcaine HCl 2% urojet (5 mLs Mucous Membrane Given 2/4/23 0417)   amoxicillin capsule 1,000 mg (1,000 mg Oral Given 2/4/23 0417)     Medical Decision Making:   Initial Assessment:   Pleasant 82-year-old male presents the ER for evaluation of 5 days of right ear pain.  No improved with over-the-counter medications.  No fever chills chest pain shortness of breath.  Will appearing no acute distress.  Right ear exam does reveal serous effusion.  Given 5 days of pain with no improvement, will plan antibiotics.  Will plan symptomatic support reassess likely discharge.                        Clinical Impression:   Final diagnoses:  [H92.01] Otalgia of right ear (Primary)  [H65.01] Right acute serous otitis media, recurrence not specified        ED Disposition Condition    Discharge Stable          ED Prescriptions       Medication Sig Dispense Start Date End Date Auth. Provider    amoxicillin (AMOXIL) 875 MG tablet Take 1 tablet (875 mg total) by mouth 2 (two) times daily. 14 tablet 2/4/2023 -- Minerva Pierre MD          Follow-up Information       Follow up With Specialties Details Why Contact Info    Luke Mcgee MD Internal Medicine Schedule an appointment as soon as possible for a visit  As needed [address]  [address]  [facility] [id number] [phone]               Minerva Pierre MD  02/04/23 1695

## 2023-02-04 NOTE — ED TRIAGE NOTES
Review of patient's allergies indicates:  No Known Allergies     Patient has verified the spelling of their name and  on armband.   APPEARANCE: Patient is alert, calm, oriented x 4, and does not appear distressed.  ENT: EARS: no obvious drainage. +right ear pain NOSE: no active bleeding. THROAT: no redness or swelling.

## 2023-02-06 NOTE — TELEPHONE ENCOUNTER
----- Message from Rachel Burrows MD sent at 1/18/2019  9:30 PM CST -----  Bx benign, needs GI f/u  
PERIPHERAL EDEMA

## 2023-02-10 ENCOUNTER — TELEPHONE (OUTPATIENT)
Dept: ORTHOPEDICS | Facility: CLINIC | Age: 83
End: 2023-02-10
Payer: MEDICARE

## 2023-02-10 DIAGNOSIS — M79.641 RIGHT HAND PAIN: Primary | ICD-10-CM

## 2023-02-13 ENCOUNTER — OFFICE VISIT (OUTPATIENT)
Dept: ORTHOPEDICS | Facility: CLINIC | Age: 83
End: 2023-02-13
Payer: MEDICARE

## 2023-02-13 ENCOUNTER — HOSPITAL ENCOUNTER (OUTPATIENT)
Dept: RADIOLOGY | Facility: HOSPITAL | Age: 83
Discharge: HOME | End: 2023-02-13
Attending: ORTHOPAEDIC SURGERY
Payer: MEDICARE

## 2023-02-13 VITALS — HEIGHT: 67 IN | BODY MASS INDEX: 27.31 KG/M2 | WEIGHT: 174 LBS

## 2023-02-13 DIAGNOSIS — M65.341 TRIGGER RING FINGER OF RIGHT HAND: Primary | ICD-10-CM

## 2023-02-13 DIAGNOSIS — M79.641 RIGHT HAND PAIN: ICD-10-CM

## 2023-02-13 PROCEDURE — 99203 OFFICE O/P NEW LOW 30 MIN: CPT | Mod: 25,S$GLB,, | Performed by: ORTHOPAEDIC SURGERY

## 2023-02-13 PROCEDURE — 1101F PR PT FALLS ASSESS DOC 0-1 FALLS W/OUT INJ PAST YR: ICD-10-PCS | Mod: CPTII,S$GLB,, | Performed by: ORTHOPAEDIC SURGERY

## 2023-02-13 PROCEDURE — 1101F PT FALLS ASSESS-DOCD LE1/YR: CPT | Mod: CPTII,S$GLB,, | Performed by: ORTHOPAEDIC SURGERY

## 2023-02-13 PROCEDURE — 3288F PR FALLS RISK ASSESSMENT DOCUMENTED: ICD-10-PCS | Mod: CPTII,S$GLB,, | Performed by: ORTHOPAEDIC SURGERY

## 2023-02-13 PROCEDURE — 99203 PR OFFICE/OUTPT VISIT, NEW, LEVL III, 30-44 MIN: ICD-10-PCS | Mod: 25,S$GLB,, | Performed by: ORTHOPAEDIC SURGERY

## 2023-02-13 PROCEDURE — 99999 PR PBB SHADOW E&M-EST. PATIENT-LVL III: ICD-10-PCS | Mod: PBBFAC,,, | Performed by: ORTHOPAEDIC SURGERY

## 2023-02-13 PROCEDURE — 1125F AMNT PAIN NOTED PAIN PRSNT: CPT | Mod: CPTII,S$GLB,, | Performed by: ORTHOPAEDIC SURGERY

## 2023-02-13 PROCEDURE — 73130 X-RAY EXAM OF HAND: CPT | Mod: TC,PN,RT

## 2023-02-13 PROCEDURE — 20550 PR INJECT TENDON SHEATH/LIGAMENT: ICD-10-PCS | Mod: RT,S$GLB,, | Performed by: ORTHOPAEDIC SURGERY

## 2023-02-13 PROCEDURE — 73130 X-RAY EXAM OF HAND: CPT | Mod: 26,RT,, | Performed by: RADIOLOGY

## 2023-02-13 PROCEDURE — 20550 NJX 1 TENDON SHEATH/LIGAMENT: CPT | Mod: RT,S$GLB,, | Performed by: ORTHOPAEDIC SURGERY

## 2023-02-13 PROCEDURE — 3288F FALL RISK ASSESSMENT DOCD: CPT | Mod: CPTII,S$GLB,, | Performed by: ORTHOPAEDIC SURGERY

## 2023-02-13 PROCEDURE — 1125F PR PAIN SEVERITY QUANTIFIED, PAIN PRESENT: ICD-10-PCS | Mod: CPTII,S$GLB,, | Performed by: ORTHOPAEDIC SURGERY

## 2023-02-13 PROCEDURE — 1159F MED LIST DOCD IN RCRD: CPT | Mod: CPTII,S$GLB,, | Performed by: ORTHOPAEDIC SURGERY

## 2023-02-13 PROCEDURE — 1157F ADVNC CARE PLAN IN RCRD: CPT | Mod: CPTII,S$GLB,, | Performed by: ORTHOPAEDIC SURGERY

## 2023-02-13 PROCEDURE — 1157F PR ADVANCE CARE PLAN OR EQUIV PRESENT IN MEDICAL RECORD: ICD-10-PCS | Mod: CPTII,S$GLB,, | Performed by: ORTHOPAEDIC SURGERY

## 2023-02-13 PROCEDURE — 1159F PR MEDICATION LIST DOCUMENTED IN MEDICAL RECORD: ICD-10-PCS | Mod: CPTII,S$GLB,, | Performed by: ORTHOPAEDIC SURGERY

## 2023-02-13 PROCEDURE — 99999 PR PBB SHADOW E&M-EST. PATIENT-LVL III: CPT | Mod: PBBFAC,,, | Performed by: ORTHOPAEDIC SURGERY

## 2023-02-13 PROCEDURE — 73130 XR HAND COMPLETE 3 VIEW RIGHT: ICD-10-PCS | Mod: 26,RT,, | Performed by: RADIOLOGY

## 2023-02-13 RX ORDER — TRIAMCINOLONE ACETONIDE 40 MG/ML
20 INJECTION, SUSPENSION INTRA-ARTICULAR; INTRAMUSCULAR
Status: COMPLETED | OUTPATIENT
Start: 2023-02-13 | End: 2023-02-13

## 2023-02-13 RX ADMIN — TRIAMCINOLONE ACETONIDE 20 MG: 40 INJECTION, SUSPENSION INTRA-ARTICULAR; INTRAMUSCULAR at 11:02

## 2023-02-13 NOTE — PROGRESS NOTES
Subjective:      Patient ID: Bryant Gil is a 82 y.o. male.    Chief Complaint: Consult of the Right Hand      HPI  Bryant Gil is a  82 y.o. male presenting today for painful locking of the right ring finger.  There was not a history of trauma.  Onset of symptoms began several months ago  No numbness or tingling reported  Symptoms worse in the morning associated with gripping activities.      Review of patient's allergies indicates:  No Known Allergies      Current Outpatient Medications   Medication Sig Dispense Refill    amLODIPine (NORVASC) 10 MG tablet TAKE 1 TABLET BY MOUTH ONCE DAILY 90 tablet 2    amoxicillin (AMOXIL) 875 MG tablet Take 1 tablet (875 mg total) by mouth 2 (two) times daily. 14 tablet 0    aspirin (ECOTRIN) 81 MG EC tablet Take 81 mg by mouth once daily.      atorvastatin (LIPITOR) 20 MG tablet TAKE 1 TABLET BY MOUTH ONCE DAILY 90 tablet 2    BD ALCOHOL SWABS PadM USE FOR HOME GLUCOSE MONITORING DAILY 100 each 3    blood sugar diagnostic (TRUE METRIX GLUCOSE TEST STRIP) Strp Test Blood Sugar twice daily 100 strip 1    blood-glucose meter (TRUE METRIX GLUCOSE METER) Misc Test blood sugar twice a day 1 each 0    ciclopirox (PENLAC) 8 % Soln APPLY TOPICALLY NIGHTLY. 1 each 1    clobetasoL (TEMOVATE) 0.05 % cream Apply topically 2 (two) times daily. Focally to thick scars of the chest 3 weeks after shots 45 g 0    docusate sodium (COLACE) 100 MG capsule Take 1 capsule (100 mg total) by mouth 3 (three) times daily as needed for Constipation (stoole softener). 100 capsule 0    glipiZIDE 5 MG TR24 TAKE 1 TABLET BY MOUTH  DAILY WITH BREAKFAST 90 tablet 3    lancets (TRUEPLUS LANCETS) 30 gauge Misc Check sugar twice daily 200 each 3    latanoprost 0.005 % ophthalmic solution INSTILL 1 DROP INTO BOTH EYES ONE TIME DAILY 2.5 mL 10    losartan-hydrochlorothiazide 100-25 mg (HYZAAR) 100-25 mg per tablet TAKE 1 TABLET BY MOUTH  DAILY IN THE MORNING 90 tablet 3    metFORMIN (GLUCOPHAGE) 1000 MG tablet  TAKE 1 TABLET BY MOUTH  TWICE DAILY WITH MEALS 180 tablet 3    metoprolol succinate (TOPROL-XL) 100 MG 24 hr tablet TAKE 1 TABLET BY MOUTH ONCE DAILY 90 tablet 2    pantoprazole (PROTONIX) 40 MG tablet Take 1 tablet (40 mg total) by mouth 2 (two) times daily. 180 tablet 1    triamcinolone (KENALOG) 0.5 % ointment Apply topically 2 (two) times daily. 30 g 2     No current facility-administered medications for this visit.       Past Medical History:   Diagnosis Date    Cataract of both eyes     Chronic gastritis without bleeding, negative H. pylori Jaunuary 2019 EGD 1/30/2020    Diabetes mellitus     Diverticulosis     Ectatic aorta 2/6/2018    HTN (hypertension)     HTN (hypertension) 10/9/2012    Lower GI bleeding 1/11/2021    Near syncope 2/5/2021    OHT (ocular hypertension)     Prostate cancer     Pyelonephritis, right no stone on CT scan. 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    Tubular adenoma of colon 01/10/2012    Type II or unspecified type diabetes mellitus with neurological manifestations, not stated as uncontrolled(250.60) 10/9/2012       Past Surgical History:   Procedure Laterality Date    CARPAL TUNNEL RELEASE Right 08/25/2015    CIRCUMCISION  04/13/2005    COLONOSCOPY N/A 3/29/2017    Procedure: COLONOSCOPY Golytely prep;  Surgeon: Kavitha Cleaning MD;  Location: [facility];  Service: Endoscopy;  Laterality: N/A;    COLONOSCOPY N/A 1/12/2021    Procedure: COLONOSCOPY;  Surgeon: Dung Panda MD;  Location: [facility];  Service: Endoscopy;  Laterality: N/A;    COLONOSCOPY W/ POLYPECTOMY  01/10/2012    Repeat in 5 years     ESOPHAGOGASTRODUODENOSCOPY N/A 1/8/2019    Procedure: EGD (ESOPHAGOGASTRODUODENOSCOPY);  Surgeon: Rachel Burrows MD;  Location: [facility];  Service:  "Endoscopy;  Laterality: N/A;    ESOPHAGOGASTRODUODENOSCOPY N/A 5/14/2020    Procedure: ESOPHAGOGASTRODUODENOSCOPY (EGD);  Surgeon: Rachel Burrows MD;  Location: [facility] ENDO;  Service: Endoscopy;  Laterality: N/A;  Dr. Luis Angel Burrows if possible.    FLEXIBLE SIGMOIDOSCOPY N/A 1/8/2019    Procedure: SIGMOIDOSCOPY, FLEXIBLE;  Surgeon: Rachel Burrows MD;  Location: [facility] ENDO;  Service: Endoscopy;  Laterality: N/A;    PENILE PROSTHESIS IMPLANT      PROSTATE SURGERY  1999    Prostatectomy for prostate ca; [facility]    TRIGGER FINGER RELEASE Right 2015       Review of Systems:  ROS    OBJECTIVE:     PHYSICAL EXAM:  Height: 5' 7" (170.2 cm) Weight: 78.9 kg (174 lb)  Vitals:    02/13/23 1134   Weight: 78.9 kg (174 lb)   Height: 5' 7" (1.702 m)   PainSc:   4     Well developed, well nourished male in no acute distress  Alert and oriented x 3  HEENT- Normal exam  Lungs- Clear to auscultation  Heart- Regular rate and rhythm  Abdomen- Soft nontender  Extremity exam- examination right hand there is tenderness over the flexor tendon sheath to the right ring finger limited range of motion painful flexion   Mild triggering   strength decreased   Tinel sign negative at the wrist       RADIOGRAPHS:  None  Comments: I have personally reviewed the imaging and I agree with the above radiologist's report.    ASSESSMENT/PLAN:     IMPRESSION:  Triggering of the right ring finger    PLAN:  I explained the nature of the problem to the patient   Recommended injection  After pause for time-out identified the right ring finger injected flexor tendon sheath with combination Kenalog 20 mg 0.5 cc xylocaine sterile technique  Tolerated the procedure well without complication  Gentle range of motion prevent stiffness  Follow-up 4-6 weeks       - We talked at length about the anatomy and pathophysiology of   Encounter Diagnosis   Name Primary?    Trigger ring finger of right hand Yes           Disclaimer: This note has been generated using " voice-recognition software. There may be typographical errors that have been missed during proof-reading.

## 2023-03-16 ENCOUNTER — OFFICE VISIT (OUTPATIENT)
Dept: OTOLARYNGOLOGY | Facility: CLINIC | Age: 83
End: 2023-03-16
Payer: MEDICARE

## 2023-03-16 ENCOUNTER — LAB VISIT (OUTPATIENT)
Dept: LAB | Facility: HOSPITAL | Age: 83
End: 2023-03-16
Attending: FAMILY MEDICINE
Payer: MEDICARE

## 2023-03-16 ENCOUNTER — CLINICAL SUPPORT (OUTPATIENT)
Dept: OTOLARYNGOLOGY | Facility: CLINIC | Age: 83
End: 2023-03-16
Attending: FAMILY MEDICINE
Payer: MEDICARE

## 2023-03-16 ENCOUNTER — OFFICE VISIT (OUTPATIENT)
Dept: FAMILY MEDICINE | Facility: CLINIC | Age: 83
End: 2023-03-16
Attending: FAMILY MEDICINE
Payer: MEDICARE

## 2023-03-16 VITALS
SYSTOLIC BLOOD PRESSURE: 120 MMHG | HEIGHT: 67 IN | OXYGEN SATURATION: 98 % | BODY MASS INDEX: 27.34 KG/M2 | DIASTOLIC BLOOD PRESSURE: 70 MMHG | TEMPERATURE: 98 F | WEIGHT: 174.19 LBS | HEART RATE: 65 BPM

## 2023-03-16 VITALS
HEART RATE: 72 BPM | WEIGHT: 172.5 LBS | BODY MASS INDEX: 27.02 KG/M2 | DIASTOLIC BLOOD PRESSURE: 82 MMHG | SYSTOLIC BLOOD PRESSURE: 137 MMHG

## 2023-03-16 DIAGNOSIS — E78.2 MIXED HYPERLIPIDEMIA: ICD-10-CM

## 2023-03-16 DIAGNOSIS — H91.93 BILATERAL HEARING LOSS, UNSPECIFIED HEARING LOSS TYPE: ICD-10-CM

## 2023-03-16 DIAGNOSIS — I47.19 ATRIAL TACHYCARDIA, PAROXYSMAL: ICD-10-CM

## 2023-03-16 DIAGNOSIS — I10 ESSENTIAL HYPERTENSION: ICD-10-CM

## 2023-03-16 DIAGNOSIS — N18.32 STAGE 3B CHRONIC KIDNEY DISEASE: ICD-10-CM

## 2023-03-16 DIAGNOSIS — H61.21 IMPACTED CERUMEN OF RIGHT EAR: ICD-10-CM

## 2023-03-16 DIAGNOSIS — I70.0 AORTIC ATHEROSCLEROSIS: ICD-10-CM

## 2023-03-16 DIAGNOSIS — H90.3 SENSORINEURAL HEARING LOSS (SNHL) OF BOTH EARS: Primary | ICD-10-CM

## 2023-03-16 DIAGNOSIS — H90.3 SENSORINEURAL HEARING LOSS, BILATERAL: Primary | ICD-10-CM

## 2023-03-16 DIAGNOSIS — E11.42 TYPE 2 DIABETES MELLITUS WITH DIABETIC POLYNEUROPATHY, WITHOUT LONG-TERM CURRENT USE OF INSULIN: Primary | ICD-10-CM

## 2023-03-16 DIAGNOSIS — E11.42 TYPE 2 DIABETES MELLITUS WITH DIABETIC POLYNEUROPATHY, WITHOUT LONG-TERM CURRENT USE OF INSULIN: ICD-10-CM

## 2023-03-16 LAB
ALBUMIN SERPL BCP-MCNC: 4.1 G/DL (ref 3.5–5.2)
ALBUMIN/CREAT UR: 11.3 UG/MG (ref 0–30)
ALP SERPL-CCNC: 58 U/L (ref 55–135)
ALT SERPL W/O P-5'-P-CCNC: 18 U/L (ref 10–44)
ANION GAP SERPL CALC-SCNC: 9 MMOL/L (ref 8–16)
AST SERPL-CCNC: 14 U/L (ref 10–40)
BILIRUB SERPL-MCNC: 0.3 MG/DL (ref 0.1–1)
BILIRUB UR QL STRIP: NEGATIVE
BUN SERPL-MCNC: 22 MG/DL (ref 8–23)
CALCIUM SERPL-MCNC: 9.5 MG/DL (ref 8.7–10.5)
CHLORIDE SERPL-SCNC: 109 MMOL/L (ref 95–110)
CLARITY UR: CLEAR
CO2 SERPL-SCNC: 26 MMOL/L (ref 23–29)
COLOR UR: YELLOW
CREAT SERPL-MCNC: 1.1 MG/DL (ref 0.5–1.4)
CREAT UR-MCNC: 151 MG/DL (ref 23–375)
EST. GFR  (NO RACE VARIABLE): >60 ML/MIN/1.73 M^2
ESTIMATED AVG GLUCOSE: 166 MG/DL (ref 68–131)
GLUCOSE SERPL-MCNC: 105 MG/DL (ref 70–110)
GLUCOSE UR QL STRIP: NEGATIVE
HBA1C MFR BLD: 7.4 % (ref 4–5.6)
HGB UR QL STRIP: NEGATIVE
KETONES UR QL STRIP: NEGATIVE
LEUKOCYTE ESTERASE UR QL STRIP: NEGATIVE
MICROALBUMIN UR DL<=1MG/L-MCNC: 17 UG/ML
NITRITE UR QL STRIP: NEGATIVE
PH UR STRIP: 7 [PH] (ref 5–8)
POTASSIUM SERPL-SCNC: 3.9 MMOL/L (ref 3.5–5.1)
PROT SERPL-MCNC: 7.4 G/DL (ref 6–8.4)
PROT UR QL STRIP: NEGATIVE
SODIUM SERPL-SCNC: 144 MMOL/L (ref 136–145)
SP GR UR STRIP: 1.02 (ref 1–1.03)
URN SPEC COLLECT METH UR: NORMAL
UROBILINOGEN UR STRIP-ACNC: NEGATIVE EU/DL

## 2023-03-16 PROCEDURE — 3075F PR MOST RECENT SYSTOLIC BLOOD PRESS GE 130-139MM HG: ICD-10-PCS | Mod: CPTII,S$GLB,, | Performed by: NURSE PRACTITIONER

## 2023-03-16 PROCEDURE — 92557 COMPREHENSIVE HEARING TEST: CPT | Mod: S$GLB,,, | Performed by: PHYSICIAN ASSISTANT

## 2023-03-16 PROCEDURE — 1126F PR PAIN SEVERITY QUANTIFIED, NO PAIN PRESENT: ICD-10-PCS | Mod: CPTII,S$GLB,, | Performed by: NURSE PRACTITIONER

## 2023-03-16 PROCEDURE — 3074F PR MOST RECENT SYSTOLIC BLOOD PRESSURE < 130 MM HG: ICD-10-PCS | Mod: CPTII,S$GLB,, | Performed by: FAMILY MEDICINE

## 2023-03-16 PROCEDURE — 1101F PR PT FALLS ASSESS DOC 0-1 FALLS W/OUT INJ PAST YR: ICD-10-PCS | Mod: CPTII,S$GLB,, | Performed by: NURSE PRACTITIONER

## 2023-03-16 PROCEDURE — 69210 REMOVE IMPACTED EAR WAX UNI: CPT | Mod: S$GLB,,, | Performed by: NURSE PRACTITIONER

## 2023-03-16 PROCEDURE — 1159F MED LIST DOCD IN RCRD: CPT | Mod: CPTII,S$GLB,, | Performed by: FAMILY MEDICINE

## 2023-03-16 PROCEDURE — 69210 EAR CERUMEN REMOVAL: ICD-10-PCS | Mod: S$GLB,,, | Performed by: NURSE PRACTITIONER

## 2023-03-16 PROCEDURE — 1157F PR ADVANCE CARE PLAN OR EQUIV PRESENT IN MEDICAL RECORD: ICD-10-PCS | Mod: CPTII,S$GLB,, | Performed by: FAMILY MEDICINE

## 2023-03-16 PROCEDURE — 99999 PR PBB SHADOW E&M-EST. PATIENT-LVL III: ICD-10-PCS | Mod: PBBFAC,,, | Performed by: NURSE PRACTITIONER

## 2023-03-16 PROCEDURE — 99213 OFFICE O/P EST LOW 20 MIN: CPT | Mod: 25,S$GLB,, | Performed by: NURSE PRACTITIONER

## 2023-03-16 PROCEDURE — 3074F SYST BP LT 130 MM HG: CPT | Mod: CPTII,S$GLB,, | Performed by: FAMILY MEDICINE

## 2023-03-16 PROCEDURE — 80053 COMPREHEN METABOLIC PANEL: CPT | Performed by: FAMILY MEDICINE

## 2023-03-16 PROCEDURE — 36415 COLL VENOUS BLD VENIPUNCTURE: CPT | Performed by: FAMILY MEDICINE

## 2023-03-16 PROCEDURE — 99214 OFFICE O/P EST MOD 30 MIN: CPT | Mod: S$GLB,,, | Performed by: FAMILY MEDICINE

## 2023-03-16 PROCEDURE — 99214 PR OFFICE/OUTPT VISIT, EST, LEVL IV, 30-39 MIN: ICD-10-PCS | Mod: S$GLB,,, | Performed by: FAMILY MEDICINE

## 2023-03-16 PROCEDURE — 92557 PR COMPREHENSIVE HEARING TEST: ICD-10-PCS | Mod: S$GLB,,, | Performed by: PHYSICIAN ASSISTANT

## 2023-03-16 PROCEDURE — 3079F DIAST BP 80-89 MM HG: CPT | Mod: CPTII,S$GLB,, | Performed by: NURSE PRACTITIONER

## 2023-03-16 PROCEDURE — 1126F AMNT PAIN NOTED NONE PRSNT: CPT | Mod: CPTII,S$GLB,, | Performed by: NURSE PRACTITIONER

## 2023-03-16 PROCEDURE — 3075F SYST BP GE 130 - 139MM HG: CPT | Mod: CPTII,S$GLB,, | Performed by: NURSE PRACTITIONER

## 2023-03-16 PROCEDURE — 3078F PR MOST RECENT DIASTOLIC BLOOD PRESSURE < 80 MM HG: ICD-10-PCS | Mod: CPTII,S$GLB,, | Performed by: FAMILY MEDICINE

## 2023-03-16 PROCEDURE — 1160F RVW MEDS BY RX/DR IN RCRD: CPT | Mod: CPTII,S$GLB,, | Performed by: FAMILY MEDICINE

## 2023-03-16 PROCEDURE — 1157F ADVNC CARE PLAN IN RCRD: CPT | Mod: CPTII,S$GLB,, | Performed by: FAMILY MEDICINE

## 2023-03-16 PROCEDURE — 99499 RISK ADDL DX/OHS AUDIT: ICD-10-PCS | Mod: S$GLB,,, | Performed by: FAMILY MEDICINE

## 2023-03-16 PROCEDURE — 99499 UNLISTED E&M SERVICE: CPT | Mod: S$GLB,,, | Performed by: FAMILY MEDICINE

## 2023-03-16 PROCEDURE — 1159F MED LIST DOCD IN RCRD: CPT | Mod: CPTII,S$GLB,, | Performed by: NURSE PRACTITIONER

## 2023-03-16 PROCEDURE — 99999 PR PBB SHADOW E&M-EST. PATIENT-LVL IV: CPT | Mod: PBBFAC,,, | Performed by: FAMILY MEDICINE

## 2023-03-16 PROCEDURE — 92567 TYMPANOMETRY: CPT | Mod: S$GLB,,, | Performed by: PHYSICIAN ASSISTANT

## 2023-03-16 PROCEDURE — 1159F PR MEDICATION LIST DOCUMENTED IN MEDICAL RECORD: ICD-10-PCS | Mod: CPTII,S$GLB,, | Performed by: FAMILY MEDICINE

## 2023-03-16 PROCEDURE — 1159F PR MEDICATION LIST DOCUMENTED IN MEDICAL RECORD: ICD-10-PCS | Mod: CPTII,S$GLB,, | Performed by: NURSE PRACTITIONER

## 2023-03-16 PROCEDURE — 81003 URINALYSIS AUTO W/O SCOPE: CPT | Performed by: FAMILY MEDICINE

## 2023-03-16 PROCEDURE — 3288F FALL RISK ASSESSMENT DOCD: CPT | Mod: CPTII,S$GLB,, | Performed by: NURSE PRACTITIONER

## 2023-03-16 PROCEDURE — 99999 PR PBB SHADOW E&M-EST. PATIENT-LVL II: CPT | Mod: PBBFAC,,, | Performed by: PHYSICIAN ASSISTANT

## 2023-03-16 PROCEDURE — 99213 PR OFFICE/OUTPT VISIT, EST, LEVL III, 20-29 MIN: ICD-10-PCS | Mod: 25,S$GLB,, | Performed by: NURSE PRACTITIONER

## 2023-03-16 PROCEDURE — 1101F PT FALLS ASSESS-DOCD LE1/YR: CPT | Mod: CPTII,S$GLB,, | Performed by: NURSE PRACTITIONER

## 2023-03-16 PROCEDURE — 83036 HEMOGLOBIN GLYCOSYLATED A1C: CPT | Performed by: FAMILY MEDICINE

## 2023-03-16 PROCEDURE — 1157F ADVNC CARE PLAN IN RCRD: CPT | Mod: CPTII,S$GLB,, | Performed by: NURSE PRACTITIONER

## 2023-03-16 PROCEDURE — 3288F PR FALLS RISK ASSESSMENT DOCUMENTED: ICD-10-PCS | Mod: CPTII,S$GLB,, | Performed by: NURSE PRACTITIONER

## 2023-03-16 PROCEDURE — 3079F PR MOST RECENT DIASTOLIC BLOOD PRESSURE 80-89 MM HG: ICD-10-PCS | Mod: CPTII,S$GLB,, | Performed by: NURSE PRACTITIONER

## 2023-03-16 PROCEDURE — 1157F PR ADVANCE CARE PLAN OR EQUIV PRESENT IN MEDICAL RECORD: ICD-10-PCS | Mod: CPTII,S$GLB,, | Performed by: NURSE PRACTITIONER

## 2023-03-16 PROCEDURE — 99999 PR PBB SHADOW E&M-EST. PATIENT-LVL II: ICD-10-PCS | Mod: PBBFAC,,, | Performed by: PHYSICIAN ASSISTANT

## 2023-03-16 PROCEDURE — 1160F PR REVIEW ALL MEDS BY PRESCRIBER/CLIN PHARMACIST DOCUMENTED: ICD-10-PCS | Mod: CPTII,S$GLB,, | Performed by: FAMILY MEDICINE

## 2023-03-16 PROCEDURE — 3078F DIAST BP <80 MM HG: CPT | Mod: CPTII,S$GLB,, | Performed by: FAMILY MEDICINE

## 2023-03-16 PROCEDURE — 92567 PR TYMPA2METRY: ICD-10-PCS | Mod: S$GLB,,, | Performed by: PHYSICIAN ASSISTANT

## 2023-03-16 PROCEDURE — 82570 ASSAY OF URINE CREATININE: CPT | Performed by: FAMILY MEDICINE

## 2023-03-16 PROCEDURE — 99999 PR PBB SHADOW E&M-EST. PATIENT-LVL IV: ICD-10-PCS | Mod: PBBFAC,,, | Performed by: FAMILY MEDICINE

## 2023-03-16 PROCEDURE — 99999 PR PBB SHADOW E&M-EST. PATIENT-LVL III: CPT | Mod: PBBFAC,,, | Performed by: NURSE PRACTITIONER

## 2023-03-16 NOTE — PROGRESS NOTES
Chief Complaint   Patient presents with    Hearing Loss     bilateral   .     HPI:Patient is a very pleasant 82 y.o. male here to see me today for the first time for evaluation of hearing loss.  He reports hearing loss that has been gradually progressing over the last several years. He has noted any difference in hearing between the ears, with the left ear being the better hearing ear. He has noted any tinnitus in both ears.  He has not had any recent issues with ear pain or ear drainage.  He denies a family history of hearing loss, and has not had any previous otologic surgery.  He has a history of significant loud noise exposure.He denies issues with dizziness.      Past Medical History:   Diagnosis Date    Cataract of both eyes     Chronic gastritis without bleeding, negative H. pylori Jaunuary  EGD 2020    Diabetes mellitus     Diverticulosis     Ectatic aorta 2018    HTN (hypertension)     HTN (hypertension) 10/9/2012    Lower GI bleeding 2021    Near syncope 2021    OHT (ocular hypertension)     Prostate cancer     Pyelonephritis, right no stone on CT scan. 2013    Rectal bleeding 2021    Sepsis, same organism in urine grew in his blood, Klebsiella 2013    Sigmoid diverticulosis 2017    SOB (shortness of breath) 2021    Tendinitis of both rotator cuffs 2013    Tortuous aorta 2018    Tubular adenoma of colon 01/10/2012    Type II or unspecified type diabetes mellitus with neurological manifestations, not stated as uncontrolled(250.60) 10/9/2012     Social History     Socioeconomic History    Marital status:    Tobacco Use    Smoking status: Former     Packs/day: 0.50     Years: 3.00     Pack years: 1.50     Types: Cigarettes     Quit date: 1960     Years since quittin.2    Smokeless tobacco: Never    Tobacco comments:     smoked as a teenager   Substance and Sexual Activity    Alcohol use: Yes     Comment: social drinks a beer 1-2 x month     Drug use: No    Sexual activity: Yes     Partners: Female   Social History Narrative    Works at [employer] in [place]     Social Determinants of Health     Financial Resource Strain: Low Risk     Difficulty of Paying Living Expenses: Not hard at all   Food Insecurity: No Food Insecurity    Worried About Running Out of Food in the Last Year: Never true    Ran Out of Food in the Last Year: Never true   Transportation Needs: No Transportation Needs    Lack of Transportation (Medical): No    Lack of Transportation (Non-Medical): No   Physical Activity: Insufficiently Active    Days of Exercise per Week: 3 days    Minutes of Exercise per Session: 10 min   Stress: No Stress Concern Present    Feeling of Stress : Not at all   Social Connections: Socially Integrated    Frequency of Communication with Friends and Family: More than three times a week    Frequency of Social Gatherings with Friends and Family: More than three times a week    Attends Evangelical Services: More than 4 times per year    Active Member of Clubs or Organizations: Yes    Attends Club or Organization Meetings: Never    Marital Status:    Housing Stability: Low Risk     Unable to Pay for Housing in the Last Year: No    Number of Places Lived in the Last Year: 1    Unstable Housing in the Last Year: No     Past Surgical History:   Procedure Laterality Date    CARPAL TUNNEL RELEASE Right 08/25/2015    CIRCUMCISION  04/13/2005    COLONOSCOPY N/A 3/29/2017    Procedure: COLONOSCOPY Golytely prep;  Surgeon: Kavitha Cleaning MD;  Location: [facility];  Service: Endoscopy;  Laterality: N/A;    COLONOSCOPY N/A 1/12/2021    Procedure: COLONOSCOPY;  Surgeon: Dung Panda MD;  Location: [facility];  Service: Endoscopy;  Laterality: N/A;    COLONOSCOPY W/ POLYPECTOMY  01/10/2012    Repeat in 5 years     ESOPHAGOGASTRODUODENOSCOPY N/A 1/8/2019    Procedure: EGD (ESOPHAGOGASTRODUODENOSCOPY);  Surgeon: Rachel Burrows MD;  Location: [facility];  Service:  Endoscopy;  Laterality: N/A;    ESOPHAGOGASTRODUODENOSCOPY N/A 5/14/2020    Procedure: ESOPHAGOGASTRODUODENOSCOPY (EGD);  Surgeon: Rachel Burrows MD;  Location: [facility] ENDO;  Service: Endoscopy;  Laterality: N/A;  Dr. Luis Angel Burrows if possible.    FLEXIBLE SIGMOIDOSCOPY N/A 1/8/2019    Procedure: SIGMOIDOSCOPY, FLEXIBLE;  Surgeon: Rachel Burrows MD;  Location: [facility] ENDO;  Service: Endoscopy;  Laterality: N/A;    PENILE PROSTHESIS IMPLANT      PROSTATE SURGERY  1999    Prostatectomy for prostate ca; [facility]    TRIGGER FINGER RELEASE Right 2015     Family History   Problem Relation Age of Onset    Diabetes Sister     Cataracts Sister     Hypertension Mother     No Known Problems Brother     No Known Problems Sister     No Known Problems Son     No Known Problems Daughter     Heart attack Brother     No Known Problems Sister     No Known Problems Sister     Blindness Neg Hx     Amblyopia Neg Hx     Glaucoma Neg Hx     Retinal detachment Neg Hx     Strabismus Neg Hx     Macular degeneration Neg Hx     Stroke Neg Hx     Thyroid disease Neg Hx     Colon cancer Neg Hx     Esophageal cancer Neg Hx            Review of Systems  General: negative for chills, fever or weight loss  Psychological: negative for mood changes or depression  Ophthalmic: negative for blurry vision, photophobia or eye pain  ENT: see HPI  Respiratory: no cough, shortness of breath, or wheezing  Cardiovascular: no chest pain or dyspnea on exertion  Gastrointestinal: no abdominal pain, change in bowel habits, or black/ bloody stools  Musculoskeletal: negative for gait disturbance or muscular weakness  Neurological: no syncope or seizures; no ataxia  Dermatological: negative for puritis,  rash and jaundice  Hematologic/lymphatic: no easy bruising, no new lumps or bumps      Physical Exam:    Vitals:    03/16/23 1125   BP: 137/82   Pulse: 72       Constitutional: Well appearing / communicating without difficutly.  NAD.  Eyes: EOM I Bilaterally  Head/Face:  Normocephalic.  Negative paranasal sinus pressure/tenderness.  Salivary glands WNL.  House Brackmann I Bilaterally.  Right Ear: Auricle normal appearance. EAC with cerumen impaction. External Auditory Canal within normal limits no lesions or masses,TM w/o masses/lesions/perforations. TM mobility noted.   Left Ear: Auricle normal appearance. External Auditory Canal within normal limits no lesions or masses,TM w/o masses/lesions/perforations. TM mobility noted.  Rinne air conduction >bone conduction bilaterally; Vargas midline  Nose: No gross nasal septal deviation. Inferior Turbinates 3+ bilaterally. No septal perforation. No masses/lesions. External nasal skin appears normal without masses/lesions.  Oral Cavity: Gingiva/lips within normal limits.  Dentition/gingiva healthy appearing. Mucus membranes moist. Floor of mouth soft, no masses palpated. Oral Tongue mobile. Hard Palate appears normal.    Oropharynx: Base of tongue appears normal. No masses/lesions noted. Tonsillar fossa/pharyngeal wall without lesions. Posterior oropharynx WNL.  Soft palate without masses. Midline uvula.   Neck/Lymphatic: No LAD I-VI bilaterally.  No thyromegaly.  No masses noted on exam.    Mirror laryngoscopy/nasopharyngoscopy: Active gag reflex.  Unable to perform.    Neuro/Psychiatric: AOx3.  Normal mood and affect.   Cardiovascular: Normal carotid pulses bilaterally, no increasing jugular venous distention noted at cervical region bilaterally.    Respiratory: Normal respiratory effort, no stridor, no retractions noted.      Procedures    Ear Cerumen Removal     Date/Time: 3/16/2023 11:20 AM  Performed by: Lorena Eller NP  Authorized by: Lorena Eller NP      Consent Done?:  Yes (Verbal)  Medication Used:  Debrox  Location details:  Right ear  Procedure type: curette    Procedure type comment:  Suction  Cerumen  Removal Results:  Cerumen completely removed  Patient tolerance:  Patient tolerated the procedure well with no immediate  complications      Audiogram interpreted personally by me and discussed in detail with the patient today.   Audiogram results revealed a moderate to severe sensorineural hearing loss bilaterally in the mid to high frequency range.  Speech reception thresholds were noted at 20 dB in the right ear and 20 dB in the left ear.  Speech discrimination scores were 72% in the right ear and 72% in the left ear.  Tympanometry revealed Type As in the right ear and Type As in the left ear. The results of the audiogram were reviewed with the patient and the benefits of amplification were discussed.        Assessment:    ICD-10-CM ICD-9-CM    1. Sensorineural hearing loss (SNHL) of both ears  H90.3 389.18       2. Impacted cerumen of right ear  H61.21 380.4 Ear Cerumen Removal        The primary encounter diagnosis was Sensorineural hearing loss (SNHL) of both ears. A diagnosis of Impacted cerumen of right ear was also pertinent to this visit.      Plan:  Orders Placed This Encounter   Procedures    Ear Cerumen Removal     - We reviewed the patient's recent audiogram and hearing loss in detail.  We also discussed that he is a good candidate for hearing aids, if and when he the patient is motivated.  He was given handouts with information and pricing of hearing aids, and will contact audiology when ready to proceed.  We also discussed the use hearing protection when exposed to loud noise, including lawn equipment.     -f/u 1 year for audiologic evaluation or PRN      Lorena Eller NP

## 2023-03-16 NOTE — PROGRESS NOTES
Bryant Gil, a 82 y.o. male, was seen today in the clinic for an audiologic evaluation.  Patients main complaint was hearing loss.      Audiogram results revealed a moderate to severe sensorineural hearing loss bilaterally in the mid to high frequency range.  Speech reception thresholds were noted at 20 dB in the right ear and 20 dB in the left ear.  Speech discrimination scores were 72% in the right ear and 72% in the left ear.  Tympanometry revealed Type As in the right ear and Type As in the left ear. The results of the audiogram were reviewed with the patient and the benefits of amplification were discussed.    Recommendations:  Otologic evaluation  Annual audiogram  Hearing protection when in noise  Hearing aid consultation

## 2023-03-16 NOTE — PROCEDURES
Ear Cerumen Removal    Date/Time: 3/16/2023 11:20 AM  Performed by: Lorena Eller NP  Authorized by: Lorena Eller NP     Consent Done?:  Yes (Verbal)  Medication Used:  Debrox  Location details:  Right ear  Procedure type: curette    Procedure type comment:  Suction  Cerumen  Removal Results:  Cerumen completely removed  Patient tolerance:  Patient tolerated the procedure well with no immediate complications

## 2023-03-16 NOTE — PROGRESS NOTES
Subjective:       Patient ID: Bryant Gil is a 82 y.o. male.    Chief Complaint: Follow-up    82 yr old pleasant male with DM II, HTN, HLD, atrial tachycardia, presents today as a new patient to me and for establishing primary care and also his 6 month check and lab work. C/o hearing loss.      DM Ii - controlled -    HGBA1C                   7.2 (H)             08/19/2022              - on metformin n glipizide - compliant - no hypoglycemic symptoms      HTN - controlled - on HYZAAR, amlodipine and metoprolol      HLD - improving - on statin -     LDLCALC                  110.8               07/18/2022                   History as below - reviewed             Medication Refill  Associated symptoms include arthralgias, joint swelling and weakness. Pertinent negatives include no chest pain, congestion, coughing, diaphoresis, headaches, myalgias, neck pain, rash, visual change or vomiting.   Diabetes  He presents for his follow-up diabetic visit. He has type 2 diabetes mellitus. His disease course has been stable. Pertinent negatives for hypoglycemia include no confusion, dizziness, headaches, hunger, nervousness/anxiousness, seizures, speech difficulty or tremors. Associated symptoms include weakness. Pertinent negatives for diabetes include no blurred vision, no chest pain, no foot paresthesias, no foot ulcerations, no polydipsia, no polyuria, no visual change and no weight loss. Pertinent negatives for hypoglycemia complications include no blackouts, no nocturnal hypoglycemia and no required assistance. Symptoms are stable. Pertinent negatives for diabetic complications include no autonomic neuropathy, impotence, nephropathy, peripheral neuropathy or retinopathy. Risk factors for coronary artery disease include diabetes mellitus, dyslipidemia and male sex. Current diabetic treatment includes oral agent (dual therapy). He is compliant with treatment all of the time. Meal planning includes avoidance of concentrated  sweets. He has not had a previous visit with a dietitian. He rarely participates in exercise. There is no change in his home blood glucose trend. An ACE inhibitor/angiotensin II receptor blocker is being taken. He does not see a podiatrist.Eye exam is not current.   Hypertension  This is a chronic problem. The current episode started more than 1 year ago. The problem has been gradually improving since onset. The problem is controlled. Pertinent negatives include no blurred vision, chest pain, headaches, neck pain or palpitations. There are no associated agents to hypertension. Risk factors for coronary artery disease include dyslipidemia, male gender and diabetes mellitus. Past treatments include angiotensin blockers, diuretics, calcium channel blockers and beta blockers. The current treatment provides significant improvement. There are no compliance problems.  There is no history of angina, CAD/MI or retinopathy. There is no history of chronic renal disease, hyperaldosteronism, hyperparathyroidism, a hypertension causing med, renovascular disease or a thyroid problem.   Hyperlipidemia  This is a chronic problem. The current episode started more than 1 year ago. The problem is controlled. Recent lipid tests were reviewed and are normal. He has no history of chronic renal disease. There are no known factors aggravating his hyperlipidemia. Pertinent negatives include no chest pain or myalgias. Current antihyperlipidemic treatment includes statins. The current treatment provides significant improvement of lipids. There are no compliance problems.  Risk factors for coronary artery disease include diabetes mellitus, dyslipidemia, hypertension and male sex.   Review of Systems   Constitutional: Negative.  Negative for activity change, diaphoresis, unexpected weight change and weight loss.   HENT:  Positive for hearing loss. Negative for nasal congestion, ear pain, mouth sores, rhinorrhea, trouble swallowing and voice  change.    Eyes: Negative.  Negative for blurred vision, pain, discharge and visual disturbance.   Respiratory: Negative.  Negative for apnea, cough, chest tightness and wheezing.    Cardiovascular: Negative.  Negative for chest pain and palpitations.   Gastrointestinal: Negative.  Negative for abdominal distention, anal bleeding, blood in stool, constipation, diarrhea and vomiting.   Endocrine: Negative.  Negative for cold intolerance, polydipsia and polyuria.   Genitourinary: Negative.  Negative for decreased urine volume, difficulty urinating, discharge, frequency, hematuria, impotence, scrotal swelling and urgency.   Musculoskeletal:  Positive for arthralgias and joint swelling. Negative for back pain, myalgias, neck pain and neck stiffness.   Integumentary:  Negative for color change and rash. Negative.   Allergic/Immunologic: Negative.  Negative for environmental allergies.   Neurological:  Positive for weakness. Negative for dizziness, tremors, seizures, speech difficulty, light-headedness and headaches.   Hematological: Negative.    Psychiatric/Behavioral: Negative.  Negative for agitation, confusion, dysphoric mood and suicidal ideas. The patient is not nervous/anxious.        PMH/PSH/FH/SH/MED/ALLERGY reviewed    Objective:       Vitals:    03/16/23 1033   BP: 120/70   Pulse: 65   Temp: 98 °F (36.7 °C)       Physical Exam  Constitutional:       Appearance: He is well-developed.   HENT:      Head: Normocephalic and atraumatic.      Right Ear: External ear normal.      Left Ear: External ear normal.      Nose: Nose normal.      Mouth/Throat:      Pharynx: No oropharyngeal exudate.   Eyes:      General: No scleral icterus.        Right eye: No discharge.         Left eye: No discharge.      Conjunctiva/sclera: Conjunctivae normal.      Pupils: Pupils are equal, round, and reactive to light.   Neck:      Thyroid: No thyromegaly.      Vascular: No JVD.      Trachea: No tracheal deviation.   Cardiovascular:       Rate and Rhythm: Normal rate and regular rhythm.      Heart sounds: Normal heart sounds. No murmur heard.    No friction rub. No gallop.   Pulmonary:      Effort: Pulmonary effort is normal. No respiratory distress.      Breath sounds: Normal breath sounds. No stridor. No wheezing or rales.   Chest:      Chest wall: No tenderness.   Abdominal:      General: Bowel sounds are normal. There is no distension.      Palpations: Abdomen is soft. There is no mass.      Tenderness: There is no abdominal tenderness. There is no guarding or rebound.      Hernia: No hernia is present.   Musculoskeletal:         General: No tenderness. Normal range of motion.      Cervical back: Normal range of motion and neck supple.   Lymphadenopathy:      Cervical: No cervical adenopathy.   Skin:     General: Skin is warm and dry.      Coloration: Skin is not pale.      Findings: No erythema or rash.   Neurological:      Mental Status: He is alert and oriented to person, place, and time.      Cranial Nerves: No cranial nerve deficit.      Motor: No abnormal muscle tone.      Coordination: Coordination normal.      Deep Tendon Reflexes: Reflexes are normal and symmetric. Reflexes normal.   Psychiatric:         Behavior: Behavior normal.         Thought Content: Thought content normal.         Judgment: Judgment normal.       Assessment:       Problem List Items Addressed This Visit       Type 2 diabetes mellitus with diabetic polyneuropathy, without long-term current use of insulin - Primary (Chronic)    Relevant Orders    Comprehensive Metabolic Panel    Hemoglobin A1C    Urinalysis    Microalbumin/Creatinine Ratio, Urine    Mixed hyperlipidemia (Chronic)    Relevant Orders    Comprehensive Metabolic Panel    Essential hypertension (Chronic)    Relevant Orders    Comprehensive Metabolic Panel    Stage 3b chronic kidney disease    [place] (hard of hearing)    Relevant Orders    Ambulatory referral/consult to ENT    Ambulatory referral/consult to  Audiology    Atrial tachycardia, paroxysmal    Aortic atherosclerosis       Plan:           Bryant was seen today for follow-up.    Diagnoses and all orders for this visit:    Type 2 diabetes mellitus with diabetic polyneuropathy, without long-term current use of insulin  -     Comprehensive Metabolic Panel; Future  -     Hemoglobin A1C; Future  -     Urinalysis; Future  -     Microalbumin/Creatinine Ratio, Urine; Future    Mixed hyperlipidemia  -     Comprehensive Metabolic Panel; Future    Essential hypertension  -     Comprehensive Metabolic Panel; Future    Bilateral hearing loss, unspecified hearing loss type  -     Ambulatory referral/consult to ENT; Future  -     Ambulatory referral/consult to Audiology; Future    Stage 3b chronic kidney disease    Atrial tachycardia, paroxysmal    Aortic atherosclerosis      Hearing loss  -refer ent    HTN  -controlled    DM II  -controlled    HLD  -not at goal  -diet control n statin    Itching  -kenalog as directed    Spent adequate time in obtaining history and explaining differentials    25 minutes spent during this visit of which greater than 50% devoted to face-face counseling and coordination of care regarding diagnosis and management plan      Follow up in about 6 months (around 9/16/2023), or if symptoms worsen or fail to improve.

## 2023-04-06 ENCOUNTER — PES CALL (OUTPATIENT)
Dept: ADMINISTRATIVE | Facility: CLINIC | Age: 83
End: 2023-04-06
Payer: MEDICARE

## 2023-04-13 NOTE — TELEPHONE ENCOUNTER
Please schedule for [facility] COVID-19 respiratory check and COVID-19 PCR testing.  Patient is high risk longstanding type 2 diabetes with diabetic peripheral neuropathy in chronic kidney disease   eye pain traumatic

## 2023-04-25 ENCOUNTER — OFFICE VISIT (OUTPATIENT)
Dept: FAMILY MEDICINE | Facility: CLINIC | Age: 83
End: 2023-04-25
Attending: FAMILY MEDICINE
Payer: MEDICARE

## 2023-04-25 VITALS
WEIGHT: 171.94 LBS | TEMPERATURE: 98 F | HEART RATE: 68 BPM | DIASTOLIC BLOOD PRESSURE: 70 MMHG | BODY MASS INDEX: 26.99 KG/M2 | SYSTOLIC BLOOD PRESSURE: 120 MMHG | HEIGHT: 67 IN | OXYGEN SATURATION: 96 %

## 2023-04-25 DIAGNOSIS — K21.9 GASTROESOPHAGEAL REFLUX DISEASE, UNSPECIFIED WHETHER ESOPHAGITIS PRESENT: Primary | ICD-10-CM

## 2023-04-25 PROCEDURE — 3078F DIAST BP <80 MM HG: CPT | Mod: CPTII,S$GLB,, | Performed by: FAMILY MEDICINE

## 2023-04-25 PROCEDURE — 3078F PR MOST RECENT DIASTOLIC BLOOD PRESSURE < 80 MM HG: ICD-10-PCS | Mod: CPTII,S$GLB,, | Performed by: FAMILY MEDICINE

## 2023-04-25 PROCEDURE — 1159F MED LIST DOCD IN RCRD: CPT | Mod: CPTII,S$GLB,, | Performed by: FAMILY MEDICINE

## 2023-04-25 PROCEDURE — 1157F ADVNC CARE PLAN IN RCRD: CPT | Mod: CPTII,S$GLB,, | Performed by: FAMILY MEDICINE

## 2023-04-25 PROCEDURE — 3074F SYST BP LT 130 MM HG: CPT | Mod: CPTII,S$GLB,, | Performed by: FAMILY MEDICINE

## 2023-04-25 PROCEDURE — 99999 PR PBB SHADOW E&M-EST. PATIENT-LVL III: ICD-10-PCS | Mod: PBBFAC,,, | Performed by: FAMILY MEDICINE

## 2023-04-25 PROCEDURE — 99499 UNLISTED E&M SERVICE: CPT | Mod: S$GLB,,, | Performed by: FAMILY MEDICINE

## 2023-04-25 PROCEDURE — 99499 RISK ADDL DX/OHS AUDIT: ICD-10-PCS | Mod: S$GLB,,, | Performed by: FAMILY MEDICINE

## 2023-04-25 PROCEDURE — 99214 OFFICE O/P EST MOD 30 MIN: CPT | Mod: S$GLB,,, | Performed by: FAMILY MEDICINE

## 2023-04-25 PROCEDURE — 1160F RVW MEDS BY RX/DR IN RCRD: CPT | Mod: CPTII,S$GLB,, | Performed by: FAMILY MEDICINE

## 2023-04-25 PROCEDURE — 99214 PR OFFICE/OUTPT VISIT, EST, LEVL IV, 30-39 MIN: ICD-10-PCS | Mod: S$GLB,,, | Performed by: FAMILY MEDICINE

## 2023-04-25 PROCEDURE — 99999 PR PBB SHADOW E&M-EST. PATIENT-LVL III: CPT | Mod: PBBFAC,,, | Performed by: FAMILY MEDICINE

## 2023-04-25 PROCEDURE — 1157F PR ADVANCE CARE PLAN OR EQUIV PRESENT IN MEDICAL RECORD: ICD-10-PCS | Mod: CPTII,S$GLB,, | Performed by: FAMILY MEDICINE

## 2023-04-25 PROCEDURE — 3074F PR MOST RECENT SYSTOLIC BLOOD PRESSURE < 130 MM HG: ICD-10-PCS | Mod: CPTII,S$GLB,, | Performed by: FAMILY MEDICINE

## 2023-04-25 PROCEDURE — 1159F PR MEDICATION LIST DOCUMENTED IN MEDICAL RECORD: ICD-10-PCS | Mod: CPTII,S$GLB,, | Performed by: FAMILY MEDICINE

## 2023-04-25 PROCEDURE — 1160F PR REVIEW ALL MEDS BY PRESCRIBER/CLIN PHARMACIST DOCUMENTED: ICD-10-PCS | Mod: CPTII,S$GLB,, | Performed by: FAMILY MEDICINE

## 2023-04-25 NOTE — PROGRESS NOTES
Subjective     Patient ID: Bryant Gil is a 82 y.o. male.    Chief Complaint: Abdominal Pain    82 yr old pleasant male with DM II, HTN, HLD, atrial tachycardia, presents today for evaluation of GERD symptoms and abdominal pain. Onset a week ago and it is much better today. He did not take anything. He has some GERD symptoms as well. No N/V/C/D/Fever. Details as follows -    Abdominal Pain  This is a new problem. The current episode started in the past 7 days. The onset quality is sudden. The problem occurs intermittently. The problem has been gradually improving. The pain is located in the epigastric region. The pain is at a severity of 5/10. The pain is moderate. The quality of the pain is colicky and cramping. The abdominal pain does not radiate. Pertinent negatives include no anorexia, constipation, diarrhea, dysuria, fever, frequency, headaches, hematuria, melena, myalgias, nausea, vomiting or weight loss. The pain is relieved by Nothing. He has tried nothing for the symptoms. The treatment provided moderate relief. There is no history of abdominal surgery, gallstones, GERD, pancreatitis or PUD. Patient's medical history does not include UTI.   Review of Systems   Constitutional: Negative.  Negative for activity change, diaphoresis, fever, unexpected weight change and weight loss.   HENT: Negative.  Negative for nasal congestion, ear pain, mouth sores, rhinorrhea and voice change.    Eyes: Negative.  Negative for pain, discharge and visual disturbance.   Respiratory: Negative.  Negative for apnea, cough and wheezing.    Cardiovascular: Negative.  Negative for chest pain and palpitations.   Gastrointestinal:  Positive for abdominal pain. Negative for abdominal distention, anal bleeding, anorexia, constipation, diarrhea, melena, nausea and vomiting.   Endocrine: Negative.  Negative for cold intolerance and polyuria.   Genitourinary: Negative.  Negative for decreased urine volume, difficulty urinating,  discharge, dysuria, frequency, hematuria and scrotal swelling.   Musculoskeletal: Negative.  Negative for back pain, myalgias and neck stiffness.   Integumentary:  Negative for color change and rash. Negative.   Allergic/Immunologic: Negative.  Negative for environmental allergies.   Neurological: Negative.  Negative for dizziness, speech difficulty, weakness, light-headedness and headaches.   Hematological: Negative.    Psychiatric/Behavioral: Negative.  Negative for agitation, dysphoric mood and suicidal ideas. The patient is not nervous/anxious.         Past Medical History:   Diagnosis Date    Cataract of both eyes     Chronic gastritis without bleeding, negative H. pylori Jaunuary 2019 EGD 1/30/2020    Diabetes mellitus     Diverticulosis     Ectatic aorta 2/6/2018    HTN (hypertension)     HTN (hypertension) 10/9/2012    Lower GI bleeding 1/11/2021    Near syncope 2/5/2021    OHT (ocular hypertension)     Prostate cancer     Pyelonephritis, right no stone on CT scan. 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    Tubular adenoma of colon 01/10/2012    Type II or unspecified type diabetes mellitus with neurological manifestations, not stated as uncontrolled(250.60) 10/9/2012       Past Surgical History:   Procedure Laterality Date    CARPAL TUNNEL RELEASE Right 08/25/2015    CIRCUMCISION  04/13/2005    COLONOSCOPY N/A 3/29/2017    Procedure: COLONOSCOPY Golytely prep;  Surgeon: Kavitha Cleaning MD;  Location: [facility] ENDO;  Service: Endoscopy;  Laterality: N/A;    COLONOSCOPY N/A 1/12/2021    Procedure: COLONOSCOPY;  Surgeon: Dung Panda MD;  Location: [facility] ENDO;  Service: Endoscopy;  Laterality: N/A;    COLONOSCOPY W/ POLYPECTOMY  01/10/2012    Repeat in 5 years      ESOPHAGOGASTRODUODENOSCOPY N/A 2019    Procedure: EGD (ESOPHAGOGASTRODUODENOSCOPY);  Surgeon: Rachel Burrows MD;  Location: [facility] ENDO;  Service: Endoscopy;  Laterality: N/A;    ESOPHAGOGASTRODUODENOSCOPY N/A 2020    Procedure: ESOPHAGOGASTRODUODENOSCOPY (EGD);  Surgeon: Rachel Burrows MD;  Location: [facility] ENDO;  Service: Endoscopy;  Laterality: N/A;  Dr. Luis Angel Burrows if possible.    FLEXIBLE SIGMOIDOSCOPY N/A 2019    Procedure: SIGMOIDOSCOPY, FLEXIBLE;  Surgeon: Rachel Burrows MD;  Location: [facility] ENDO;  Service: Endoscopy;  Laterality: N/A;    PENILE PROSTHESIS IMPLANT      PROSTATE SURGERY      Prostatectomy for prostate ca; [facility]    TRIGGER FINGER RELEASE Right 2015       Family History   Problem Relation Age of Onset    Diabetes Sister     Cataracts Sister     Hypertension Mother     No Known Problems Brother     No Known Problems Sister     No Known Problems Son     No Known Problems Daughter     Heart attack Brother     No Known Problems Sister     No Known Problems Sister     Blindness Neg Hx     Amblyopia Neg Hx     Glaucoma Neg Hx     Retinal detachment Neg Hx     Strabismus Neg Hx     Macular degeneration Neg Hx     Stroke Neg Hx     Thyroid disease Neg Hx     Colon cancer Neg Hx     Esophageal cancer Neg Hx        Social History     Socioeconomic History    Marital status:    Tobacco Use    Smoking status: Former     Packs/day: 0.50     Years: 3.00     Pack years: 1.50     Types: Cigarettes     Quit date: 1960     Years since quittin.3    Smokeless tobacco: Never    Tobacco comments:     smoked as a teenager   Substance and Sexual Activity    Alcohol use: Yes     Comment: social drinks a beer 1-2 x month    Drug use: No    Sexual activity: Yes     Partners: Female   Social History Narrative    Works at [employer] in [employer]     Social Determinants of Health     Financial Resource Strain: Low Risk     Difficulty of Paying Living Expenses: Not hard at all   Food Insecurity: No  Food Insecurity    Worried About Running Out of Food in the Last Year: Never true    Ran Out of Food in the Last Year: Never true   Transportation Needs: No Transportation Needs    Lack of Transportation (Medical): No    Lack of Transportation (Non-Medical): No   Physical Activity: Insufficiently Active    Days of Exercise per Week: 3 days    Minutes of Exercise per Session: 10 min   Stress: No Stress Concern Present    Feeling of Stress : Not at all   Social Connections: Socially Integrated    Frequency of Communication with Friends and Family: More than three times a week    Frequency of Social Gatherings with Friends and Family: More than three times a week    Attends Buddhism Services: More than 4 times per year    Active Member of Clubs or Organizations: Yes    Attends Club or Organization Meetings: Never    Marital Status:    Housing Stability: Low Risk     Unable to Pay for Housing in the Last Year: No    Number of Places Lived in the Last Year: 1    Unstable Housing in the Last Year: No       Current Outpatient Medications   Medication Sig Dispense Refill    amLODIPine (NORVASC) 10 MG tablet TAKE 1 TABLET BY MOUTH ONCE DAILY 90 tablet 2    aspirin (ECOTRIN) 81 MG EC tablet Take 81 mg by mouth once daily.      atorvastatin (LIPITOR) 20 MG tablet TAKE 1 TABLET BY MOUTH ONCE DAILY 90 tablet 2    BD ALCOHOL SWABS PadM USE FOR HOME GLUCOSE MONITORING DAILY 100 each 3    blood sugar diagnostic (TRUE METRIX GLUCOSE TEST STRIP) Strp Test Blood Sugar twice daily 100 strip 1    blood-glucose meter (TRUE METRIX GLUCOSE METER) Misc Test blood sugar twice a day 1 each 0    ciclopirox (PENLAC) 8 % Soln APPLY TOPICALLY NIGHTLY. 1 each 1    clobetasoL (TEMOVATE) 0.05 % cream Apply topically 2 (two) times daily. Focally to thick scars of the chest 3 weeks after shots 45 g 0    docusate sodium (COLACE) 100 MG capsule Take 1 capsule (100 mg total) by mouth 3 (three) times daily as needed for Constipation (stoole  softener). 100 capsule 0    glipiZIDE 5 MG TR24 TAKE 1 TABLET BY MOUTH  DAILY WITH BREAKFAST 90 tablet 3    lancets (TRUEPLUS LANCETS) 30 gauge Misc Check sugar twice daily 200 each 3    latanoprost 0.005 % ophthalmic solution INSTILL 1 DROP INTO BOTH EYES ONE TIME DAILY 2.5 mL 10    losartan-hydrochlorothiazide 100-25 mg (HYZAAR) 100-25 mg per tablet TAKE 1 TABLET BY MOUTH  DAILY IN THE MORNING 90 tablet 3    metFORMIN (GLUCOPHAGE) 1000 MG tablet TAKE 1 TABLET BY MOUTH  TWICE DAILY WITH MEALS 180 tablet 3    metoprolol succinate (TOPROL-XL) 100 MG 24 hr tablet TAKE 1 TABLET BY MOUTH ONCE DAILY 90 tablet 2    pantoprazole (PROTONIX) 40 MG tablet Take 1 tablet (40 mg total) by mouth 2 (two) times daily. 180 tablet 1    triamcinolone (KENALOG) 0.5 % ointment Apply topically 2 (two) times daily. 30 g 2     No current facility-administered medications for this visit.       Review of patient's allergies indicates:  No Known Allergies    Objective   Vitals:    04/25/23 1451   BP: 120/70   Pulse: 68   Temp: 98 °F (36.7 °C)       Physical Exam  Constitutional:       Appearance: He is well-developed.   HENT:      Head: Normocephalic and atraumatic.      Right Ear: External ear normal.      Left Ear: External ear normal.      Nose: Nose normal.      Mouth/Throat:      Pharynx: No oropharyngeal exudate.   Eyes:      General: No scleral icterus.        Right eye: No discharge.         Left eye: No discharge.      Conjunctiva/sclera: Conjunctivae normal.      Pupils: Pupils are equal, round, and reactive to light.   Neck:      Thyroid: No thyromegaly.      Vascular: No JVD.      Trachea: No tracheal deviation.   Cardiovascular:      Rate and Rhythm: Normal rate and regular rhythm.      Heart sounds: Normal heart sounds. No murmur heard.    No friction rub. No gallop.   Pulmonary:      Effort: Pulmonary effort is normal. No respiratory distress.      Breath sounds: Normal breath sounds. No stridor. No wheezing or rales.   Chest:       Chest wall: No tenderness.   Abdominal:      General: Bowel sounds are normal. There is no distension.      Palpations: Abdomen is soft. There is no mass.      Tenderness: There is no abdominal tenderness. There is no guarding or rebound.      Hernia: No hernia is present.   Musculoskeletal:         General: No tenderness. Normal range of motion.      Cervical back: Normal range of motion and neck supple.   Lymphadenopathy:      Cervical: No cervical adenopathy.   Skin:     General: Skin is warm and dry.      Coloration: Skin is not pale.      Findings: No erythema or rash.   Neurological:      Mental Status: He is alert and oriented to person, place, and time.      Cranial Nerves: No cranial nerve deficit.      Motor: No abnormal muscle tone.      Coordination: Coordination normal.      Deep Tendon Reflexes: Reflexes are normal and symmetric. Reflexes normal.   Psychiatric:         Behavior: Behavior normal.         Thought Content: Thought content normal.         Judgment: Judgment normal.          Assessment and Plan     Problem List Items Addressed This Visit    None  Visit Diagnoses       Gastroesophageal reflux disease, unspecified whether esophagitis present    -  Primary            Bryant was seen today for abdominal pain.    Diagnoses and all orders for this visit:    Gastroesophageal reflux disease, unspecified whether esophagitis present    \  GERD  -lifestyle and diet changes  -mylanta as needed  -handout given    Spent adequate time in obtaining history and explaining differentials    25 minutes spent during this visit of which greater than 50% devoted to face-face counseling and coordination of care regarding diagnosis and management plan    Follow up in about 5 months (around 9/15/2023), or if symptoms worsen or fail to improve.

## 2023-05-02 ENCOUNTER — PES CALL (OUTPATIENT)
Dept: ADMINISTRATIVE | Facility: CLINIC | Age: 83
End: 2023-05-02
Payer: MEDICARE

## 2023-05-17 ENCOUNTER — PES CALL (OUTPATIENT)
Dept: ADMINISTRATIVE | Facility: CLINIC | Age: 83
End: 2023-05-17
Payer: MEDICARE

## 2023-05-22 DIAGNOSIS — K21.9 GASTROESOPHAGEAL REFLUX DISEASE, UNSPECIFIED WHETHER ESOPHAGITIS PRESENT: ICD-10-CM

## 2023-05-22 DIAGNOSIS — H40.053 OCULAR HYPERTENSION, BILATERAL: ICD-10-CM

## 2023-05-22 DIAGNOSIS — E78.2 MIXED HYPERLIPIDEMIA: ICD-10-CM

## 2023-05-22 DIAGNOSIS — E11.42 TYPE 2 DIABETES MELLITUS WITH DIABETIC POLYNEUROPATHY, WITHOUT LONG-TERM CURRENT USE OF INSULIN: ICD-10-CM

## 2023-05-22 DIAGNOSIS — I10 ESSENTIAL HYPERTENSION: ICD-10-CM

## 2023-05-22 RX ORDER — PANTOPRAZOLE SODIUM 40 MG/1
TABLET, DELAYED RELEASE ORAL
Qty: 180 TABLET | Refills: 0 | OUTPATIENT
Start: 2023-05-22

## 2023-05-22 RX ORDER — METOPROLOL SUCCINATE 100 MG/1
TABLET, EXTENDED RELEASE ORAL
Qty: 90 TABLET | Refills: 0 | OUTPATIENT
Start: 2023-05-22

## 2023-05-22 RX ORDER — METFORMIN HYDROCHLORIDE 1000 MG/1
TABLET ORAL
Qty: 180 TABLET | Refills: 0 | OUTPATIENT
Start: 2023-05-22

## 2023-05-22 RX ORDER — ATORVASTATIN CALCIUM 20 MG/1
TABLET, FILM COATED ORAL
Qty: 90 TABLET | Refills: 0 | OUTPATIENT
Start: 2023-05-22

## 2023-05-22 RX ORDER — GLIPIZIDE 5 MG/1
TABLET ORAL
Qty: 90 TABLET | Refills: 0 | OUTPATIENT
Start: 2023-05-22

## 2023-05-22 RX ORDER — LOSARTAN POTASSIUM AND HYDROCHLOROTHIAZIDE 25; 100 MG/1; MG/1
TABLET ORAL
Qty: 90 TABLET | Refills: 0 | OUTPATIENT
Start: 2023-05-22

## 2023-05-22 RX ORDER — AMLODIPINE BESYLATE 10 MG/1
TABLET ORAL
Qty: 90 TABLET | Refills: 0 | OUTPATIENT
Start: 2023-05-22

## 2023-05-22 RX ORDER — LATANOPROST 50 UG/ML
1 SOLUTION/ DROPS OPHTHALMIC DAILY
Qty: 5 ML | Refills: 0 | Status: SHIPPED | OUTPATIENT
Start: 2023-05-22 | End: 2023-07-20

## 2023-05-22 NOTE — TELEPHONE ENCOUNTER
Care Due:                  Date            Visit Type   Department     Provider  --------------------------------------------------------------------------------                                MYCHART                              FOLLOWUP/OF  [facility]    Luke Lindajackiegilmachel  Last Visit: 04-      FICE VISIT   MEDICINE       Arely                              EP -                              PR[facility]      [facility]  Next Visit: 09-      CARE ([facility])   [facility]                                                            Last  Test          Frequency    Reason                     Performed    Due Date  --------------------------------------------------------------------------------    Lipid Panel.  12 months..  atorvastatin.............  07- 07-    [facility] Embedded Care Due Messages. Reference number: [id number].   5/22/2023 2:57:04 PM CDT

## 2023-05-22 NOTE — TELEPHONE ENCOUNTER
Refill Routing Note   Medication(s) are not appropriate for processing by [facility] for the following reason(s):      Medication outside of protocol    [facility] action(s):  Route  Quick Discontinue Care Due:  Labs due   Medication Therapy Plan: Request without required information. Pharmacy notified previously.      Appointments  past 12m or future 3m with PCP    Date Provider   Last Visit   4/25/2023 Luke Mcgee MD   Next Visit   9/15/2023 Luke Mcgee MD   ED visits in past 90 days: 0        Note composed:5:21 PM 05/22/2023

## 2023-05-22 NOTE — TELEPHONE ENCOUNTER
Provider Staff:  Please note Refusal of medication.     Action required for this patient.      Requested Prescriptions     Signed Prescriptions Disp Refills    latanoprost 0.005 % ophthalmic solution 5 mL 0     Sig: Place 1 drop into both eyes once daily.     Authorizing Provider: SHARONA SHEIKH     Refused Prescriptions Disp Refills    glipiZIDE (GLUCOTROL) 5 MG tablet [Pharmacy Med Name: GLIPIZIDE  5MG TAB] 90 tablet 0     Refused By: JUDIT DELGADO     Reason for Refusal: Refill not appropriate    amLODIPine (NORVASC) 10 MG tablet [Pharmacy Med Name: AMLODIPINE 10MG TAB] 90 tablet 0     Refused By: JUDIT DELGADO     Reason for Refusal: Refill not appropriate    metoprolol succinate (TOPROL-XL) 100 MG 24 hr tablet [Pharmacy Med Name: METOPROLOL ER(SUCC) 100MG TAB] 90 tablet 0     Refused By: JUDIT DELGADO     Reason for Refusal: Refill not appropriate    metFORMIN (GLUCOPHAGE) 1000 MG tablet [Pharmacy Med Name: METFORMIN 1000MG TAB] 180 tablet 0     Refused By: JUDIT DELGADO     Reason for Refusal: Refill not appropriate    pantoprazole (PROTONIX) 40 MG tablet [Pharmacy Med Name: PANTOPRAZOLE DR 40MG TAB] 180 tablet 0     Refused By: JUDIT DELGADO     Reason for Refusal: Refill not appropriate    losartan-hydrochlorothiazide 100-25 mg (HYZAAR) 100-25 mg per tablet [Pharmacy Med Name: LOSARTAN/HCTZ 100-25MG TAB] 90 tablet 0     Refused By: JUDIT DELGADO     Reason for Refusal: Refill not appropriate    atorvastatin (LIPITOR) 20 MG tablet [Pharmacy Med Name: ATORVASTATIN 20MG TAB] 90 tablet 0     Refused By: JUDIT DELGADO     Reason for Refusal: Refill not appropriate      Thanks!  [facility]   Note composed: 05/22/2023 5:33 PM

## 2023-05-25 DIAGNOSIS — I10 ESSENTIAL HYPERTENSION: ICD-10-CM

## 2023-05-25 DIAGNOSIS — E78.2 MIXED HYPERLIPIDEMIA: ICD-10-CM

## 2023-05-25 DIAGNOSIS — K21.9 GASTROESOPHAGEAL REFLUX DISEASE, UNSPECIFIED WHETHER ESOPHAGITIS PRESENT: ICD-10-CM

## 2023-05-25 DIAGNOSIS — E11.42 TYPE 2 DIABETES MELLITUS WITH DIABETIC POLYNEUROPATHY, WITHOUT LONG-TERM CURRENT USE OF INSULIN: ICD-10-CM

## 2023-05-25 RX ORDER — METOPROLOL SUCCINATE 100 MG/1
100 TABLET, EXTENDED RELEASE ORAL DAILY
Qty: 90 TABLET | Refills: 1 | Status: SHIPPED | OUTPATIENT
Start: 2023-05-25 | End: 2023-11-19 | Stop reason: SDUPTHER

## 2023-05-25 RX ORDER — METFORMIN HYDROCHLORIDE 1000 MG/1
1000 TABLET ORAL 2 TIMES DAILY WITH MEALS
Qty: 180 TABLET | Refills: 1 | Status: SHIPPED | OUTPATIENT
Start: 2023-05-25 | End: 2023-11-10

## 2023-05-25 RX ORDER — ATORVASTATIN CALCIUM 20 MG/1
20 TABLET, FILM COATED ORAL DAILY
Qty: 90 TABLET | Refills: 1 | Status: SHIPPED | OUTPATIENT
Start: 2023-05-25 | End: 2023-11-10

## 2023-05-25 RX ORDER — AMLODIPINE BESYLATE 10 MG/1
TABLET ORAL
Qty: 90 TABLET | Refills: 1 | Status: SHIPPED | OUTPATIENT
Start: 2023-05-25 | End: 2023-11-10

## 2023-05-25 RX ORDER — LOSARTAN POTASSIUM AND HYDROCHLOROTHIAZIDE 25; 100 MG/1; MG/1
TABLET ORAL
Qty: 90 TABLET | Refills: 1 | Status: SHIPPED | OUTPATIENT
Start: 2023-05-25 | End: 2023-11-10

## 2023-05-25 RX ORDER — PANTOPRAZOLE SODIUM 40 MG/1
40 TABLET, DELAYED RELEASE ORAL 2 TIMES DAILY
Qty: 180 TABLET | Refills: 1 | Status: SHIPPED | OUTPATIENT
Start: 2023-05-25 | End: 2023-11-10

## 2023-05-25 RX ORDER — GLIPIZIDE 5 MG/1
5 TABLET, FILM COATED, EXTENDED RELEASE ORAL DAILY
Qty: 90 TABLET | Refills: 1 | Status: SHIPPED | OUTPATIENT
Start: 2023-05-25 | End: 2023-11-10

## 2023-05-25 NOTE — TELEPHONE ENCOUNTER
No care due was identified.  [facility] Embedded Care Due Messages. Reference number: [id number].   5/25/2023 12:26:32 PM CDT   Writer spoke to Jacque to follow up from the triage phone call earlier this week. Jacque reported compliance with medications, but noted she is feeling \"a lot of emotions and anger\" when she is on Prednisone. Jacque confirmed she will keep and complete her appointment on Monday at 11:50am with PCP.     Writer noted the service referral to pulmonary medicine was ordered during ER visit. Writer offered to pass her information along to the referral specialist, Carlie Jeff, however Jacque elected to wait until her appointment with PCP to further discuss the necessity of the referral.     Jacque was appreciative of the phone call and did not have additional questions or concerns at this time.

## 2023-06-09 ENCOUNTER — TELEPHONE (OUTPATIENT)
Dept: FAMILY MEDICINE | Facility: CLINIC | Age: 83
End: 2023-06-09
Payer: MEDICARE

## 2023-06-09 NOTE — TELEPHONE ENCOUNTER
----- Message from Adelina Stephenson MA sent at 6/8/2023  5:26 PM CDT -----    ----- Message -----  From: Margie Elizondo  Sent: 6/8/2023   3:10 PM CDT  To: Arely Butler Staff    Type:  Needs Medical Advice    Who Called: Agatha ayon/ Hilda   Would the patient rather a call back or a response via [facility]? call  Best Call Back Number:   Additional Information:     Caller  requesting a call for virtual confirmation for the chronic condition of the pt

## 2023-07-20 DIAGNOSIS — H40.053 OCULAR HYPERTENSION, BILATERAL: ICD-10-CM

## 2023-07-20 RX ORDER — LATANOPROST 50 UG/ML
SOLUTION/ DROPS OPHTHALMIC
Qty: 2.5 ML | Refills: 1 | Status: SHIPPED | OUTPATIENT
Start: 2023-07-20 | End: 2023-10-02

## 2023-08-17 ENCOUNTER — TELEPHONE (OUTPATIENT)
Dept: OPTOMETRY | Facility: CLINIC | Age: 83
End: 2023-08-17
Payer: MEDICARE

## 2023-09-01 PROBLEM — N18.32 STAGE 3B CHRONIC KIDNEY DISEASE: Status: RESOLVED | Noted: 2023-03-16 | Resolved: 2023-09-01

## 2023-09-01 NOTE — PROGRESS NOTES
Bryant Gil presented for a  Medicare AWV and comprehensive Health Risk Assessment today. The following components were reviewed and updated:    Medical history  Family History  Social history  Allergies and Current Medications  Health Risk Assessment  Health Maintenance  Care Team         ** See Completed Assessments for Annual Wellness Visit within the encounter summary.**         The following assessments were completed:  Living Situation  CAGE  Depression Screening  Timed Get Up and Go  Whisper Test  Cognitive Function Screening      Nutrition Screening  ADL Screening  PAQ Screening      Review for Opioid Screening: Pt does not have Rx for Opioids      Review for Substance Use Disorders: Patient does not use substance        Current Outpatient Medications:     amLODIPine (NORVASC) 10 MG tablet, TAKE 1 TABLET BY MOUTH ONCE DAILY, Disp: 90 tablet, Rfl: 1    aspirin (ECOTRIN) 81 MG EC tablet, Take 81 mg by mouth once daily., Disp: , Rfl:     atorvastatin (LIPITOR) 20 MG tablet, Take 1 tablet (20 mg total) by mouth once daily., Disp: 90 tablet, Rfl: 1    BD ALCOHOL SWABS PadM, USE FOR HOME GLUCOSE MONITORING DAILY, Disp: 100 each, Rfl: 3    blood sugar diagnostic (TRUE METRIX GLUCOSE TEST STRIP) Strp, Test Blood Sugar twice daily, Disp: 100 strip, Rfl: 1    blood-glucose meter (TRUE METRIX GLUCOSE METER) Misc, Test blood sugar twice a day, Disp: 1 each, Rfl: 0    clobetasoL (TEMOVATE) 0.05 % cream, Apply topically 2 (two) times daily. Focally to thick scars of the chest 3 weeks after shots, Disp: 45 g, Rfl: 0    glipiZIDE 5 MG TR24, Take 1 tablet (5 mg total) by mouth once daily., Disp: 90 tablet, Rfl: 1    lancets (TRUEPLUS LANCETS) 30 gauge Misc, Check sugar twice daily, Disp: 200 each, Rfl: 3    latanoprost 0.005 % ophthalmic solution, INSTILL 1 DROP INTO BOTH EYES ONE TIME DAILY, Disp: 2.5 mL, Rfl: 1    losartan-hydrochlorothiazide 100-25 mg (HYZAAR) 100-25 mg per tablet, TAKE 1 TABLET BY MOUTH  DAILY IN THE  "MORNING, Disp: 90 tablet, Rfl: 1    metFORMIN (GLUCOPHAGE) 1000 MG tablet, Take 1 tablet (1,000 mg total) by mouth 2 (two) times daily with meals., Disp: 180 tablet, Rfl: 1    metoprolol succinate (TOPROL-XL) 100 MG 24 hr tablet, Take 1 tablet (100 mg total) by mouth once daily., Disp: 90 tablet, Rfl: 1    pantoprazole (PROTONIX) 40 MG tablet, Take 1 tablet (40 mg total) by mouth 2 (two) times daily., Disp: 180 tablet, Rfl: 1    triamcinolone (KENALOG) 0.5 % ointment, Apply topically 2 (two) times daily., Disp: 30 g, Rfl: 2       Vitals:    09/06/23 0952   BP: 122/64   Pulse: 74   SpO2: 96%   Weight: 79.4 kg (175 lb)   Height: 5' 7" (1.702 m)   PainSc: 0-No pain      Physical Exam  Vitals and nursing note reviewed.   Constitutional:       General: He is not in acute distress.     Appearance: Normal appearance. He is not ill-appearing.   HENT:      Head: Normocephalic and atraumatic.   Eyes:      General: No scleral icterus.        Right eye: No discharge.         Left eye: No discharge.      Extraocular Movements: Extraocular movements intact.      Conjunctiva/sclera: Conjunctivae normal.      Comments: +glasses   Cardiovascular:      Rate and Rhythm: Normal rate.   Pulmonary:      Effort: Pulmonary effort is normal.   Musculoskeletal:      Cervical back: Normal range of motion.      Right lower leg: No edema.      Left lower leg: No edema.   Skin:     General: Skin is warm and dry.      Findings: No rash.   Neurological:      Mental Status: He is alert and oriented to person, place, and time.   Psychiatric:         Mood and Affect: Mood normal.         Behavior: Behavior normal. Behavior is cooperative.         Cognition and Memory: Cognition and memory normal.               Diagnoses and health risks identified today and associated recommendations/orders:    1. Encounter for preventive health examination  - Chart reviewed. Problem list updated. Discussed current medical diagnosis, current medications, " medical/surgical/family/social history; updated provider list; documented vital signs; identified any cognitive impairment; and updated risk factor list. Addressed any outstanding health maintenance. Provided patient with personalized health advice. Continue to follow up with PCP and any specialists.   - patient did not want me to place orders for lipid or HgA1c, would like to wait to see PCP    2. Aortic atherosclerosis  Chronic; stable on current treatment plan; follow up with PCP  - taking statin and aSA    3. Atrial tachycardia, paroxysmal  Chronic; stable on current treatment plan; follow up with PCP  - taking metoprolol    4. Type 2 diabetes mellitus with diabetic polyneuropathy, without long-term current use of insulin  Chronic; stable on current treatment plan; follow up with PCP  - taking metformin and glipizide    5. 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 Vitamin B 12 deficiency  Chronic; stable on current treatment plan; follow up with PCP    7. Nonrheumatic aortic valve insufficiency  Chronic; stable on current treatment plan; follow up with PCP    8. Gastroesophageal reflux disease without esophagitis  Chronic; stable on current treatment plan; follow up with PCP  - taking ppi    9. Mixed hyperlipidemia  Chronic; stable on current treatment plan; follow up with PCP  - taking statin     10. Other specified forms of hearing loss, unspecified laterality  Chronic; stable on current treatment plan; follow up with PCP  - has hearing aids  - follow up with ENT    11. BMI 27.0-27.9,adult  - Recommendation for healthy diet and increasing exercise as tolerated with goal of 150min/week . Recommend weight loss      Provided Bryant with a 5-10 year written screening schedule and personal prevention plan. Recommendations were developed using the USPSTF age appropriate recommendations. Education,  counseling, and referrals were provided as needed. After Visit Summary printed and given to patient which includes a list of additional screenings\tests needed.    Follow up in about 1 year (around 9/6/2024) for your next annual wellness visit.    Bronwyn Red, FNP-C    Advance Care Planning     I offered to discuss advanced care planning, including how to pick a person who would make decisions for you if you were unable to make them for yourself, called a health care power of , and what kind of decisions you might make such as use of life sustaining treatments such as ventilators and tube feeding when faced with a life limiting illness recorded on a living will that they will need to know. (How you want to be cared for as you near the end of your natural life)     X  Patient has advanced directives on file, which we reviewed, and they do not wish to make changes.

## 2023-09-06 ENCOUNTER — OFFICE VISIT (OUTPATIENT)
Dept: FAMILY MEDICINE | Facility: CLINIC | Age: 83
End: 2023-09-06
Payer: MEDICARE

## 2023-09-06 VITALS
HEIGHT: 67 IN | WEIGHT: 175 LBS | DIASTOLIC BLOOD PRESSURE: 64 MMHG | OXYGEN SATURATION: 96 % | SYSTOLIC BLOOD PRESSURE: 122 MMHG | BODY MASS INDEX: 27.47 KG/M2 | HEART RATE: 74 BPM

## 2023-09-06 DIAGNOSIS — K21.9 GASTROESOPHAGEAL REFLUX DISEASE WITHOUT ESOPHAGITIS: ICD-10-CM

## 2023-09-06 DIAGNOSIS — E11.22 TYPE 2 DM WITH CKD STAGE 2 AND HYPERTENSION: ICD-10-CM

## 2023-09-06 DIAGNOSIS — Z00.00 ENCOUNTER FOR PREVENTIVE HEALTH EXAMINATION: Primary | ICD-10-CM

## 2023-09-06 DIAGNOSIS — I12.9 TYPE 2 DM WITH CKD STAGE 2 AND HYPERTENSION: ICD-10-CM

## 2023-09-06 DIAGNOSIS — I47.19 ATRIAL TACHYCARDIA, PAROXYSMAL: ICD-10-CM

## 2023-09-06 DIAGNOSIS — H91.8X9 OTHER SPECIFIED FORMS OF HEARING LOSS, UNSPECIFIED LATERALITY: ICD-10-CM

## 2023-09-06 DIAGNOSIS — E53.8 VITAMIN B 12 DEFICIENCY: ICD-10-CM

## 2023-09-06 DIAGNOSIS — I70.0 AORTIC ATHEROSCLEROSIS: ICD-10-CM

## 2023-09-06 DIAGNOSIS — N18.2 TYPE 2 DM WITH CKD STAGE 2 AND HYPERTENSION: ICD-10-CM

## 2023-09-06 DIAGNOSIS — I35.1 NONRHEUMATIC AORTIC VALVE INSUFFICIENCY: ICD-10-CM

## 2023-09-06 DIAGNOSIS — E11.42 TYPE 2 DIABETES MELLITUS WITH DIABETIC POLYNEUROPATHY, WITHOUT LONG-TERM CURRENT USE OF INSULIN: Chronic | ICD-10-CM

## 2023-09-06 DIAGNOSIS — E78.2 MIXED HYPERLIPIDEMIA: Chronic | ICD-10-CM

## 2023-09-06 PROBLEM — K62.5 BRIGHT RED BLOOD PER RECTUM: Status: RESOLVED | Noted: 2019-01-08 | Resolved: 2023-09-06

## 2023-09-06 PROBLEM — R10.9 ABDOMINAL PAIN: Status: RESOLVED | Noted: 2020-05-14 | Resolved: 2023-09-06

## 2023-09-06 PROCEDURE — 1101F PR PT FALLS ASSESS DOC 0-1 FALLS W/OUT INJ PAST YR: ICD-10-PCS | Mod: CPTII,S$GLB,, | Performed by: NURSE PRACTITIONER

## 2023-09-06 PROCEDURE — 3078F DIAST BP <80 MM HG: CPT | Mod: CPTII,S$GLB,, | Performed by: NURSE PRACTITIONER

## 2023-09-06 PROCEDURE — 1101F PT FALLS ASSESS-DOCD LE1/YR: CPT | Mod: CPTII,S$GLB,, | Performed by: NURSE PRACTITIONER

## 2023-09-06 PROCEDURE — 1160F RVW MEDS BY RX/DR IN RCRD: CPT | Mod: CPTII,S$GLB,, | Performed by: NURSE PRACTITIONER

## 2023-09-06 PROCEDURE — G0439 PR MEDICARE ANNUAL WELLNESS SUBSEQUENT VISIT: ICD-10-PCS | Mod: S$GLB,,, | Performed by: NURSE PRACTITIONER

## 2023-09-06 PROCEDURE — 1157F ADVNC CARE PLAN IN RCRD: CPT | Mod: CPTII,S$GLB,, | Performed by: NURSE PRACTITIONER

## 2023-09-06 PROCEDURE — G0439 PPPS, SUBSEQ VISIT: HCPCS | Mod: S$GLB,,, | Performed by: NURSE PRACTITIONER

## 2023-09-06 PROCEDURE — 1126F AMNT PAIN NOTED NONE PRSNT: CPT | Mod: CPTII,S$GLB,, | Performed by: NURSE PRACTITIONER

## 2023-09-06 PROCEDURE — 99999 PR PBB SHADOW E&M-EST. PATIENT-LVL IV: ICD-10-PCS | Mod: PBBFAC,,, | Performed by: NURSE PRACTITIONER

## 2023-09-06 PROCEDURE — 3288F PR FALLS RISK ASSESSMENT DOCUMENTED: ICD-10-PCS | Mod: CPTII,S$GLB,, | Performed by: NURSE PRACTITIONER

## 2023-09-06 PROCEDURE — 99999 PR PBB SHADOW E&M-EST. PATIENT-LVL IV: CPT | Mod: PBBFAC,,, | Performed by: NURSE PRACTITIONER

## 2023-09-06 PROCEDURE — 1170F FXNL STATUS ASSESSED: CPT | Mod: CPTII,S$GLB,, | Performed by: NURSE PRACTITIONER

## 2023-09-06 PROCEDURE — 1159F PR MEDICATION LIST DOCUMENTED IN MEDICAL RECORD: ICD-10-PCS | Mod: CPTII,S$GLB,, | Performed by: NURSE PRACTITIONER

## 2023-09-06 PROCEDURE — 1159F MED LIST DOCD IN RCRD: CPT | Mod: CPTII,S$GLB,, | Performed by: NURSE PRACTITIONER

## 2023-09-06 PROCEDURE — 3074F PR MOST RECENT SYSTOLIC BLOOD PRESSURE < 130 MM HG: ICD-10-PCS | Mod: CPTII,S$GLB,, | Performed by: NURSE PRACTITIONER

## 2023-09-06 PROCEDURE — 1160F PR REVIEW ALL MEDS BY PRESCRIBER/CLIN PHARMACIST DOCUMENTED: ICD-10-PCS | Mod: CPTII,S$GLB,, | Performed by: NURSE PRACTITIONER

## 2023-09-06 PROCEDURE — 1157F PR ADVANCE CARE PLAN OR EQUIV PRESENT IN MEDICAL RECORD: ICD-10-PCS | Mod: CPTII,S$GLB,, | Performed by: NURSE PRACTITIONER

## 2023-09-06 PROCEDURE — 3078F PR MOST RECENT DIASTOLIC BLOOD PRESSURE < 80 MM HG: ICD-10-PCS | Mod: CPTII,S$GLB,, | Performed by: NURSE PRACTITIONER

## 2023-09-06 PROCEDURE — 3074F SYST BP LT 130 MM HG: CPT | Mod: CPTII,S$GLB,, | Performed by: NURSE PRACTITIONER

## 2023-09-06 PROCEDURE — 3288F FALL RISK ASSESSMENT DOCD: CPT | Mod: CPTII,S$GLB,, | Performed by: NURSE PRACTITIONER

## 2023-09-06 PROCEDURE — 1170F PR FUNCTIONAL STATUS ASSESSED: ICD-10-PCS | Mod: CPTII,S$GLB,, | Performed by: NURSE PRACTITIONER

## 2023-09-06 PROCEDURE — 1126F PR PAIN SEVERITY QUANTIFIED, NO PAIN PRESENT: ICD-10-PCS | Mod: CPTII,S$GLB,, | Performed by: NURSE PRACTITIONER

## 2023-09-06 NOTE — PATIENT INSTRUCTIONS
Counseling and Referral of Other Preventative  (Italic type indicates deductible and co-insurance are waived)    Patient Name: Bryant Gil  Today's Date: 9/6/2023    Health Maintenance       Date Due Completion Date    Lipid Panel 07/18/2023 7/18/2022    Hemoglobin A1c 09/16/2023 3/16/2023    Influenza Vaccine (1) 09/07/2023 (Originally 9/1/2023) 11/7/2022    Override on 9/21/2020: Done (completed at [facility])    Override on 9/25/2019: Done ([facility])    Override on 1/25/2017: Done    Eye Exam 09/08/2023 (Originally 7/28/2023) 7/28/2022    COVID-19 Vaccine (6 - Moderna series) 09/01/2024 (Originally 3/7/2023) 11/7/2022    Diabetes Urine Screening 03/16/2024 3/16/2023    TETANUS VACCINE 01/01/2027 1/1/2017 (Done)    Override on 1/1/2017: Done (Done through work)        No orders of the defined types were placed in this encounter.      The following information is provided to all patients.  This information is to help you find resources for any of the problems found today that may be affecting your health:                Living healthy guide: www.[facility].[place].gov      Understanding Diabetes: www.diabetes.org      Eating healthy: www.cdc.gov/healthyweight      CDC home safety checklist: www.cdc.gov/steadi/patient.html      Agency on Aging: www.goea.[place].[facility]      Alcoholics anonymous (AA): www.aa.org      Physical Activity: www.juliette.nih.gov/ud8ygwg      Tobacco use: www.quitwithusla.org

## 2023-09-15 ENCOUNTER — LAB VISIT (OUTPATIENT)
Dept: LAB | Facility: HOSPITAL | Age: 83
End: 2023-09-15
Attending: FAMILY MEDICINE
Payer: MEDICARE

## 2023-09-15 ENCOUNTER — OFFICE VISIT (OUTPATIENT)
Dept: FAMILY MEDICINE | Facility: CLINIC | Age: 83
End: 2023-09-15
Attending: FAMILY MEDICINE
Payer: MEDICARE

## 2023-09-15 VITALS
HEART RATE: 65 BPM | BODY MASS INDEX: 27.34 KG/M2 | WEIGHT: 174.19 LBS | OXYGEN SATURATION: 96 % | DIASTOLIC BLOOD PRESSURE: 68 MMHG | HEIGHT: 67 IN | SYSTOLIC BLOOD PRESSURE: 122 MMHG

## 2023-09-15 DIAGNOSIS — E11.42 TYPE 2 DIABETES MELLITUS WITH DIABETIC POLYNEUROPATHY, WITHOUT LONG-TERM CURRENT USE OF INSULIN: Primary | ICD-10-CM

## 2023-09-15 DIAGNOSIS — N18.2 TYPE 2 DM WITH CKD STAGE 2 AND HYPERTENSION: ICD-10-CM

## 2023-09-15 DIAGNOSIS — I12.9 TYPE 2 DM WITH CKD STAGE 2 AND HYPERTENSION: ICD-10-CM

## 2023-09-15 DIAGNOSIS — E11.22 TYPE 2 DM WITH CKD STAGE 2 AND HYPERTENSION: ICD-10-CM

## 2023-09-15 DIAGNOSIS — E11.42 TYPE 2 DIABETES MELLITUS WITH DIABETIC POLYNEUROPATHY, WITHOUT LONG-TERM CURRENT USE OF INSULIN: ICD-10-CM

## 2023-09-15 DIAGNOSIS — L91.0 KELOID: ICD-10-CM

## 2023-09-15 DIAGNOSIS — K21.9 GASTROESOPHAGEAL REFLUX DISEASE WITHOUT ESOPHAGITIS: ICD-10-CM

## 2023-09-15 DIAGNOSIS — I70.0 AORTIC ATHEROSCLEROSIS: ICD-10-CM

## 2023-09-15 DIAGNOSIS — L29.9 ITCH: ICD-10-CM

## 2023-09-15 DIAGNOSIS — E78.2 MIXED HYPERLIPIDEMIA: ICD-10-CM

## 2023-09-15 DIAGNOSIS — Z23 IMMUNIZATION DUE: ICD-10-CM

## 2023-09-15 LAB
ALBUMIN SERPL BCP-MCNC: 4.4 G/DL (ref 3.5–5.2)
ALP SERPL-CCNC: 56 U/L (ref 55–135)
ALT SERPL W/O P-5'-P-CCNC: 13 U/L (ref 10–44)
ANION GAP SERPL CALC-SCNC: 13 MMOL/L (ref 8–16)
AST SERPL-CCNC: 17 U/L (ref 10–40)
BASOPHILS # BLD AUTO: 0.06 K/UL (ref 0–0.2)
BASOPHILS NFR BLD: 0.9 % (ref 0–1.9)
BILIRUB SERPL-MCNC: 0.5 MG/DL (ref 0.1–1)
BUN SERPL-MCNC: 21 MG/DL (ref 8–23)
CALCIUM SERPL-MCNC: 9.7 MG/DL (ref 8.7–10.5)
CHLORIDE SERPL-SCNC: 108 MMOL/L (ref 95–110)
CHOLEST SERPL-MCNC: 146 MG/DL (ref 120–199)
CHOLEST/HDLC SERPL: 4.4 {RATIO} (ref 2–5)
CO2 SERPL-SCNC: 23 MMOL/L (ref 23–29)
CREAT SERPL-MCNC: 1.6 MG/DL (ref 0.5–1.4)
DIFFERENTIAL METHOD: ABNORMAL
EOSINOPHIL # BLD AUTO: 0.1 K/UL (ref 0–0.5)
EOSINOPHIL NFR BLD: 1.7 % (ref 0–8)
ERYTHROCYTE [DISTWIDTH] IN BLOOD BY AUTOMATED COUNT: 13.1 % (ref 11.5–14.5)
EST. GFR  (NO RACE VARIABLE): 43 ML/MIN/1.73 M^2
ESTIMATED AVG GLUCOSE: 154 MG/DL (ref 68–131)
GLUCOSE SERPL-MCNC: 142 MG/DL (ref 70–110)
HBA1C MFR BLD: 7 % (ref 4–5.6)
HCT VFR BLD AUTO: 41.6 % (ref 40–54)
HDLC SERPL-MCNC: 33 MG/DL (ref 40–75)
HDLC SERPL: 22.6 % (ref 20–50)
HGB BLD-MCNC: 13.8 G/DL (ref 14–18)
IMM GRANULOCYTES # BLD AUTO: 0.02 K/UL (ref 0–0.04)
IMM GRANULOCYTES NFR BLD AUTO: 0.3 % (ref 0–0.5)
LDLC SERPL CALC-MCNC: 94.8 MG/DL (ref 63–159)
LYMPHOCYTES # BLD AUTO: 2.2 K/UL (ref 1–4.8)
LYMPHOCYTES NFR BLD: 35 % (ref 18–48)
MCH RBC QN AUTO: 28.8 PG (ref 27–31)
MCHC RBC AUTO-ENTMCNC: 33.2 G/DL (ref 32–36)
MCV RBC AUTO: 87 FL (ref 82–98)
MONOCYTES # BLD AUTO: 0.6 K/UL (ref 0.3–1)
MONOCYTES NFR BLD: 9.7 % (ref 4–15)
NEUTROPHILS # BLD AUTO: 3.3 K/UL (ref 1.8–7.7)
NEUTROPHILS NFR BLD: 52.4 % (ref 38–73)
NONHDLC SERPL-MCNC: 113 MG/DL
NRBC BLD-RTO: 0 /100 WBC
PLATELET # BLD AUTO: 276 K/UL (ref 150–450)
PMV BLD AUTO: 10.5 FL (ref 9.2–12.9)
POTASSIUM SERPL-SCNC: 4.1 MMOL/L (ref 3.5–5.1)
PROT SERPL-MCNC: 7.7 G/DL (ref 6–8.4)
RBC # BLD AUTO: 4.8 M/UL (ref 4.6–6.2)
SODIUM SERPL-SCNC: 144 MMOL/L (ref 136–145)
TRIGL SERPL-MCNC: 91 MG/DL (ref 30–150)
WBC # BLD AUTO: 6.37 K/UL (ref 3.9–12.7)

## 2023-09-15 PROCEDURE — 90694 FLU VACCINE - QUADRIVALENT - ADJUVANTED: ICD-10-PCS | Mod: S$GLB,,, | Performed by: FAMILY MEDICINE

## 2023-09-15 PROCEDURE — 3078F PR MOST RECENT DIASTOLIC BLOOD PRESSURE < 80 MM HG: ICD-10-PCS | Mod: CPTII,S$GLB,, | Performed by: FAMILY MEDICINE

## 2023-09-15 PROCEDURE — 1159F PR MEDICATION LIST DOCUMENTED IN MEDICAL RECORD: ICD-10-PCS | Mod: CPTII,S$GLB,, | Performed by: FAMILY MEDICINE

## 2023-09-15 PROCEDURE — 1160F PR REVIEW ALL MEDS BY PRESCRIBER/CLIN PHARMACIST DOCUMENTED: ICD-10-PCS | Mod: CPTII,S$GLB,, | Performed by: FAMILY MEDICINE

## 2023-09-15 PROCEDURE — 1159F MED LIST DOCD IN RCRD: CPT | Mod: CPTII,S$GLB,, | Performed by: FAMILY MEDICINE

## 2023-09-15 PROCEDURE — 85025 COMPLETE CBC W/AUTO DIFF WBC: CPT | Performed by: FAMILY MEDICINE

## 2023-09-15 PROCEDURE — 3288F PR FALLS RISK ASSESSMENT DOCUMENTED: ICD-10-PCS | Mod: CPTII,S$GLB,, | Performed by: FAMILY MEDICINE

## 2023-09-15 PROCEDURE — 90694 VACC AIIV4 NO PRSRV 0.5ML IM: CPT | Mod: S$GLB,,, | Performed by: FAMILY MEDICINE

## 2023-09-15 PROCEDURE — 3074F SYST BP LT 130 MM HG: CPT | Mod: CPTII,S$GLB,, | Performed by: FAMILY MEDICINE

## 2023-09-15 PROCEDURE — 3288F FALL RISK ASSESSMENT DOCD: CPT | Mod: CPTII,S$GLB,, | Performed by: FAMILY MEDICINE

## 2023-09-15 PROCEDURE — 1157F ADVNC CARE PLAN IN RCRD: CPT | Mod: CPTII,S$GLB,, | Performed by: FAMILY MEDICINE

## 2023-09-15 PROCEDURE — 36415 COLL VENOUS BLD VENIPUNCTURE: CPT | Performed by: FAMILY MEDICINE

## 2023-09-15 PROCEDURE — 1157F PR ADVANCE CARE PLAN OR EQUIV PRESENT IN MEDICAL RECORD: ICD-10-PCS | Mod: CPTII,S$GLB,, | Performed by: FAMILY MEDICINE

## 2023-09-15 PROCEDURE — G0008 FLU VACCINE - QUADRIVALENT - ADJUVANTED: ICD-10-PCS | Mod: S$GLB,,, | Performed by: FAMILY MEDICINE

## 2023-09-15 PROCEDURE — G0008 ADMIN INFLUENZA VIRUS VAC: HCPCS | Mod: S$GLB,,, | Performed by: FAMILY MEDICINE

## 2023-09-15 PROCEDURE — 3074F PR MOST RECENT SYSTOLIC BLOOD PRESSURE < 130 MM HG: ICD-10-PCS | Mod: CPTII,S$GLB,, | Performed by: FAMILY MEDICINE

## 2023-09-15 PROCEDURE — 1126F AMNT PAIN NOTED NONE PRSNT: CPT | Mod: CPTII,S$GLB,, | Performed by: FAMILY MEDICINE

## 2023-09-15 PROCEDURE — 99215 PR OFFICE/OUTPT VISIT, EST, LEVL V, 40-54 MIN: ICD-10-PCS | Mod: 25,S$GLB,, | Performed by: FAMILY MEDICINE

## 2023-09-15 PROCEDURE — 99999 PR PBB SHADOW E&M-EST. PATIENT-LVL IV: CPT | Mod: PBBFAC,,, | Performed by: FAMILY MEDICINE

## 2023-09-15 PROCEDURE — 3078F DIAST BP <80 MM HG: CPT | Mod: CPTII,S$GLB,, | Performed by: FAMILY MEDICINE

## 2023-09-15 PROCEDURE — 99999 PR PBB SHADOW E&M-EST. PATIENT-LVL IV: ICD-10-PCS | Mod: PBBFAC,,, | Performed by: FAMILY MEDICINE

## 2023-09-15 PROCEDURE — 1160F RVW MEDS BY RX/DR IN RCRD: CPT | Mod: CPTII,S$GLB,, | Performed by: FAMILY MEDICINE

## 2023-09-15 PROCEDURE — 99215 OFFICE O/P EST HI 40 MIN: CPT | Mod: 25,S$GLB,, | Performed by: FAMILY MEDICINE

## 2023-09-15 PROCEDURE — 1101F PR PT FALLS ASSESS DOC 0-1 FALLS W/OUT INJ PAST YR: ICD-10-PCS | Mod: CPTII,S$GLB,, | Performed by: FAMILY MEDICINE

## 2023-09-15 PROCEDURE — 1126F PR PAIN SEVERITY QUANTIFIED, NO PAIN PRESENT: ICD-10-PCS | Mod: CPTII,S$GLB,, | Performed by: FAMILY MEDICINE

## 2023-09-15 PROCEDURE — 80061 LIPID PANEL: CPT | Performed by: FAMILY MEDICINE

## 2023-09-15 PROCEDURE — 83036 HEMOGLOBIN GLYCOSYLATED A1C: CPT | Performed by: FAMILY MEDICINE

## 2023-09-15 PROCEDURE — 80053 COMPREHEN METABOLIC PANEL: CPT | Performed by: FAMILY MEDICINE

## 2023-09-15 PROCEDURE — 1101F PT FALLS ASSESS-DOCD LE1/YR: CPT | Mod: CPTII,S$GLB,, | Performed by: FAMILY MEDICINE

## 2023-09-15 RX ORDER — CLOBETASOL PROPIONATE 0.5 MG/G
CREAM TOPICAL 2 TIMES DAILY
Qty: 45 G | Refills: 3 | Status: SHIPPED | OUTPATIENT
Start: 2023-09-15 | End: 2024-02-08 | Stop reason: SDUPTHER

## 2023-09-15 NOTE — PROGRESS NOTES
Subjective:       Patient ID: Bryant Gil is a 83 y.o. male.    Chief Complaint: Annual Exam    82 yr old pleasant male with DM II, HTN, HLD, atrial tachycardia, presents today as a new patient to me and for establishing primary care and also his 6 month check and lab work.       DM II - controlled -   HGBA1C                   7.4 (H)             03/16/2023                       - on metformin n glipizide - compliant - no hypoglycemic symptoms      HTN - controlled - on HYZAAR, amlodipine and metoprolol      HLD - improving - on statin -      LDLCALC                  110.8               07/18/2022                History as below - reviewed             Medication Refill  Associated symptoms include arthralgias, joint swelling and weakness. Pertinent negatives include no chest pain, congestion, coughing, diaphoresis, headaches, myalgias, neck pain, rash, visual change or vomiting.   Diabetes  He presents for his follow-up diabetic visit. He has type 2 diabetes mellitus. His disease course has been stable. Pertinent negatives for hypoglycemia include no confusion, dizziness, headaches, hunger, nervousness/anxiousness, seizures, speech difficulty or tremors. Associated symptoms include weakness. Pertinent negatives for diabetes include no blurred vision, no chest pain, no foot paresthesias, no foot ulcerations, no polydipsia, no polyuria, no visual change and no weight loss. Pertinent negatives for hypoglycemia complications include no blackouts, no nocturnal hypoglycemia and no required assistance. Symptoms are stable. Pertinent negatives for diabetic complications include no autonomic neuropathy, impotence, nephropathy, peripheral neuropathy or retinopathy. Risk factors for coronary artery disease include diabetes mellitus, dyslipidemia and male sex. Current diabetic treatment includes oral agent (dual therapy). He is compliant with treatment all of the time. Meal planning includes avoidance of concentrated sweets.  He has not had a previous visit with a dietitian. He rarely participates in exercise. There is no change in his home blood glucose trend. An ACE inhibitor/angiotensin II receptor blocker is being taken. He does not see a podiatrist.Eye exam is not current.   Hypertension  This is a chronic problem. The current episode started more than 1 year ago. The problem has been gradually improving since onset. The problem is controlled. Pertinent negatives include no blurred vision, chest pain, headaches, neck pain or palpitations. There are no associated agents to hypertension. Risk factors for coronary artery disease include dyslipidemia, male gender and diabetes mellitus. Past treatments include angiotensin blockers, diuretics, calcium channel blockers and beta blockers. The current treatment provides significant improvement. There are no compliance problems.  There is no history of angina, CAD/MI or retinopathy. There is no history of chronic renal disease, hyperaldosteronism, hyperparathyroidism, a hypertension causing med, renovascular disease or a thyroid problem.   Hyperlipidemia  This is a chronic problem. The current episode started more than 1 year ago. The problem is controlled. Recent lipid tests were reviewed and are normal. He has no history of chronic renal disease. There are no known factors aggravating his hyperlipidemia. Pertinent negatives include no chest pain or myalgias. Current antihyperlipidemic treatment includes statins. The current treatment provides significant improvement of lipids. There are no compliance problems.  Risk factors for coronary artery disease include diabetes mellitus, dyslipidemia, hypertension and male sex.     Review of Systems   Constitutional: Negative.  Negative for activity change, diaphoresis, unexpected weight change and weight loss.   HENT:  Positive for hearing loss. Negative for nasal congestion, ear pain, mouth sores, rhinorrhea, trouble swallowing and voice change.     Eyes: Negative.  Negative for blurred vision, pain, discharge and visual disturbance.   Respiratory: Negative.  Negative for apnea, cough, chest tightness and wheezing.    Cardiovascular: Negative.  Negative for chest pain and palpitations.   Gastrointestinal: Negative.  Negative for abdominal distention, anal bleeding, blood in stool, constipation, diarrhea and vomiting.   Endocrine: Negative.  Negative for cold intolerance, polydipsia and polyuria.   Genitourinary: Negative.  Negative for decreased urine volume, difficulty urinating, discharge, frequency, hematuria, impotence, scrotal swelling and urgency.   Musculoskeletal:  Positive for arthralgias and joint swelling. Negative for back pain, myalgias, neck pain and neck stiffness.   Integumentary:  Negative for color change and rash. Negative.   Allergic/Immunologic: Negative.  Negative for environmental allergies.   Neurological:  Positive for weakness. Negative for dizziness, tremors, seizures, speech difficulty, light-headedness and headaches.   Hematological: Negative.    Psychiatric/Behavioral: Negative.  Negative for agitation, confusion, dysphoric mood and suicidal ideas. The patient is not nervous/anxious.          PMH/PSH/FH/SH/MED/ALLERGY reviewed    Past Medical History:   Diagnosis Date    Cataract of both eyes     Chronic gastritis without bleeding, negative H. pylori Jaunuary 2019 EGD 1/30/2020    Diabetes mellitus     Diverticulosis     Ectatic aorta 2/6/2018    HTN (hypertension)     HTN (hypertension) 10/9/2012    Lower GI bleeding 1/11/2021    Near syncope 2/5/2021    OHT (ocular hypertension)     Prostate cancer     Pyelonephritis, right no stone on CT scan. 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    Tubular adenoma of colon  01/10/2012    Type II or unspecified type diabetes mellitus with neurological manifestations, not stated as uncontrolled(250.60) 10/9/2012       Past Surgical History:   Procedure Laterality Date    CARPAL TUNNEL RELEASE Right 08/25/2015    CIRCUMCISION  04/13/2005    COLONOSCOPY N/A 3/29/2017    Procedure: COLONOSCOPY Golytely prep;  Surgeon: Kavitha Cleaning MD;  Location: [facility];  Service: Endoscopy;  Laterality: N/A;    COLONOSCOPY N/A 1/12/2021    Procedure: COLONOSCOPY;  Surgeon: Dung Panda MD;  Location: [facility];  Service: Endoscopy;  Laterality: N/A;    COLONOSCOPY W/ POLYPECTOMY  01/10/2012    Repeat in 5 years     ESOPHAGOGASTRODUODENOSCOPY N/A 1/8/2019    Procedure: EGD (ESOPHAGOGASTRODUODENOSCOPY);  Surgeon: Rachel Burrows MD;  Location: [facility];  Service: Endoscopy;  Laterality: N/A;    ESOPHAGOGASTRODUODENOSCOPY N/A 5/14/2020    Procedure: ESOPHAGOGASTRODUODENOSCOPY (EGD);  Surgeon: Rachel Burrows MD;  Location: [facility];  Service: Endoscopy;  Laterality: N/A;  Dr. Luis Angel Burrows if possible.    FLEXIBLE SIGMOIDOSCOPY N/A 1/8/2019    Procedure: SIGMOIDOSCOPY, FLEXIBLE;  Surgeon: Rachel Burrows MD;  Location: [facility];  Service: Endoscopy;  Laterality: N/A;    PENILE PROSTHESIS IMPLANT      PROSTATE SURGERY  1999    Prostatectomy for prostate ca; [facility]    TRIGGER FINGER RELEASE Right 2015       Family History   Problem Relation Age of Onset    Hypertension Mother     No Known Problems Father     Diabetes Sister     Cataracts Sister     No Known Problems Sister     No Known Problems Sister     No Known Problems Sister     No Known Problems Brother     Heart attack Brother     Coronary artery disease Brother     No Known Problems Brother     Coronary artery disease Brother     Heart attack Brother     No Known Problems Daughter     No Known Problems Son     Blindness Neg Hx     Amblyopia Neg Hx     Glaucoma Neg Hx     Retinal detachment Neg Hx     Strabismus Neg Hx     Macular  degeneration Neg Hx     Stroke Neg Hx     Thyroid disease Neg Hx     Colon cancer Neg Hx     Esophageal cancer Neg Hx        Social History     Socioeconomic History    Marital status:    Tobacco Use    Smoking status: Former     Current packs/day: 0.00     Average packs/day: 0.5 packs/day for 3.0 years (1.5 ttl pk-yrs)     Types: Cigarettes     Start date:      Quit date: 1960     Years since quittin.7    Smokeless tobacco: Never    Tobacco comments:     smoked as a teenager   Substance and Sexual Activity    Alcohol use: Yes     Comment: social drinks a beer 1-2 x month    Drug use: No    Sexual activity: Yes     Partners: Female   Social History Narrative    Works at [employer] in [employer]     Social Determinants of Health     Financial Resource Strain: Low Risk  (2023)    Overall Financial Resource Strain (CARDIA)     Difficulty of Paying Living Expenses: Not hard at all   Food Insecurity: No Food Insecurity (2023)    Hunger Vital Sign     Worried About Running Out of Food in the Last Year: Never true     Ran Out of Food in the Last Year: Never true   Transportation Needs: No Transportation Needs (2023)    PRAPARE - Transportation     Lack of Transportation (Medical): No     Lack of Transportation (Non-Medical): No   Physical Activity: Insufficiently Active (2023)    Exercise Vital Sign     Days of Exercise per Week: 3 days     Minutes of Exercise per Session: 10 min   Stress: No Stress Concern Present (2023)    [place] [employer] - Occupational Stress Questionnaire     Feeling of Stress : Not at all   Social Connections: Moderately Integrated (2023)    Social Connection and Isolation Panel [NHANES]     Frequency of Communication with Friends and Family: More than three times a week     Frequency of Social Gatherings with Friends and Family: More than three times a week     Attends Druze Services: More than 4 times per year     Active Member of Clubs  or Organizations: No     Attends Club or Organization Meetings: Never     Marital Status:    Housing Stability: Low Risk  (9/6/2023)    Housing Stability Vital Sign     Unable to Pay for Housing in the Last Year: No     Number of Places Lived in the Last Year: 1     Unstable Housing in the Last Year: No       Current Outpatient Medications   Medication Sig Dispense Refill    amLODIPine (NORVASC) 10 MG tablet TAKE 1 TABLET BY MOUTH ONCE DAILY 90 tablet 1    aspirin (ECOTRIN) 81 MG EC tablet Take 81 mg by mouth once daily.      atorvastatin (LIPITOR) 20 MG tablet Take 1 tablet (20 mg total) by mouth once daily. 90 tablet 1    BD ALCOHOL SWABS PadM USE FOR HOME GLUCOSE MONITORING DAILY 100 each 3    blood sugar diagnostic (TRUE METRIX GLUCOSE TEST STRIP) Strp Test Blood Sugar twice daily 100 strip 1    blood-glucose meter (TRUE METRIX GLUCOSE METER) Misc Test blood sugar twice a day 1 each 0    clobetasoL (TEMOVATE) 0.05 % cream Apply topically 2 (two) times daily. Focally to thick scars of the chest 3 weeks after shots 45 g 0    glipiZIDE 5 MG TR24 Take 1 tablet (5 mg total) by mouth once daily. 90 tablet 1    lancets (TRUEPLUS LANCETS) 30 gauge Misc Check sugar twice daily 200 each 3    latanoprost 0.005 % ophthalmic solution INSTILL 1 DROP INTO BOTH EYES ONE TIME DAILY 2.5 mL 1    losartan-hydrochlorothiazide 100-25 mg (HYZAAR) 100-25 mg per tablet TAKE 1 TABLET BY MOUTH  DAILY IN THE MORNING 90 tablet 1    metFORMIN (GLUCOPHAGE) 1000 MG tablet Take 1 tablet (1,000 mg total) by mouth 2 (two) times daily with meals. 180 tablet 1    metoprolol succinate (TOPROL-XL) 100 MG 24 hr tablet Take 1 tablet (100 mg total) by mouth once daily. 90 tablet 1    pantoprazole (PROTONIX) 40 MG tablet Take 1 tablet (40 mg total) by mouth 2 (two) times daily. 180 tablet 1    triamcinolone (KENALOG) 0.5 % ointment Apply topically 2 (two) times daily. 30 g 2     No current facility-administered medications for this visit.        Review of patient's allergies indicates:  No Known Allergies      Objective:       Vitals:    09/15/23 0942   BP: 122/68   Pulse: 65       Physical Exam  Constitutional:       Appearance: He is well-developed.   HENT:      Head: Normocephalic and atraumatic.      Right Ear: External ear normal.      Left Ear: External ear normal.      Nose: Nose normal.      Mouth/Throat:      Pharynx: No oropharyngeal exudate.   Eyes:      General: No scleral icterus.        Right eye: No discharge.         Left eye: No discharge.      Conjunctiva/sclera: Conjunctivae normal.      Pupils: Pupils are equal, round, and reactive to light.   Neck:      Thyroid: No thyromegaly.      Vascular: No JVD.      Trachea: No tracheal deviation.   Cardiovascular:      Rate and Rhythm: Normal rate and regular rhythm.      Heart sounds: Normal heart sounds. No murmur heard.     No friction rub. No gallop.   Pulmonary:      Effort: Pulmonary effort is normal. No respiratory distress.      Breath sounds: Normal breath sounds. No stridor. No wheezing or rales.   Chest:      Chest wall: No tenderness.   Abdominal:      General: Bowel sounds are normal. There is no distension.      Palpations: Abdomen is soft. There is no mass.      Tenderness: There is no abdominal tenderness. There is no guarding or rebound.      Hernia: No hernia is present.   Musculoskeletal:         General: No tenderness. Normal range of motion.      Cervical back: Normal range of motion and neck supple.   Lymphadenopathy:      Cervical: No cervical adenopathy.   Skin:     General: Skin is warm and dry.      Coloration: Skin is not pale.      Findings: No erythema or rash.   Neurological:      Mental Status: He is alert and oriented to person, place, and time.      Cranial Nerves: No cranial nerve deficit.      Motor: No abnormal muscle tone.      Coordination: Coordination normal.      Deep Tendon Reflexes: Reflexes are normal and symmetric. Reflexes normal.   Psychiatric:          Behavior: Behavior normal.         Thought Content: Thought content normal.         Judgment: Judgment normal.         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

## 2023-10-02 DIAGNOSIS — H40.053 OCULAR HYPERTENSION, BILATERAL: ICD-10-CM

## 2023-10-02 RX ORDER — LATANOPROST 50 UG/ML
SOLUTION/ DROPS OPHTHALMIC
Qty: 2.5 ML | Refills: 1 | Status: SHIPPED | OUTPATIENT
Start: 2023-10-02 | End: 2023-11-09 | Stop reason: SDUPTHER

## 2023-11-06 ENCOUNTER — PATIENT MESSAGE (OUTPATIENT)
Dept: FAMILY MEDICINE | Facility: CLINIC | Age: 83
End: 2023-11-06
Payer: MEDICARE

## 2023-11-06 DIAGNOSIS — K64.9 HEMORRHOIDS, UNSPECIFIED HEMORRHOID TYPE: Primary | ICD-10-CM

## 2023-11-06 RX ORDER — HYDROCORTISONE 25 MG/G
CREAM TOPICAL 2 TIMES DAILY
Qty: 30 G | Refills: 3 | Status: SHIPPED | OUTPATIENT
Start: 2023-11-06

## 2023-11-09 ENCOUNTER — OFFICE VISIT (OUTPATIENT)
Dept: OPTOMETRY | Facility: CLINIC | Age: 83
End: 2023-11-09
Payer: MEDICARE

## 2023-11-09 DIAGNOSIS — E11.36 TYPE 2 DIABETES MELLITUS WITH CATARACT: ICD-10-CM

## 2023-11-09 DIAGNOSIS — H52.203 HYPEROPIA WITH ASTIGMATISM AND PRESBYOPIA, BILATERAL: ICD-10-CM

## 2023-11-09 DIAGNOSIS — H40.053 BILATERAL OCULAR HYPERTENSION: Primary | ICD-10-CM

## 2023-11-09 DIAGNOSIS — H52.4 HYPEROPIA WITH ASTIGMATISM AND PRESBYOPIA, BILATERAL: ICD-10-CM

## 2023-11-09 DIAGNOSIS — H40.053 OCULAR HYPERTENSION, BILATERAL: ICD-10-CM

## 2023-11-09 DIAGNOSIS — H25.13 SENILE NUCLEAR SCLEROSIS, BILATERAL: ICD-10-CM

## 2023-11-09 DIAGNOSIS — H52.03 HYPEROPIA WITH ASTIGMATISM AND PRESBYOPIA, BILATERAL: ICD-10-CM

## 2023-11-09 DIAGNOSIS — E11.9 TYPE 2 DIABETES MELLITUS WITHOUT RETINOPATHY: ICD-10-CM

## 2023-11-09 PROCEDURE — 1160F PR REVIEW ALL MEDS BY PRESCRIBER/CLIN PHARMACIST DOCUMENTED: ICD-10-PCS | Mod: CPTII,S$GLB,, | Performed by: OPTOMETRIST

## 2023-11-09 PROCEDURE — 1159F PR MEDICATION LIST DOCUMENTED IN MEDICAL RECORD: ICD-10-PCS | Mod: CPTII,S$GLB,, | Performed by: OPTOMETRIST

## 2023-11-09 PROCEDURE — 92015 PR REFRACTION: ICD-10-PCS | Mod: S$GLB,,, | Performed by: OPTOMETRIST

## 2023-11-09 PROCEDURE — 92014 COMPRE OPH EXAM EST PT 1/>: CPT | Mod: S$GLB,,, | Performed by: OPTOMETRIST

## 2023-11-09 PROCEDURE — 2023F PR DILATED RETINAL EXAM W/O EVID OF RETINOPATHY: ICD-10-PCS | Mod: CPTII,S$GLB,, | Performed by: OPTOMETRIST

## 2023-11-09 PROCEDURE — 3288F FALL RISK ASSESSMENT DOCD: CPT | Mod: CPTII,S$GLB,, | Performed by: OPTOMETRIST

## 2023-11-09 PROCEDURE — 99999 PR PBB SHADOW E&M-EST. PATIENT-LVL III: ICD-10-PCS | Mod: PBBFAC,,, | Performed by: OPTOMETRIST

## 2023-11-09 PROCEDURE — 1126F PR PAIN SEVERITY QUANTIFIED, NO PAIN PRESENT: ICD-10-PCS | Mod: CPTII,S$GLB,, | Performed by: OPTOMETRIST

## 2023-11-09 PROCEDURE — 1160F RVW MEDS BY RX/DR IN RCRD: CPT | Mod: CPTII,S$GLB,, | Performed by: OPTOMETRIST

## 2023-11-09 PROCEDURE — 1159F MED LIST DOCD IN RCRD: CPT | Mod: CPTII,S$GLB,, | Performed by: OPTOMETRIST

## 2023-11-09 PROCEDURE — 2023F DILAT RTA XM W/O RTNOPTHY: CPT | Mod: CPTII,S$GLB,, | Performed by: OPTOMETRIST

## 2023-11-09 PROCEDURE — 1101F PT FALLS ASSESS-DOCD LE1/YR: CPT | Mod: CPTII,S$GLB,, | Performed by: OPTOMETRIST

## 2023-11-09 PROCEDURE — 1157F ADVNC CARE PLAN IN RCRD: CPT | Mod: CPTII,S$GLB,, | Performed by: OPTOMETRIST

## 2023-11-09 PROCEDURE — 3288F PR FALLS RISK ASSESSMENT DOCUMENTED: ICD-10-PCS | Mod: CPTII,S$GLB,, | Performed by: OPTOMETRIST

## 2023-11-09 PROCEDURE — 1157F PR ADVANCE CARE PLAN OR EQUIV PRESENT IN MEDICAL RECORD: ICD-10-PCS | Mod: CPTII,S$GLB,, | Performed by: OPTOMETRIST

## 2023-11-09 PROCEDURE — 92015 DETERMINE REFRACTIVE STATE: CPT | Mod: S$GLB,,, | Performed by: OPTOMETRIST

## 2023-11-09 PROCEDURE — 99999 PR PBB SHADOW E&M-EST. PATIENT-LVL III: CPT | Mod: PBBFAC,,, | Performed by: OPTOMETRIST

## 2023-11-09 PROCEDURE — 92014 PR EYE EXAM, EST PATIENT,COMPREHESV: ICD-10-PCS | Mod: S$GLB,,, | Performed by: OPTOMETRIST

## 2023-11-09 PROCEDURE — 1101F PR PT FALLS ASSESS DOC 0-1 FALLS W/OUT INJ PAST YR: ICD-10-PCS | Mod: CPTII,S$GLB,, | Performed by: OPTOMETRIST

## 2023-11-09 PROCEDURE — 1126F AMNT PAIN NOTED NONE PRSNT: CPT | Mod: CPTII,S$GLB,, | Performed by: OPTOMETRIST

## 2023-11-09 RX ORDER — LATANOPROST 50 UG/ML
SOLUTION/ DROPS OPHTHALMIC
Qty: 7.5 ML | Refills: 3 | Status: SHIPPED | OUTPATIENT
Start: 2023-11-09

## 2023-11-09 NOTE — PROGRESS NOTES
HPI    GWENDOLYN: 10/21 with Dr. Forte  Chief complaint (CC): patient is here for annual eye exam today.  Patient   had glasses made after last exam and has trouble reading with them.   Patient has a trifocal that is measured too low and he has to hold his   glasses up to read.  Glasses? + 2 yrs. old  Contacts? -  H/o eye surgery, injections or laser: -  H/o eye injury: -  Known eye conditions? See above  Family h/o eye conditions? -  Eye gtts? U sing latanoprost OU Q HS, last used last night      (-) Flashes (-)  Floaters (-) Mucous   (-)  Tearing (-) Itching (-) Burning   (-) Headaches (-) Eye Pain/discomfort (-) Irritation   (-)  Redness (-) Double vision (-) Blurry vision    Diabetic? +BS about 120 yesterday  A1c? Hemoglobin A1C       Date                     Value               Ref Range             Status                09/15/2023               7.0 (H)             4.0 - 5.6 %           Final                 03/16/2023               7.4 (H)             4.0 - 5.6 %           Final                 08/19/2022               7.2 (H)             4.0 - 5.6 %           Final                  Last edited by Krystal Abebe on 11/9/2023  3:33 PM.            Assessment /Plan     For exam results, see Encounter Report.      Bilateral ocular hypertension  -     Posterior Segment OCT Optic Nerve- Both eyes; Future  -     [place] Visual Field - OU - Extended - Both Eyes; Future  (-) FHX. IOP 18 OD, 17 OS. C/d 0.4 OD, 0.35 OS. Prev testing w/Dr Forte. Tmax 32 OD, 27 OS. IOP target of high teens. Educated pt on findings w/understanding. Cont Latanoprost QHS OU. RTC 4 mo IOP/OCT/24-2 Crystal faster.     Type 2 diabetes mellitus without retinopathy  BS control. No signs of diabetic retinopathy. Monitor with annual exam.    Senile nuclear sclerosis, bilateral  Type 2 diabetes mellitus with cataract  Nuclear sclerotic cataract - not visually significant. Observe.    Hyperopia with astigmatism and presbyopia, bilateral  SRx released to  patient. Patient educated on lens options. Normal ocular health. RTC 1 year for routine exam.

## 2023-11-10 DIAGNOSIS — I10 ESSENTIAL HYPERTENSION: ICD-10-CM

## 2023-11-10 DIAGNOSIS — E78.2 MIXED HYPERLIPIDEMIA: ICD-10-CM

## 2023-11-10 DIAGNOSIS — E11.42 TYPE 2 DIABETES MELLITUS WITH DIABETIC POLYNEUROPATHY, WITHOUT LONG-TERM CURRENT USE OF INSULIN: ICD-10-CM

## 2023-11-10 DIAGNOSIS — K21.9 GASTROESOPHAGEAL REFLUX DISEASE, UNSPECIFIED WHETHER ESOPHAGITIS PRESENT: ICD-10-CM

## 2023-11-10 RX ORDER — ATORVASTATIN CALCIUM 20 MG/1
20 TABLET, FILM COATED ORAL
Qty: 90 TABLET | Refills: 3 | Status: SHIPPED | OUTPATIENT
Start: 2023-11-10

## 2023-11-10 RX ORDER — AMLODIPINE BESYLATE 10 MG/1
TABLET ORAL
Qty: 90 TABLET | Refills: 3 | Status: SHIPPED | OUTPATIENT
Start: 2023-11-10

## 2023-11-10 RX ORDER — PANTOPRAZOLE SODIUM 40 MG/1
40 TABLET, DELAYED RELEASE ORAL 2 TIMES DAILY
Qty: 180 TABLET | Refills: 10 | Status: SHIPPED | OUTPATIENT
Start: 2023-11-10

## 2023-11-10 RX ORDER — LOSARTAN POTASSIUM AND HYDROCHLOROTHIAZIDE 25; 100 MG/1; MG/1
TABLET ORAL
Qty: 90 TABLET | Refills: 3 | Status: SHIPPED | OUTPATIENT
Start: 2023-11-10

## 2023-11-10 RX ORDER — METFORMIN HYDROCHLORIDE 1000 MG/1
1000 TABLET ORAL 2 TIMES DAILY WITH MEALS
Qty: 180 TABLET | Refills: 1 | Status: SHIPPED | OUTPATIENT
Start: 2023-11-10 | End: 2024-01-17

## 2023-11-10 RX ORDER — GLIPIZIDE 5 MG/1
5 TABLET, FILM COATED, EXTENDED RELEASE ORAL
Qty: 90 TABLET | Refills: 1 | Status: SHIPPED | OUTPATIENT
Start: 2023-11-10 | End: 2024-04-02

## 2023-11-10 NOTE — TELEPHONE ENCOUNTER
No care due was identified.  [facility] Embedded Care Due Messages. Reference number: [id number].   11/10/2023 4:02:48 PM CST

## 2023-11-11 NOTE — TELEPHONE ENCOUNTER
Refill Routing Note   Medication(s) are not appropriate for processing by [facility] for the following reason(s):      Medication outside of protocol    [facility] action(s):  Approve  Route Care Due:  None identified     Medication Therapy Plan: Protonix: Total daily dose outside of [facility] protocol      Appointments  past 12m or future 3m with PCP    Date Provider   Last Visit   9/15/2023 Luke Mcgee MD   Next Visit   3/15/2024 Luke Mcgee MD   ED visits in past 90 days: 0        Note composed:8:50 PM 11/10/2023

## 2023-11-19 DIAGNOSIS — I10 ESSENTIAL HYPERTENSION: ICD-10-CM

## 2023-11-20 RX ORDER — METOPROLOL SUCCINATE 100 MG/1
100 TABLET, EXTENDED RELEASE ORAL DAILY
Qty: 90 TABLET | Refills: 1 | Status: SHIPPED | OUTPATIENT
Start: 2023-11-20

## 2023-11-20 NOTE — TELEPHONE ENCOUNTER
No care due was identified.  [facility] Embedded Care Due Messages. Reference number: [id number].   11/19/2023 10:54:46 PM CST

## 2023-12-21 ENCOUNTER — OFFICE VISIT (OUTPATIENT)
Dept: ORTHOPEDICS | Facility: CLINIC | Age: 83
End: 2023-12-21
Payer: MEDICARE

## 2023-12-21 VITALS — BODY MASS INDEX: 27.28 KG/M2 | HEIGHT: 67 IN

## 2023-12-21 DIAGNOSIS — M65.341 TRIGGER RING FINGER OF RIGHT HAND: Primary | ICD-10-CM

## 2023-12-21 PROCEDURE — 1101F PT FALLS ASSESS-DOCD LE1/YR: CPT | Mod: CPTII,S$GLB,, | Performed by: ORTHOPAEDIC SURGERY

## 2023-12-21 PROCEDURE — 99213 PR OFFICE/OUTPT VISIT, EST, LEVL III, 20-29 MIN: ICD-10-PCS | Mod: 25,S$GLB,, | Performed by: ORTHOPAEDIC SURGERY

## 2023-12-21 PROCEDURE — 20550 PR INJECT TENDON SHEATH/LIGAMENT: ICD-10-PCS | Mod: RT,S$GLB,, | Performed by: ORTHOPAEDIC SURGERY

## 2023-12-21 PROCEDURE — 20550 NJX 1 TENDON SHEATH/LIGAMENT: CPT | Mod: RT,S$GLB,, | Performed by: ORTHOPAEDIC SURGERY

## 2023-12-21 PROCEDURE — 3288F FALL RISK ASSESSMENT DOCD: CPT | Mod: CPTII,S$GLB,, | Performed by: ORTHOPAEDIC SURGERY

## 2023-12-21 PROCEDURE — 1101F PR PT FALLS ASSESS DOC 0-1 FALLS W/OUT INJ PAST YR: ICD-10-PCS | Mod: CPTII,S$GLB,, | Performed by: ORTHOPAEDIC SURGERY

## 2023-12-21 PROCEDURE — 99999 PR PBB SHADOW E&M-EST. PATIENT-LVL III: CPT | Mod: PBBFAC,,, | Performed by: ORTHOPAEDIC SURGERY

## 2023-12-21 PROCEDURE — 3288F PR FALLS RISK ASSESSMENT DOCUMENTED: ICD-10-PCS | Mod: CPTII,S$GLB,, | Performed by: ORTHOPAEDIC SURGERY

## 2023-12-21 PROCEDURE — 1157F PR ADVANCE CARE PLAN OR EQUIV PRESENT IN MEDICAL RECORD: ICD-10-PCS | Mod: CPTII,S$GLB,, | Performed by: ORTHOPAEDIC SURGERY

## 2023-12-21 PROCEDURE — 1159F MED LIST DOCD IN RCRD: CPT | Mod: CPTII,S$GLB,, | Performed by: ORTHOPAEDIC SURGERY

## 2023-12-21 PROCEDURE — 1125F AMNT PAIN NOTED PAIN PRSNT: CPT | Mod: CPTII,S$GLB,, | Performed by: ORTHOPAEDIC SURGERY

## 2023-12-21 PROCEDURE — 1125F PR PAIN SEVERITY QUANTIFIED, PAIN PRESENT: ICD-10-PCS | Mod: CPTII,S$GLB,, | Performed by: ORTHOPAEDIC SURGERY

## 2023-12-21 PROCEDURE — 1159F PR MEDICATION LIST DOCUMENTED IN MEDICAL RECORD: ICD-10-PCS | Mod: CPTII,S$GLB,, | Performed by: ORTHOPAEDIC SURGERY

## 2023-12-21 PROCEDURE — 1157F ADVNC CARE PLAN IN RCRD: CPT | Mod: CPTII,S$GLB,, | Performed by: ORTHOPAEDIC SURGERY

## 2023-12-21 PROCEDURE — 99213 OFFICE O/P EST LOW 20 MIN: CPT | Mod: 25,S$GLB,, | Performed by: ORTHOPAEDIC SURGERY

## 2023-12-21 PROCEDURE — 99999 PR PBB SHADOW E&M-EST. PATIENT-LVL III: ICD-10-PCS | Mod: PBBFAC,,, | Performed by: ORTHOPAEDIC SURGERY

## 2023-12-21 RX ORDER — TRIAMCINOLONE ACETONIDE 40 MG/ML
20 INJECTION, SUSPENSION INTRA-ARTICULAR; INTRAMUSCULAR
Status: COMPLETED | OUTPATIENT
Start: 2023-12-21 | End: 2023-12-21

## 2023-12-21 RX ADMIN — TRIAMCINOLONE ACETONIDE 20 MG: 40 INJECTION, SUSPENSION INTRA-ARTICULAR; INTRAMUSCULAR at 09:12

## 2023-12-21 NOTE — PROGRESS NOTES
Subjective:      Patient ID: Bryant Gil is a 83 y.o. male.    Chief Complaint: Follow-up (Right ring finger c/o pain)      HPI  Bryant Gil is a  83 y.o. male presenting today for painful locking of the right ring finger.  There was not a history of trauma.  Onset of symptoms began several months ago  He had an injection about a year ago with good results   No numbness or tingling reported.      Review of patient's allergies indicates:  No Known Allergies      Current Outpatient Medications   Medication Sig Dispense Refill    amLODIPine (NORVASC) 10 MG tablet TAKE 1 TABLET EVERY DAY 90 tablet 3    aspirin (ECOTRIN) 81 MG EC tablet Take 81 mg by mouth once daily.      atorvastatin (LIPITOR) 20 MG tablet TAKE 1 TABLET ONE TIME DAILY 90 tablet 3    BD ALCOHOL SWABS PadM USE FOR HOME GLUCOSE MONITORING DAILY 100 each 3    blood sugar diagnostic (TRUE METRIX GLUCOSE TEST STRIP) Strp Test Blood Sugar twice daily 100 strip 1    blood-glucose meter (TRUE METRIX GLUCOSE METER) Misc Test blood sugar twice a day 1 each 0    clobetasoL (TEMOVATE) 0.05 % cream Apply topically 2 (two) times daily. Focally to thick scars of the chest 3 weeks after shots 45 g 3    glipiZIDE 5 MG TR24 TAKE 1 TABLET ONE TIME DAILY 90 tablet 1    hydrocortisone 2.5 % cream Apply topically 2 (two) times daily. 30 g 3    lancets (TRUEPLUS LANCETS) 30 gauge Misc Check sugar twice daily 200 each 3    latanoprost 0.005 % ophthalmic solution INSTILL 1 DROP INTO BOTH EYES ONE TIME DAILY 7.5 mL 3    losartan-hydrochlorothiazide 100-25 mg (HYZAAR) 100-25 mg per tablet TAKE 1 TABLET EVERY MORNING 90 tablet 3    metFORMIN (GLUCOPHAGE) 1000 MG tablet TAKE 1 TABLET TWICE DAILY WITH MEALS 180 tablet 1    metoprolol succinate (TOPROL-XL) 100 MG 24 hr tablet Take 1 tablet (100 mg total) by mouth once daily. 90 tablet 1    pantoprazole (PROTONIX) 40 MG tablet TAKE 1 TABLET TWICE DAILY 180 tablet 10    triamcinolone (KENALOG) 0.5 % ointment Apply topically 2 (two)  times daily. 30 g 2     No current facility-administered medications for this visit.       Past Medical History:   Diagnosis Date    Cataract of both eyes     Chronic gastritis without bleeding, negative H. pylori Jaunuary 2019 EGD 1/30/2020    Diabetes mellitus     Diverticulosis     Ectatic aorta 2/6/2018    HTN (hypertension)     HTN (hypertension) 10/9/2012    Lower GI bleeding 1/11/2021    Near syncope 2/5/2021    OHT (ocular hypertension)     Prostate cancer     Pyelonephritis, right no stone on CT scan. 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    Tubular adenoma of colon 01/10/2012    Type II or unspecified type diabetes mellitus with neurological manifestations, not stated as uncontrolled(250.60) 10/9/2012       Past Surgical History:   Procedure Laterality Date    CARPAL TUNNEL RELEASE Right 08/25/2015    CIRCUMCISION  04/13/2005    COLONOSCOPY N/A 3/29/2017    Procedure: COLONOSCOPY Golytely prep;  Surgeon: Kavitha Cleaning MD;  Location: [facility];  Service: Endoscopy;  Laterality: N/A;    COLONOSCOPY N/A 1/12/2021    Procedure: COLONOSCOPY;  Surgeon: Dung Panda MD;  Location: [facility];  Service: Endoscopy;  Laterality: N/A;    COLONOSCOPY W/ POLYPECTOMY  01/10/2012    Repeat in 5 years     ESOPHAGOGASTRODUODENOSCOPY N/A 1/8/2019    Procedure: EGD (ESOPHAGOGASTRODUODENOSCOPY);  Surgeon: Rachel Burrows MD;  Location: [facility];  Service: Endoscopy;  Laterality: N/A;    ESOPHAGOGASTRODUODENOSCOPY N/A 5/14/2020    Procedure: ESOPHAGOGASTRODUODENOSCOPY (EGD);  Surgeon: Rachel Burrows MD;  Location: [facility];  Service: Endoscopy;  Laterality: N/A;  Dr. Luis Angel Burrows if possible.    FLEXIBLE SIGMOIDOSCOPY N/A 1/8/2019    Procedure: SIGMOIDOSCOPY, FLEXIBLE;  Surgeon: Rachel LEMUS  "MD Markos;  Location: [facility];  Service: Endoscopy;  Laterality: N/A;    PENILE PROSTHESIS IMPLANT      PROSTATE SURGERY  1999    Prostatectomy for prostate ca; [facility]    TRIGGER FINGER RELEASE Right 2015       Review of Systems:  ROS    OBJECTIVE:     PHYSICAL EXAM:  Height: 5' 7" (170.2 cm)    Vitals:    12/21/23 0904   Height: 5' 7" (1.702 m)   PainSc:   9   PainLoc: Finger     Well developed, well nourished male in no acute distress  Alert and oriented x 3  HEENT- Normal exam  Lungs- Clear to auscultation  Heart- Regular rate and rhythm  Abdomen- Soft nontender  Extremity exam- examination right hand there is tenderness over the flexor tendon sheath right ring finger limited range of motion mild triggering slight flexion contracture noted at the PIP joint   Sensation intact  Tinel sign negative       RADIOGRAPHS:  None  Comments: I have personally reviewed the imaging and I agree with the above radiologist's report.    ASSESSMENT/PLAN:     IMPRESSION:  Triggering of the right ring finger with slight flexion contracture    PLAN:  I explained the nature of the problem to the patient   Recommended injection  After pause for time-out identified the right ring finger injected flexor tendon sheath with combination Kenalog 20 mg 0.5 cc xylocaine sterile technique   I have also recommended range-of-motion exercises to help with the contracture  Follow-up 4-6 weeks for recheck if symptoms have not improved then I would recommend surgical release       - We talked at length about the anatomy and pathophysiology of   Encounter Diagnosis   Name Primary?    Trigger ring finger of right hand Yes           Disclaimer: This note has been generated using voice-recognition software. There may be typographical errors that have been missed during proof-reading.     "

## 2024-01-02 ENCOUNTER — PATIENT MESSAGE (OUTPATIENT)
Dept: ADMINISTRATIVE | Facility: OTHER | Age: 84
End: 2024-01-02
Payer: MEDICARE

## 2024-01-04 ENCOUNTER — PATIENT MESSAGE (OUTPATIENT)
Dept: ADMINISTRATIVE | Facility: OTHER | Age: 84
End: 2024-01-04
Payer: MEDICARE

## 2024-01-11 DIAGNOSIS — Z00.00 ENCOUNTER FOR MEDICARE ANNUAL WELLNESS EXAM: ICD-10-CM

## 2024-01-17 ENCOUNTER — LAB VISIT (OUTPATIENT)
Dept: LAB | Facility: HOSPITAL | Age: 84
End: 2024-01-17
Attending: FAMILY MEDICINE
Payer: MEDICARE

## 2024-01-17 ENCOUNTER — OFFICE VISIT (OUTPATIENT)
Dept: FAMILY MEDICINE | Facility: CLINIC | Age: 84
End: 2024-01-17
Attending: FAMILY MEDICINE
Payer: MEDICARE

## 2024-01-17 ENCOUNTER — PATIENT MESSAGE (OUTPATIENT)
Dept: FAMILY MEDICINE | Facility: CLINIC | Age: 84
End: 2024-01-17

## 2024-01-17 VITALS
HEART RATE: 82 BPM | HEIGHT: 67 IN | OXYGEN SATURATION: 97 % | DIASTOLIC BLOOD PRESSURE: 76 MMHG | SYSTOLIC BLOOD PRESSURE: 130 MMHG | WEIGHT: 173.75 LBS | BODY MASS INDEX: 27.27 KG/M2

## 2024-01-17 DIAGNOSIS — I10 ESSENTIAL HYPERTENSION: Chronic | ICD-10-CM

## 2024-01-17 DIAGNOSIS — E11.42 TYPE 2 DIABETES MELLITUS WITH DIABETIC POLYNEUROPATHY, WITHOUT LONG-TERM CURRENT USE OF INSULIN: ICD-10-CM

## 2024-01-17 DIAGNOSIS — I70.0 AORTIC ATHEROSCLEROSIS: ICD-10-CM

## 2024-01-17 DIAGNOSIS — I47.10 PSVT (PAROXYSMAL SUPRAVENTRICULAR TACHYCARDIA): ICD-10-CM

## 2024-01-17 DIAGNOSIS — I12.9 TYPE 2 DM WITH CKD STAGE 2 AND HYPERTENSION: ICD-10-CM

## 2024-01-17 DIAGNOSIS — E78.2 MIXED HYPERLIPIDEMIA: ICD-10-CM

## 2024-01-17 DIAGNOSIS — K21.9 GASTROESOPHAGEAL REFLUX DISEASE WITHOUT ESOPHAGITIS: ICD-10-CM

## 2024-01-17 DIAGNOSIS — E11.42 TYPE 2 DIABETES MELLITUS WITH DIABETIC POLYNEUROPATHY, WITHOUT LONG-TERM CURRENT USE OF INSULIN: Primary | ICD-10-CM

## 2024-01-17 DIAGNOSIS — E11.22 TYPE 2 DM WITH CKD STAGE 2 AND HYPERTENSION: ICD-10-CM

## 2024-01-17 DIAGNOSIS — N18.2 TYPE 2 DM WITH CKD STAGE 2 AND HYPERTENSION: ICD-10-CM

## 2024-01-17 LAB
ALBUMIN SERPL BCP-MCNC: 4.2 G/DL (ref 3.5–5.2)
ALBUMIN/CREAT UR: 11.9 UG/MG (ref 0–30)
ALP SERPL-CCNC: 51 U/L (ref 55–135)
ALT SERPL W/O P-5'-P-CCNC: 16 U/L (ref 10–44)
ANION GAP SERPL CALC-SCNC: 11 MMOL/L (ref 8–16)
AST SERPL-CCNC: 17 U/L (ref 10–40)
BASOPHILS # BLD AUTO: 0.05 K/UL (ref 0–0.2)
BASOPHILS NFR BLD: 1.1 % (ref 0–1.9)
BILIRUB SERPL-MCNC: 0.6 MG/DL (ref 0.1–1)
BILIRUB UR QL STRIP: NEGATIVE
BUN SERPL-MCNC: 18 MG/DL (ref 8–23)
CALCIUM SERPL-MCNC: 9.6 MG/DL (ref 8.7–10.5)
CHLORIDE SERPL-SCNC: 105 MMOL/L (ref 95–110)
CLARITY UR: CLEAR
CO2 SERPL-SCNC: 27 MMOL/L (ref 23–29)
COLOR UR: YELLOW
CREAT SERPL-MCNC: 1.2 MG/DL (ref 0.5–1.4)
CREAT UR-MCNC: 84 MG/DL (ref 23–375)
DIFFERENTIAL METHOD BLD: ABNORMAL
EOSINOPHIL # BLD AUTO: 0.1 K/UL (ref 0–0.5)
EOSINOPHIL NFR BLD: 2 % (ref 0–8)
ERYTHROCYTE [DISTWIDTH] IN BLOOD BY AUTOMATED COUNT: 13.9 % (ref 11.5–14.5)
EST. GFR  (NO RACE VARIABLE): >60 ML/MIN/1.73 M^2
ESTIMATED AVG GLUCOSE: 140 MG/DL (ref 68–131)
GLUCOSE SERPL-MCNC: 130 MG/DL (ref 70–110)
GLUCOSE UR QL STRIP: NEGATIVE
HBA1C MFR BLD: 6.5 % (ref 4–5.6)
HCT VFR BLD AUTO: 42.2 % (ref 40–54)
HGB BLD-MCNC: 13.4 G/DL (ref 14–18)
HGB UR QL STRIP: NEGATIVE
IMM GRANULOCYTES # BLD AUTO: 0.01 K/UL (ref 0–0.04)
IMM GRANULOCYTES NFR BLD AUTO: 0.2 % (ref 0–0.5)
KETONES UR QL STRIP: NEGATIVE
LEUKOCYTE ESTERASE UR QL STRIP: NEGATIVE
LYMPHOCYTES # BLD AUTO: 1.6 K/UL (ref 1–4.8)
LYMPHOCYTES NFR BLD: 36 % (ref 18–48)
MCH RBC QN AUTO: 27.9 PG (ref 27–31)
MCHC RBC AUTO-ENTMCNC: 31.8 G/DL (ref 32–36)
MCV RBC AUTO: 88 FL (ref 82–98)
MICROALBUMIN UR DL<=1MG/L-MCNC: 10 UG/ML
MONOCYTES # BLD AUTO: 0.4 K/UL (ref 0.3–1)
MONOCYTES NFR BLD: 8.6 % (ref 4–15)
NEUTROPHILS # BLD AUTO: 2.3 K/UL (ref 1.8–7.7)
NEUTROPHILS NFR BLD: 52.1 % (ref 38–73)
NITRITE UR QL STRIP: NEGATIVE
NRBC BLD-RTO: 0 /100 WBC
PH UR STRIP: 7 [PH] (ref 5–8)
PLATELET # BLD AUTO: 271 K/UL (ref 150–450)
PMV BLD AUTO: 11.1 FL (ref 9.2–12.9)
POTASSIUM SERPL-SCNC: 3.8 MMOL/L (ref 3.5–5.1)
PROT SERPL-MCNC: 7.7 G/DL (ref 6–8.4)
PROT UR QL STRIP: NEGATIVE
RBC # BLD AUTO: 4.81 M/UL (ref 4.6–6.2)
SODIUM SERPL-SCNC: 143 MMOL/L (ref 136–145)
SP GR UR STRIP: 1.01 (ref 1–1.03)
URN SPEC COLLECT METH UR: NORMAL
UROBILINOGEN UR STRIP-ACNC: NEGATIVE EU/DL
WBC # BLD AUTO: 4.42 K/UL (ref 3.9–12.7)

## 2024-01-17 PROCEDURE — 3078F DIAST BP <80 MM HG: CPT | Mod: CPTII,S$GLB,, | Performed by: FAMILY MEDICINE

## 2024-01-17 PROCEDURE — 3072F LOW RISK FOR RETINOPATHY: CPT | Mod: CPTII,S$GLB,, | Performed by: FAMILY MEDICINE

## 2024-01-17 PROCEDURE — 3075F SYST BP GE 130 - 139MM HG: CPT | Mod: CPTII,S$GLB,, | Performed by: FAMILY MEDICINE

## 2024-01-17 PROCEDURE — 1157F ADVNC CARE PLAN IN RCRD: CPT | Mod: CPTII,S$GLB,, | Performed by: FAMILY MEDICINE

## 2024-01-17 PROCEDURE — 80053 COMPREHEN METABOLIC PANEL: CPT | Performed by: FAMILY MEDICINE

## 2024-01-17 PROCEDURE — 1126F AMNT PAIN NOTED NONE PRSNT: CPT | Mod: CPTII,S$GLB,, | Performed by: FAMILY MEDICINE

## 2024-01-17 PROCEDURE — 3288F FALL RISK ASSESSMENT DOCD: CPT | Mod: CPTII,S$GLB,, | Performed by: FAMILY MEDICINE

## 2024-01-17 PROCEDURE — 1101F PT FALLS ASSESS-DOCD LE1/YR: CPT | Mod: CPTII,S$GLB,, | Performed by: FAMILY MEDICINE

## 2024-01-17 PROCEDURE — 99215 OFFICE O/P EST HI 40 MIN: CPT | Mod: S$GLB,,, | Performed by: FAMILY MEDICINE

## 2024-01-17 PROCEDURE — 36415 COLL VENOUS BLD VENIPUNCTURE: CPT | Performed by: FAMILY MEDICINE

## 2024-01-17 PROCEDURE — 82043 UR ALBUMIN QUANTITATIVE: CPT | Performed by: FAMILY MEDICINE

## 2024-01-17 PROCEDURE — 81003 URINALYSIS AUTO W/O SCOPE: CPT | Performed by: FAMILY MEDICINE

## 2024-01-17 PROCEDURE — 83036 HEMOGLOBIN GLYCOSYLATED A1C: CPT | Performed by: FAMILY MEDICINE

## 2024-01-17 PROCEDURE — 99999 PR PBB SHADOW E&M-EST. PATIENT-LVL IV: CPT | Mod: PBBFAC,,, | Performed by: FAMILY MEDICINE

## 2024-01-17 PROCEDURE — 1160F RVW MEDS BY RX/DR IN RCRD: CPT | Mod: CPTII,S$GLB,, | Performed by: FAMILY MEDICINE

## 2024-01-17 PROCEDURE — 1159F MED LIST DOCD IN RCRD: CPT | Mod: CPTII,S$GLB,, | Performed by: FAMILY MEDICINE

## 2024-01-17 PROCEDURE — 85025 COMPLETE CBC W/AUTO DIFF WBC: CPT | Performed by: FAMILY MEDICINE

## 2024-01-17 RX ORDER — METFORMIN HYDROCHLORIDE 500 MG/1
500 TABLET ORAL 2 TIMES DAILY WITH MEALS
Qty: 180 TABLET | Refills: 3 | Status: SHIPPED | OUTPATIENT
Start: 2024-01-17 | End: 2024-01-18 | Stop reason: SDUPTHER

## 2024-01-17 NOTE — PROGRESS NOTES
Subjective:       Patient ID: Bryant Gil is a 83 y.o. male.    Chief Complaint: Follow-up    82 yr old pleasant male with DM II, HTN, HLD, atrial tachycardia, presents today as a new patient to me and for establishing primary care and also his 6 month check and lab work.       DM II - controlled -     HGBA1C                   7.0 (H)             09/15/2023                             - on metformin n glipizide - compliant - no hypoglycemic symptoms      HTN - controlled - on HYZAAR, amlodipine and metoprolol      HLD - improving - on statin -    LDLCALC                  94.8                09/15/2023                      History as below - reviewed             Medication Refill  Associated symptoms include arthralgias, joint swelling and weakness. Pertinent negatives include no chest pain, congestion, coughing, diaphoresis, headaches, myalgias, neck pain, rash, visual change or vomiting.   Diabetes  He presents for his follow-up diabetic visit. He has type 2 diabetes mellitus. His disease course has been stable. Pertinent negatives for hypoglycemia include no confusion, dizziness, headaches, hunger, nervousness/anxiousness, seizures, speech difficulty or tremors. Associated symptoms include weakness. Pertinent negatives for diabetes include no blurred vision, no chest pain, no foot paresthesias, no foot ulcerations, no polydipsia, no polyuria, no visual change and no weight loss. Pertinent negatives for hypoglycemia complications include no blackouts, no nocturnal hypoglycemia and no required assistance. Symptoms are stable. Pertinent negatives for diabetic complications include no autonomic neuropathy, impotence, nephropathy, peripheral neuropathy or retinopathy. Risk factors for coronary artery disease include diabetes mellitus, dyslipidemia and male sex. Current diabetic treatment includes oral agent (dual therapy). He is compliant with treatment all of the time. Meal planning includes avoidance of  concentrated sweets. He has not had a previous visit with a dietitian. He rarely participates in exercise. There is no change in his home blood glucose trend. An ACE inhibitor/angiotensin II receptor blocker is being taken. He does not see a podiatrist.Eye exam is not current.   Hypertension  This is a chronic problem. The current episode started more than 1 year ago. The problem has been gradually improving since onset. The problem is controlled. Pertinent negatives include no blurred vision, chest pain, headaches, neck pain or palpitations. There are no associated agents to hypertension. Risk factors for coronary artery disease include dyslipidemia, male gender and diabetes mellitus. Past treatments include angiotensin blockers, diuretics, calcium channel blockers and beta blockers. The current treatment provides significant improvement. There are no compliance problems.  There is no history of angina, CAD/MI or retinopathy. There is no history of chronic renal disease, hyperaldosteronism, hyperparathyroidism, a hypertension causing med, renovascular disease or a thyroid problem.   Hyperlipidemia  This is a chronic problem. The current episode started more than 1 year ago. The problem is controlled. Recent lipid tests were reviewed and are normal. He has no history of chronic renal disease. There are no known factors aggravating his hyperlipidemia. Pertinent negatives include no chest pain or myalgias. Current antihyperlipidemic treatment includes statins. The current treatment provides significant improvement of lipids. There are no compliance problems.  Risk factors for coronary artery disease include diabetes mellitus, dyslipidemia, hypertension and male sex.     Review of Systems   Constitutional: Negative.  Negative for activity change, diaphoresis, unexpected weight change and weight loss.   HENT:  Positive for hearing loss. Negative for nasal congestion, ear pain, mouth sores, rhinorrhea, trouble swallowing  and voice change.    Eyes: Negative.  Negative for blurred vision, pain, discharge and visual disturbance.   Respiratory: Negative.  Negative for apnea, cough, chest tightness and wheezing.    Cardiovascular: Negative.  Negative for chest pain and palpitations.   Gastrointestinal: Negative.  Negative for abdominal distention, anal bleeding, blood in stool, constipation, diarrhea and vomiting.   Endocrine: Negative.  Negative for cold intolerance, polydipsia and polyuria.   Genitourinary: Negative.  Negative for decreased urine volume, difficulty urinating, discharge, frequency, hematuria, impotence, scrotal swelling and urgency.   Musculoskeletal:  Positive for arthralgias and joint swelling. Negative for back pain, myalgias, neck pain and neck stiffness.   Integumentary:  Negative for color change and rash. Negative.   Allergic/Immunologic: Negative.  Negative for environmental allergies.   Neurological:  Positive for weakness. Negative for dizziness, tremors, seizures, speech difficulty, light-headedness and headaches.   Hematological: Negative.    Psychiatric/Behavioral: Negative.  Negative for agitation, confusion, dysphoric mood and suicidal ideas. The patient is not nervous/anxious.          PMH/PSH/FH/SH/MED/ALLERGY reviewed    Past Medical History:   Diagnosis Date    Cataract of both eyes     Chronic gastritis without bleeding, negative H. pylori Jaunuary 2019 EGD 1/30/2020    Diabetes mellitus     Diverticulosis     Ectatic aorta 2/6/2018    HTN (hypertension)     HTN (hypertension) 10/9/2012    Lower GI bleeding 1/11/2021    Near syncope 2/5/2021    OHT (ocular hypertension)     Prostate cancer     Pyelonephritis, right no stone on CT scan. 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     Tubular adenoma of colon 01/10/2012    Type II or unspecified type diabetes mellitus with neurological manifestations, not stated as uncontrolled(250.60) 10/9/2012       Past Surgical History:   Procedure Laterality Date    CARPAL TUNNEL RELEASE Right 08/25/2015    CIRCUMCISION  04/13/2005    COLONOSCOPY N/A 3/29/2017    Procedure: COLONOSCOPY Golytely prep;  Surgeon: Kavitha Cleaning MD;  Location: [facility];  Service: Endoscopy;  Laterality: N/A;    COLONOSCOPY N/A 1/12/2021    Procedure: COLONOSCOPY;  Surgeon: Dung Panda MD;  Location: [facility];  Service: Endoscopy;  Laterality: N/A;    COLONOSCOPY W/ POLYPECTOMY  01/10/2012    Repeat in 5 years     ESOPHAGOGASTRODUODENOSCOPY N/A 1/8/2019    Procedure: EGD (ESOPHAGOGASTRODUODENOSCOPY);  Surgeon: Rachel Burrows MD;  Location: [facility];  Service: Endoscopy;  Laterality: N/A;    ESOPHAGOGASTRODUODENOSCOPY N/A 5/14/2020    Procedure: ESOPHAGOGASTRODUODENOSCOPY (EGD);  Surgeon: Rachel Burrows MD;  Location: [facility];  Service: Endoscopy;  Laterality: N/A;  Dr. Luis Angel Burrows if possible.    FLEXIBLE SIGMOIDOSCOPY N/A 1/8/2019    Procedure: SIGMOIDOSCOPY, FLEXIBLE;  Surgeon: Rachel Burrows MD;  Location: [facility];  Service: Endoscopy;  Laterality: N/A;    PENILE PROSTHESIS IMPLANT      PROSTATE SURGERY  1999    Prostatectomy for prostate ca; [facility]    TRIGGER FINGER RELEASE Right 2015       Family History   Problem Relation Age of Onset    Hypertension Mother     No Known Problems Father     Diabetes Sister     Cataracts Sister     No Known Problems Sister     No Known Problems Sister     No Known Problems Sister     No Known Problems Brother     Heart attack Brother     Coronary artery disease Brother     No Known Problems Brother     Coronary artery disease Brother     Heart attack Brother     No Known Problems Daughter     No Known Problems Son     Blindness Neg Hx     Amblyopia Neg Hx     Glaucoma Neg Hx     Retinal detachment Neg Hx      Strabismus Neg Hx     Macular degeneration Neg Hx     Stroke Neg Hx     Thyroid disease Neg Hx     Colon cancer Neg Hx     Esophageal cancer Neg Hx        Social History     Socioeconomic History    Marital status:    Tobacco Use    Smoking status: Former     Current packs/day: 0.00     Average packs/day: 0.5 packs/day for 3.0 years (1.5 ttl pk-yrs)     Types: Cigarettes     Start date:      Quit date: 1960     Years since quittin.0     Passive exposure: Never    Smokeless tobacco: Never    Tobacco comments:     smoked as a teenager   Substance and Sexual Activity    Alcohol use: Yes     Comment: social drinks a beer 1-2 x month    Drug use: No    Sexual activity: Yes     Partners: Female   Social History Narrative    Works at [employer] in [place]     Social Determinants of Health     Financial Resource Strain: Low Risk  (2023)    Overall Financial Resource Strain (CARDIA)     Difficulty of Paying Living Expenses: Not hard at all   Food Insecurity: No Food Insecurity (2023)    Hunger Vital Sign     Worried About Running Out of Food in the Last Year: Never true     Ran Out of Food in the Last Year: Never true   Transportation Needs: No Transportation Needs (2023)    PRAPARE - Transportation     Lack of Transportation (Medical): No     Lack of Transportation (Non-Medical): No   Physical Activity: Insufficiently Active (2023)    Exercise Vital Sign     Days of Exercise per Week: 3 days     Minutes of Exercise per Session: 10 min   Stress: No Stress Concern Present (2023)    [employer] - Occupational Stress Questionnaire     Feeling of Stress : Not at all   Social Connections: Moderately Integrated (2023)    Social Connection and Isolation Panel [NHANES]     Frequency of Communication with Friends and Family: More than three times a week     Frequency of Social Gatherings with Friends and Family: More than three times a week     Attends Yazidi  Services: More than 4 times per year     Active Member of Clubs or Organizations: No     Attends Club or Organization Meetings: Never     Marital Status:    Housing Stability: Low Risk  (9/6/2023)    Housing Stability Vital Sign     Unable to Pay for Housing in the Last Year: No     Number of Places Lived in the Last Year: 1     Unstable Housing in the Last Year: No       Current Outpatient Medications   Medication Sig Dispense Refill    amLODIPine (NORVASC) 10 MG tablet TAKE 1 TABLET EVERY DAY 90 tablet 3    aspirin (ECOTRIN) 81 MG EC tablet Take 81 mg by mouth once daily.      atorvastatin (LIPITOR) 20 MG tablet TAKE 1 TABLET ONE TIME DAILY 90 tablet 3    BD ALCOHOL SWABS PadM USE FOR HOME GLUCOSE MONITORING DAILY 100 each 3    blood sugar diagnostic (TRUE METRIX GLUCOSE TEST STRIP) Strp Test Blood Sugar twice daily 100 strip 1    blood-glucose meter (TRUE METRIX GLUCOSE METER) Misc Test blood sugar twice a day 1 each 0    clobetasoL (TEMOVATE) 0.05 % cream Apply topically 2 (two) times daily. Focally to thick scars of the chest 3 weeks after shots 45 g 3    glipiZIDE 5 MG TR24 TAKE 1 TABLET ONE TIME DAILY 90 tablet 1    hydrocortisone 2.5 % cream Apply topically 2 (two) times daily. 30 g 3    lancets (TRUEPLUS LANCETS) 30 gauge Misc Check sugar twice daily 200 each 3    latanoprost 0.005 % ophthalmic solution INSTILL 1 DROP INTO BOTH EYES ONE TIME DAILY 7.5 mL 3    losartan-hydrochlorothiazide 100-25 mg (HYZAAR) 100-25 mg per tablet TAKE 1 TABLET EVERY MORNING 90 tablet 3    metoprolol succinate (TOPROL-XL) 100 MG 24 hr tablet Take 1 tablet (100 mg total) by mouth once daily. 90 tablet 1    pantoprazole (PROTONIX) 40 MG tablet TAKE 1 TABLET TWICE DAILY 180 tablet 10    triamcinolone (KENALOG) 0.5 % ointment Apply topically 2 (two) times daily. 30 g 2    metFORMIN (GLUCOPHAGE) 500 MG tablet Take 1 tablet (500 mg total) by mouth 2 (two) times daily with meals. 180 tablet 3     No current  facility-administered medications for this visit.       Review of patient's allergies indicates:  No Known Allergies      Objective:       Vitals:    01/17/24 1004   BP: 130/76   Pulse: 82       Physical Exam  Constitutional:       Appearance: He is well-developed.   HENT:      Head: Normocephalic and atraumatic.      Right Ear: External ear normal.      Left Ear: External ear normal.      Nose: Nose normal.      Mouth/Throat:      Pharynx: No oropharyngeal exudate.   Eyes:      General: No scleral icterus.        Right eye: No discharge.         Left eye: No discharge.      Conjunctiva/sclera: Conjunctivae normal.      Pupils: Pupils are equal, round, and reactive to light.   Neck:      Thyroid: No thyromegaly.      Vascular: No JVD.      Trachea: No tracheal deviation.   Cardiovascular:      Rate and Rhythm: Normal rate and regular rhythm.      Heart sounds: Normal heart sounds. No murmur heard.     No friction rub. No gallop.   Pulmonary:      Effort: Pulmonary effort is normal. No respiratory distress.      Breath sounds: Normal breath sounds. No stridor. No wheezing or rales.   Chest:      Chest wall: No tenderness.   Abdominal:      General: Bowel sounds are normal. There is no distension.      Palpations: Abdomen is soft. There is no mass.      Tenderness: There is no abdominal tenderness. There is no guarding or rebound.      Hernia: No hernia is present.   Musculoskeletal:         General: No tenderness. Normal range of motion.      Cervical back: Normal range of motion and neck supple.   Lymphadenopathy:      Cervical: No cervical adenopathy.   Skin:     General: Skin is warm and dry.      Coloration: Skin is not pale.      Findings: No erythema or rash.   Neurological:      Mental Status: He is alert and oriented to person, place, and time.      Cranial Nerves: No cranial nerve deficit.      Motor: No abnormal muscle tone.      Coordination: Coordination normal.      Deep Tendon Reflexes: Reflexes are  normal and symmetric. Reflexes normal.   Psychiatric:         Behavior: Behavior normal.         Thought Content: Thought content normal.         Judgment: Judgment normal.         Assessment:       Problem List Items Addressed This Visit       Type 2 diabetes mellitus with diabetic polyneuropathy, without long-term current use of insulin - Primary (Chronic)    Relevant Medications    metFORMIN (GLUCOPHAGE) 500 MG tablet    Other Relevant Orders    CBC Auto Differential    Comprehensive Metabolic Panel    Hemoglobin A1C    Urinalysis    Microalbumin/Creatinine Ratio, Urine    Mixed hyperlipidemia (Chronic)    Relevant Orders    CBC Auto Differential    Comprehensive Metabolic Panel    Hemoglobin A1C    Urinalysis    Microalbumin/Creatinine Ratio, Urine    Essential hypertension (Chronic)    Relevant Orders    CBC Auto Differential    Comprehensive Metabolic Panel    Hemoglobin A1C    Urinalysis    Microalbumin/Creatinine Ratio, Urine    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    Relevant Medications    metFORMIN (GLUCOPHAGE) 500 MG tablet    Other Relevant Orders    CBC Auto Differential    Comprehensive Metabolic Panel    Hemoglobin A1C    Urinalysis    Microalbumin/Creatinine Ratio, Urine    PSVT (paroxysmal supraventricular tachycardia)    Gastroesophageal reflux disease without esophagitis    Relevant Orders    CBC Auto Differential    Comprehensive Metabolic Panel    Hemoglobin A1C    Urinalysis    Microalbumin/Creatinine Ratio, Urine    Aortic atherosclerosis    Relevant Orders    CBC Auto Differential    Comprehensive Metabolic Panel    Hemoglobin A1C    Urinalysis    Microalbumin/Creatinine Ratio, Urine       Plan:           Bryant was seen today for follow-up.    Diagnoses and all orders for this visit:    Type 2 diabetes mellitus with diabetic polyneuropathy, without long-term current use of insulin  -     CBC Auto Differential; Future  -     Comprehensive Metabolic Panel; Future  -     Hemoglobin A1C;  Future  -     Urinalysis; Future  -     Microalbumin/Creatinine Ratio, Urine; Future  -     metFORMIN (GLUCOPHAGE) 500 MG tablet; Take 1 tablet (500 mg total) by mouth 2 (two) times daily with meals.    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  -     CBC Auto Differential; Future  -     Comprehensive Metabolic Panel; Future  -     Hemoglobin A1C; Future  -     Urinalysis; Future  -     Microalbumin/Creatinine Ratio, Urine; Future    Mixed hyperlipidemia  -     CBC Auto Differential; Future  -     Comprehensive Metabolic Panel; Future  -     Hemoglobin A1C; Future  -     Urinalysis; Future  -     Microalbumin/Creatinine Ratio, Urine; Future    Essential hypertension  -     CBC Auto Differential; Future  -     Comprehensive Metabolic Panel; Future  -     Hemoglobin A1C; Future  -     Urinalysis; Future  -     Microalbumin/Creatinine Ratio, Urine; Future    PSVT (paroxysmal supraventricular tachycardia)          HTN  -controlled    DM II  -controlled    HLD  -not at goal  -diet control n statin    Itching  -kenalog as directed    Spent adequate time in obtaining history and explaining differentials    40 minutes spent during this visit of which greater than 50% devoted to face-face counseling and coordination of care regarding diagnosis and management plan      Rtc 6 m r prn

## 2024-01-18 DIAGNOSIS — E11.42 TYPE 2 DIABETES MELLITUS WITH DIABETIC POLYNEUROPATHY, WITHOUT LONG-TERM CURRENT USE OF INSULIN: ICD-10-CM

## 2024-01-18 RX ORDER — METFORMIN HYDROCHLORIDE 500 MG/1
TABLET ORAL
Qty: 180 TABLET | Refills: 0 | OUTPATIENT
Start: 2024-01-18

## 2024-01-18 RX ORDER — METFORMIN HYDROCHLORIDE 500 MG/1
500 TABLET ORAL 2 TIMES DAILY WITH MEALS
Qty: 180 TABLET | Refills: 3 | Status: SHIPPED | OUTPATIENT
Start: 2024-01-18

## 2024-01-18 NOTE — TELEPHONE ENCOUNTER
Patient: Alexandr Huizar Date: 2021   : 1945 Attending: Zion Orosco, *   76 year old male      Subjective: I'm breathing the same, I do not feel I am turning the corner I was quite tired today but I was able to go to the commode and have a bowel movement, coughing not much,i cannot tolare much Bipap machine.  Patient on high-flow oxygen watching the monitor or 89-94%, his doing better at night on BiPAP, but he's anxious on it, but he said he usually has insomnia and he tried everything over-the-counter and is not really working and he is not interested in melatonin    Review of Systems:  Patient denies nausea and vomiting, tolerating oral intake  Constitutional:  positive for fatigue and malaise  Eyes:  negative  Ears, nose, mouth, throat, and face:  positive for hearing loss  Respiratory:  positive for chronic bronchitis, cough, dyspnea on exertion, emphysema, sputum and wheezing  Cardiovascular:  negative  Gastrointestinal:  negative  Genitourinary:  negative  Hematologic/Lymphatic:  negative  Musculoskeletal:  negative  Neurological:  negative  Behavioral/Psychiatric:  negative     Reviewed Pertinent: Allergies, Medications, Medical History, Surgical History, Social History, Family History, Radiology and Labs    Vital Last Value 24 Hour Range   Temperature 98 °F (36.7 °C) (21 1000) Temp  Min: 98 °F (36.7 °C)  Max: 98 °F (36.7 °C)   Pulse (!) 101 (21 1700) Pulse  Min: 71  Max: 126   Respiratory (!) 30 (21) Resp  Min: 19  Max: 38   Non-Invasive  Blood Pressure 134/67 (21 1600) BP  Min: 119/64  Max: 137/74   Pulse Oximetry (!) 89 % (21) SpO2  Min: 77 %  Max: 98 %   Arterial   Blood Pressure   No data recorded     Vital Today Admit   Weight 81.8 kg (180 lb 5.4 oz) (21 0550) Weight: 87 kg (191 lb 12.8 oz) (21)   Height N/A Height: 5' 11\" (180.3 cm) (21)   BMI N/A BMI (Calculated): 25.06 (21)     Weight over the  No care due was identified.  [facility] Embedded Care Due Messages. Reference number: [id number].   1/18/2024 10:56:34 AM CST   past 48 Hours:  Patient Vitals for the past 48 hrs:   Weight   12/19/21 0532 82 kg (180 lb 12.4 oz)   12/20/21 0550 81.8 kg (180 lb 5.4 oz)        Hemodynamics:      Last Value 24 Hour Range   CVP   No data recorded   PAS/PAD   No data recorded   PCWP   No data recorded   CO   No data recorded   CI   No data recorded   SV02   No data recorded     [employer] II Score:    Vent Settings:    Day # on vent:     Last Value 24 Hour Range   Vent/02 Device   *DO NOT USE*  O2 Device could not be evaluated. This SmartLink does not work with rows of the type: Custom List   Mode   Vent Mode could not be evaluated. This SmartLink does not work with rows of the type: Custom List   RRset   No data recorded   02 Liter Flow 60 L/min (15LPM OM) O2 Flow Rate (L/min)  Min: 60 L/min  Max: 60 L/min   Fi02 95 % FiO2 (%)  Min: 95 %  Max: 95 %   PEEP/CPAC/EPAP   No data recorded       Intake/Output:    Last Stool Occurrence: 1 (12/20/21 1457)    No intake/output data recorded.    I/O last 3 completed shifts:  In: 1339 [P.O.:1080; IV Piggyback:259]  Out: 2000 [Urine:2000]    Physical Exam:  Visit Vitals  /67   Pulse (!) 101   Temp 98 °F (36.7 °C) (Oral)   Resp (!) 30   Ht 5' 11\" (1.803 m)   Wt 81.8 kg (180 lb 5.4 oz)   SpO2 (!) 89%   BMI 25.15 kg/m²       General Appearance:    Alert, cooperative, no distress, appears stated age   Head:    Normocephalic, without obvious abnormality, atraumatic   Eyes:    Pupils eqiual, round, reactive to light, conjunctivae/corneas    clear, extraocular movements intact,  both eyes    Ears:    Normal tympanic membranes and external ear canals, both     ears   Nose:   Nares normal, septum midline, mucosa normal, no drainage    or sinus tenderness   Throat:   Lips, mucosa, and tongue normal; teeth and gums normal   Neck:   Supple, symmetrical, trachea midline, no adenopathy;        thyroid:  No enlargement/tenderness/nodules; no carotid    bruit or jugular venous distention   Back:     Symmetric, no  curvature, range of motion normal, no     costovertebral angle tenderness   Lungs:     Diminished to auscultation bilaterally, respirations unlabored at rest but labored if he talks with scattered wheezes and rhonchi   Chest wall:    No tenderness or deformity   Heart:    Regular rate and rhythm, S1 and S2 normal, no murmur, rub   or gallop   Abdomen:     Soft, non-tender, bowel sounds active all four quadrants,     no masses, no organomegaly           Extremities:   Extremities normal, atraumatic, no cyanosis or edema   Pulses:   2+ and symmetric all extremities   Skin:   Skin color, texture, turgor normal, no rashes or lesions   Lymph nodes:   Cervical, supraclavicular, and axillary nodes normal   Neurologic:   Cranial nerves II-XII intact. Normal strength, sensation and      reflexes throughout       Laboratory Results:     Lab Results   Component Value Date    SODIUM 137 12/20/2021    POTASSIUM 5.1 12/20/2021    CO2 21 12/20/2021    GLUCOSE 280 (H) 12/20/2021    BUN 50 (H) 12/20/2021    CREATININE 1.35 (H) 12/20/2021    CALCIUM 7.9 (L) 12/20/2021    ALBUMIN 2.3 (L) 12/20/2021    MG 1.7 10/28/2020    BILIRUBIN 0.7 12/20/2021    ALKPT 97 12/20/2021    AST 23 12/20/2021    GPT 37 12/20/2021    INR 1.0 12/13/2021    WBC 24.5 (H) 12/20/2021    HGB 13.1 12/20/2021    HCT 41.6 12/20/2021     (L) 12/20/2021    BAND 3 11/13/2019    RESR 4 11/29/2021    CPK 87 12/18/2021    CRP 4.8 (H) 12/20/2021    TSH 3.693 08/18/2021         Urinalysis:    Lab Results   Component Value Date    USPG 1.025 12/15/2021    UWBC Negative 12/15/2021    URBC Trace (A) 12/15/2021    UBILI Negative 12/15/2021    UPH 5.5 12/15/2021    UBACTR NONE SEEN 11/03/2016       Cultures: Not Applicable    Imaging: Reviewed Abnormal    Assessment:   1. Covid 19 acute hypoxemic respiratory failure  2. COPD exacerbation  3. 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 Arthralgia - due to previous chemo use- on plaquenil  5.  ESSENTIAL HYPERTENSION WITH ACUTE KIDNEY INJURY DUE TO DEHYDRATION  6. Refuses Lovenox or heparin DVT prophylaxis  7. Elevated glucose due to steroids and hydration with D5 will change IV fluids.      Plan:  Instructed the patient to rest on and off he had a bowel movement, inflammatory markers trending down , tomorrow he will sit in the chair, unfortunately is hard to give him a diet because he needs to take his mask off so he prefers to drink Ensure 2 cans at the time through a straw so that will be good I order that, prophylaxis DVT SCD boots with pumps as the patient agreeable now with Lovenox, CT of chest for PE negative for PE  .  He is constipated and will give him Senokot  Will continue current medication pain alleviated with Tylenol.  All his questions are answered to his satisfaction.  Stay in ICU.  Prognosis remains guarded; patient is a do not resuscitate    Discussed with: Nurse and Patient

## 2024-01-18 NOTE — TELEPHONE ENCOUNTER
No care due was identified.  [facility] Embedded Care Due Messages. Reference number: [id number].   1/18/2024 1:33:55 PM CST

## 2024-01-18 NOTE — TELEPHONE ENCOUNTER
Refill Decision Note   Bryant Gil  is requesting a refill authorization.  Brief Assessment and Rationale for Refill:  Quick Discontinue     Medication Therapy Plan:  New pharmacy requestPharmacy is requesting new scripts for the following medications without required information, (sig/ frequency/qty/etc)         Comments: Pharmacies have been requesting medications for patients without required information, (sig, frequency, qty, etc.). In addition, requests are sent for medication(s) pt. are currently not taking, and medications patients have never taken.    We have spoken to the pharmacies about these request types and advised their teams previously that we are unable to assess these New Script requests and require all details for these requests. This is a known issue and has been reported.      Note composed:11:38 AM 01/18/2024

## 2024-02-08 ENCOUNTER — OFFICE VISIT (OUTPATIENT)
Dept: DERMATOLOGY | Facility: CLINIC | Age: 84
End: 2024-02-08
Payer: MEDICARE

## 2024-02-08 DIAGNOSIS — L91.0 KELOID: Primary | ICD-10-CM

## 2024-02-08 DIAGNOSIS — L90.5 SCAR: ICD-10-CM

## 2024-02-08 DIAGNOSIS — L29.9 ITCH: ICD-10-CM

## 2024-02-08 PROCEDURE — 3072F LOW RISK FOR RETINOPATHY: CPT | Mod: CPTII,S$GLB,, | Performed by: DERMATOLOGY

## 2024-02-08 PROCEDURE — 99213 OFFICE O/P EST LOW 20 MIN: CPT | Mod: S$GLB,,, | Performed by: DERMATOLOGY

## 2024-02-08 PROCEDURE — 1159F MED LIST DOCD IN RCRD: CPT | Mod: CPTII,S$GLB,, | Performed by: DERMATOLOGY

## 2024-02-08 PROCEDURE — 1157F ADVNC CARE PLAN IN RCRD: CPT | Mod: CPTII,S$GLB,, | Performed by: DERMATOLOGY

## 2024-02-08 PROCEDURE — 1101F PT FALLS ASSESS-DOCD LE1/YR: CPT | Mod: CPTII,S$GLB,, | Performed by: DERMATOLOGY

## 2024-02-08 PROCEDURE — 1160F RVW MEDS BY RX/DR IN RCRD: CPT | Mod: CPTII,S$GLB,, | Performed by: DERMATOLOGY

## 2024-02-08 PROCEDURE — 3288F FALL RISK ASSESSMENT DOCD: CPT | Mod: CPTII,S$GLB,, | Performed by: DERMATOLOGY

## 2024-02-08 PROCEDURE — 99999 PR PBB SHADOW E&M-EST. PATIENT-LVL III: CPT | Mod: PBBFAC,,, | Performed by: DERMATOLOGY

## 2024-02-08 PROCEDURE — 1126F AMNT PAIN NOTED NONE PRSNT: CPT | Mod: CPTII,S$GLB,, | Performed by: DERMATOLOGY

## 2024-02-08 RX ORDER — CLOBETASOL PROPIONATE 0.5 MG/G
CREAM TOPICAL 2 TIMES DAILY
Qty: 45 G | Refills: 2 | Status: SHIPPED | OUTPATIENT
Start: 2024-02-08 | End: 2024-06-07

## 2024-02-08 NOTE — PROGRESS NOTES
Subjective:      Patient ID:  Bryant Gil is a 83 y.o. male who presents for   Chief Complaint   Patient presents with    Keloid     Chest and ears     Patient with new complaint of keloid  Location: ears  Duration: 7 months  Symptoms: itches  Relieving factors/Previous treatments: clobetasol  cm        Keloid - Follow-up  Symptom course: improving  Affected locations: chest, left ear and right ear  Signs / symptoms: itching  Severity: mild      Review of Systems   Constitutional:  Positive for fatigue. Negative for fever and chills.       Objective:   Physical Exam   Constitutional: He appears well-developed and well-nourished. No distress.   Neurological: He is alert and oriented to person, place, and time. He is not disoriented.   Psychiatric: He has a normal mood and affect.   Skin:   Areas Examined (abnormalities noted in diagram):   Head / Face Inspection Performed  Chest / Axilla Inspection Performed       Diagram Legend     Erythematous scaling macule/papule c/w actinic keratosis       Vascular papule c/w angioma      Pigmented verrucoid papule/plaque c/w seborrheic keratosis      Yellow umbilicated papule c/w sebaceous hyperplasia      Irregularly shaped tan macule c/w lentigo     1-2 mm smooth white papules consistent with Milia      Movable subcutaneous cyst with punctum c/w epidermal inclusion cyst      Subcutaneous movable cyst c/w pilar cyst      Firm pink to brown papule c/w dermatofibroma      Pedunculated fleshy papule(s) c/w skin tag(s)      Evenly pigmented macule c/w junctional nevus     Mildly variegated pigmented, slightly irregular-bordered macule c/w mildly atypical nevus      Flesh colored to evenly pigmented papule c/w intradermal nevus       Pink pearly papule/plaque c/w basal cell carcinoma      Erythematous hyperkeratotic cursted plaque c/w SCC      Surgical scar with no sign of skin cancer recurrence      Open and closed comedones      Inflammatory papules and pustules       Verrucoid papule consistent consistent with wart     Erythematous eczematous patches and plaques     Dystrophic onycholytic nail with subungual debris c/w onychomycosis     Umbilicated papule    Erythematous-base heme-crusted tan verrucoid plaque consistent with inflamed seborrheic keratosis     Erythematous Silvery Scaling Plaque c/w Psoriasis     See annotation                    Assessment / Plan:        Keloid  -     clobetasoL (TEMOVATE) 0.05 % cream; Apply topically 2 (two) times daily. Focally to thick scars of the chest 3 weeks after shots  Dispense: 45 g; Refill: 2  Chest scars doing well.  Cont clob focally prn.  Independent historian was in exam room or on virtual today to provide information and assist in delivering therapy and treatment at home.  Okay to wait on il roids for mildly thick remnants.    Plan exc x 2 for rusty earlobes.  Also serial il roids.  Discussed with patient the risks of procedure or surgery, including scar, ulceration, recurrence, skin discoloration, and infection.  Patient not to have alcohol, ibuprofen, nsaids day of and night before procedure.  No swimming pool or ocean water for one week after procedure.  No heavy exertion in general or physical activity that would stretch the surgical site for one week after the procedure.    Scar  Long term scar discussed.    Itch  -     clobetasoL (TEMOVATE) 0.05 % cream; Apply topically 2 (two) times daily. Focally to thick scars of the chest 3 weeks after shots  Dispense: 45 g; Refill: 2  Reviewed with patient different treatment options and associated risks.  Proper application of medications and or care for affected area(s) and condition(s) reviewed.             Follow up if symptoms worsen or fail to improve, for Procedure.

## 2024-03-11 ENCOUNTER — CLINICAL SUPPORT (OUTPATIENT)
Dept: OPHTHALMOLOGY | Facility: CLINIC | Age: 84
End: 2024-03-11
Payer: MEDICARE

## 2024-03-11 ENCOUNTER — OFFICE VISIT (OUTPATIENT)
Dept: OPTOMETRY | Facility: CLINIC | Age: 84
End: 2024-03-11
Payer: COMMERCIAL

## 2024-03-11 DIAGNOSIS — H52.4 HYPEROPIA WITH ASTIGMATISM AND PRESBYOPIA, BILATERAL: ICD-10-CM

## 2024-03-11 DIAGNOSIS — H40.053 BILATERAL OCULAR HYPERTENSION: ICD-10-CM

## 2024-03-11 DIAGNOSIS — H25.13 SENILE NUCLEAR SCLEROSIS, BILATERAL: ICD-10-CM

## 2024-03-11 DIAGNOSIS — H52.203 HYPEROPIA WITH ASTIGMATISM AND PRESBYOPIA, BILATERAL: ICD-10-CM

## 2024-03-11 DIAGNOSIS — H40.053 BILATERAL OCULAR HYPERTENSION: Primary | ICD-10-CM

## 2024-03-11 DIAGNOSIS — H25.012 CORTICAL AGE-RELATED CATARACT OF LEFT EYE: ICD-10-CM

## 2024-03-11 DIAGNOSIS — H52.03 HYPEROPIA WITH ASTIGMATISM AND PRESBYOPIA, BILATERAL: ICD-10-CM

## 2024-03-11 PROCEDURE — 92015 DETERMINE REFRACTIVE STATE: CPT | Mod: S$GLB,,, | Performed by: OPTOMETRIST

## 2024-03-11 PROCEDURE — 99999 PR PBB SHADOW E&M-EST. PATIENT-LVL III: CPT | Mod: PBBFAC,,, | Performed by: OPTOMETRIST

## 2024-03-11 PROCEDURE — 99214 OFFICE O/P EST MOD 30 MIN: CPT | Mod: S$GLB,,, | Performed by: OPTOMETRIST

## 2024-03-11 RX ORDER — INFLUENZA A VIRUS A/VICTORIA/4897/2022 IVR-238 (H1N1) ANTIGEN (FORMALDEHYDE INACTIVATED), INFLUENZA A VIRUS A/DARWIN/6/2021 IVR-227 (H3N2) ANTIGEN (FORMALDEHYDE INACTIVATED), INFLUENZA B VIRUS B/AUSTRIA/1359417/2021 BVR-26 ANTIGEN (FORMALDEHYDE INACTIVATED), INFLUENZA B VIRUS B/PHUKET/3073/2013 BVR-1B ANTIGEN (FORMALDEHYDE INACTIVATED) 15; 15; 15; 15 UG/.5ML; UG/.5ML; UG/.5ML; UG/.5ML
INJECTION, SUSPENSION INTRAMUSCULAR
COMMUNITY
Start: 2023-09-15 | End: 2024-06-07

## 2024-03-11 NOTE — PROGRESS NOTES
HPI    GWENDOLYN: 11/23  Chief complaint (CC): Patient is here for follow up with HVF,OCT and IOP   check today. Patient hasn't noticed any vison cahnges since the alst exam.  Glasses? +  Contacts? -  H/o eye surgery, injections or laser: -  H/o eye injury: -  Known eye conditions? See above  Family h/o eye conditions? -  Eye gtts? Using latanoprost OU Q HS, last used last night      (-) Flashes (-)  Floaters (-) Mucous   (-)  Tearing (-) Itching (-) Burning   (-) Headaches (-) Eye Pain/discomfort (-) Irritation   (-)  Redness (-) Double vision (-) Blurry vision    Diabetic? +  yesterday  A1c? Hemoglobin A1C       Date                     Value               Ref Range             Status                01/17/2024               6.5 (H)             4.0 - 5.6 %           Final                 09/15/2023               7.0 (H)             4.0 - 5.6 %           Final                 03/16/2023               7.4 (H)             4.0 - 5.6 %           Final                         Last edited by Krystal Abebe on 3/11/2024  9:32 AM.            Assessment /Plan     For exam results, see Encounter Report.      Bilateral ocular hypertension  (-) FHX. IOP 17 OD, OS. Last 18 OD, 17 OS. C/d 0.4 OD, 0.35 OS. Prev testing w/Dr Forte. Tmax 32 OD, 27 OS. IOP target of high teens.   3/11/2024 OCT WNL OU  3/11/2024 HVF OD WNL, OS borderline  Educated pt on findings w/understanding. Cont Latanoprost QHS OU.   RTC 4 mo IOP/pachy    Hyperopia with astigmatism and presbyopia, bilateral  Senile nuclear sclerosis, bilateral  -     Ambulatory referral/consult to Ophthalmology; Future; Expected date: 03/18/2024  Cortical age-related cataract of left eye  -     Ambulatory referral/consult to Ophthalmology; Future; Expected date: 03/18/2024  Changes noted OD w/decreased VA from 20/20 to 20/30. OS doesn't have changes but VA decreased from 20/30 to 20/40 today. Pt reports noting vision changes recently. Pt could initially see out of glasses  purchased in Nov but is now having trouble seeing small print. Educated pt on potential for surgery. Referral to Dr. Sumner.

## 2024-03-11 NOTE — PROGRESS NOTES
OCT/HVF done ou./ rel/fix/coop. Good ou./ chart checked for latex allergy./ .75 + 1.50 x 15/od 1.50 + .50 x 20/os-[facility]   - increased forgetfulness   - most likely 2/2 dementia worsening vs infectious etiology vs metabolic  - xray chest , procal - neg  -Off abx  - BCx - ngtd  - monitor bp  - currently stable and cooperative

## 2024-03-19 ENCOUNTER — PROCEDURE VISIT (OUTPATIENT)
Dept: DERMATOLOGY | Facility: CLINIC | Age: 84
End: 2024-03-19
Payer: MEDICARE

## 2024-03-19 DIAGNOSIS — T14.8XXA OPEN WOUND OF SKIN: ICD-10-CM

## 2024-03-19 DIAGNOSIS — L91.0 KELOID: Primary | ICD-10-CM

## 2024-03-19 PROCEDURE — 88305 TISSUE EXAM BY PATHOLOGIST: CPT | Mod: 26,,, | Performed by: PATHOLOGY

## 2024-03-19 PROCEDURE — 11311 SHAVE SKIN LESION 0.6-1.0 CM: CPT | Mod: S$GLB,,, | Performed by: DERMATOLOGY

## 2024-03-19 PROCEDURE — 88305 TISSUE EXAM BY PATHOLOGIST: CPT | Performed by: PATHOLOGY

## 2024-03-19 PROCEDURE — 11312 SHAVE SKIN LESION 1.1-2.0 CM: CPT | Mod: S$GLB,,, | Performed by: DERMATOLOGY

## 2024-03-19 PROCEDURE — 11900 INJECT SKIN LESIONS </W 7: CPT | Mod: XS,S$GLB,, | Performed by: DERMATOLOGY

## 2024-03-19 PROCEDURE — 11310 SHAVE SKIN LESION 0.5 CM/<: CPT | Mod: S$GLB,,, | Performed by: DERMATOLOGY

## 2024-03-19 NOTE — PATIENT INSTRUCTIONS
Post- Operative Wound Care    Your doctor has performed local skin surgery today.  Vaseline ointment and a pressure bandage were placed after the surgery.  It is very important that you keep this bandage in place for 24 hours.  This will decrease the risk of post - operative infection and bleeding.  After 24 hours, you may remove the band aid and wash the area with warm soap and water and apply Vaseline ointment.  Many patients prefer to use Neosporin or Bacitracin ointment.  This is acceptable; however know that you can develop an allergy to this medication even if you have used it safely for years.  It is important to keep the area moist.  Letting it dry out and get air slows healing time, will worsen the scar, and make it more difficult to remove the stitches.  Band aid is optional after first 24 hours.    It is best NOT to use hydrogen peroxide or antibacterial soap to wash the operative site.       If you notice increasing redness, tenderness, pain, or yellow drainage at the biopsy or surgical site, please notify your doctor.  These are signs of an infection.    If your biopsy/surgical site is bleeding, apply firm pressure for 15 minutes straight.  Repeat for another 15 minutes, if it is still bleeding.   If the surgical site continues to bleed, then please contact your doctor.      For [facility] users:   You will receive your pathology results in [facility] as soon as they are available. Please be assured that your physician/provider will review your results and contact you should additional treatment be required. This is one more way [employer] is putting you first.       [facility] [place] [address]/ [phone] FAX/ [email or web address]

## 2024-03-19 NOTE — PROGRESS NOTES
Shave removal procedure note:  r ant earlobe    Lesion size 0.5 cm    Shave performed after verbal consent including risk of infection, scar, recurrence, need for additional treatment of site. Area prepped with alcohol, anesthetized with approximately 1.0cc of 1% lidocaine with epinephrine. Lesional tissue shaved with razor blade. Hemostasis achieved with application of aluminum chloride followed by hyfrecation as needed. No complications. Dressing applied. Wound care explained.    Shave removal procedure note:  l ant earlobe    Lesion size 0.6 cm    Shave performed after verbal consent including risk of infection, scar, recurrence, need for additional treatment of site. Area prepped with alcohol, anesthetized with approximately 1.0cc of 1% lidocaine with epinephrine. Lesional tissue shaved with razor blade. Hemostasis achieved with application of aluminum chloride followed by hyfrecation as needed. No complications. Dressing applied. Wound care explained.    Shave removal procedure note:  l post earlobe    Lesion size 1.3 cm    Shave performed after verbal consent including risk of infection, scar, recurrence, need for additional treatment of site. Area prepped with alcohol, anesthetized with approximately 1.0cc of 1% lidocaine with epinephrine. Lesional tissue shaved with razor blade. Hemostasis achieved with application of aluminum chloride followed by hyfrecation as needed. No complications. Dressing applied. Wound care explained.    Shave removal procedure note:  r post earlobe    Lesion size 1.4 cm    Shave performed after verbal consent including risk of infection, scar, recurrence, need for additional treatment of site. Area prepped with alcohol, anesthetized with approximately 1.0cc of 1% lidocaine with epinephrine. Lesional tissue shaved with razor blade. Hemostasis achieved with application of aluminum chloride followed by hyfrecation as needed. No complications. Dressing applied. Wound care  explained.        Wound beds x 4  Intralesional Kenalog 10mg/cc (0.6 cc total) injected into 4 lesions on the rusty ear lobes today after obtaining verbal consent including risk of surrounding hypopigmentation. Patient tolerated procedure well.    Units: 1  NDC for Kenalog 10mg/cc:  4308-1246-62

## 2024-03-22 LAB
FINAL PATHOLOGIC DIAGNOSIS: NORMAL
GROSS: NORMAL
Lab: NORMAL
MICROSCOPIC EXAM: NORMAL

## 2024-04-01 DIAGNOSIS — E11.42 TYPE 2 DIABETES MELLITUS WITH DIABETIC POLYNEUROPATHY, WITHOUT LONG-TERM CURRENT USE OF INSULIN: ICD-10-CM

## 2024-04-01 NOTE — TELEPHONE ENCOUNTER
No care due was identified.  [facility] Embedded Care Due Messages. Reference number: [id number].   4/01/2024 1:55:00 AM CDT

## 2024-04-02 RX ORDER — GLIPIZIDE 5 MG/1
5 TABLET, FILM COATED, EXTENDED RELEASE ORAL
Qty: 90 TABLET | Refills: 1 | Status: SHIPPED | OUTPATIENT
Start: 2024-04-02

## 2024-04-02 NOTE — TELEPHONE ENCOUNTER
Refill Decision Note   Bryant Gil  is requesting a refill authorization.  Brief Assessment and Rationale for Refill:  Approve     Medication Therapy Plan:         Comments:     Note composed:6:18 AM 04/02/2024

## 2024-04-10 DIAGNOSIS — I10 ESSENTIAL HYPERTENSION: ICD-10-CM

## 2024-04-10 RX ORDER — METOPROLOL SUCCINATE 100 MG/1
100 TABLET, EXTENDED RELEASE ORAL
Qty: 90 TABLET | Refills: 3 | Status: SHIPPED | OUTPATIENT
Start: 2024-04-10

## 2024-04-10 NOTE — TELEPHONE ENCOUNTER
No care due was identified.  [facility] Embedded Care Due Messages. Reference number: [id number].   4/10/2024 2:03:34 AM CDT

## 2024-04-10 NOTE — TELEPHONE ENCOUNTER
Refill Decision Note   Bryant Jeffery  is requesting a refill authorization.  Brief Assessment and Rationale for Refill:  Approve     Medication Therapy Plan:        Comments:     Note composed:5:01 PM 04/10/2024

## 2024-04-30 ENCOUNTER — PATIENT MESSAGE (OUTPATIENT)
Dept: DERMATOLOGY | Facility: CLINIC | Age: 84
End: 2024-04-30

## 2024-04-30 ENCOUNTER — OFFICE VISIT (OUTPATIENT)
Dept: DERMATOLOGY | Facility: CLINIC | Age: 84
End: 2024-04-30
Payer: MEDICARE

## 2024-04-30 DIAGNOSIS — L91.0 KELOID: Primary | ICD-10-CM

## 2024-04-30 PROCEDURE — 1101F PT FALLS ASSESS-DOCD LE1/YR: CPT | Mod: HCNC,CPTII,S$GLB, | Performed by: DERMATOLOGY

## 2024-04-30 PROCEDURE — 3288F FALL RISK ASSESSMENT DOCD: CPT | Mod: HCNC,CPTII,S$GLB, | Performed by: DERMATOLOGY

## 2024-04-30 PROCEDURE — G2211 COMPLEX E/M VISIT ADD ON: HCPCS | Mod: HCNC,S$GLB,, | Performed by: DERMATOLOGY

## 2024-04-30 PROCEDURE — 1126F AMNT PAIN NOTED NONE PRSNT: CPT | Mod: HCNC,CPTII,S$GLB, | Performed by: DERMATOLOGY

## 2024-04-30 PROCEDURE — 99213 OFFICE O/P EST LOW 20 MIN: CPT | Mod: HCNC,S$GLB,, | Performed by: DERMATOLOGY

## 2024-04-30 PROCEDURE — 1159F MED LIST DOCD IN RCRD: CPT | Mod: HCNC,CPTII,S$GLB, | Performed by: DERMATOLOGY

## 2024-04-30 PROCEDURE — 99999 PR PBB SHADOW E&M-EST. PATIENT-LVL III: CPT | Mod: PBBFAC,,, | Performed by: DERMATOLOGY

## 2024-04-30 PROCEDURE — 3072F LOW RISK FOR RETINOPATHY: CPT | Mod: HCNC,CPTII,S$GLB, | Performed by: DERMATOLOGY

## 2024-04-30 PROCEDURE — 1157F ADVNC CARE PLAN IN RCRD: CPT | Mod: HCNC,CPTII,S$GLB, | Performed by: DERMATOLOGY

## 2024-04-30 PROCEDURE — 1160F RVW MEDS BY RX/DR IN RCRD: CPT | Mod: HCNC,CPTII,S$GLB, | Performed by: DERMATOLOGY

## 2024-04-30 RX ORDER — CLOBETASOL PROPIONATE 0.5 MG/G
CREAM TOPICAL 2 TIMES DAILY
Qty: 45 G | Refills: 1 | Status: SHIPPED | OUTPATIENT
Start: 2024-04-30

## 2024-04-30 NOTE — PROGRESS NOTES
Subjective:      Patient ID:  Bryant Gil is a 83 y.o. male who presents for   Chief Complaint   Patient presents with    Keloid     Follow-up      Keloid - Follow-up  Symptom course: improving  Affected locations: right ear and left ear  Signs / symptoms: tender      Review of Systems   Constitutional: Negative.    HENT: Negative.     Respiratory: Negative.     Musculoskeletal: Negative.        Objective:   Physical Exam   Constitutional: He appears well-developed and well-nourished.   Neurological: He is alert and oriented to person, place, and time.   Psychiatric: He has a normal mood and affect.        Diagram Legend     Erythematous scaling macule/papule c/w actinic keratosis       Vascular papule c/w angioma      Pigmented verrucoid papule/plaque c/w seborrheic keratosis      Yellow umbilicated papule c/w sebaceous hyperplasia      Irregularly shaped tan macule c/w lentigo     1-2 mm smooth white papules consistent with Milia      Movable subcutaneous cyst with punctum c/w epidermal inclusion cyst      Subcutaneous movable cyst c/w pilar cyst      Firm pink to brown papule c/w dermatofibroma      Pedunculated fleshy papule(s) c/w skin tag(s)      Evenly pigmented macule c/w junctional nevus     Mildly variegated pigmented, slightly irregular-bordered macule c/w mildly atypical nevus      Flesh colored to evenly pigmented papule c/w intradermal nevus       Pink pearly papule/plaque c/w basal cell carcinoma      Erythematous hyperkeratotic cursted plaque c/w SCC      Surgical scar with no sign of skin cancer recurrence      Open and closed comedones      Inflammatory papules and pustules      Verrucoid papule consistent consistent with wart     Erythematous eczematous patches and plaques     Dystrophic onycholytic nail with subungual debris c/w onychomycosis     Umbilicated papule    Erythematous-base heme-crusted tan verrucoid plaque consistent with inflamed seborrheic keratosis     Erythematous Silvery  Scaling Plaque c/w Psoriasis     See annotation                    Assessment / Plan:        Keloid  -     clobetasoL (TEMOVATE) 0.05 % cream; Apply topically 2 (two) times daily. Bid-tid focally to ear scars until flat.  Dispense: 45 g; Refill: 1  Doing well post shaves.  Firmness more deep in skin.  Offered il roids but pt wishes to wait.  Start using clob cr tid focally to keloids.  Proper application of medications and or care for affected area(s) and condition(s) reviewed.  Reviewed with patient different treatment options and associated risks.  Patient to watch for recurrence or flares or worsening and to call the clinic for a follow up appointment for such.  If any regrowth, pt to call to get in for il roids touch up.             Follow up in about 2 months (around 6/30/2024).

## 2024-05-27 ENCOUNTER — OFFICE VISIT (OUTPATIENT)
Dept: OPHTHALMOLOGY | Facility: CLINIC | Age: 84
End: 2024-05-27
Payer: MEDICARE

## 2024-05-27 DIAGNOSIS — H40.053 BILATERAL OCULAR HYPERTENSION: ICD-10-CM

## 2024-05-27 DIAGNOSIS — H25.13 SENILE NUCLEAR SCLEROSIS, BILATERAL: ICD-10-CM

## 2024-05-27 DIAGNOSIS — H43.812 VITREOUS DETACHMENT OF LEFT EYE: ICD-10-CM

## 2024-05-27 DIAGNOSIS — E11.9 DM TYPE 2 WITHOUT RETINOPATHY: ICD-10-CM

## 2024-05-27 DIAGNOSIS — H25.13 NUCLEAR SCLEROSIS OF BOTH EYES: Primary | ICD-10-CM

## 2024-05-27 DIAGNOSIS — H25.012 CORTICAL AGE-RELATED CATARACT OF LEFT EYE: ICD-10-CM

## 2024-05-27 DIAGNOSIS — H52.7 REFRACTIVE ERROR: ICD-10-CM

## 2024-05-27 PROCEDURE — 1159F MED LIST DOCD IN RCRD: CPT | Mod: HCNC,CPTII,S$GLB, | Performed by: OPHTHALMOLOGY

## 2024-05-27 PROCEDURE — 92004 COMPRE OPH EXAM NEW PT 1/>: CPT | Mod: HCNC,S$GLB,, | Performed by: OPHTHALMOLOGY

## 2024-05-27 PROCEDURE — 1101F PT FALLS ASSESS-DOCD LE1/YR: CPT | Mod: HCNC,CPTII,S$GLB, | Performed by: OPHTHALMOLOGY

## 2024-05-27 PROCEDURE — 1160F RVW MEDS BY RX/DR IN RCRD: CPT | Mod: HCNC,CPTII,S$GLB, | Performed by: OPHTHALMOLOGY

## 2024-05-27 PROCEDURE — 1126F AMNT PAIN NOTED NONE PRSNT: CPT | Mod: HCNC,CPTII,S$GLB, | Performed by: OPHTHALMOLOGY

## 2024-05-27 PROCEDURE — 92136 OPHTHALMIC BIOMETRY: CPT | Mod: HCNC,LT,S$GLB, | Performed by: OPHTHALMOLOGY

## 2024-05-27 PROCEDURE — 99999 PR PBB SHADOW E&M-EST. PATIENT-LVL III: CPT | Mod: PBBFAC,HCNC,, | Performed by: OPHTHALMOLOGY

## 2024-05-27 PROCEDURE — 1157F ADVNC CARE PLAN IN RCRD: CPT | Mod: HCNC,CPTII,S$GLB, | Performed by: OPHTHALMOLOGY

## 2024-05-27 PROCEDURE — 3288F FALL RISK ASSESSMENT DOCD: CPT | Mod: HCNC,CPTII,S$GLB, | Performed by: OPHTHALMOLOGY

## 2024-05-27 NOTE — PROGRESS NOTES
Subjective:       Patient ID: Bryant Gil is a 83 y.o. male.    Chief Complaint: Cataract    HPI    Dr. Zazueta    Bilateral ocular hypertension  Cataract OU    Latanoprost QHS OU    Patient here for cataract evaluation. Patient states decrease in overall   vision with current glasses. Doesn't drive at night anymore due to glare   issues.  Patient denies diplopia, headaches, flashes/floaters, and pain.        Last edited by Nohelia Ramirez MA on 5/27/2024  8:23 AM.             Assessment:       1. Nuclear sclerosis of both eyes    2. Senile nuclear sclerosis, bilateral    3. Cortical age-related cataract of left eye    4. Bilateral ocular hypertension    5. Vitreous detachment of left eye    6. DM type 2 without retinopathy    7. Refractive error        Plan:       Visually significant cataract OU -Pt. Wants Sx.    OHT OU-IOP's are acceptable OU.  PVD OS-Stable.  DM-No NPDR OU.  RE-Pt does not want Toric IOL's.        Cataract Surgery Consent: Patient with a visually significant cataract with difficulties of ADLs, reading, driving, night vision, glare (any and all).  Discussed with Patient/Family/Caregiver: options, risks and benefits, expectations of cataract surgery, utilized an eye model with questions and answers to facilitate discussion.  Discussed lens options and patient understands that glasses may be required for optimal vision for distance and/or near vision after cataract surgery.  The Patient/Family/Caregiver  voice good understanding and patient wishes to proceed with surgery.  The patient will likely benefit from surgery and patient signed consent for Left Eye.  CE OS 1st  CNAOTO 19.5,       OD 2nd CNAOTO 19.5.  CPM OU.  Control DM.

## 2024-05-31 ENCOUNTER — TELEPHONE (OUTPATIENT)
Dept: OPHTHALMOLOGY | Facility: CLINIC | Age: 84
End: 2024-05-31
Payer: MEDICARE

## 2024-05-31 DIAGNOSIS — H25.11 NS (NUCLEAR SCLEROSIS), RIGHT: Primary | ICD-10-CM

## 2024-05-31 DIAGNOSIS — H25.13 NUCLEAR SCLEROSIS OF BOTH EYES: Primary | ICD-10-CM

## 2024-05-31 DIAGNOSIS — H25.12 NS (NUCLEAR SCLEROSIS), LEFT: Primary | ICD-10-CM

## 2024-05-31 RX ORDER — PREDNISOLONE/MOXIFLOX/BROMFEN 1 %-0.5 %
1 SUSPENSION, DROPS(FINAL DOSAGE FORM)(ML) OPHTHALMIC (EYE) 3 TIMES DAILY
Qty: 8 ML | Refills: 2 | Status: SHIPPED | OUTPATIENT
Start: 2024-06-22

## 2024-05-31 NOTE — TELEPHONE ENCOUNTER
----- Message from Nicolas Vasquez sent at 5/31/2024 11:07 AM CDT -----  Consult/Advisory    Name Of Caller:Bryant      Contact Preference:[phone]    Nature of call: Ptn returning a missed call please call to assist

## 2024-06-07 ENCOUNTER — PATIENT MESSAGE (OUTPATIENT)
Dept: FAMILY MEDICINE | Facility: CLINIC | Age: 84
End: 2024-06-07

## 2024-06-07 ENCOUNTER — OFFICE VISIT (OUTPATIENT)
Dept: FAMILY MEDICINE | Facility: CLINIC | Age: 84
End: 2024-06-07
Payer: MEDICARE

## 2024-06-07 VITALS
HEART RATE: 82 BPM | BODY MASS INDEX: 26.99 KG/M2 | OXYGEN SATURATION: 95 % | WEIGHT: 171.94 LBS | SYSTOLIC BLOOD PRESSURE: 106 MMHG | HEIGHT: 67 IN | DIASTOLIC BLOOD PRESSURE: 60 MMHG

## 2024-06-07 DIAGNOSIS — E11.22 TYPE 2 DM WITH CKD STAGE 2 AND HYPERTENSION: ICD-10-CM

## 2024-06-07 DIAGNOSIS — I12.9 TYPE 2 DM WITH CKD STAGE 2 AND HYPERTENSION: ICD-10-CM

## 2024-06-07 DIAGNOSIS — I70.0 AORTIC ATHEROSCLEROSIS: ICD-10-CM

## 2024-06-07 DIAGNOSIS — Z85.46 HISTORY OF PROSTATE CANCER: Chronic | ICD-10-CM

## 2024-06-07 DIAGNOSIS — Z00.00 ENCOUNTER FOR PREVENTIVE HEALTH EXAMINATION: Primary | ICD-10-CM

## 2024-06-07 DIAGNOSIS — R41.3 MEMORY LOSS: ICD-10-CM

## 2024-06-07 DIAGNOSIS — E11.69 TYPE 2 DIABETES MELLITUS WITH HYPERLIPIDEMIA: ICD-10-CM

## 2024-06-07 DIAGNOSIS — Z00.00 ENCOUNTER FOR MEDICARE ANNUAL WELLNESS EXAM: ICD-10-CM

## 2024-06-07 DIAGNOSIS — K57.30 SIGMOID DIVERTICULOSIS: Chronic | ICD-10-CM

## 2024-06-07 DIAGNOSIS — E78.5 TYPE 2 DIABETES MELLITUS WITH HYPERLIPIDEMIA: ICD-10-CM

## 2024-06-07 DIAGNOSIS — H40.059 OCULAR HYPERTENSION, UNSPECIFIED LATERALITY: ICD-10-CM

## 2024-06-07 DIAGNOSIS — E11.42 TYPE 2 DIABETES MELLITUS WITH DIABETIC POLYNEUROPATHY, WITHOUT LONG-TERM CURRENT USE OF INSULIN: Chronic | ICD-10-CM

## 2024-06-07 DIAGNOSIS — N18.2 TYPE 2 DM WITH CKD STAGE 2 AND HYPERTENSION: ICD-10-CM

## 2024-06-07 DIAGNOSIS — K21.9 GASTROESOPHAGEAL REFLUX DISEASE WITHOUT ESOPHAGITIS: ICD-10-CM

## 2024-06-07 DIAGNOSIS — I35.1 NONRHEUMATIC AORTIC VALVE INSUFFICIENCY: ICD-10-CM

## 2024-06-07 DIAGNOSIS — I47.10 PSVT (PAROXYSMAL SUPRAVENTRICULAR TACHYCARDIA): ICD-10-CM

## 2024-06-07 PROCEDURE — 1101F PT FALLS ASSESS-DOCD LE1/YR: CPT | Mod: HCNC,CPTII,S$GLB, | Performed by: NURSE PRACTITIONER

## 2024-06-07 PROCEDURE — 3288F FALL RISK ASSESSMENT DOCD: CPT | Mod: HCNC,CPTII,S$GLB, | Performed by: NURSE PRACTITIONER

## 2024-06-07 PROCEDURE — 3074F SYST BP LT 130 MM HG: CPT | Mod: HCNC,CPTII,S$GLB, | Performed by: NURSE PRACTITIONER

## 2024-06-07 PROCEDURE — 1159F MED LIST DOCD IN RCRD: CPT | Mod: HCNC,CPTII,S$GLB, | Performed by: NURSE PRACTITIONER

## 2024-06-07 PROCEDURE — 1160F RVW MEDS BY RX/DR IN RCRD: CPT | Mod: HCNC,CPTII,S$GLB, | Performed by: NURSE PRACTITIONER

## 2024-06-07 PROCEDURE — 1123F ACP DISCUSS/DSCN MKR DOCD: CPT | Mod: HCNC,CPTII,S$GLB, | Performed by: NURSE PRACTITIONER

## 2024-06-07 PROCEDURE — G0439 PPPS, SUBSEQ VISIT: HCPCS | Mod: HCNC,S$GLB,, | Performed by: NURSE PRACTITIONER

## 2024-06-07 PROCEDURE — 1170F FXNL STATUS ASSESSED: CPT | Mod: HCNC,CPTII,S$GLB, | Performed by: NURSE PRACTITIONER

## 2024-06-07 PROCEDURE — 99999 PR PBB SHADOW E&M-EST. PATIENT-LVL V: CPT | Mod: PBBFAC,HCNC,, | Performed by: NURSE PRACTITIONER

## 2024-06-07 PROCEDURE — 1126F AMNT PAIN NOTED NONE PRSNT: CPT | Mod: HCNC,CPTII,S$GLB, | Performed by: NURSE PRACTITIONER

## 2024-06-07 PROCEDURE — 3078F DIAST BP <80 MM HG: CPT | Mod: HCNC,CPTII,S$GLB, | Performed by: NURSE PRACTITIONER

## 2024-06-07 NOTE — PROGRESS NOTES
Bryant Gil presented for a  Medicare AWV and comprehensive Health Risk Assessment today. The following components were reviewed and updated:    Medical history  Family History  Social history  Allergies and Current Medications  Health Risk Assessment  Health Maintenance  Care Team         ** See Completed Assessments for Annual Wellness Visit within the encounter summary.**         The following assessments were completed:  Living Situation  CAGE  Depression Screening  Timed Get Up and Go  Whisper Test  Cognitive Function Screening - deferred due to known memory impairment   Nutrition Screening  ADL Screening  PAQ Screening      Opioid documentation for eAWV      Patient does not have a current opioid prescription.        Review for Substance Use Disorders: Patient does not use substance        Current Outpatient Medications:     amLODIPine (NORVASC) 10 MG tablet, TAKE 1 TABLET EVERY DAY, Disp: 90 tablet, Rfl: 3    aspirin (ECOTRIN) 81 MG EC tablet, Take 81 mg by mouth once daily., Disp: , Rfl:     atorvastatin (LIPITOR) 20 MG tablet, TAKE 1 TABLET ONE TIME DAILY, Disp: 90 tablet, Rfl: 3    BD ALCOHOL SWABS PadM, USE FOR HOME GLUCOSE MONITORING DAILY, Disp: 100 each, Rfl: 3    blood sugar diagnostic (TRUE METRIX GLUCOSE TEST STRIP) Strp, Test Blood Sugar twice daily, Disp: 100 strip, Rfl: 1    blood-glucose meter (TRUE METRIX GLUCOSE METER) Misc, Test blood sugar twice a day, Disp: 1 each, Rfl: 0    clobetasoL (TEMOVATE) 0.05 % cream, Apply topically 2 (two) times daily. Bid-tid focally to ear scars until flat., Disp: 45 g, Rfl: 1    glipiZIDE 5 MG TR24, TAKE 1 TABLET EVERY DAY, Disp: 90 tablet, Rfl: 1    hydrocortisone 2.5 % cream, Apply topically 2 (two) times daily., Disp: 30 g, Rfl: 3    lancets (TRUEPLUS LANCETS) 30 gauge Misc, Check sugar twice daily, Disp: 200 each, Rfl: 3    latanoprost 0.005 % ophthalmic solution, INSTILL 1 DROP INTO BOTH EYES ONE TIME DAILY, Disp: 7.5 mL, Rfl: 3     "losartan-hydrochlorothiazide 100-25 mg (HYZAAR) 100-25 mg per tablet, TAKE 1 TABLET EVERY MORNING, Disp: 90 tablet, Rfl: 3    metFORMIN (GLUCOPHAGE) 500 MG tablet, Take 1 tablet (500 mg total) by mouth 2 (two) times daily with meals., Disp: 180 tablet, Rfl: 3    metoprolol succinate (TOPROL-XL) 100 MG 24 hr tablet, TAKE 1 TABLET ONE TIME DAILY, Disp: 90 tablet, Rfl: 3    pantoprazole (PROTONIX) 40 MG tablet, TAKE 1 TABLET TWICE DAILY, Disp: 180 tablet, Rfl: 10    [START ON 6/22/2024] prednisoLONE-moxiflox-bromfen 1-0.5-0.075 % DrpS, Place 1 drop into the left eye 3 (three) times daily., Disp: 8 mL, Rfl: 2    UNABLE TO FIND, medication name: Prevagen, Disp: , Rfl:        Vitals:    06/07/24 1316   BP: 106/60   Pulse: 82   SpO2: 95%   Weight: 78 kg (171 lb 15.3 oz)   Height: 5' 7" (1.702 m)   PainSc: 0-No pain      Physical Exam  Vitals and nursing note reviewed.   Constitutional:       General: He is not in acute distress.     Appearance: Normal appearance. He is not ill-appearing.   HENT:      Head: Normocephalic and atraumatic.      Mouth/Throat:      Mouth: Mucous membranes are moist.   Eyes:      General: No scleral icterus.        Right eye: No discharge.         Left eye: No discharge.      Extraocular Movements: Extraocular movements intact.      Conjunctiva/sclera: Conjunctivae normal.      Comments: glasses   Cardiovascular:      Rate and Rhythm: Normal rate.   Pulmonary:      Effort: Pulmonary effort is normal. No respiratory distress.   Musculoskeletal:      Cervical back: Normal range of motion.   Skin:     General: Skin is warm and dry.      Findings: No rash.   Neurological:      Mental Status: He is alert and oriented to person, place, and time.   Psychiatric:         Mood and Affect: Mood normal.         Behavior: Behavior normal. Behavior is cooperative.         Cognition and Memory: Cognition normal.               Diagnoses and health risks identified today and associated " recommendations/orders:    1. Encounter for preventive health examination  - Chart reviewed. Problem list updated. Discussed current medical diagnosis, current medications, medical/surgical/family/social history; updated provider list; documented vital signs; identified any cognitive impairment; and updated risk factor list. Addressed any outstanding health maintenance. Provided patient with personalized health advice. Continue to follow up with PCP and any specialists.     2. Type 2 diabetes mellitus with diabetic polyneuropathy, without long-term current use of insulin  Chronic; stable on current treatment plan; follow up with PCP  - taking metformin, glipizide  - no medication for neuropathy at this time     3. PSVT (paroxysmal supraventricular tachycardia)  Chronic; stable on current treatment plan; follow up with PCP  - taking metoprolol    4. Aortic atherosclerosis  Chronic; stable on current treatment plan; follow up with PCP  - taking statin and asa     5. 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 Nonrheumatic aortic valve insufficiency  Chronic; stable on current treatment plan; follow up with PCP  - currently asymptomatic     7. Type 2 diabetes mellitus with hyperlipidemia  Chronic; stable on current treatment plan; follow up with PCP  = taking statin     8. Memory loss  Chronic; stable on current treatment plan; follow up with PCP  - taking prevagen vitamins    9. Ocular hypertension, unspecified laterality  Chronic; stable on current treatment plan; follow up with PCP  - follow up with optometry     10. History of prostate cancer  Chronic; stable on current treatment plan; follow up with PCP     11. Gastroesophageal reflux disease without esophagitis  Chronic; stable on current treatment plan; follow up with PCP  - taking  ppi     12. Sigmoid diverticulosis  Chronic; stable on current treatment plan; follow up with PCP  - avoid eating seeds    13. Encounter for Medicare annual wellness exam  - Ambulatory Referral/Consult to Enhanced Annual Wellness Visit (eAWV)    14. BMI 26.0-26.9,adult  - Recommendation for healthy diet and increasing exercise as tolerated with goal of 150min/week      Provided Bryant with a 5-10 year written screening schedule and personal prevention plan. Recommendations were developed using the USPSTF age appropriate recommendations. Education, counseling, and referrals were provided as needed. After Visit Summary printed and given to patient which includes a list of additional screenings\tests needed.    Follow up in about 1 year (around 6/7/2025) for your next annual wellness visit.    Bronwyn Red, FNP-C    Advance Care Planning     I offered to discuss advanced care planning, including how to pick a person who would make decisions for you if you were unable to make them for yourself, called a health care power of , and what kind of decisions you might make such as use of life sustaining treatments such as ventilators and tube feeding when faced with a life limiting illness recorded on a living will that they will need to know. (How you want to be cared for as you near the end of your natural life)     X  Patient has advanced directives on file, which we reviewed, and they do not wish to make changes.

## 2024-06-07 NOTE — PATIENT INSTRUCTIONS
Counseling and Referral of Other Preventative  (Italic type indicates deductible and co-insurance are waived)    Patient Name: Bryant Gil  Today's Date: 6/7/2024    Health Maintenance       Date Due Completion Date    RSV Vaccine (Age 60+ and Pregnant patients) (1 - 1-dose 60+ series) 06/14/2024 (Originally 8/18/2002) ---    COVID-19 Vaccine (6 - 2023-24 season) 09/01/2024 (Originally 9/1/2023) 11/7/2022    Hemoglobin A1c 07/17/2024 1/17/2024    Lipid Panel 09/15/2024 9/15/2023    Diabetes Urine Screening 01/17/2025 1/17/2024    Eye Exam 05/27/2025 5/27/2024    TETANUS VACCINE 01/01/2027 1/1/2017 (Done)    Override on 1/1/2017: Done (Done through work)        No orders of the defined types were placed in this encounter.      The following information is provided to all patients.  This information is to help you find resources for any of the problems found today that may be affecting your health:                  Living healthy guide: www.[facility].[place].gov      Understanding Diabetes: www.diabetes.org      Eating healthy: www.cdc.gov/healthyweight      CDC home safety checklist: www.cdc.gov/steadi/patient.html      Agency on Aging: www.goea.[place].[facility]      Alcoholics anonymous (AA): www.aa.org      Physical Activity: www.juliette.nih.gov/xz6pwnq      Tobacco use: www.quitwithusla.org

## 2024-06-19 ENCOUNTER — TELEPHONE (OUTPATIENT)
Dept: OPHTHALMOLOGY | Facility: CLINIC | Age: 84
End: 2024-06-19
Payer: MEDICARE

## 2024-06-23 NOTE — H&P
[facility]  History & Physical    Subjective:      Chief Complaint/Reason for Admission:     Bryant Gil is a 83 y.o. male.    Past Medical History:   Diagnosis Date    Cataract of both eyes     Chronic gastritis without bleeding, negative H. pylori Jaunuary 2019 EGD 1/30/2020    Diabetes mellitus     Diverticulosis     Ectatic aorta 2/6/2018    HTN (hypertension)     HTN (hypertension) 10/9/2012    Lower GI bleeding 1/11/2021    Near syncope 2/5/2021    OHT (ocular hypertension)     Prostate cancer     Pyelonephritis, right no stone on CT scan. 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    Tubular adenoma of colon 01/10/2012    Type II or unspecified type diabetes mellitus with neurological manifestations, not stated as uncontrolled(250.60) 10/9/2012     Past Surgical History:   Procedure Laterality Date    CARPAL TUNNEL RELEASE Right 08/25/2015    CIRCUMCISION  04/13/2005    COLONOSCOPY N/A 3/29/2017    Procedure: COLONOSCOPY Golytely prep;  Surgeon: Kavitha Cleaning MD;  Location: [facility];  Service: Endoscopy;  Laterality: N/A;    COLONOSCOPY N/A 1/12/2021    Procedure: COLONOSCOPY;  Surgeon: Dung Panda MD;  Location: [facility];  Service: Endoscopy;  Laterality: N/A;    COLONOSCOPY W/ POLYPECTOMY  01/10/2012    Repeat in 5 years     ESOPHAGOGASTRODUODENOSCOPY N/A 1/8/2019    Procedure: EGD (ESOPHAGOGASTRODUODENOSCOPY);  Surgeon: Rachel Burrows MD;  Location: [facility];  Service: Endoscopy;  Laterality: N/A;    ESOPHAGOGASTRODUODENOSCOPY N/A 5/14/2020    Procedure: ESOPHAGOGASTRODUODENOSCOPY (EGD);  Surgeon: Rachel Burrows MD;  Location: [facility];  Service: Endoscopy;  Laterality: N/A;  Dr. Luis Angel Burrows if possible.    FLEXIBLE SIGMOIDOSCOPY N/A  2019    Procedure: SIGMOIDOSCOPY, FLEXIBLE;  Surgeon: Rachel Burrows MD;  Location: [facility];  Service: Endoscopy;  Laterality: N/A;    PENILE PROSTHESIS IMPLANT      PROSTATE SURGERY      Prostatectomy for prostate ca; [facility]    TRIGGER FINGER RELEASE Right 2015     Social History     Tobacco Use    Smoking status: Former     Current packs/day: 0.00     Average packs/day: 0.5 packs/day for 3.0 years (1.5 ttl pk-yrs)     Types: Cigarettes     Start date:      Quit date:      Years since quittin.5     Passive exposure: Never    Smokeless tobacco: Never    Tobacco comments:     smoked as a teenager   Substance Use Topics    Alcohol use: Yes     Comment: social drinks a beer 1-2 x month    Drug use: Never       No medications prior to admission.     Review of patient's allergies indicates:  No Known Allergies     Review of Systems   Eyes:  Positive for blurred vision.   All other systems reviewed and are negative.        Objective:      Vital Signs (Most Recent)       Vital Signs Range (Last 24H):       Physical Exam  Constitutional:       Appearance: He is well-developed.   HENT:      Head: Normocephalic.   Eyes:      Conjunctiva/sclera: Conjunctivae normal.      Pupils: Pupils are equal, round, and reactive to light.   Cardiovascular:      Rate and Rhythm: Normal rate.   Pulmonary:      Effort: Pulmonary effort is normal.      Breath sounds: Normal breath sounds.   Abdominal:      General: Bowel sounds are normal.      Palpations: Abdomen is soft.   Musculoskeletal:         General: Normal range of motion.      Cervical back: Normal range of motion and neck supple.   Skin:     General: Skin is warm.   Neurological:      Mental Status: He is alert and oriented to person, place, and time.         ASA Score: II    Mallampati Score: II    Plan for Sedation: Moderate    Patient or Family History of Anesthesia problems : No    Any changes affecting the anesthesia assessment which may have changed  since the initial assessment and H and P:  No       Data Review:    ECG:     Assessment:      Cataract OS.    Plan:    CE OS.

## 2024-06-24 NOTE — PRE-PROCEDURE INSTRUCTIONS
Patient reviewed on 6/24/2024.  Okay to proceed at [facility]. The following pre-procedure instructions and arrival time have been reviewed with patient via phone and sent to patient portal for review.  Patient verbalized an understanding.  Pt to be accompanied by wife-Dina day of procedure as responsible .      Dear Bryant     Below you will find basic pre-procedure instructions in preparation for your procedure on 6/25/24 with Dr. Sumner     You should have received your arrival time already from Dr's office.     - Nothing to eat or drink after midnight the night before your procedure until after your procedure, except AM meds with small sips of water.     - HOLD all oral Diabetic medications night before and morning of procedure  - HOLD all Insulin morning of procedure  - HOLD all Fluid pills morning of procedure  - HOLD all non-insulin shots until after surgery (Ozempic, Mounjaro, Trulicity, Victoza, Byetta, Wegovy and Adlyxin) (7 days prior)  - HOLD all vitamins, minerals and herbal supplements morning of procedure   - TAKE all B/P meds, EXCEPT those that contain a fluid pill (ex. Lasix, Hydroclorothiazide/HCTZ, Spirnolactone)  - USE inhalers as needed and bring AM of surgery  - USE EYE DROPS as directed  -TAKE blood thinner meds AM of surgery unless otherwise instructed by your provider to not take     - Shower and wash face with antibacterial soap (ex. Dial) for 3 mins PM prior and AM of surgery  - No powder, lotions, creams, oils, gels, ointments, makeup,  or jewelry    - Wear comfortable clothing (button up shirt)     (Patient is required to have a responsible ride to transport home, ride may not leave while patient is in surgery)     -- [facility], 2nd floor Surgery Center, located   @ [address] [place] [address]  2nd Floor Registration        If you have any questions or concerns please feel free to contact your surgeon's office.           Please reply to  this message as receipt of delivery.     [place], LPN [facility]  Pre-Admit - Anesthesia Dept

## 2024-06-25 ENCOUNTER — HOSPITAL ENCOUNTER (OUTPATIENT)
Facility: HOSPITAL | Age: 84
Discharge: HOME | End: 2024-06-25
Attending: OPHTHALMOLOGY | Admitting: OPHTHALMOLOGY
Payer: MEDICARE

## 2024-06-25 VITALS
OXYGEN SATURATION: 96 % | HEIGHT: 67 IN | SYSTOLIC BLOOD PRESSURE: 147 MMHG | RESPIRATION RATE: 20 BRPM | WEIGHT: 180 LBS | DIASTOLIC BLOOD PRESSURE: 74 MMHG | HEART RATE: 95 BPM | BODY MASS INDEX: 28.25 KG/M2 | TEMPERATURE: 98 F

## 2024-06-25 DIAGNOSIS — H25.12 NUCLEAR SCLEROTIC CATARACT OF LEFT EYE: Primary | ICD-10-CM

## 2024-06-25 LAB — POCT GLUCOSE: 170 MG/DL (ref 70–110)

## 2024-06-25 PROCEDURE — 94761 N-INVAS EAR/PLS OXIMETRY MLT: CPT

## 2024-06-25 PROCEDURE — 99900035 HC TECH TIME PER 15 MIN (STAT)

## 2024-06-25 PROCEDURE — 27201423 OPTIME MED/SURG SUP & DEVICES STERILE SUPPLY: Performed by: OPHTHALMOLOGY

## 2024-06-25 PROCEDURE — 36000706: Performed by: OPHTHALMOLOGY

## 2024-06-25 PROCEDURE — 25000003 PHARM REV CODE 250: Performed by: OPHTHALMOLOGY

## 2024-06-25 PROCEDURE — 99153 MOD SED SAME PHYS/QHP EA: CPT | Performed by: OPHTHALMOLOGY

## 2024-06-25 PROCEDURE — 36000707: Performed by: OPHTHALMOLOGY

## 2024-06-25 PROCEDURE — 82962 GLUCOSE BLOOD TEST: CPT | Performed by: OPHTHALMOLOGY

## 2024-06-25 PROCEDURE — 99152 MOD SED SAME PHYS/QHP 5/>YRS: CPT | Performed by: OPHTHALMOLOGY

## 2024-06-25 PROCEDURE — 71000015 HC POSTOP RECOV 1ST HR: Performed by: OPHTHALMOLOGY

## 2024-06-25 PROCEDURE — 63600175 PHARM REV CODE 636 W HCPCS: Performed by: OPHTHALMOLOGY

## 2024-06-25 PROCEDURE — V2632 POST CHMBR INTRAOCULAR LENS: HCPCS | Performed by: OPHTHALMOLOGY

## 2024-06-25 PROCEDURE — 66984 XCAPSL CTRC RMVL W/O ECP: CPT | Mod: HCNC,LT,, | Performed by: OPHTHALMOLOGY

## 2024-06-25 DEVICE — LENS CLAREON AUTONOME 19.5D: Type: IMPLANTABLE DEVICE | Site: EYE | Status: FUNCTIONAL

## 2024-06-25 RX ORDER — EPINEPHRINE 1 MG/ML
INJECTION, SOLUTION, CONCENTRATE INTRAVENOUS
Status: DISCONTINUED | OUTPATIENT
Start: 2024-06-25 | End: 2024-06-25 | Stop reason: HOSPADM

## 2024-06-25 RX ORDER — MIDAZOLAM HYDROCHLORIDE 1 MG/ML
2 INJECTION, SOLUTION INTRAMUSCULAR; INTRAVENOUS EVERY 5 MIN PRN
Status: DISCONTINUED | OUTPATIENT
Start: 2024-06-25 | End: 2024-06-25 | Stop reason: HOSPADM

## 2024-06-25 RX ORDER — ACETAMINOPHEN 325 MG/1
650 TABLET ORAL EVERY 4 HOURS PRN
Status: DISCONTINUED | OUTPATIENT
Start: 2024-06-25 | End: 2024-06-25 | Stop reason: HOSPADM

## 2024-06-25 RX ORDER — CYCLOPENTOLATE HYDROCHLORIDE 10 MG/ML
1 SOLUTION/ DROPS OPHTHALMIC
Status: COMPLETED | OUTPATIENT
Start: 2024-06-25 | End: 2024-06-25

## 2024-06-25 RX ORDER — SODIUM CHLORIDE 9 MG/ML
INJECTION, SOLUTION INTRAVENOUS CONTINUOUS
Status: DISCONTINUED | OUTPATIENT
Start: 2024-06-25 | End: 2024-06-25 | Stop reason: HOSPADM

## 2024-06-25 RX ORDER — TETRACAINE HYDROCHLORIDE 5 MG/ML
1 SOLUTION OPHTHALMIC EVERY 5 MIN PRN
Status: COMPLETED | OUTPATIENT
Start: 2024-06-25 | End: 2024-06-25

## 2024-06-25 RX ORDER — FENTANYL CITRATE 50 UG/ML
25 INJECTION, SOLUTION INTRAMUSCULAR; INTRAVENOUS EVERY 5 MIN PRN
Status: DISCONTINUED | OUTPATIENT
Start: 2024-06-25 | End: 2024-06-25 | Stop reason: HOSPADM

## 2024-06-25 RX ORDER — MOXIFLOXACIN 5 MG/ML
1 SOLUTION/ DROPS OPHTHALMIC EVERY 5 MIN PRN
Status: COMPLETED | OUTPATIENT
Start: 2024-06-25 | End: 2024-06-25

## 2024-06-25 RX ORDER — TROPICAMIDE 10 MG/ML
1 SOLUTION/ DROPS OPHTHALMIC
Status: COMPLETED | OUTPATIENT
Start: 2024-06-25 | End: 2024-06-25

## 2024-06-25 RX ORDER — HYDROCODONE BITARTRATE AND ACETAMINOPHEN 5; 325 MG/1; MG/1
1 TABLET ORAL EVERY 4 HOURS PRN
Status: DISCONTINUED | OUTPATIENT
Start: 2024-06-25 | End: 2024-06-25 | Stop reason: HOSPADM

## 2024-06-25 RX ORDER — PROPARACAINE HYDROCHLORIDE 5 MG/ML
SOLUTION/ DROPS OPHTHALMIC
Status: DISCONTINUED | OUTPATIENT
Start: 2024-06-25 | End: 2024-06-25 | Stop reason: HOSPADM

## 2024-06-25 RX ORDER — PREDNISOLONE ACETATE 10 MG/ML
SUSPENSION/ DROPS OPHTHALMIC
Status: DISCONTINUED | OUTPATIENT
Start: 2024-06-25 | End: 2024-06-25 | Stop reason: HOSPADM

## 2024-06-25 RX ORDER — LIDOCAIN/PHENYLEPH/BSS NO.2/PF 1 %-1.5 %
SYRINGE (ML) INTRAOCULAR
Status: DISCONTINUED | OUTPATIENT
Start: 2024-06-25 | End: 2024-06-25 | Stop reason: HOSPADM

## 2024-06-25 RX ORDER — PHENYLEPHRINE HYDROCHLORIDE 25 MG/ML
1 SOLUTION/ DROPS OPHTHALMIC
Status: COMPLETED | OUTPATIENT
Start: 2024-06-25 | End: 2024-06-25

## 2024-06-25 RX ORDER — MOXIFLOXACIN 5 MG/ML
SOLUTION/ DROPS OPHTHALMIC
Status: DISCONTINUED | OUTPATIENT
Start: 2024-06-25 | End: 2024-06-25 | Stop reason: HOSPADM

## 2024-06-25 RX ORDER — LIDOCAINE HYDROCHLORIDE 40 MG/ML
INJECTION, SOLUTION RETROBULBAR
Status: DISCONTINUED | OUTPATIENT
Start: 2024-06-25 | End: 2024-06-25 | Stop reason: HOSPADM

## 2024-06-25 RX ADMIN — CYCLOPENTOLATE HYDROCHLORIDE 1 DROP: 10 SOLUTION/ DROPS OPHTHALMIC at 07:06

## 2024-06-25 RX ADMIN — FENTANYL CITRATE 25 MCG: 50 INJECTION, SOLUTION INTRAMUSCULAR; INTRAVENOUS at 09:06

## 2024-06-25 RX ADMIN — PHENYLEPHRINE HYDROCHLORIDE 1 DROP: 25 SOLUTION/ DROPS OPHTHALMIC at 07:06

## 2024-06-25 RX ADMIN — TROPICAMIDE 1 DROP: 10 SOLUTION/ DROPS OPHTHALMIC at 07:06

## 2024-06-25 RX ADMIN — TROPICAMIDE 1 DROP: 10 SOLUTION/ DROPS OPHTHALMIC at 08:06

## 2024-06-25 RX ADMIN — TETRACAINE HYDROCHLORIDE 1 DROP: 5 SOLUTION OPHTHALMIC at 08:06

## 2024-06-25 RX ADMIN — MOXIFLOXACIN OPHTHALMIC 1 DROP: 5 SOLUTION/ DROPS OPHTHALMIC at 08:06

## 2024-06-25 RX ADMIN — CYCLOPENTOLATE HYDROCHLORIDE 1 DROP: 10 SOLUTION/ DROPS OPHTHALMIC at 08:06

## 2024-06-25 RX ADMIN — PHENYLEPHRINE HYDROCHLORIDE 1 DROP: 25 SOLUTION/ DROPS OPHTHALMIC at 08:06

## 2024-06-25 RX ADMIN — TETRACAINE HYDROCHLORIDE 1 DROP: 5 SOLUTION OPHTHALMIC at 07:06

## 2024-06-25 RX ADMIN — MOXIFLOXACIN OPHTHALMIC 1 DROP: 5 SOLUTION/ DROPS OPHTHALMIC at 07:06

## 2024-06-25 RX ADMIN — MIDAZOLAM HYDROCHLORIDE 1 MG: 1 INJECTION, SOLUTION INTRAMUSCULAR; INTRAVENOUS at 09:06

## 2024-06-25 NOTE — PROGRESS NOTES
Pt showing ectopy while in pre-op. Frequent PVCs with bigeminy noted. Dr. Johnson with anesthesia notified at this time. MD at bedside to assess - okay to proceed with procedure per MD. EKG strip printed and attached to chart for reference.

## 2024-06-25 NOTE — BRIEF OP NOTE
[facility]  Brief Operative Note    Surgery Date: 6/25/2024     Surgeons and Role:     * Federico Sumner MD - Primary    Assisting Surgeon: None    Pre-op Diagnosis:  NS (nuclear sclerosis), left [H25.12]    Post-op Diagnosis:  Post-Op Diagnosis Codes:     * NS (nuclear sclerosis), left [H25.12]    Procedure(s) (LRB):  EXTRACTION, CATARACT, WITH IOL INSERTION (Left)    Anesthesia: RN IV Sedation    Operative Findings:     Estimated Blood Loss: * No values recorded between 6/25/2024  9:18 AM and 6/25/2024  9:41 AM *         Specimens:   Specimen (24h ago, onward)      None              Discharge Note    OUTCOME: Patient tolerated treatment/procedure well without complication and is now ready for discharge.    DISPOSITION: Home or Self Care    FINAL DIAGNOSIS:  Nuclear sclerotic cataract of left eye    FOLLOWUP: In clinic    DISCHARGE INSTRUCTIONS:    Discharge Procedure Orders   Other restrictions (specify):   Order Comments: No heavy lifting or bending for 1 week.

## 2024-06-25 NOTE — PLAN OF CARE
Chart reviewed. Preop nursing care completed per orders. Safe surgery checklist complete. Pt denies any open wounds cuts or sores. Pt denies any metal in body or use of weight loss injections. Pt AAOX3, VSS on room air. . Pt toileted, Bed locked in lowest position, Call light within reach. Pt denies any needs at this time. Belongings placed under stretcher.

## 2024-06-25 NOTE — DISCHARGE INSTRUCTIONS
Post Operative Instructions for Cataract Surgery- [place]    STARTING THE SAME DAY OF SURGERY:    Place one (1) drop of Prednisolone-Moxifloxacin-Bromfenac (shake bottle) into the operative eye three (3) times a day.     You will use this drop for four (4) weeks following surgery.      1 DAY POST OPERATIVE APPOINTMENT:    Date: ______________________________/ Time: ___________________________    Location: [facility]- [address], [place] [address]      Please use 1 drop of PREDNISOLONE-MOXIFLOXACIN-BROMFENAC in the operative eye before arriving for your appointment; this will help keep your eye comfortable.       RESTRICTIONS FOR SEVEN (7) DAYS FOLLOWING SURGERY:    DO NOT lift anything over 10 pounds.    DO NOT bend at the waist, only at the knees (keep head in upright position).    DO NOT rub your eye.    DO NOT get any water into the eye.    DO NOT wear any makeup, lotions, or creams on/around the eye.    Wear the protective eye shield you were give after surgery anytime you go to sleep. You may remove the shield while awake.       PLEASE NOTE:    You may take over the counter pain medication such as Tylenol or Ibuprofen as directed, if needed for pain.      *** If you experience severe pain, loss of vision, sudden onset of flashes, and/or floaters, IMMEDIATELY CALL Dr. Sumner's office [phone]; AFTER HOUR [phone]; OR proceed to the EMERGENCY ROOM.

## 2024-06-25 NOTE — OP NOTE
Operative Date:  06/25/2024    Discharge Date:  06/25/2024    Discharge Patient Home    Report Title: Operative Note      SURGEON: Federico Sumner MD     ASSISTANT:     PREOPERATIVE DIAGNOSIS: Visually significant NSC cataract,  Left Eye.    POSTOPERATIVE DIAGNOSIS: Visually-significant NSC cataract,  Left Eye.    PROCEDURE PERFORMED: Phacoemulsification of the cataract with posterior chamber intraocular lens Left Eye.    ANESTHESIA:  Moderate Sedation with Local    The team confirmed the patient ID and re-evaluated the patient and sedation plan confirming it is suitable for the patient's condition and procedure prior to administering sedation.    COMPLICATIONS: None    ESTIMATED BLOOD LOSS: Minimal    INDICATIONS FOR PROCEDURE:   The patient is a pleasant 83 year old gentleman with increasing difficulties with activities of daily living secondary to a dense visually significant cataract in the Left Eye.  Discussions have been carried out with this patient concerning the options to surgery, risks, benefits and expectations.  The patient voiced good understanding and wished to proceed with the above procedure.    PROCEDURE IN DETAIL: The patient was brought to the operating room and received topical anesthetic to the eye and then was prepped and draped in the usual sterile fashion.  Using the Marcos ring and the guarded itzel blade set at 0.37 mm, a partial thickness clear cornea incision was made temporally.  The paracentesis site was made at the six o'clock position.  Omidria was injected into the anterior chamber through the paracentesis.  Viscoat was then injected into the anterior chamber.  The eye was then reentered at the primary surgical site with a 2.4 mm keratome followed by continuous capsulotomy, hydrodissection, hydrodelineation and phacoemulsification of the cataract.  Residual cortical material was removed using automated irrigation-aspiration technique.  Healon was injected into the  posterior chamber and a CNAOTO 19.5 diopter lens was placed in the bag without difficulty. Residual viscoelastic was removed using automated irrigation-aspiration technique. The eye was re-pressurized using BSS solution and both the paracentesis site and the primary surgical site were demonstrated to be watertight at the end of the case with Weck--Samantha manipulation.  One drop of Ofloxacin and one drop of Pred acetate 1% was applied to the Left Eye .The eye was closed, patched and a Begum shield placed.  The patient was taken to the recovery room in good and stable condition.  The patient tolerated the procedure well.  The patient was instructed to refrain from any heavy lifting, bending, stooping or straining activities, discharged home  and to follow-up in the morning for routine postoperative care with Federico Sumner MD.

## 2024-06-25 NOTE — PLAN OF CARE
Discharge instructions given to patient and they verbalized understanding of all.  VSS, denies n/v and tolerating PO, rates pain level tolerable. IV removed, and family notified for patient discharge home.    (0) swallows foods and liquids w/o difficulty

## 2024-06-26 ENCOUNTER — OFFICE VISIT (OUTPATIENT)
Dept: OPHTHALMOLOGY | Facility: CLINIC | Age: 84
End: 2024-06-26
Payer: MEDICARE

## 2024-06-26 DIAGNOSIS — Z98.890 POST-OPERATIVE STATE: Primary | ICD-10-CM

## 2024-06-26 PROCEDURE — 99024 POSTOP FOLLOW-UP VISIT: CPT | Mod: S$GLB,,, | Performed by: OPHTHALMOLOGY

## 2024-06-26 PROCEDURE — 1157F ADVNC CARE PLAN IN RCRD: CPT | Mod: CPTII,S$GLB,, | Performed by: OPHTHALMOLOGY

## 2024-06-26 PROCEDURE — 1160F RVW MEDS BY RX/DR IN RCRD: CPT | Mod: CPTII,S$GLB,, | Performed by: OPHTHALMOLOGY

## 2024-06-26 PROCEDURE — 99999 PR PBB SHADOW E&M-EST. PATIENT-LVL II: CPT | Mod: PBBFAC,,, | Performed by: OPHTHALMOLOGY

## 2024-06-26 PROCEDURE — 1159F MED LIST DOCD IN RCRD: CPT | Mod: CPTII,S$GLB,, | Performed by: OPHTHALMOLOGY

## 2024-06-26 NOTE — PROGRESS NOTES
Subjective:       Patient ID: Bryant Gil is a 83 y.o. male.    Chief Complaint: Post-op Evaluation    HPI    S/p phaco w/IOL OS 6/25/24    Combo TID OS    Pt here for 1 day post op OS.  Pt states doing well. Pt denies eye pain   OS.   Last edited by Maren Retana MA on 6/26/2024  9:41 AM.             Assessment:       1. Post-operative state        Plan:       S/p CE OS- Doing well.        CPM OS.  RTC 1 wk.

## 2024-07-02 ENCOUNTER — PATIENT MESSAGE (OUTPATIENT)
Dept: ADMINISTRATIVE | Facility: OTHER | Age: 84
End: 2024-07-02
Payer: MEDICARE

## 2024-07-02 ENCOUNTER — PATIENT MESSAGE (OUTPATIENT)
Dept: OTHER | Facility: OTHER | Age: 84
End: 2024-07-02
Payer: MEDICARE

## 2024-07-03 ENCOUNTER — OFFICE VISIT (OUTPATIENT)
Dept: OPHTHALMOLOGY | Facility: CLINIC | Age: 84
End: 2024-07-03
Payer: MEDICARE

## 2024-07-03 DIAGNOSIS — Z98.890 POST-OPERATIVE STATE: Primary | ICD-10-CM

## 2024-07-03 DIAGNOSIS — H25.11 NS (NUCLEAR SCLEROSIS), RIGHT: ICD-10-CM

## 2024-07-03 PROCEDURE — 99999 PR PBB SHADOW E&M-EST. PATIENT-LVL III: CPT | Mod: PBBFAC,,, | Performed by: OPHTHALMOLOGY

## 2024-07-03 NOTE — PROGRESS NOTES
Subjective:       Patient ID: Bryant Gil is a 83 y.o. male.    Chief Complaint: Post-op Evaluation    HPI    DLS: 6/26/2024    Pt here for 1 week post phaco w/IOL OS- 6/25/2024  Pt states no eye pain or discomfort and vision seems to be doing great. Pt   denies any floaters, flashes or diplopia. Pt states he's not using the   Latanoprost eye drops in either eye.     Meds;  Combo TID OS      Last edited by Cammie Jarquin on 7/3/2024  1:50 PM.             Assessment:       1. Post-operative state    2. NS (nuclear sclerosis), right        Plan:       S/p CE OS- Doing well.    Visually significant cataract OD -Pt. Wants Sx.      CPM OS.  Cataract Surgery Consent: Patient with a visually significant cataract with difficulties of ADLs, reading, driving, night vision, glare (any and all).  Discussed with Patient/Family/Caregiver: options, risks and benefits, expectations of cataract surgery, utilized an eye model with questions and answers to facilitate discussion.  Discussed lens options and patient understands that glasses may be required for optimal vision for distance and/or near vision after cataract surgery.  The Patient/Family/Caregiver  voice good understanding and patient wishes to proceed with surgery.  The patient will likely benefit from surgery and patient signed consent for Right Eye.  CE OD 7/16/24

## 2024-07-09 ENCOUNTER — TELEPHONE (OUTPATIENT)
Dept: OPHTHALMOLOGY | Facility: CLINIC | Age: 84
End: 2024-07-09
Payer: MEDICARE

## 2024-07-10 ENCOUNTER — TELEPHONE (OUTPATIENT)
Dept: OPHTHALMOLOGY | Facility: CLINIC | Age: 84
End: 2024-07-10
Payer: MEDICARE

## 2024-07-14 NOTE — H&P
[facility]  History & Physical    Subjective:      Chief Complaint/Reason for Admission:     Bryant Gil is a 83 y.o. male.    Past Medical History:   Diagnosis Date    Cataract of both eyes     Chronic gastritis without bleeding, negative H. pylori Jaunuary 2019 EGD 1/30/2020    Diabetes mellitus     Diverticulosis     Ectatic aorta 2/6/2018    HTN (hypertension)     HTN (hypertension) 10/9/2012    Lower GI bleeding 1/11/2021    Near syncope 2/5/2021    OHT (ocular hypertension)     Prostate cancer     Pyelonephritis, right no stone on CT scan. 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 OR;  Service: Ophthalmology;  Laterality: Left;    CIRCUMCISION  04/13/2005    COLONOSCOPY N/A 3/29/2017    Procedure: COLONOSCOPY Golytely prep;  Surgeon: Kavitha Cleaning MD;  Location[facility];  Service: Endoscopy;  Laterality: N/A;    COLONOSCOPY N/A 1/12/2021    Procedure: COLONOSCOPY;  Surgeon: Dung Panda MD;  Location: [facility];  Service: Endoscopy;  Laterality: N/A;    COLONOSCOPY W/ POLYPECTOMY  01/10/2012    Repeat in 5 years     ESOPHAGOGASTRODUODENOSCOPY N/A 1/8/2019    Procedure: EGD (ESOPHAGOGASTRODUODENOSCOPY);  Surgeon: Rachel Burrows MD;  Location: [facility];  Service: Endoscopy;  Laterality: N/A;     ESOPHAGOGASTRODUODENOSCOPY N/A 2020    Procedure: ESOPHAGOGASTRODUODENOSCOPY (EGD);  Surgeon: Rachel Burrows MD;  Location: [facility];  Service: Endoscopy;  Laterality: N/A;  Dr. Luis Angel Burrows if possible.    FLEXIBLE SIGMOIDOSCOPY N/A 2019    Procedure: SIGMOIDOSCOPY, FLEXIBLE;  Surgeon: Rachel Burrows MD;  Location: [facility] ENDO;  Service: Endoscopy;  Laterality: N/A;    PENILE PROSTHESIS IMPLANT      PROSTATE SURGERY      Prostatectomy for prostate ca; [facility]    TRIGGER FINGER RELEASE Right 2015     Social History     Tobacco Use    Smoking status: Former     Current packs/day: 0.00     Average packs/day: 0.5 packs/day for 3.0 years (1.5 ttl pk-yrs)     Types: Cigarettes     Start date:      Quit date:      Years since quittin.5     Passive exposure: Never    Smokeless tobacco: Never    Tobacco comments:     smoked as a teenager   Substance Use Topics    Alcohol use: Yes     Comment: social drinks a beer 1-2 x month    Drug use: Never       No medications prior to admission.     Review of patient's allergies indicates:  No Known Allergies     Review of Systems   Eyes:  Positive for blurred vision.   All other systems reviewed and are negative.        Objective:      Vital Signs (Most Recent)       Vital Signs Range (Last 24H):       Physical Exam  Constitutional:       Appearance: He is well-developed.   HENT:      Head: Normocephalic.   Eyes:      Conjunctiva/sclera: Conjunctivae normal.      Pupils: Pupils are equal, round, and reactive to light.   Cardiovascular:      Rate and Rhythm: Normal rate.   Pulmonary:      Effort: Pulmonary effort is normal.      Breath sounds: Normal breath sounds.   Abdominal:      General: Bowel sounds are normal.      Palpations: Abdomen is soft.   Musculoskeletal:         General: Normal range of motion.      Cervical back: Normal range of motion and neck supple.   Skin:     General: Skin is warm.   Neurological:      Mental Status: He is alert and  oriented to person, place, and time.         ASA Score: II    Mallampati Score: II    Plan for Sedation: Moderate    Patient or Family History of Anesthesia problems : No    Any changes affecting the anesthesia assessment which may have changed since the initial assessment and H and P:  No       Data Review:    ECG:     Assessment:      Cataract OD.    Plan:    CE OD.

## 2024-07-15 NOTE — PRE-PROCEDURE INSTRUCTIONS
Patient reviewed on 7/15/2024.  Okay to proceed at [facility]. The following pre-procedure instructions and arrival time have been reviewed with patient via phone and sent to patient portal for review.  Patient verbalized an understanding.  Pt to be accompanied by wife-Dina day of procedure as responsible .      Dear Bryant     Below you will find basic pre-procedure instructions in preparation for your procedure on 7/16/24 with Dr. Sumner     You should have received your arrival time already from Dr's office.     - Nothing to eat or drink after midnight the night before your procedure until after your procedure, except AM meds with small sips of water.     - HOLD all oral Diabetic medications night before and morning of procedure  - HOLD all Insulin morning of procedure  - HOLD all Fluid pills morning of procedure  - HOLD all non-insulin shots until after surgery (Ozempic, Mounjaro, Trulicity, Victoza, Byetta, Wegovy and Adlyxin) (7 days prior)  - HOLD all vitamins, minerals and herbal supplements morning of procedure   - TAKE all B/P meds, EXCEPT those that contain a fluid pill (ex. Lasix, Hydroclorothiazide/HCTZ, Spirnolactone)  - USE inhalers as needed and bring AM of surgery  - USE EYE DROPS as directed  -TAKE blood thinner meds AM of surgery unless otherwise instructed by your provider to not take     - Shower and wash face with antibacterial soap (ex. Dial) for 3 mins PM prior and AM of surgery  - No powder, lotions, creams, oils, gels, ointments, makeup,  or jewelry    - Wear comfortable clothing (button up shirt)     (Patient is required to have a responsible ride to transport home, ride may not leave while patient is in surgery)     -- [facility], 2nd floor Surgery Center, located   @ [address] [place] [address]  2nd Floor Registration        If you have any questions or concerns please feel free to contact your surgeon's office.           Please reply to  this message as receipt of delivery.     [place], LPN [facility]  Pre-Admit - Anesthesia Dept

## 2024-07-16 ENCOUNTER — PATIENT MESSAGE (OUTPATIENT)
Dept: ADMINISTRATIVE | Facility: HOSPITAL | Age: 84
End: 2024-07-16
Payer: MEDICARE

## 2024-07-16 ENCOUNTER — HOSPITAL ENCOUNTER (OUTPATIENT)
Facility: HOSPITAL | Age: 84
Discharge: HOME | End: 2024-07-16
Attending: OPHTHALMOLOGY | Admitting: OPHTHALMOLOGY
Payer: MEDICARE

## 2024-07-16 VITALS
RESPIRATION RATE: 19 BRPM | TEMPERATURE: 97 F | SYSTOLIC BLOOD PRESSURE: 127 MMHG | HEART RATE: 85 BPM | OXYGEN SATURATION: 99 % | DIASTOLIC BLOOD PRESSURE: 72 MMHG

## 2024-07-16 DIAGNOSIS — H25.11 NUCLEAR SCLEROTIC CATARACT OF RIGHT EYE: Primary | ICD-10-CM

## 2024-07-16 LAB — POCT GLUCOSE: 135 MG/DL (ref 70–110)

## 2024-07-16 PROCEDURE — 27201423 OPTIME MED/SURG SUP & DEVICES STERILE SUPPLY: Performed by: OPHTHALMOLOGY

## 2024-07-16 PROCEDURE — 82962 GLUCOSE BLOOD TEST: CPT | Performed by: OPHTHALMOLOGY

## 2024-07-16 PROCEDURE — 99153 MOD SED SAME PHYS/QHP EA: CPT | Performed by: OPHTHALMOLOGY

## 2024-07-16 PROCEDURE — 25000003 PHARM REV CODE 250: Performed by: OPHTHALMOLOGY

## 2024-07-16 PROCEDURE — 36000706: Performed by: OPHTHALMOLOGY

## 2024-07-16 PROCEDURE — 99900035 HC TECH TIME PER 15 MIN (STAT)

## 2024-07-16 PROCEDURE — 36000707: Performed by: OPHTHALMOLOGY

## 2024-07-16 PROCEDURE — 66984 XCAPSL CTRC RMVL W/O ECP: CPT | Mod: 79,HCNC,RT, | Performed by: OPHTHALMOLOGY

## 2024-07-16 PROCEDURE — 99152 MOD SED SAME PHYS/QHP 5/>YRS: CPT | Performed by: OPHTHALMOLOGY

## 2024-07-16 PROCEDURE — V2632 POST CHMBR INTRAOCULAR LENS: HCPCS | Performed by: OPHTHALMOLOGY

## 2024-07-16 PROCEDURE — 71000015 HC POSTOP RECOV 1ST HR: Performed by: OPHTHALMOLOGY

## 2024-07-16 PROCEDURE — 63600175 PHARM REV CODE 636 W HCPCS: Performed by: OPHTHALMOLOGY

## 2024-07-16 PROCEDURE — 94761 N-INVAS EAR/PLS OXIMETRY MLT: CPT | Mod: XB

## 2024-07-16 DEVICE — LENS CLAREON AUTONOME 19.5D: Type: IMPLANTABLE DEVICE | Site: EYE | Status: FUNCTIONAL

## 2024-07-16 RX ORDER — EPINEPHRINE 1 MG/ML
INJECTION, SOLUTION, CONCENTRATE INTRAVENOUS
Status: DISCONTINUED | OUTPATIENT
Start: 2024-07-16 | End: 2024-07-16 | Stop reason: HOSPADM

## 2024-07-16 RX ORDER — FENTANYL CITRATE 50 UG/ML
25 INJECTION, SOLUTION INTRAMUSCULAR; INTRAVENOUS EVERY 5 MIN PRN
Status: DISCONTINUED | OUTPATIENT
Start: 2024-07-16 | End: 2024-07-16 | Stop reason: HOSPADM

## 2024-07-16 RX ORDER — LIDOCAIN/PHENYLEPH/BSS NO.2/PF 1 %-1.5 %
SYRINGE (ML) INTRAOCULAR
Status: DISCONTINUED | OUTPATIENT
Start: 2024-07-16 | End: 2024-07-16 | Stop reason: HOSPADM

## 2024-07-16 RX ORDER — TETRACAINE HYDROCHLORIDE 5 MG/ML
SOLUTION OPHTHALMIC
Status: DISCONTINUED | OUTPATIENT
Start: 2024-07-16 | End: 2024-07-16 | Stop reason: HOSPADM

## 2024-07-16 RX ORDER — ACETAMINOPHEN 325 MG/1
650 TABLET ORAL EVERY 4 HOURS PRN
Status: CANCELLED | OUTPATIENT
Start: 2024-07-16

## 2024-07-16 RX ORDER — MOXIFLOXACIN 5 MG/ML
1 SOLUTION/ DROPS OPHTHALMIC
Status: COMPLETED | OUTPATIENT
Start: 2024-07-16 | End: 2024-07-16

## 2024-07-16 RX ORDER — TROPICAMIDE 10 MG/ML
1 SOLUTION/ DROPS OPHTHALMIC
Status: COMPLETED | OUTPATIENT
Start: 2024-07-16 | End: 2024-07-16

## 2024-07-16 RX ORDER — SODIUM CHLORIDE 9 MG/ML
INJECTION, SOLUTION INTRAVENOUS CONTINUOUS
Status: DISCONTINUED | OUTPATIENT
Start: 2024-07-16 | End: 2024-07-16 | Stop reason: HOSPADM

## 2024-07-16 RX ORDER — PREDNISOLONE ACETATE 10 MG/ML
SUSPENSION/ DROPS OPHTHALMIC
Status: DISCONTINUED | OUTPATIENT
Start: 2024-07-16 | End: 2024-07-16 | Stop reason: HOSPADM

## 2024-07-16 RX ORDER — MIDAZOLAM HYDROCHLORIDE 1 MG/ML
2 INJECTION, SOLUTION INTRAMUSCULAR; INTRAVENOUS
Status: DISCONTINUED | OUTPATIENT
Start: 2024-07-16 | End: 2024-07-16 | Stop reason: HOSPADM

## 2024-07-16 RX ORDER — PHENYLEPHRINE HYDROCHLORIDE 25 MG/ML
1 SOLUTION/ DROPS OPHTHALMIC
Status: COMPLETED | OUTPATIENT
Start: 2024-07-16 | End: 2024-07-16

## 2024-07-16 RX ORDER — TETRACAINE HYDROCHLORIDE 5 MG/ML
1 SOLUTION OPHTHALMIC
Status: COMPLETED | OUTPATIENT
Start: 2024-07-16 | End: 2024-07-16

## 2024-07-16 RX ORDER — LIDOCAINE HYDROCHLORIDE 40 MG/ML
INJECTION, SOLUTION RETROBULBAR
Status: DISCONTINUED | OUTPATIENT
Start: 2024-07-16 | End: 2024-07-16 | Stop reason: HOSPADM

## 2024-07-16 RX ORDER — CYCLOPENTOLATE HYDROCHLORIDE 10 MG/ML
1 SOLUTION/ DROPS OPHTHALMIC
Status: COMPLETED | OUTPATIENT
Start: 2024-07-16 | End: 2024-07-16

## 2024-07-16 RX ORDER — HYDROCODONE BITARTRATE AND ACETAMINOPHEN 5; 325 MG/1; MG/1
1 TABLET ORAL EVERY 4 HOURS PRN
Status: CANCELLED | OUTPATIENT
Start: 2024-07-16

## 2024-07-16 RX ADMIN — CYCLOPENTOLATE HYDROCHLORIDE 1 DROP: 10 SOLUTION/ DROPS OPHTHALMIC at 07:07

## 2024-07-16 RX ADMIN — FENTANYL CITRATE 25 MCG: 50 INJECTION, SOLUTION INTRAMUSCULAR; INTRAVENOUS at 09:07

## 2024-07-16 RX ADMIN — TROPICAMIDE 1 DROP: 10 SOLUTION/ DROPS OPHTHALMIC at 07:07

## 2024-07-16 RX ADMIN — TETRACAINE HYDROCHLORIDE 1 DROP: 5 SOLUTION OPHTHALMIC at 07:07

## 2024-07-16 RX ADMIN — MOXIFLOXACIN OPHTHALMIC 1 DROP: 5 SOLUTION/ DROPS OPHTHALMIC at 07:07

## 2024-07-16 RX ADMIN — PHENYLEPHRINE HYDROCHLORIDE 1 DROP: 25 SOLUTION/ DROPS OPHTHALMIC at 07:07

## 2024-07-16 RX ADMIN — MIDAZOLAM HYDROCHLORIDE 1 MG: 1 INJECTION, SOLUTION INTRAMUSCULAR; INTRAVENOUS at 09:07

## 2024-07-16 NOTE — DISCHARGE INSTRUCTIONS
Post Operative Instructions for Cataract Surgery- [place]    STARTING THE SAME DAY OF SURGERY:    Place one (1) drop of Prednisolone-Moxifloxacin-Bromfenac (shake bottle) into the operative eye three (3) times a day.     You will use this drop for four (4) weeks following surgery.      1 DAY POST OPERATIVE APPOINTMENT:    Date: ______________________________/ Time: ___________________________    Location: [facility]- [address], [place] [address]      Please use 1 drop of PREDNISOLONE-MOXIFLOXACIN-BROMFENAC in the operative eye before arriving for your appointment; this will help keep your eye comfortable.       RESTRICTIONS FOR SEVEN (7) DAYS FOLLOWING SURGERY:    DO NOT lift anything over 10 pounds.    DO NOT bend at the waist, only at the knees (keep head in upright position).    DO NOT rub your eye.    DO NOT get any water into the eye.    DO NOT wear any makeup, lotions, or creams on/around the eye.    Wear the protective eye shield you were give after surgery anytime you go to sleep. You may remove the shield while awake.       PLEASE NOTE:    You may take over the counter pain medication such as Tylenol or Ibuprofen as directed, if needed for pain.      *** If you experience severe pain, loss of vision, sudden onset of flashes, and/or floaters, IMMEDIATELY CALL Dr. Sumner's office [phone]; AFTER HOUR [phone]; OR proceed to the EMERGENCY ROOM.

## 2024-07-16 NOTE — BRIEF OP NOTE
[facility]  Brief Operative Note    Surgery Date: 7/16/2024     Surgeons and Role:     * Federico Sumner MD - Primary    Assisting Surgeon: None    Pre-op Diagnosis:  NS (nuclear sclerosis), right [H25.11]    Post-op Diagnosis:  Post-Op Diagnosis Codes:     * NS (nuclear sclerosis), right [H25.11]    Procedure(s) (LRB):  EXTRACTION, CATARACT, WITH IOL INSERTION (Right)    Anesthesia: RN IV Sedation    Operative Findings:     Estimated Blood Loss: * No values recorded between 7/16/2024  9:16 AM and 7/16/2024  9:40 AM *         Specimens:   Specimen (24h ago, onward)      None              Discharge Note    OUTCOME: Patient tolerated treatment/procedure well without complication and is now ready for discharge.    DISPOSITION: Home or Self Care    FINAL DIAGNOSIS:  Nuclear sclerotic cataract of right eye    FOLLOWUP: In clinic    DISCHARGE INSTRUCTIONS:    Discharge Procedure Orders   Other restrictions (specify):   Order Comments: No heavy lifting or bending for 1 week.

## 2024-07-16 NOTE — OP NOTE
Operative Date:  07/16/2024    Discharge Date:  07/16/2024    Discharge Patient Home    Report Title: Operative Note      SURGEON: Federico Sumner MD     ASSISTANT:     PREOPERATIVE DIAGNOSIS: Visually significant NSC cataract,  Right Eye.    POSTOPERATIVE DIAGNOSIS: Visually-significant NSC cataract,  Right Eye.    PROCEDURE PERFORMED: Phacoemulsification of the cataract with posterior chamber intraocular lens Right Eye.    ANESTHESIA:  Moderate Sedation with Local    The team confirmed the patient ID and re-evaluated the patient and sedation plan confirming it is suitable for the patient's condition and procedure prior to administering sedation.    COMPLICATIONS: None    ESTIMATED BLOOD LOSS: Minimal    INDICATIONS FOR PROCEDURE:   The patient is a pleasant 83 year old gentleman with increasing difficulties with activities of daily living secondary to a dense visually significant cataract in the Right Eye.  Discussions have been carried out with this patient concerning the options to surgery, risks, benefits and expectations.  The patient voiced good understanding and wished to proceed with the above procedure.    PROCEDURE IN DETAIL: The patient was brought to the operating room and received topical anesthetic to the eye and then was prepped and draped in the usual sterile fashion.  Using the Marcos ring and the guarded itzel blade set at 0.37 mm, a partial thickness clear cornea incision was made temporally.  The paracentesis site was made at the twelve o'clock position.  Omidria was injected into the anterior chamber through the paracentesis.  Viscoat was then injected into the anterior chamber.  The eye was then reentered at the primary surgical site with a 2.4 mm keratome followed by continuous capsulotomy, hydrodissection, hydrodelineation and phacoemulsification of the cataract.  Residual cortical material was removed using automated irrigation-aspiration technique.  Healon was injected into the  posterior chamber and a CNAOTO 19.5 diopter lens was placed in the bag without difficulty. Residual viscoelastic was removed using automated irrigation-aspiration technique. The eye was re-pressurized using BSS solution and both the paracentesis site and the primary surgical site were demonstrated to be watertight at the end of the case with Weck--Samantha manipulation.  One drop of Ofloxacin and one drop of Pred acetate 1% was applied to the Right Eye .The eye was closed, patched and a Begum shield placed.  The patient was taken to the recovery room in good and stable condition.  The patient tolerated the procedure well.  The patient was instructed to refrain from any heavy lifting, bending, stooping or straining activities, discharged home  and to follow-up in the morning for routine postoperative care with Federico Sumner MD.

## 2024-07-17 ENCOUNTER — OFFICE VISIT (OUTPATIENT)
Dept: OPHTHALMOLOGY | Facility: CLINIC | Age: 84
End: 2024-07-17
Payer: MEDICARE

## 2024-07-17 DIAGNOSIS — Z98.890 POST-OPERATIVE STATE: Primary | ICD-10-CM

## 2024-07-17 PROCEDURE — 1160F RVW MEDS BY RX/DR IN RCRD: CPT | Mod: CPTII,S$GLB,, | Performed by: OPHTHALMOLOGY

## 2024-07-17 PROCEDURE — 1159F MED LIST DOCD IN RCRD: CPT | Mod: CPTII,S$GLB,, | Performed by: OPHTHALMOLOGY

## 2024-07-17 PROCEDURE — 99024 POSTOP FOLLOW-UP VISIT: CPT | Mod: S$GLB,,, | Performed by: OPHTHALMOLOGY

## 2024-07-17 PROCEDURE — 1126F AMNT PAIN NOTED NONE PRSNT: CPT | Mod: CPTII,S$GLB,, | Performed by: OPHTHALMOLOGY

## 2024-07-17 PROCEDURE — 99999 PR PBB SHADOW E&M-EST. PATIENT-LVL II: CPT | Mod: PBBFAC,,, | Performed by: OPHTHALMOLOGY

## 2024-07-17 PROCEDURE — 1157F ADVNC CARE PLAN IN RCRD: CPT | Mod: CPTII,S$GLB,, | Performed by: OPHTHALMOLOGY

## 2024-07-17 NOTE — PROGRESS NOTES
Subjective:       Patient ID: Bryant Gil is a 83 y.o. male.    Chief Complaint: Post-op Evaluation    HPI    Here for one day PO phaco w/IOL OD 7/16/24                              S/P Phaco w/IOL OS 6/25/2024    Eye meds: PMB TID OU    83 year old male state vision is clear after sx in both eyes. Denies eye   pain or discomfort Denies any floaters, flashes or diplopia. Pt wants to   know when he should start using his  Latanoprost eye drops again since   having sx         Last edited by Myla Cortes on 7/17/2024  1:19 PM.             Assessment:       1. Post-operative state        Plan:       S/p CE OU- Doing well.      CPM OU.  RTC 1 wk.

## 2024-07-18 ENCOUNTER — PATIENT OUTREACH (OUTPATIENT)
Dept: ADMINISTRATIVE | Facility: HOSPITAL | Age: 84
End: 2024-07-18
Payer: MEDICARE

## 2024-07-18 ENCOUNTER — TELEPHONE (OUTPATIENT)
Dept: FAMILY MEDICINE | Facility: CLINIC | Age: 84
End: 2024-07-18
Payer: MEDICARE

## 2024-07-18 DIAGNOSIS — E11.42 TYPE 2 DIABETES MELLITUS WITH DIABETIC POLYNEUROPATHY, WITHOUT LONG-TERM CURRENT USE OF INSULIN: Primary | ICD-10-CM

## 2024-07-18 DIAGNOSIS — E78.2 MIXED HYPERLIPIDEMIA: Chronic | ICD-10-CM

## 2024-07-18 NOTE — PROGRESS NOTES
Population Health Chart Review & Patient Outreach Details      Additional [facility] Health Notes:    Msg sent to patient to assist with scheduling lab appointment (A1c, also sent to pcp to enter additional labs if needed)       Updates Requested / Reviewed:      Updated Care Coordination Note, Care Everywhere, Care Team Updated, and Immunizations Reconciliation Completed or Queried: [facility] Topics Overdue:      [facility] Score: 1     Hemoglobin A1c    RSV Vaccine                  Health Maintenance Topic(s) Outreach Outcomes & Actions Taken:    Lab(s) - Outreach Outcomes & Actions Taken  : Primary Care Follow Up Visit Scheduled

## 2024-07-18 NOTE — TELEPHONE ENCOUNTER
Ordered more labs - need to do today or tomorrow fasting    ----- Message from Rosy Grossman LPN sent at 7/18/2024  9:22 AM CDT -----  Regarding: FW: Lab Orders    ----- Message -----  From: Alicia Godoy LPN  Sent: 7/18/2024   8:29 AM CDT  To: Arely Butler Staff  Subject: Lab Orders                                       Hi,    Patient is due for A1c. He is also coming in for annual soon.  Please enter any additional labs needed.  Will schedule prior to office visit.

## 2024-07-19 ENCOUNTER — PATIENT MESSAGE (OUTPATIENT)
Dept: FAMILY MEDICINE | Facility: CLINIC | Age: 84
End: 2024-07-19
Payer: MEDICARE

## 2024-07-24 ENCOUNTER — LAB VISIT (OUTPATIENT)
Dept: LAB | Facility: HOSPITAL | Age: 84
End: 2024-07-24
Attending: FAMILY MEDICINE
Payer: MEDICARE

## 2024-07-24 ENCOUNTER — OFFICE VISIT (OUTPATIENT)
Dept: OPHTHALMOLOGY | Facility: CLINIC | Age: 84
End: 2024-07-24
Payer: MEDICARE

## 2024-07-24 DIAGNOSIS — E11.42 TYPE 2 DIABETES MELLITUS WITH DIABETIC POLYNEUROPATHY, WITHOUT LONG-TERM CURRENT USE OF INSULIN: ICD-10-CM

## 2024-07-24 DIAGNOSIS — Z98.890 POST-OPERATIVE STATE: Primary | ICD-10-CM

## 2024-07-24 LAB
ALBUMIN SERPL BCP-MCNC: 4 G/DL (ref 3.5–5.2)
ALP SERPL-CCNC: 64 U/L (ref 55–135)
ALT SERPL W/O P-5'-P-CCNC: 16 U/L (ref 10–44)
ANION GAP SERPL CALC-SCNC: 13 MMOL/L (ref 8–16)
AST SERPL-CCNC: 18 U/L (ref 10–40)
BASOPHILS # BLD AUTO: 0.06 K/UL (ref 0–0.2)
BASOPHILS NFR BLD: 1.1 % (ref 0–1.9)
BILIRUB SERPL-MCNC: 0.4 MG/DL (ref 0.1–1)
BUN SERPL-MCNC: 35 MG/DL (ref 8–23)
CALCIUM SERPL-MCNC: 9.8 MG/DL (ref 8.7–10.5)
CHLORIDE SERPL-SCNC: 107 MMOL/L (ref 95–110)
CO2 SERPL-SCNC: 22 MMOL/L (ref 23–29)
CREAT SERPL-MCNC: 1.6 MG/DL (ref 0.5–1.4)
DIFFERENTIAL METHOD BLD: ABNORMAL
EOSINOPHIL # BLD AUTO: 0.1 K/UL (ref 0–0.5)
EOSINOPHIL NFR BLD: 1.9 % (ref 0–8)
ERYTHROCYTE [DISTWIDTH] IN BLOOD BY AUTOMATED COUNT: 13.6 % (ref 11.5–14.5)
EST. GFR  (NO RACE VARIABLE): 43 ML/MIN/1.73 M^2
ESTIMATED AVG GLUCOSE: 166 MG/DL (ref 68–131)
GLUCOSE SERPL-MCNC: 149 MG/DL (ref 70–110)
HBA1C MFR BLD: 7.4 % (ref 4–5.6)
HCT VFR BLD AUTO: 39.1 % (ref 40–54)
HGB BLD-MCNC: 12.6 G/DL (ref 14–18)
IMM GRANULOCYTES # BLD AUTO: 0.01 K/UL (ref 0–0.04)
IMM GRANULOCYTES NFR BLD AUTO: 0.2 % (ref 0–0.5)
LYMPHOCYTES # BLD AUTO: 2.3 K/UL (ref 1–4.8)
LYMPHOCYTES NFR BLD: 40.4 % (ref 18–48)
MCH RBC QN AUTO: 27.6 PG (ref 27–31)
MCHC RBC AUTO-ENTMCNC: 32.2 G/DL (ref 32–36)
MCV RBC AUTO: 86 FL (ref 82–98)
MONOCYTES # BLD AUTO: 0.6 K/UL (ref 0.3–1)
MONOCYTES NFR BLD: 10 % (ref 4–15)
NEUTROPHILS # BLD AUTO: 2.6 K/UL (ref 1.8–7.7)
NEUTROPHILS NFR BLD: 46.4 % (ref 38–73)
NRBC BLD-RTO: 0 /100 WBC
PLATELET # BLD AUTO: 282 K/UL (ref 150–450)
PMV BLD AUTO: 10.5 FL (ref 9.2–12.9)
POTASSIUM SERPL-SCNC: 3.9 MMOL/L (ref 3.5–5.1)
PROT SERPL-MCNC: 7.8 G/DL (ref 6–8.4)
RBC # BLD AUTO: 4.56 M/UL (ref 4.6–6.2)
SODIUM SERPL-SCNC: 142 MMOL/L (ref 136–145)
WBC # BLD AUTO: 5.69 K/UL (ref 3.9–12.7)

## 2024-07-24 PROCEDURE — 36415 COLL VENOUS BLD VENIPUNCTURE: CPT | Mod: HCNC | Performed by: FAMILY MEDICINE

## 2024-07-24 PROCEDURE — 1126F AMNT PAIN NOTED NONE PRSNT: CPT | Mod: CPTII,S$GLB,, | Performed by: OPHTHALMOLOGY

## 2024-07-24 PROCEDURE — 1160F RVW MEDS BY RX/DR IN RCRD: CPT | Mod: CPTII,S$GLB,, | Performed by: OPHTHALMOLOGY

## 2024-07-24 PROCEDURE — 1159F MED LIST DOCD IN RCRD: CPT | Mod: CPTII,S$GLB,, | Performed by: OPHTHALMOLOGY

## 2024-07-24 PROCEDURE — 1157F ADVNC CARE PLAN IN RCRD: CPT | Mod: CPTII,S$GLB,, | Performed by: OPHTHALMOLOGY

## 2024-07-24 PROCEDURE — 99999 PR PBB SHADOW E&M-EST. PATIENT-LVL III: CPT | Mod: PBBFAC,,, | Performed by: OPHTHALMOLOGY

## 2024-07-24 PROCEDURE — 3288F FALL RISK ASSESSMENT DOCD: CPT | Mod: CPTII,S$GLB,, | Performed by: OPHTHALMOLOGY

## 2024-07-24 PROCEDURE — 99024 POSTOP FOLLOW-UP VISIT: CPT | Mod: S$GLB,,, | Performed by: OPHTHALMOLOGY

## 2024-07-24 PROCEDURE — 1101F PT FALLS ASSESS-DOCD LE1/YR: CPT | Mod: CPTII,S$GLB,, | Performed by: OPHTHALMOLOGY

## 2024-07-24 PROCEDURE — 80053 COMPREHEN METABOLIC PANEL: CPT | Mod: HCNC | Performed by: FAMILY MEDICINE

## 2024-07-24 PROCEDURE — 85025 COMPLETE CBC W/AUTO DIFF WBC: CPT | Mod: HCNC | Performed by: FAMILY MEDICINE

## 2024-07-24 PROCEDURE — 83036 HEMOGLOBIN GLYCOSYLATED A1C: CPT | Mod: HCNC | Performed by: FAMILY MEDICINE

## 2024-07-26 NOTE — PROGRESS NOTES
Subjective:       Patient ID: Bryant Gil is a 83 y.o. male.    Chief Complaint: Post-op Evaluation    HPI    S/p phaco w/IOL OD 7/16/24   S/P Phaco w/IOL OS 6/25/2024     Combo TID OD    Pt here for 1 week post op OD. Pt states doing well. Pt denies eye pain   OD.    Last edited by Maren Retana MA on 7/24/2024  9:29 AM.             Assessment:       1. Post-operative state        Plan:       S/p CE OU- Doing well.      CPM OD.  RTC 3 wks.

## 2024-08-06 ENCOUNTER — OFFICE VISIT (OUTPATIENT)
Dept: DERMATOLOGY | Facility: CLINIC | Age: 84
End: 2024-08-06
Payer: MEDICARE

## 2024-08-06 DIAGNOSIS — L91.0 KELOID: Primary | ICD-10-CM

## 2024-08-06 PROCEDURE — 99999 PR PBB SHADOW E&M-EST. PATIENT-LVL III: CPT | Mod: PBBFAC,HCNC,, | Performed by: DERMATOLOGY

## 2024-08-06 PROCEDURE — 11900 INJECT SKIN LESIONS </W 7: CPT | Mod: HCNC,S$GLB,, | Performed by: DERMATOLOGY

## 2024-08-06 PROCEDURE — 99499 UNLISTED E&M SERVICE: CPT | Mod: HCNC,S$GLB,, | Performed by: DERMATOLOGY

## 2024-08-07 ENCOUNTER — OFFICE VISIT (OUTPATIENT)
Dept: FAMILY MEDICINE | Facility: CLINIC | Age: 84
End: 2024-08-07
Attending: FAMILY MEDICINE
Payer: MEDICARE

## 2024-08-07 VITALS
HEIGHT: 67 IN | OXYGEN SATURATION: 95 % | DIASTOLIC BLOOD PRESSURE: 74 MMHG | BODY MASS INDEX: 27.71 KG/M2 | SYSTOLIC BLOOD PRESSURE: 122 MMHG | HEART RATE: 77 BPM | WEIGHT: 176.56 LBS

## 2024-08-07 DIAGNOSIS — N18.32 STAGE 3B CHRONIC KIDNEY DISEASE: ICD-10-CM

## 2024-08-07 DIAGNOSIS — I47.10 PSVT (PAROXYSMAL SUPRAVENTRICULAR TACHYCARDIA): ICD-10-CM

## 2024-08-07 DIAGNOSIS — E11.42 TYPE 2 DIABETES MELLITUS WITH DIABETIC POLYNEUROPATHY, WITHOUT LONG-TERM CURRENT USE OF INSULIN: Primary | ICD-10-CM

## 2024-08-07 DIAGNOSIS — E78.2 MIXED HYPERLIPIDEMIA: ICD-10-CM

## 2024-08-07 DIAGNOSIS — E11.69 TYPE 2 DIABETES MELLITUS WITH HYPERLIPIDEMIA: ICD-10-CM

## 2024-08-07 DIAGNOSIS — R41.3 MEMORY LOSS: ICD-10-CM

## 2024-08-07 DIAGNOSIS — I70.0 AORTIC ATHEROSCLEROSIS: ICD-10-CM

## 2024-08-07 DIAGNOSIS — E78.5 TYPE 2 DIABETES MELLITUS WITH HYPERLIPIDEMIA: ICD-10-CM

## 2024-08-07 PROCEDURE — 99999 PR PBB SHADOW E&M-EST. PATIENT-LVL IV: CPT | Mod: PBBFAC,HCNC,, | Performed by: FAMILY MEDICINE

## 2024-08-08 ENCOUNTER — PATIENT MESSAGE (OUTPATIENT)
Dept: ADMINISTRATIVE | Facility: OTHER | Age: 84
End: 2024-08-08
Payer: MEDICARE

## 2024-08-14 ENCOUNTER — OFFICE VISIT (OUTPATIENT)
Dept: OPHTHALMOLOGY | Facility: CLINIC | Age: 84
End: 2024-08-14
Payer: MEDICARE

## 2024-08-14 DIAGNOSIS — H52.7 REFRACTIVE ERROR: ICD-10-CM

## 2024-08-14 DIAGNOSIS — H40.053 BILATERAL OCULAR HYPERTENSION: ICD-10-CM

## 2024-08-14 DIAGNOSIS — Z98.890 POST-OPERATIVE STATE: Primary | ICD-10-CM

## 2024-08-14 PROCEDURE — 1159F MED LIST DOCD IN RCRD: CPT | Mod: CPTII,S$GLB,, | Performed by: OPHTHALMOLOGY

## 2024-08-14 PROCEDURE — 1126F AMNT PAIN NOTED NONE PRSNT: CPT | Mod: CPTII,S$GLB,, | Performed by: OPHTHALMOLOGY

## 2024-08-14 PROCEDURE — 99024 POSTOP FOLLOW-UP VISIT: CPT | Mod: S$GLB,,, | Performed by: OPHTHALMOLOGY

## 2024-08-14 PROCEDURE — 1101F PT FALLS ASSESS-DOCD LE1/YR: CPT | Mod: CPTII,S$GLB,, | Performed by: OPHTHALMOLOGY

## 2024-08-14 PROCEDURE — 3288F FALL RISK ASSESSMENT DOCD: CPT | Mod: CPTII,S$GLB,, | Performed by: OPHTHALMOLOGY

## 2024-08-14 PROCEDURE — 1160F RVW MEDS BY RX/DR IN RCRD: CPT | Mod: CPTII,S$GLB,, | Performed by: OPHTHALMOLOGY

## 2024-08-14 PROCEDURE — 99999 PR PBB SHADOW E&M-EST. PATIENT-LVL III: CPT | Mod: PBBFAC,,, | Performed by: OPHTHALMOLOGY

## 2024-08-14 PROCEDURE — 1157F ADVNC CARE PLAN IN RCRD: CPT | Mod: CPTII,S$GLB,, | Performed by: OPHTHALMOLOGY

## 2024-08-14 NOTE — PROGRESS NOTES
Subjective:       Patient ID: Bryant Gil is a 83 y.o. male.    Chief Complaint: Post-op Evaluation    HPI    Here for 3 week f/u PCIOL OU     S/p phaco w/IOL OD 7/16/24   S/p Phaco w/IOL OS 6/25/24     Combo TID OD    83 year old male states he noticed floaters about 4 days ago, but can't   tell which eye. 3 days ago he had pain in left eye pain, so he used the   PMB drop again on the left eye and it stopped the pain. Pt is requesting   MRX    Last edited by Myla Cortes on 8/14/2024  9:58 AM.             Assessment:       1. Post-operative state    2. Bilateral ocular hypertension    3. Refractive error        Plan:       S/p CE OU- Doing well.  OHT OU-IOP's are better OU since CE OU. Pt has not used his Latanoprost since before both surgeries.    RE-Pt wants MRx.      Stay off Latanoprost OU for now.  Give MRx.  RTC Dr Zazueta in 4 mos for IOP's.

## 2024-08-19 ENCOUNTER — OFFICE VISIT (OUTPATIENT)
Dept: OPHTHALMOLOGY | Facility: CLINIC | Age: 84
End: 2024-08-19
Payer: MEDICARE

## 2024-08-19 DIAGNOSIS — H40.053 BILATERAL OCULAR HYPERTENSION: ICD-10-CM

## 2024-08-19 DIAGNOSIS — Z98.890 POST-OPERATIVE STATE: Primary | ICD-10-CM

## 2024-08-19 DIAGNOSIS — H52.7 REFRACTIVE ERROR: ICD-10-CM

## 2024-08-19 PROCEDURE — 99999 PR PBB SHADOW E&M-EST. PATIENT-LVL III: CPT | Mod: PBBFAC,HCNC,, | Performed by: OPHTHALMOLOGY

## 2024-08-19 PROCEDURE — 1125F AMNT PAIN NOTED PAIN PRSNT: CPT | Mod: HCNC,CPTII,S$GLB, | Performed by: OPHTHALMOLOGY

## 2024-08-19 PROCEDURE — 1101F PT FALLS ASSESS-DOCD LE1/YR: CPT | Mod: HCNC,CPTII,S$GLB, | Performed by: OPHTHALMOLOGY

## 2024-08-19 PROCEDURE — 99024 POSTOP FOLLOW-UP VISIT: CPT | Mod: HCNC,S$GLB,, | Performed by: OPHTHALMOLOGY

## 2024-08-19 PROCEDURE — 1159F MED LIST DOCD IN RCRD: CPT | Mod: HCNC,CPTII,S$GLB, | Performed by: OPHTHALMOLOGY

## 2024-08-19 PROCEDURE — 3288F FALL RISK ASSESSMENT DOCD: CPT | Mod: HCNC,CPTII,S$GLB, | Performed by: OPHTHALMOLOGY

## 2024-08-19 PROCEDURE — 1160F RVW MEDS BY RX/DR IN RCRD: CPT | Mod: HCNC,CPTII,S$GLB, | Performed by: OPHTHALMOLOGY

## 2024-08-19 PROCEDURE — 1157F ADVNC CARE PLAN IN RCRD: CPT | Mod: HCNC,CPTII,S$GLB, | Performed by: OPHTHALMOLOGY

## 2024-08-19 NOTE — PROGRESS NOTES
"Subjective:       Patient ID: Bryant Gil is a 84 y.o. male.    Chief Complaint: Eye Problem (Patient Bryant Gil is an 84 year old male./S/p Phaco w/IOL OD: 07/16/24 /S/p Phaco w/IOL OS: 06/25/24 /)    HPI     Eye Problem     Additional comments: Patient Bryant Gil is an 84 year old male.  S/p Phaco w/IOL OD: 07/16/24   S/p Phaco w/IOL OS: 06/25/24              Comments    Pt here for urgent care post-op visit. Pt states 7-8/10 eye pain OS with   "trash" feeling for the past week, goes away after using Combo medication.   Pt states that he has a film over OS since this A.M. Pt states that he has   a current sensation "like something is there" OS today. Pt states   occasional black dots in VA OS for the past week, denies any flashes,   veil, or curtain in VA.    DLS: 08/14/2024 with Dr. Sumner    Meds:  1. Combo BID OS PRN  2. Not currently using Latanoprost qhs OU    POHx:  1. Post-operative state   2. Bilateral ocular hypertension   3. Refractive error           Last edited by Allyssa Encarnacion on 8/19/2024 11:04 AM.             Assessment:       1. Post-operative state    2. Bilateral ocular hypertension    3. Refractive error        Plan:       S/p CE OU- Doing well but with rebound iritis OS.    OHT OU-IOP's are acceptable OU off Latanoprost.  RE-Pt does not want MRx.        D/c Combo gtts OS.  Start PF qid OS & taper off over 4 wks.  RTC 4 wks.    "

## 2024-09-09 ENCOUNTER — OFFICE VISIT (OUTPATIENT)
Dept: OPHTHALMOLOGY | Facility: CLINIC | Age: 84
End: 2024-09-09
Payer: MEDICARE

## 2024-09-09 DIAGNOSIS — H40.053 BILATERAL OCULAR HYPERTENSION: ICD-10-CM

## 2024-09-09 DIAGNOSIS — Z98.890 POST-OPERATIVE STATE: Primary | ICD-10-CM

## 2024-09-09 PROCEDURE — 1160F RVW MEDS BY RX/DR IN RCRD: CPT | Mod: HCNC,CPTII,S$GLB, | Performed by: OPHTHALMOLOGY

## 2024-09-09 PROCEDURE — 99999 PR PBB SHADOW E&M-EST. PATIENT-LVL III: CPT | Mod: PBBFAC,HCNC,, | Performed by: OPHTHALMOLOGY

## 2024-09-09 PROCEDURE — 3288F FALL RISK ASSESSMENT DOCD: CPT | Mod: HCNC,CPTII,S$GLB, | Performed by: OPHTHALMOLOGY

## 2024-09-09 PROCEDURE — 1159F MED LIST DOCD IN RCRD: CPT | Mod: HCNC,CPTII,S$GLB, | Performed by: OPHTHALMOLOGY

## 2024-09-09 PROCEDURE — 99024 POSTOP FOLLOW-UP VISIT: CPT | Mod: HCNC,S$GLB,, | Performed by: OPHTHALMOLOGY

## 2024-09-09 PROCEDURE — 1101F PT FALLS ASSESS-DOCD LE1/YR: CPT | Mod: HCNC,CPTII,S$GLB, | Performed by: OPHTHALMOLOGY

## 2024-09-09 PROCEDURE — 1157F ADVNC CARE PLAN IN RCRD: CPT | Mod: HCNC,CPTII,S$GLB, | Performed by: OPHTHALMOLOGY

## 2024-09-09 PROCEDURE — 1126F AMNT PAIN NOTED NONE PRSNT: CPT | Mod: HCNC,CPTII,S$GLB, | Performed by: OPHTHALMOLOGY

## 2024-09-09 NOTE — PROGRESS NOTES
"Subjective:       Patient ID: Bryant iGl is a 84 y.o. male.    Chief Complaint: Post-op Evaluation    HPI    Here for follow up for iritis OS    S/p phaco w/IOL OD 7/16/24   S/p Phaco w/IOL OS 6/25/24     Eye meds: PF BID OS     84 year old male states he is no longer feeling trash in his eyes.  States   he has been tapering the PF drops as instructed. Occasionally sees "black   dots" in the left eye, but not anything new. Denies ocular pain. States OS   is "better"    POHx:   1. Post-operative state   2. Bilateral ocular hypertension   3. Refractive error     Last edited by Myla Cedeno on 9/9/2024 10:20 AM.             Assessment:       1. Post-operative state    2. Bilateral ocular hypertension        Plan:       S/p CE OU- Doing well with resolved iritis OS on PF taper.    OHT OU-IOP's are stable/WNL's off Latanoprost.        Taper off PF OS over 8 days.  Stay off Latanoprost OU.  RTC 4 wks for IOP's.  "

## 2024-09-21 DIAGNOSIS — H40.053 OCULAR HYPERTENSION, BILATERAL: ICD-10-CM

## 2024-09-23 RX ORDER — LATANOPROST 50 UG/ML
SOLUTION/ DROPS OPHTHALMIC
Qty: 7.5 ML | Refills: 3 | Status: SHIPPED | OUTPATIENT
Start: 2024-09-23

## 2024-10-04 ENCOUNTER — LAB VISIT (OUTPATIENT)
Dept: LAB | Facility: HOSPITAL | Age: 84
End: 2024-10-04
Attending: FAMILY MEDICINE
Payer: MEDICARE

## 2024-10-04 DIAGNOSIS — N18.32 STAGE 3B CHRONIC KIDNEY DISEASE: ICD-10-CM

## 2024-10-04 DIAGNOSIS — E11.42 TYPE 2 DIABETES MELLITUS WITH DIABETIC POLYNEUROPATHY, WITHOUT LONG-TERM CURRENT USE OF INSULIN: ICD-10-CM

## 2024-10-04 LAB
ALBUMIN SERPL BCP-MCNC: 4 G/DL (ref 3.5–5.2)
ALP SERPL-CCNC: 62 U/L (ref 55–135)
ALT SERPL W/O P-5'-P-CCNC: 17 U/L (ref 10–44)
ANION GAP SERPL CALC-SCNC: 9 MMOL/L (ref 8–16)
AST SERPL-CCNC: 16 U/L (ref 10–40)
BILIRUB SERPL-MCNC: 0.3 MG/DL (ref 0.1–1)
BUN SERPL-MCNC: 29 MG/DL (ref 8–23)
CALCIUM SERPL-MCNC: 9.7 MG/DL (ref 8.7–10.5)
CHLORIDE SERPL-SCNC: 106 MMOL/L (ref 95–110)
CO2 SERPL-SCNC: 25 MMOL/L (ref 23–29)
CREAT SERPL-MCNC: 1.6 MG/DL (ref 0.5–1.4)
EST. GFR  (NO RACE VARIABLE): 43 ML/MIN/1.73 M^2
GLUCOSE SERPL-MCNC: 193 MG/DL (ref 70–110)
POTASSIUM SERPL-SCNC: 3.8 MMOL/L (ref 3.5–5.1)
PROT SERPL-MCNC: 7.7 G/DL (ref 6–8.4)
SODIUM SERPL-SCNC: 140 MMOL/L (ref 136–145)

## 2024-10-04 PROCEDURE — 80053 COMPREHEN METABOLIC PANEL: CPT | Performed by: FAMILY MEDICINE

## 2024-10-04 PROCEDURE — 36415 COLL VENOUS BLD VENIPUNCTURE: CPT | Performed by: FAMILY MEDICINE

## 2024-10-07 ENCOUNTER — OFFICE VISIT (OUTPATIENT)
Dept: FAMILY MEDICINE | Facility: CLINIC | Age: 84
End: 2024-10-07
Attending: FAMILY MEDICINE
Payer: MEDICARE

## 2024-10-07 VITALS
OXYGEN SATURATION: 97 % | HEIGHT: 67 IN | HEART RATE: 71 BPM | WEIGHT: 158.94 LBS | SYSTOLIC BLOOD PRESSURE: 120 MMHG | DIASTOLIC BLOOD PRESSURE: 72 MMHG | BODY MASS INDEX: 24.94 KG/M2

## 2024-10-07 DIAGNOSIS — E78.5 TYPE 2 DIABETES MELLITUS WITH HYPERLIPIDEMIA: ICD-10-CM

## 2024-10-07 DIAGNOSIS — Z23 FLU VACCINE NEED: ICD-10-CM

## 2024-10-07 DIAGNOSIS — E11.42 TYPE 2 DIABETES MELLITUS WITH DIABETIC POLYNEUROPATHY, WITHOUT LONG-TERM CURRENT USE OF INSULIN: Primary | ICD-10-CM

## 2024-10-07 DIAGNOSIS — I47.10 PSVT (PAROXYSMAL SUPRAVENTRICULAR TACHYCARDIA): ICD-10-CM

## 2024-10-07 DIAGNOSIS — I10 ESSENTIAL HYPERTENSION: ICD-10-CM

## 2024-10-07 DIAGNOSIS — K21.9 GASTROESOPHAGEAL REFLUX DISEASE, UNSPECIFIED WHETHER ESOPHAGITIS PRESENT: ICD-10-CM

## 2024-10-07 DIAGNOSIS — I70.0 AORTIC ATHEROSCLEROSIS: ICD-10-CM

## 2024-10-07 DIAGNOSIS — N18.32 STAGE 3B CHRONIC KIDNEY DISEASE: ICD-10-CM

## 2024-10-07 DIAGNOSIS — Z12.5 ENCOUNTER FOR SCREENING FOR MALIGNANT NEOPLASM OF PROSTATE: ICD-10-CM

## 2024-10-07 DIAGNOSIS — E11.69 TYPE 2 DIABETES MELLITUS WITH HYPERLIPIDEMIA: ICD-10-CM

## 2024-10-07 DIAGNOSIS — E78.2 MIXED HYPERLIPIDEMIA: ICD-10-CM

## 2024-10-07 PROCEDURE — 99999 PR PBB SHADOW E&M-EST. PATIENT-LVL IV: CPT | Mod: PBBFAC,,, | Performed by: FAMILY MEDICINE

## 2024-10-07 RX ORDER — PANTOPRAZOLE SODIUM 40 MG/1
40 TABLET, DELAYED RELEASE ORAL 2 TIMES DAILY
Qty: 180 TABLET | Refills: 4 | Status: SHIPPED | OUTPATIENT
Start: 2024-10-07

## 2024-10-07 NOTE — PROGRESS NOTES
Per provider order in chart, vaccine given to patient.  FLU vaccine, for 65 and older.    Left  deltoid   Patient tolerated well.    Carmen Perla, CMA

## 2024-10-08 ENCOUNTER — OFFICE VISIT (OUTPATIENT)
Dept: DERMATOLOGY | Facility: CLINIC | Age: 84
End: 2024-10-08
Payer: MEDICARE

## 2024-10-08 DIAGNOSIS — L91.0 KELOID: Primary | ICD-10-CM

## 2024-10-08 PROCEDURE — 11900 INJECT SKIN LESIONS </W 7: CPT | Mod: S$GLB,,, | Performed by: DERMATOLOGY

## 2024-10-08 PROCEDURE — 99499 UNLISTED E&M SERVICE: CPT | Mod: S$GLB,,, | Performed by: DERMATOLOGY

## 2024-10-08 PROCEDURE — 99999 PR PBB SHADOW E&M-EST. PATIENT-LVL II: CPT | Mod: PBBFAC,,, | Performed by: DERMATOLOGY

## 2024-10-08 RX ORDER — CLOBETASOL PROPIONATE 0.5 MG/G
CREAM TOPICAL 2 TIMES DAILY
Qty: 45 G | Refills: 1 | Status: SHIPPED | OUTPATIENT
Start: 2024-10-08

## 2024-10-08 NOTE — PROGRESS NOTES
Subjective:      Patient ID: Bryant Gil is a 84 y.o. male.    Chief Complaint: Pain of the Right Hand      HPI  Patient is a  84 y.o. who presents today for f/u of R hand pain  Last saw Dr Cooper on 12/21/2024  Given CSI to R ring finger, which provided significant relief  Reports symptoms have returned recently  Denies new trauma  Denies numbness/tingling  Is interested in repeat CSI vs trigger finger release surgery      Previous hx:  Bryant Gil is a  84 y.o. male presenting today for painful locking of the right ring finger.  There was not a history of trauma.  Onset of symptoms began several months ago  He had an injection about a year ago with good results   No numbness or tingling reported.      Review of patient's allergies indicates:  No Known Allergies      Current Outpatient Medications   Medication Sig Dispense Refill    amLODIPine (NORVASC) 10 MG tablet TAKE 1 TABLET EVERY DAY 90 tablet 3    aspirin (ECOTRIN) 81 MG EC tablet Take 81 mg by mouth once daily.      atorvastatin (LIPITOR) 20 MG tablet TAKE 1 TABLET EVERY DAY 90 tablet 0    BD ALCOHOL SWABS PadM USE FOR HOME GLUCOSE MONITORING DAILY 100 each 3    blood sugar diagnostic (TRUE METRIX GLUCOSE TEST STRIP) Strp Test Blood Sugar twice daily 100 strip 1    blood-glucose meter (TRUE METRIX GLUCOSE METER) Misc Test blood sugar twice a day 1 each 0    clobetasoL (TEMOVATE) 0.05 % cream Apply topically 2 (two) times daily. Bid-tid focally to ear scars until flat. 45 g 1    glipiZIDE 5 MG TR24 TAKE 1 TABLET EVERY DAY 90 tablet 1    hydrocortisone 2.5 % cream Apply topically 2 (two) times daily. 30 g 3    lancets (TRUEPLUS LANCETS) 30 gauge Misc Check sugar twice daily 200 each 3    latanoprost 0.005 % ophthalmic solution INSTILL 1 DROP INTO BOTH EYES ONE TIME DAILY 7.5 mL 3    losartan-hydrochlorothiazide 100-25 mg (HYZAAR) 100-25 mg per tablet TAKE 1 TABLET EVERY MORNING 90 tablet 3    metFORMIN (GLUCOPHAGE) 500 MG tablet Take 1 tablet (500 mg  total) by mouth 2 (two) times daily with meals. 180 tablet 3    metoprolol succinate (TOPROL-XL) 100 MG 24 hr tablet TAKE 1 TABLET ONE TIME DAILY 90 tablet 3    pantoprazole (PROTONIX) 40 MG tablet Take 1 tablet (40 mg total) by mouth 2 (two) times daily. 180 tablet 4    prednisoLONE-moxiflox-bromfen 1-0.5-0.075 % DrpS Place 1 drop into the left eye 3 (three) times daily. 8 mL 2    UNABLE TO FIND medication name: Prevagen       No current facility-administered medications for this visit.       Past Medical History:   Diagnosis Date    Cataract of both eyes     Chronic gastritis without bleeding, negative H. pylori Jaunuary 2019 EGD 1/30/2020    Diabetes mellitus     Diverticulosis     Ectatic aorta 2/6/2018    HTN (hypertension)     HTN (hypertension) 10/9/2012    Lower GI bleeding 1/11/2021    Near syncope 2/5/2021    OHT (ocular hypertension)     Prostate cancer     Pyelonephritis, right no stone on CT scan. 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    Tubular adenoma of colon 01/10/2012    Type II or unspecified type diabetes mellitus with neurological manifestations, not stated as uncontrolled(250.60) 10/9/2012       Past Surgical History:   Procedure Laterality Date    CARPAL TUNNEL RELEASE Right 08/25/2015    CATARACT EXTRACTION W/  INTRAOCULAR LENS IMPLANT Left 6/25/2024    Procedure: EXTRACTION, CATARACT, WITH IOL INSERTION;  Surgeon: Federico Sumner MD;  Location: [facility] OR;  Service: Ophthalmology;  Laterality: Left;    CATARACT EXTRACTION W/  INTRAOCULAR LENS IMPLANT Right 7/16/2024    Procedure: EXTRACTION, CATARACT, WITH IOL INSERTION;  Surgeon: Federico Sumner MD;  Location: [facility] OR;  Service: Ophthalmology;  Laterality: Right;    CIRCUMCISION  04/13/2005    COLONOSCOPY N/A  "3/29/2017    Procedure: COLONOSCOPY Golytely prep;  Surgeon: Kavitha Cleaning MD;  Location: [facility] ENDO;  Service: Endoscopy;  Laterality: N/A;    COLONOSCOPY N/A 1/12/2021    Procedure: COLONOSCOPY;  Surgeon: Dung Panda MD;  Location: [facility] ENDO;  Service: Endoscopy;  Laterality: N/A;    COLONOSCOPY W/ POLYPECTOMY  01/10/2012    Repeat in 5 years     ESOPHAGOGASTRODUODENOSCOPY N/A 1/8/2019    Procedure: EGD (ESOPHAGOGASTRODUODENOSCOPY);  Surgeon: Rachel Burrows MD;  Location: [facility];  Service: Endoscopy;  Laterality: N/A;    ESOPHAGOGASTRODUODENOSCOPY N/A 5/14/2020    Procedure: ESOPHAGOGASTRODUODENOSCOPY (EGD);  Surgeon: Rachel Burrows MD;  Location: [facility];  Service: Endoscopy;  Laterality: N/A;  Dr. Luis Angel Burrows if possible.    FLEXIBLE SIGMOIDOSCOPY N/A 1/8/2019    Procedure: SIGMOIDOSCOPY, FLEXIBLE;  Surgeon: Rachel Burrows MD;  Location: [facility];  Service: Endoscopy;  Laterality: N/A;    PENILE PROSTHESIS IMPLANT      PROSTATE SURGERY  1999    Prostatectomy for prostate ca; [facility]    TRIGGER FINGER RELEASE Right 2015       Review of Systems:  Review of Systems   Musculoskeletal:  Positive for joint pain, joint swelling, myalgias and stiffness. Negative for arthritis.   Neurological:  Negative for numbness and paresthesias.       OBJECTIVE:     PHYSICAL EXAM:  Height: 5' 7" (170.2 cm) Weight: 72.1 kg (158 lb 15.2 oz)  Vitals:    10/11/24 1327   Weight: 72.1 kg (158 lb 15.2 oz)   Height: 5' 7" (1.702 m)   PainSc:   9       Physical Exam  Vitals reviewed.   Constitutional:       General: He is not in acute distress.     Appearance: Normal appearance. He is normal weight. He is not toxic-appearing.   HENT:      Head: Normocephalic and atraumatic.      Nose: Nose normal.   Eyes:      Extraocular Movements: Extraocular movements intact.      Conjunctiva/sclera: Conjunctivae normal.      Pupils: Pupils are equal, round, and reactive to light.   Cardiovascular:      Rate and Rhythm: Normal rate " and regular rhythm.      Pulses: Normal pulses.      Heart sounds: Normal heart sounds. No murmur heard.     No friction rub. No gallop.   Pulmonary:      Effort: Pulmonary effort is normal.      Breath sounds: Normal breath sounds. No stridor. No wheezing, rhonchi or rales.   Abdominal:      General: Abdomen is flat. Bowel sounds are normal.      Palpations: Abdomen is soft.   Musculoskeletal:         General: Normal range of motion.      Cervical back: Normal range of motion and neck supple.      Right lower leg: No edema.      Left lower leg: No edema.   Skin:     General: Skin is warm and dry.      Capillary Refill: Capillary refill takes less than 2 seconds.      Coloration: Skin is not jaundiced.      Findings: No erythema.   Neurological:      General: No focal deficit present.      Mental Status: He is alert and oriented to person, place, and time.   Psychiatric:         Mood and Affect: Mood normal.         Behavior: Behavior normal.         Thought Content: Thought content normal.         Judgment: Judgment normal.          Extremity exam- examination right hand   tenderness over the flexor tendon sheath right ring finger and 4th A1 pulley  Triggering present  limited range of motion   slight flexion contracture noted at the PIP joint   Sensation intact  Tinel sign negative       RADIOGRAPHS:  None  Comments: I have personally reviewed the imaging and I agree with the above radiologist's report.    ASSESSMENT/PLAN:   Plan: The patient and I had a thorough discussion today.  We discussed the working diagnosis as well as several other potential alternative diagnoses.    We discussed conservative and surgical treatment options. Conservative options include rest, activity modifications, workplace modifications, po and topical analgesia (OTC and rx), formal or home PT, and others. Surgical treatment was also mentioned.    We discussed repeat CSI verses surgery for right ring trigger finger release.  Because the  patient has already had 2 CSI for this finger, we discussed that he would have to have surgery if symptoms remained refractory following a 3rd CSI.  Patient has had trigger finger release on the left hand previously and is familiar with the surgery.  He opted for surgical intervention, and I agree with this decision    At this time, the patient would like to proceed with the following, which I agree with.    Surgery: Pt has failed conservative tx including rest, ice, NSAIDs, PT, CSI. Pt has elected to proceed with surgical intervention. The surgery was explained as well as risks and benefits, and consents were signed for right ring trigger finger release.   Medications: continue OTC analgesia prn. Discontinue all NSAIDs 1 week prior to surgery  Work status: as tolerated    All of the patient's questions were answered and the patient will contact us if they have any questions or concerns in the interim.      Tracie Bautista PA-C  [facility]  Orthopedic Surgery            Disclaimer: This note has been generated using voice-recognition software. There may be typographical errors that have been missed during proof-reading.

## 2024-10-08 NOTE — H&P (VIEW-ONLY)
Subjective:      Patient ID: Bryant Gil is a 84 y.o. male.    Chief Complaint: Pain of the Right Hand      HPI  Patient is a  84 y.o. who presents today for f/u of R hand pain  Last saw Dr Cooper on 12/21/2024  Given CSI to R ring finger, which provided significant relief  Reports symptoms have returned recently  Denies new trauma  Denies numbness/tingling  Is interested in repeat CSI vs trigger finger release surgery      Previous hx:  Bryant Gil is a  84 y.o. male presenting today for painful locking of the right ring finger.  There was not a history of trauma.  Onset of symptoms began several months ago  He had an injection about a year ago with good results   No numbness or tingling reported.      Review of patient's allergies indicates:  No Known Allergies      Current Outpatient Medications   Medication Sig Dispense Refill    amLODIPine (NORVASC) 10 MG tablet TAKE 1 TABLET EVERY DAY 90 tablet 3    aspirin (ECOTRIN) 81 MG EC tablet Take 81 mg by mouth once daily.      atorvastatin (LIPITOR) 20 MG tablet TAKE 1 TABLET EVERY DAY 90 tablet 0    BD ALCOHOL SWABS PadM USE FOR HOME GLUCOSE MONITORING DAILY 100 each 3    blood sugar diagnostic (TRUE METRIX GLUCOSE TEST STRIP) Strp Test Blood Sugar twice daily 100 strip 1    blood-glucose meter (TRUE METRIX GLUCOSE METER) Misc Test blood sugar twice a day 1 each 0    clobetasoL (TEMOVATE) 0.05 % cream Apply topically 2 (two) times daily. Bid-tid focally to ear scars until flat. 45 g 1    glipiZIDE 5 MG TR24 TAKE 1 TABLET EVERY DAY 90 tablet 1    hydrocortisone 2.5 % cream Apply topically 2 (two) times daily. 30 g 3    lancets (TRUEPLUS LANCETS) 30 gauge Misc Check sugar twice daily 200 each 3    latanoprost 0.005 % ophthalmic solution INSTILL 1 DROP INTO BOTH EYES ONE TIME DAILY 7.5 mL 3    losartan-hydrochlorothiazide 100-25 mg (HYZAAR) 100-25 mg per tablet TAKE 1 TABLET EVERY MORNING 90 tablet 3    metFORMIN (GLUCOPHAGE) 500 MG tablet Take 1 tablet (500 mg  total) by mouth 2 (two) times daily with meals. 180 tablet 3    metoprolol succinate (TOPROL-XL) 100 MG 24 hr tablet TAKE 1 TABLET ONE TIME DAILY 90 tablet 3    pantoprazole (PROTONIX) 40 MG tablet Take 1 tablet (40 mg total) by mouth 2 (two) times daily. 180 tablet 4    prednisoLONE-moxiflox-bromfen 1-0.5-0.075 % DrpS Place 1 drop into the left eye 3 (three) times daily. 8 mL 2    UNABLE TO FIND medication name: Prevagen       No current facility-administered medications for this visit.       Past Medical History:   Diagnosis Date    Cataract of both eyes     Chronic gastritis without bleeding, negative H. pylori Jaunuary 2019 EGD 1/30/2020    Diabetes mellitus     Diverticulosis     Ectatic aorta 2/6/2018    HTN (hypertension)     HTN (hypertension) 10/9/2012    Lower GI bleeding 1/11/2021    Near syncope 2/5/2021    OHT (ocular hypertension)     Prostate cancer     Pyelonephritis, right no stone on CT scan. 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    Tubular adenoma of colon 01/10/2012    Type II or unspecified type diabetes mellitus with neurological manifestations, not stated as uncontrolled(250.60) 10/9/2012       Past Surgical History:   Procedure Laterality Date    CARPAL TUNNEL RELEASE Right 08/25/2015    CATARACT EXTRACTION W/  INTRAOCULAR LENS IMPLANT Left 6/25/2024    Procedure: EXTRACTION, CATARACT, WITH IOL INSERTION;  Surgeon: Federico Sumner MD;  Location: [facility] OR;  Service: Ophthalmology;  Laterality: Left;    CATARACT EXTRACTION W/  INTRAOCULAR LENS IMPLANT Right 7/16/2024    Procedure: EXTRACTION, CATARACT, WITH IOL INSERTION;  Surgeon: Federico Sumner MD;  Location: [facility] OR;  Service: Ophthalmology;  Laterality: Right;    CIRCUMCISION  04/13/2005    COLONOSCOPY N/A  "3/29/2017    Procedure: COLONOSCOPY Golytely prep;  Surgeon: Kavitha Cleaning MD;  Location: [facility] ENDO;  Service: Endoscopy;  Laterality: N/A;    COLONOSCOPY N/A 1/12/2021    Procedure: COLONOSCOPY;  Surgeon: Dung Panda MD;  Location: [facility] ENDO;  Service: Endoscopy;  Laterality: N/A;    COLONOSCOPY W/ POLYPECTOMY  01/10/2012    Repeat in 5 years     ESOPHAGOGASTRODUODENOSCOPY N/A 1/8/2019    Procedure: EGD (ESOPHAGOGASTRODUODENOSCOPY);  Surgeon: Rachel Burrows MD;  Location: [facility];  Service: Endoscopy;  Laterality: N/A;    ESOPHAGOGASTRODUODENOSCOPY N/A 5/14/2020    Procedure: ESOPHAGOGASTRODUODENOSCOPY (EGD);  Surgeon: Rachel Burrows MD;  Location: [facility];  Service: Endoscopy;  Laterality: N/A;  Dr. Luis Angel Burrows if possible.    FLEXIBLE SIGMOIDOSCOPY N/A 1/8/2019    Procedure: SIGMOIDOSCOPY, FLEXIBLE;  Surgeon: Rachel Burrows MD;  Location: [facility];  Service: Endoscopy;  Laterality: N/A;    PENILE PROSTHESIS IMPLANT      PROSTATE SURGERY  1999    Prostatectomy for prostate ca; [facility]    TRIGGER FINGER RELEASE Right 2015       Review of Systems:  Review of Systems   Musculoskeletal:  Positive for joint pain, joint swelling, myalgias and stiffness. Negative for arthritis.   Neurological:  Negative for numbness and paresthesias.       OBJECTIVE:     PHYSICAL EXAM:  Height: 5' 7" (170.2 cm) Weight: 72.1 kg (158 lb 15.2 oz)  Vitals:    10/11/24 1327   Weight: 72.1 kg (158 lb 15.2 oz)   Height: 5' 7" (1.702 m)   PainSc:   9       Physical Exam  Vitals reviewed.   Constitutional:       General: He is not in acute distress.     Appearance: Normal appearance. He is normal weight. He is not toxic-appearing.   HENT:      Head: Normocephalic and atraumatic.      Nose: Nose normal.   Eyes:      Extraocular Movements: Extraocular movements intact.      Conjunctiva/sclera: Conjunctivae normal.      Pupils: Pupils are equal, round, and reactive to light.   Cardiovascular:      Rate and Rhythm: Normal rate " and regular rhythm.      Pulses: Normal pulses.      Heart sounds: Normal heart sounds. No murmur heard.     No friction rub. No gallop.   Pulmonary:      Effort: Pulmonary effort is normal.      Breath sounds: Normal breath sounds. No stridor. No wheezing, rhonchi or rales.   Abdominal:      General: Abdomen is flat. Bowel sounds are normal.      Palpations: Abdomen is soft.   Musculoskeletal:         General: Normal range of motion.      Cervical back: Normal range of motion and neck supple.      Right lower leg: No edema.      Left lower leg: No edema.   Skin:     General: Skin is warm and dry.      Capillary Refill: Capillary refill takes less than 2 seconds.      Coloration: Skin is not jaundiced.      Findings: No erythema.   Neurological:      General: No focal deficit present.      Mental Status: He is alert and oriented to person, place, and time.   Psychiatric:         Mood and Affect: Mood normal.         Behavior: Behavior normal.         Thought Content: Thought content normal.         Judgment: Judgment normal.          Extremity exam- examination right hand   tenderness over the flexor tendon sheath right ring finger and 4th A1 pulley  Triggering present  limited range of motion   slight flexion contracture noted at the PIP joint   Sensation intact  Tinel sign negative       RADIOGRAPHS:  None  Comments: I have personally reviewed the imaging and I agree with the above radiologist's report.    ASSESSMENT/PLAN:   Plan: The patient and I had a thorough discussion today.  We discussed the working diagnosis as well as several other potential alternative diagnoses.    We discussed conservative and surgical treatment options. Conservative options include rest, activity modifications, workplace modifications, po and topical analgesia (OTC and rx), formal or home PT, and others. Surgical treatment was also mentioned.    We discussed repeat CSI verses surgery for right ring trigger finger release.  Because the  patient has already had 2 CSI for this finger, we discussed that he would have to have surgery if symptoms remained refractory following a 3rd CSI.  Patient has had trigger finger release on the left hand previously and is familiar with the surgery.  He opted for surgical intervention, and I agree with this decision    At this time, the patient would like to proceed with the following, which I agree with.    Surgery: Pt has failed conservative tx including rest, ice, NSAIDs, PT, CSI. Pt has elected to proceed with surgical intervention. The surgery was explained as well as risks and benefits, and consents were signed for right ring trigger finger release.   Medications: continue OTC analgesia prn. Discontinue all NSAIDs 1 week prior to surgery  Work status: as tolerated    All of the patient's questions were answered and the patient will contact us if they have any questions or concerns in the interim.      Tracie Bautista PA-C  [facility]  Orthopedic Surgery            Disclaimer: This note has been generated using voice-recognition software. There may be typographical errors that have been missed during proof-reading.

## 2024-10-08 NOTE — PROGRESS NOTES
Subjective:      Patient ID:  Bryant Gil is a 84 y.o. male who presents for   Chief Complaint   Patient presents with    Keloid     Keloid - Follow-up  Affected locations: left ear  Severity: mild to moderate      Review of Systems   Constitutional: Negative.    HENT: Negative.     Respiratory: Negative.     Musculoskeletal: Negative.        Objective:   Physical Exam   Constitutional: He appears well-developed and well-nourished.   Neurological: He is alert and oriented to person, place, and time.   Psychiatric: He has a normal mood and affect.   Skin:               Diagram Legend     Erythematous scaling macule/papule c/w actinic keratosis       Vascular papule c/w angioma      Pigmented verrucoid papule/plaque c/w seborrheic keratosis      Yellow umbilicated papule c/w sebaceous hyperplasia      Irregularly shaped tan macule c/w lentigo     1-2 mm smooth white papules consistent with Milia      Movable subcutaneous cyst with punctum c/w epidermal inclusion cyst      Subcutaneous movable cyst c/w pilar cyst      Firm pink to brown papule c/w dermatofibroma      Pedunculated fleshy papule(s) c/w skin tag(s)      Evenly pigmented macule c/w junctional nevus     Mildly variegated pigmented, slightly irregular-bordered macule c/w mildly atypical nevus      Flesh colored to evenly pigmented papule c/w intradermal nevus       Pink pearly papule/plaque c/w basal cell carcinoma      Erythematous hyperkeratotic cursted plaque c/w SCC      Surgical scar with no sign of skin cancer recurrence      Open and closed comedones      Inflammatory papules and pustules      Verrucoid papule consistent consistent with wart     Erythematous eczematous patches and plaques     Dystrophic onycholytic nail with subungual debris c/w onychomycosis     Umbilicated papule    Erythematous-base heme-crusted tan verrucoid plaque consistent with inflamed seborrheic keratosis     Erythematous Silvery Scaling Plaque c/w Psoriasis     See  annotation  Charles earlobes with 1 cm keloids.    Assessment / Plan:        Keloid  -     clobetasoL (TEMOVATE) 0.05 % cream; Apply topically 2 (two) times daily. Bid-tid focally to ear scars until flat.  Dispense: 45 g; Refill: 1    Not much progress with shrinkage but no enlargement.  Discussed with patient the etiology and pathogenesis of the disease or skin lesion(s) and possible treatments and aggravators.    Reviewed with patient different treatment options and associated risks.  Cryosurgery procedure note:    Verbal consent from the patient is obtained including, but not limited to, risk of hypopigmentation/hyperpigmentation, scar, recurrence of lesion. Liquid nitrogen cryosurgery is applied to 2 lesions to produce a freeze injury. The patient is aware that blisters may form and is instructed on wound care with gentle cleansing and use of vaseline ointment to keep moist until healed. The patient is supplied a handout on cryosurgery and is instructed to call if lesions do not completely resolve.    Intralesional Kenalog 40mg/cc (0.25 cc total) injected into 2 lesions on the charles ear lobes today after obtaining verbal consent including risk of surrounding hypopigmentation. Patient tolerated procedure well.    Units:1  NDC for Kenalog 40mg/cc:  2934-4019-84    Cont clob focally.  Refill today.               Follow up in about 6 weeks (around 11/19/2024).

## 2024-10-09 ENCOUNTER — OFFICE VISIT (OUTPATIENT)
Dept: OPHTHALMOLOGY | Facility: CLINIC | Age: 84
End: 2024-10-09
Payer: MEDICARE

## 2024-10-09 DIAGNOSIS — Z98.890 POST-OPERATIVE STATE: Primary | ICD-10-CM

## 2024-10-09 DIAGNOSIS — H40.053 BILATERAL OCULAR HYPERTENSION: ICD-10-CM

## 2024-10-09 DIAGNOSIS — H52.7 REFRACTIVE ERROR: ICD-10-CM

## 2024-10-09 PROCEDURE — 1160F RVW MEDS BY RX/DR IN RCRD: CPT | Mod: CPTII,S$GLB,, | Performed by: OPHTHALMOLOGY

## 2024-10-09 PROCEDURE — 1159F MED LIST DOCD IN RCRD: CPT | Mod: CPTII,S$GLB,, | Performed by: OPHTHALMOLOGY

## 2024-10-09 PROCEDURE — 99999 PR PBB SHADOW E&M-EST. PATIENT-LVL II: CPT | Mod: PBBFAC,,, | Performed by: OPHTHALMOLOGY

## 2024-10-09 PROCEDURE — 1157F ADVNC CARE PLAN IN RCRD: CPT | Mod: CPTII,S$GLB,, | Performed by: OPHTHALMOLOGY

## 2024-10-09 PROCEDURE — 99024 POSTOP FOLLOW-UP VISIT: CPT | Mod: S$GLB,,, | Performed by: OPHTHALMOLOGY

## 2024-10-09 NOTE — PROGRESS NOTES
Subjective:       Patient ID: Bryant Gil is a 84 y.o. male.    Chief Complaint: Post-op Evaluation    HPI    Here for follow up for iritis OS     S/p phaco w/IOL OD 7/16/24   S/p Phaco w/IOL OS 6/25/24      Eye meds: PF BID OS (Finished)    84 year old male states he finished PF as instructed and doing well since   tapering. Pt stopped the latanoprost.. Voices no concerns   Last edited by Myla Cedeno on 10/9/2024  1:23 PM.             Assessment:       1. Post-operative state    2. Bilateral ocular hypertension    3. Refractive error        Plan:       S/p CE OU- Doing well.  OHT OU-IOP's are acceptable OU off Latanoprost.  RE-Pt does not want MRx.      Stay off Latanoprost OU.  Readers.  RTC Dr Zazueta in 6 mos.

## 2024-10-11 ENCOUNTER — OFFICE VISIT (OUTPATIENT)
Dept: ORTHOPEDICS | Facility: CLINIC | Age: 84
End: 2024-10-11
Payer: MEDICARE

## 2024-10-11 VITALS — BODY MASS INDEX: 24.94 KG/M2 | HEIGHT: 67 IN | WEIGHT: 158.94 LBS

## 2024-10-11 DIAGNOSIS — M65.341 TRIGGER RING FINGER OF RIGHT HAND: Primary | ICD-10-CM

## 2024-10-11 DIAGNOSIS — M79.641 RIGHT HAND PAIN: ICD-10-CM

## 2024-10-11 PROCEDURE — 99999 PR PBB SHADOW E&M-EST. PATIENT-LVL III: CPT | Mod: PBBFAC,,,

## 2024-10-11 RX ORDER — CEFAZOLIN SODIUM 2 G/50ML
2 SOLUTION INTRAVENOUS
OUTPATIENT
Start: 2024-10-11

## 2024-10-11 RX ORDER — MUPIROCIN 20 MG/G
OINTMENT TOPICAL
OUTPATIENT
Start: 2024-10-11

## 2024-10-14 ENCOUNTER — TELEPHONE (OUTPATIENT)
Dept: PREADMISSION TESTING | Facility: HOSPITAL | Age: 84
End: 2024-10-14
Payer: MEDICARE

## 2024-10-14 DIAGNOSIS — Z01.818 PRE-OP EVALUATION: Primary | ICD-10-CM

## 2024-10-14 DIAGNOSIS — I10 ESSENTIAL HYPERTENSION: Chronic | ICD-10-CM

## 2024-10-18 ENCOUNTER — CLINICAL SUPPORT (OUTPATIENT)
Dept: LAB | Facility: HOSPITAL | Age: 84
End: 2024-10-18
Attending: FAMILY MEDICINE
Payer: MEDICARE

## 2024-10-18 DIAGNOSIS — Z01.818 PRE-OP EVALUATION: ICD-10-CM

## 2024-10-18 DIAGNOSIS — I10 ESSENTIAL HYPERTENSION: Chronic | ICD-10-CM

## 2024-10-18 LAB
OHS QRS DURATION: 104 MS
OHS QTC CALCULATION: 431 MS

## 2024-10-18 PROCEDURE — 93005 ELECTROCARDIOGRAM TRACING: CPT | Mod: HCNC

## 2024-10-18 PROCEDURE — 93010 ELECTROCARDIOGRAM REPORT: CPT | Mod: HCNC,,, | Performed by: INTERNAL MEDICINE

## 2024-10-25 ENCOUNTER — PATIENT MESSAGE (OUTPATIENT)
Dept: ANESTHESIOLOGY | Facility: HOSPITAL | Age: 84
End: 2024-10-25
Payer: MEDICARE

## 2024-10-25 ENCOUNTER — HOSPITAL ENCOUNTER (OUTPATIENT)
Dept: PREADMISSION TESTING | Facility: HOSPITAL | Age: 84
Discharge: HOME | End: 2024-10-25
Attending: NURSE PRACTITIONER
Payer: MEDICARE

## 2024-10-25 ENCOUNTER — ANESTHESIA EVENT (OUTPATIENT)
Dept: SURGERY | Facility: HOSPITAL | Age: 84
End: 2024-10-25
Payer: MEDICARE

## 2024-10-25 NOTE — ANESTHESIA PREPROCEDURE EVALUATION
10/25/2024  Bryant Gil is a 84 y.o., male scheduled for RELEASE, TRIGGER FINGER (Right: Hand) 11/8/24.    Past Medical History:   Diagnosis Date    Cataract of both eyes     Chronic gastritis without bleeding, negative H. pylori Jaunuary 2019 EGD 1/30/2020    Diabetes mellitus     Diverticulosis     Ectatic aorta 2/6/2018    HTN (hypertension)     HTN (hypertension) 10/9/2012    Lower GI bleeding 1/11/2021    Near syncope 2/5/2021    OHT (ocular hypertension)     Prostate cancer     Pyelonephritis, right no stone on CT scan. 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    Tubular adenoma of colon 01/10/2012    Type II or unspecified type diabetes mellitus with neurological manifestations, not stated as uncontrolled(250.60) 10/9/2012     Past Surgical History:   Procedure Laterality Date    CARPAL TUNNEL RELEASE Right 08/25/2015    CATARACT EXTRACTION W/  INTRAOCULAR LENS IMPLANT Left 6/25/2024    Procedure: EXTRACTION, CATARACT, WITH IOL INSERTION;  Surgeon: Federico Sumner MD;  Location: [facility] OR;  Service: Ophthalmology;  Laterality: Left;    CATARACT EXTRACTION W/  INTRAOCULAR LENS IMPLANT Right 7/16/2024    Procedure: EXTRACTION, CATARACT, WITH IOL INSERTION;  Surgeon: Federico Sumner MD;  Location: [facility] OR;  Service: Ophthalmology;  Laterality: Right;    CIRCUMCISION  04/13/2005    COLONOSCOPY N/A 3/29/2017    Procedure: COLONOSCOPY Golytely prep;  Surgeon: Kavitha Cleaning MD;  Location: [facility];  Service: Endoscopy;  Laterality: N/A;    COLONOSCOPY N/A 1/12/2021    Procedure: COLONOSCOPY;  Surgeon: Dung Panda MD;  Location: [facility] ENDO;  Service: Endoscopy;   Laterality: N/A;    COLONOSCOPY W/ POLYPECTOMY  01/10/2012    Repeat in 5 years     ESOPHAGOGASTRODUODENOSCOPY N/A 1/8/2019    Procedure: EGD (ESOPHAGOGASTRODUODENOSCOPY);  Surgeon: Rachel Burrows MD;  Location: [facility] ENDO;  Service: Endoscopy;  Laterality: N/A;    ESOPHAGOGASTRODUODENOSCOPY N/A 5/14/2020    Procedure: ESOPHAGOGASTRODUODENOSCOPY (EGD);  Surgeon: Rachel Burrows MD;  Location: [facility] ENDO;  Service: Endoscopy;  Laterality: N/A;  Dr. Luis Angel Burrows if possible.    FLEXIBLE SIGMOIDOSCOPY N/A 1/8/2019    Procedure: SIGMOIDOSCOPY, FLEXIBLE;  Surgeon: Rachel Burrows MD;  Location: [facility] ENDO;  Service: Endoscopy;  Laterality: N/A;    PENILE PROSTHESIS IMPLANT      PROSTATE SURGERY  1999    Prostatectomy for prostate ca; EJ    TRIGGER FINGER RELEASE Right 2015     Review of patient's allergies indicates:  No Known Allergies  Current Outpatient Medications   Medication Instructions    amLODIPine (NORVASC) 10 MG tablet TAKE 1 TABLET EVERY DAY    aspirin (ECOTRIN) 81 mg, Daily    atorvastatin (LIPITOR) 20 mg, Oral    BD ALCOHOL SWABS PadM USE FOR HOME GLUCOSE MONITORING DAILY    blood sugar diagnostic (TRUE METRIX GLUCOSE TEST STRIP) Strp Test Blood Sugar twice daily    blood-glucose meter (TRUE METRIX GLUCOSE METER) Misc Test blood sugar twice a day     clobetasoL (TEMOVATE) 0.05 % cream Topical (Top), 2 times daily, Bid-tid focally to ear scars until flat.    glipiZIDE 5 mg, Oral    hydrocortisone 2.5 % cream Topical (Top), 2 times daily    lancets (TRUEPLUS LANCETS) 30 gauge Misc Check sugar twice daily    latanoprost 0.005 % ophthalmic solution INSTILL 1 DROP INTO BOTH EYES ONE TIME DAILY    losartan-hydrochlorothiazide 100-25 mg (HYZAAR) 100-25 mg per tablet TAKE 1 TABLET EVERY MORNING    metFORMIN (GLUCOPHAGE) 500 mg, Oral, 2 times daily with meals    metoprolol succinate (TOPROL-XL) 100 mg, Oral    pantoprazole (PROTONIX) 40 mg, Oral, 2 times daily    prednisoLONE-moxiflox-bromfen 1-0.5-0.075 % DrpS 1  drop, Left Eye, 3 times daily    UNABLE TO FIND medication name: Prevagen       Pre-op Assessment    I have reviewed the Patient Summary Reports.     I have reviewed the Nursing Notes.    I have reviewed the Medications.     Review of Systems  Anesthesia Hx:  No problems with previous Anesthesia             Denies Family Hx of Anesthesia complications.    Denies Personal Hx of Anesthesia complications.                    Social:  Former Smoker, Social Alcohol Use       Hematology/Oncology:       -- Anemia:                    --  Cancer in past history (prostate):                     EENT/Dental:  EENT/Dental Normal           Cardiovascular:  Exercise tolerance: good   Denies Pacemaker. Hypertension Valvular problems/Murmurs, AI   Dysrhythmias (PSVT, PVCs)       hyperlipidemia            Shortness of Breath                    Hypertension         Pulmonary:      Shortness of breath   Denies Sleep Apnea.                Renal/:  Chronic Renal Disease, CKD        Kidney Function/Disease             Hepatic/GI:     GERD, well controlled Liver Disease,        Gerd       Liver Disease        Musculoskeletal:  Musculoskeletal Normal                Neurological:  Denies TIA.  Denies CVA. Neuromuscular Disease,   Denies Seizures.                              Neuromuscular Disease   Endocrine:  Diabetes, type 2    Diabetes                          Physical Exam  General: Cooperative and Oriented    Airway:  Neck ROM: Normal ROM    Dental:  Intact      Lab Results   Component Value Date    WBC 5.69 07/24/2024    HGB 12.6 (L) 07/24/2024    HCT 39.1 (L) 07/24/2024     07/24/2024    CHOL 174 10/18/2024    TRIG 103 10/18/2024    HDL 43 10/18/2024    ALT 17 10/04/2024    AST 16 10/04/2024     10/04/2024    K 3.8 10/04/2024     10/04/2024    CREATININE 1.6 (H) 10/04/2024    BUN 29 (H) 10/04/2024    CO2 25 10/04/2024    TSH 1.184 04/07/2021    PSA <0.01 04/24/2015    INR 1.0 01/11/2021    HGBA1C 7.4 (H) 07/24/2024      Results for orders placed or performed in visit on 10/18/24   EKG 12-lead    Collection Time: 10/18/24  8:19 AM   Result Value Ref Range    QRS Duration 104 ms    [facility] QTC Calculation 431 ms    Narrative    Test Reason : Z01.818,I10,    Vent. Rate : 066 BPM     Atrial Rate : 066 BPM     P-R Int : 146 ms          QRS Dur : 104 ms      QT Int : 412 ms       P-R-T Axes : 024 -56 019 degrees     QTc Int : 431 ms    Normal sinus rhythm  Left axis deviation  Incomplete right bundle branch block  Abnormal ECG  When compared with ECG of 23-JUN-2021 13:42,  T wave inversion no longer evident in Inferior leads  Confirmed by Jose Gavin MD [id number]) on 10/18/2024 5:05:43 PM    Referred By: KARON WRIGHT           Confirmed By:Jose Gavin MD         Anesthesia Plan  Type of Anesthesia, risks & benefits discussed:    Anesthesia Type: Gen Natural Airway  Intra-op Monitoring Plan: Standard ASA Monitors  Post Op Pain Control Plan: multimodal analgesia  Induction:  IV  Airway Plan: Direct, Post-Induction  Informed Consent: Informed consent signed with the Patient and all parties understand the risks and agree with anesthesia plan.  All questions answered.   ASA Score: 3  Anesthesia Plan Notes: Anesthesia consent to be signed prior to surgery 11/8/24      Ready For Surgery From Anesthesia Perspective.     .

## 2024-10-25 NOTE — DISCHARGE INSTRUCTIONS

## 2024-11-07 DIAGNOSIS — E11.42 TYPE 2 DIABETES MELLITUS WITH DIABETIC POLYNEUROPATHY, WITHOUT LONG-TERM CURRENT USE OF INSULIN: ICD-10-CM

## 2024-11-07 DIAGNOSIS — E78.2 MIXED HYPERLIPIDEMIA: ICD-10-CM

## 2024-11-07 RX ORDER — METFORMIN HYDROCHLORIDE 500 MG/1
500 TABLET ORAL 2 TIMES DAILY WITH MEALS
Qty: 180 TABLET | Refills: 0 | Status: SHIPPED | OUTPATIENT
Start: 2024-11-07

## 2024-11-07 RX ORDER — ATORVASTATIN CALCIUM 20 MG/1
20 TABLET, FILM COATED ORAL
Qty: 90 TABLET | Refills: 3 | Status: SHIPPED | OUTPATIENT
Start: 2024-11-07

## 2024-11-07 NOTE — TELEPHONE ENCOUNTER
Refill Decision Note   Bryant Gil  is requesting a refill authorization.  Brief Assessment and Rationale for Refill:  Approve     Medication Therapy Plan:  FLOS. eGFR/CrCl data reviewed. Current dosage is acceptable, and renal dose adjustment currently not needed.      Pharmacist review requested: Yes   Comments:     Note composed:10:37 AM 11/07/2024

## 2024-11-07 NOTE — TELEPHONE ENCOUNTER
Care Due:                  Date            Visit Type   Department     Provider  --------------------------------------------------------------------------------                                EP -                              [facility]    Luke Ordoñezchel  Last Visit: 10-      CARE ([facility])   MEDICINE       Arely                              [facility]  Next Visit: 01-      CARE ([facility])   MEDICINE       Arely                                                            Last  Test          Frequency    Reason                     Performed    Due Date  --------------------------------------------------------------------------------    HBA1C.......  6 months...  glipiZIDE, metFORMIN.....  07- 01-    [facility] Embedded Care Due Messages. Reference number: [id number].   11/07/2024 1:48:00 AM CST

## 2024-11-07 NOTE — TELEPHONE ENCOUNTER
Refill Routing Note   Medication(s) are not appropriate for processing by [facility] for the following reason(s):        Required labs abnormal    [facility] action(s):  Defer  Approve        [facility]    Pharmacist review requested: Yes     Appointments  past 12m or future 3m with PCP    Date Provider   Last Visit   10/7/2024 Luke Mcgee MD   Next Visit   1/10/2025 Luke Mcgee MD   ED visits in past 90 days: 0        Note composed:9:03 AM 11/07/2024

## 2024-11-08 ENCOUNTER — ANESTHESIA (OUTPATIENT)
Dept: SURGERY | Facility: HOSPITAL | Age: 84
End: 2024-11-08
Payer: MEDICARE

## 2024-11-08 ENCOUNTER — HOSPITAL ENCOUNTER (OUTPATIENT)
Facility: HOSPITAL | Age: 84
Discharge: HOME | End: 2024-11-08
Attending: ORTHOPAEDIC SURGERY | Admitting: ORTHOPAEDIC SURGERY
Payer: MEDICARE

## 2024-11-08 VITALS
SYSTOLIC BLOOD PRESSURE: 148 MMHG | OXYGEN SATURATION: 96 % | WEIGHT: 160 LBS | HEART RATE: 65 BPM | BODY MASS INDEX: 25.11 KG/M2 | RESPIRATION RATE: 15 BRPM | HEIGHT: 67 IN | TEMPERATURE: 98 F | DIASTOLIC BLOOD PRESSURE: 87 MMHG

## 2024-11-08 DIAGNOSIS — M65.341 TRIGGER RING FINGER OF RIGHT HAND: ICD-10-CM

## 2024-11-08 LAB — POCT GLUCOSE: 129 MG/DL (ref 70–110)

## 2024-11-08 PROCEDURE — 37000009 HC ANESTHESIA EA ADD 15 MINS: Performed by: ORTHOPAEDIC SURGERY

## 2024-11-08 PROCEDURE — 71000015 HC POSTOP RECOV 1ST HR: Performed by: ORTHOPAEDIC SURGERY

## 2024-11-08 PROCEDURE — 63600175 PHARM REV CODE 636 W HCPCS: Performed by: NURSE ANESTHETIST, CERTIFIED REGISTERED

## 2024-11-08 PROCEDURE — 25000003 PHARM REV CODE 250

## 2024-11-08 PROCEDURE — 36000707: Performed by: ORTHOPAEDIC SURGERY

## 2024-11-08 PROCEDURE — 25000003 PHARM REV CODE 250: Performed by: NURSE ANESTHETIST, CERTIFIED REGISTERED

## 2024-11-08 PROCEDURE — 36000706: Performed by: ORTHOPAEDIC SURGERY

## 2024-11-08 PROCEDURE — 37000008 HC ANESTHESIA 1ST 15 MINUTES: Performed by: ORTHOPAEDIC SURGERY

## 2024-11-08 PROCEDURE — 26055 INCISE FINGER TENDON SHEATH: CPT | Mod: F6,,, | Performed by: ORTHOPAEDIC SURGERY

## 2024-11-08 PROCEDURE — 63600175 PHARM REV CODE 636 W HCPCS: Performed by: ORTHOPAEDIC SURGERY

## 2024-11-08 PROCEDURE — 71000016 HC POSTOP RECOV ADDL HR: Performed by: ORTHOPAEDIC SURGERY

## 2024-11-08 RX ORDER — BUPIVACAINE HYDROCHLORIDE 5 MG/ML
INJECTION, SOLUTION EPIDURAL; INTRACAUDAL
Status: DISCONTINUED | OUTPATIENT
Start: 2024-11-08 | End: 2024-11-08 | Stop reason: HOSPADM

## 2024-11-08 RX ORDER — PROPOFOL 10 MG/ML
VIAL (ML) INTRAVENOUS
Status: DISCONTINUED | OUTPATIENT
Start: 2024-11-08 | End: 2024-11-08

## 2024-11-08 RX ORDER — SODIUM CHLORIDE 9 MG/ML
INJECTION, SOLUTION INTRAVENOUS CONTINUOUS
OUTPATIENT
Start: 2024-11-08

## 2024-11-08 RX ORDER — ONDANSETRON 8 MG/1
8 TABLET, ORALLY DISINTEGRATING ORAL EVERY 8 HOURS PRN
Status: CANCELLED | OUTPATIENT
Start: 2024-11-08

## 2024-11-08 RX ORDER — OXYCODONE HYDROCHLORIDE 5 MG/1
10 TABLET ORAL EVERY 4 HOURS PRN
Status: CANCELLED | OUTPATIENT
Start: 2024-11-08

## 2024-11-08 RX ORDER — KETOROLAC TROMETHAMINE 30 MG/ML
15 INJECTION, SOLUTION INTRAMUSCULAR; INTRAVENOUS ONCE
Status: CANCELLED | OUTPATIENT
Start: 2024-11-08 | End: 2024-11-11

## 2024-11-08 RX ORDER — HYDROMORPHONE HYDROCHLORIDE 2 MG/ML
0.5 INJECTION, SOLUTION INTRAMUSCULAR; INTRAVENOUS; SUBCUTANEOUS EVERY 5 MIN PRN
OUTPATIENT
Start: 2024-11-08

## 2024-11-08 RX ORDER — ACETAMINOPHEN 10 MG/ML
INJECTION, SOLUTION INTRAVENOUS
Status: DISCONTINUED | OUTPATIENT
Start: 2024-11-08 | End: 2024-11-08

## 2024-11-08 RX ORDER — HYDROCODONE BITARTRATE AND ACETAMINOPHEN 5; 325 MG/1; MG/1
1 TABLET ORAL EVERY 4 HOURS PRN
Qty: 12 TABLET | Refills: 0 | Status: SHIPPED | OUTPATIENT
Start: 2024-11-08

## 2024-11-08 RX ORDER — PROPOFOL 10 MG/ML
VIAL (ML) INTRAVENOUS CONTINUOUS PRN
Status: DISCONTINUED | OUTPATIENT
Start: 2024-11-08 | End: 2024-11-08

## 2024-11-08 RX ORDER — LIDOCAINE HYDROCHLORIDE 10 MG/ML
INJECTION, SOLUTION EPIDURAL; INFILTRATION; INTRACAUDAL; PERINEURAL
Status: DISCONTINUED | OUTPATIENT
Start: 2024-11-08 | End: 2024-11-08 | Stop reason: HOSPADM

## 2024-11-08 RX ORDER — DEXAMETHASONE SODIUM PHOSPHATE 4 MG/ML
INJECTION, SOLUTION INTRA-ARTICULAR; INTRALESIONAL; INTRAMUSCULAR; INTRAVENOUS; SOFT TISSUE
Status: DISCONTINUED | OUTPATIENT
Start: 2024-11-08 | End: 2024-11-08

## 2024-11-08 RX ORDER — LIDOCAINE HYDROCHLORIDE 20 MG/ML
INJECTION INTRAVENOUS
Status: DISCONTINUED | OUTPATIENT
Start: 2024-11-08 | End: 2024-11-08

## 2024-11-08 RX ORDER — ONDANSETRON HYDROCHLORIDE 2 MG/ML
INJECTION, SOLUTION INTRAVENOUS
Status: DISCONTINUED | OUTPATIENT
Start: 2024-11-08 | End: 2024-11-08

## 2024-11-08 RX ORDER — PHENYLEPHRINE HYDROCHLORIDE 10 MG/ML
INJECTION INTRAVENOUS
Status: DISCONTINUED | OUTPATIENT
Start: 2024-11-08 | End: 2024-11-08

## 2024-11-08 RX ORDER — ONDANSETRON HYDROCHLORIDE 2 MG/ML
4 INJECTION, SOLUTION INTRAVENOUS ONCE AS NEEDED
OUTPATIENT
Start: 2024-11-08 | End: 2036-04-05

## 2024-11-08 RX ORDER — CEFAZOLIN SODIUM 1 G/3ML
INJECTION, POWDER, FOR SOLUTION INTRAMUSCULAR; INTRAVENOUS
Status: DISCONTINUED | OUTPATIENT
Start: 2024-11-08 | End: 2024-11-08

## 2024-11-08 RX ORDER — ACETAMINOPHEN 325 MG/1
650 TABLET ORAL EVERY 4 HOURS PRN
Status: CANCELLED | OUTPATIENT
Start: 2024-11-08

## 2024-11-08 RX ORDER — LIDOCAINE HYDROCHLORIDE 10 MG/ML
1 INJECTION, SOLUTION EPIDURAL; INFILTRATION; INTRACAUDAL; PERINEURAL ONCE
Status: DISCONTINUED | OUTPATIENT
Start: 2024-11-08 | End: 2024-11-08 | Stop reason: HOSPADM

## 2024-11-08 RX ORDER — MUPIROCIN 20 MG/G
OINTMENT TOPICAL
Status: DISCONTINUED | OUTPATIENT
Start: 2024-11-08 | End: 2024-11-08 | Stop reason: HOSPADM

## 2024-11-08 RX ORDER — KETOROLAC TROMETHAMINE 30 MG/ML
INJECTION, SOLUTION INTRAMUSCULAR; INTRAVENOUS
Status: DISCONTINUED | OUTPATIENT
Start: 2024-11-08 | End: 2024-11-08

## 2024-11-08 RX ORDER — GLUCAGON 1 MG
1 KIT INJECTION
OUTPATIENT
Start: 2024-11-08

## 2024-11-08 RX ORDER — SODIUM CHLORIDE 0.9 % (FLUSH) 0.9 %
10 SYRINGE (ML) INJECTION
OUTPATIENT
Start: 2024-11-08

## 2024-11-08 RX ORDER — FENTANYL CITRATE 50 UG/ML
INJECTION, SOLUTION INTRAMUSCULAR; INTRAVENOUS
Status: DISCONTINUED | OUTPATIENT
Start: 2024-11-08 | End: 2024-11-08

## 2024-11-08 RX ORDER — CEFAZOLIN 2 G/1
2 INJECTION, POWDER, FOR SOLUTION INTRAMUSCULAR; INTRAVENOUS
Status: DISCONTINUED | OUTPATIENT
Start: 2024-11-08 | End: 2024-11-08 | Stop reason: HOSPADM

## 2024-11-08 RX ADMIN — FENTANYL CITRATE 25 MCG: 50 INJECTION INTRAMUSCULAR; INTRAVENOUS at 08:11

## 2024-11-08 RX ADMIN — PROPOFOL 80 MG: 10 INJECTION, EMULSION INTRAVENOUS at 08:11

## 2024-11-08 RX ADMIN — KETOROLAC TROMETHAMINE 15 MG: 30 INJECTION, SOLUTION INTRAMUSCULAR; INTRAVENOUS at 08:11

## 2024-11-08 RX ADMIN — MUPIROCIN: 20 OINTMENT TOPICAL at 06:11

## 2024-11-08 RX ADMIN — ACETAMINOPHEN 1000 MG: 10 INJECTION, SOLUTION INTRAVENOUS at 08:11

## 2024-11-08 RX ADMIN — DEXAMETHASONE SODIUM PHOSPHATE 4 MG: 4 INJECTION, SOLUTION INTRA-ARTICULAR; INTRALESIONAL; INTRAMUSCULAR; INTRAVENOUS; SOFT TISSUE at 08:11

## 2024-11-08 RX ADMIN — CEFAZOLIN 2 G: 330 INJECTION, POWDER, FOR SOLUTION INTRAMUSCULAR; INTRAVENOUS at 08:11

## 2024-11-08 RX ADMIN — ONDANSETRON 4 MG: 2 INJECTION, SOLUTION INTRAMUSCULAR; INTRAVENOUS at 08:11

## 2024-11-08 RX ADMIN — PHENYLEPHRINE HYDROCHLORIDE 50 MCG: 10 INJECTION INTRAVENOUS at 08:11

## 2024-11-08 RX ADMIN — PROPOFOL 150 MCG/KG/MIN: 10 INJECTION, EMULSION INTRAVENOUS at 08:11

## 2024-11-08 RX ADMIN — SODIUM CHLORIDE: 0.9 INJECTION, SOLUTION INTRAVENOUS at 08:11

## 2024-11-08 RX ADMIN — LIDOCAINE HYDROCHLORIDE 100 MG: 20 INJECTION, SOLUTION INTRAVENOUS at 08:11

## 2024-11-08 NOTE — OPERATIVE NOTE ADDENDUM
Certification of Assistant at Surgery       Surgery Date: 11/8/2024     Participating Surgeons:  Surgeons and Role:     * Anthony Cooper Jr., MD - Primary    Procedures:  Procedure(s) (LRB):  RELEASE, TRIGGER FINGER (Right)    Assistant Surgeon's Certification of Necessity:  I understand that section 1842 (b) (6) (d) of the Social Security Act generally prohibits Medicare Part B reasonable charge payment for the services of assistants at surgery in teaching hospitals when qualified residents are available to furnish such services. I certify that the services for which payment is claimed were medically necessary, and that no qualified resident was available to perform the services. I further understand that these services are subject to post-payment review by the Medicare carrier.      Erin Bower PA-C    11/08/2024  10:24 AM

## 2024-11-08 NOTE — TRANSFER OF CARE
"Anesthesia Transfer of Care Note    Patient: Bryant Gil    Procedure(s) Performed: Procedure(s) (LRB):  RELEASE, TRIGGER FINGER (Right)    Patient location: [facility]    Anesthesia Type: MAC    Transport from OR: Transported from OR on 6-10 L/min O2 by face mask with adequate spontaneous ventilation    Post pain: adequate analgesia    Post assessment: no apparent anesthetic complications and tolerated procedure well    Post vital signs: stable    Level of consciousness: awake and alert    Nausea/Vomiting: no nausea/vomiting    Complications: none    Transfer of care protocol was followed    Last vitals: Visit Vitals  /71   Pulse 76   Temp 36.9 °C (98.4 °F) (Skin)   Resp 16   Ht 5' 7" (1.702 m)   Wt 72.6 kg (160 lb)   SpO2 97%   BMI 25.06 kg/m²     "

## 2024-11-08 NOTE — OP NOTE
[facility] - Surgery (Hospital)  Operative Note      Date of Procedure: 11/8/2024     Procedure: Procedure(s) (LRB):  RELEASE, TRIGGER FINGER (Right)     Surgeons and Role:     * Anthony Cooper Jr., MD - Primary    Assisting Surgeon: None    Pre-Operative Diagnosis: Trigger ring finger of right hand [M65.341]    Post-Operative Diagnosis: Post-Op Diagnosis Codes:     * Trigger ring finger of right hand [M65.341]    Anesthesia: Local MAC    Operative Findings (including complications, if any):  Triggering of the right index and right ring fingers    Description of Technical Procedures:     Preop diagnosis: 1. Triggering of the right index finger.      2. Triggering of the right ring finger.      Postop diagnosis: Same.      Operative procedure:  1. Trigger release right index finger.      2. Trigger release right ring finger.      Surgeon: Kenneth.      First assistant: Sathish.      Anesthesia: Mac.      EBL: Minimal.      Specimen: None.      Operative procedure in detail as follows:     After operative consent was obtained patient brought the operating room placed supine on the operating room table.  Anesthesia by IV sedation followed by injection of xylocaine Marcaine combination into the operative site right hand.  Tourniquet applied right arm right upper extremity prepped and draped out in the normal sterile fashion.  The Esmarch used to exsanguinated tourniquet inflated 225 mmHg.      Following this a volar oblique incision made at the base of the right index finger with a 15 blade.  Careful dissection used to expose the A1 pulley which was released longitudinally with the [place] blade and scissors.  After complete release range of motion check noted to be full without triggering.  The wound irrigated and closed with interrupted 5 O nylon horizontal mattress suture.      A 2nd incision made at the volar base of the ring finger with a 15 blade.  In a similar manner the A1 pulley was exposed protecting the digital  nerves.  The A1 pulley was released longitudinally with the Jackson blade and scissors and range of motion then checked noted to be full without triggering.  The wound irrigated and closed with interrupted 5 O nylon horizontal mattress suture on the skin.  Sterile dressings applied to both incisions followed by light wrap and the tourniquet deflated.  All sponge needle counts reported as correct no complications    Significant Surgical Tasks Conducted by the Assistant(s), if Applicable: retraction    Estimated Blood Loss (EBL): * No values recorded between 11/8/2024  8:41 AM and 11/8/2024  9:02 AM *           Implants: * No implants in log *    Specimens:   Specimen (24h ago, onward)      None                    Condition: Good    Disposition: PACU - hemodynamically stable.    Attestation: I was present and scrubbed for the entire procedure.    Discharge Note    OUTCOME: Patient tolerated treatment/procedure well without complication and is now ready for discharge.    DISPOSITION: Home or Self Care    FINAL DIAGNOSIS:  Trigger ring finger of right hand    FOLLOWUP: In clinic    DISCHARGE INSTRUCTIONS:    Discharge Procedure Orders   Diet general     Call MD for:  temperature >100.4     Call MD for:  persistent nausea and vomiting     Call MD for:  severe uncontrolled pain     Keep surgical extremity elevated     Remove dressing in 72 hours

## 2024-11-08 NOTE — INTERVAL H&P NOTE
The patient has been examined and the H&P has been reviewed:    I concur with the findings and changes have been noted since the H&P was written: added right index and ring finger trigger release    Surgery risks, benefits and alternative options discussed and understood by patient/family.          There are no hospital problems to display for this patient.

## 2024-11-08 NOTE — ANESTHESIA POSTPROCEDURE EVALUATION
Anesthesia Post Evaluation    Patient: Bryant Gil    Procedure(s) Performed: Procedure(s) (LRB):  RELEASE, TRIGGER FINGER (Right)    Final Anesthesia Type: general      Patient location during evaluation: PACU  Patient participation: Yes- Able to Participate  Level of consciousness: awake and alert  Post-procedure vital signs: reviewed and stable  Pain management: adequate  Airway patency: patent    PONV status at discharge: No PONV  Anesthetic complications: no      Cardiovascular status: blood pressure returned to baseline and hemodynamically stable  Respiratory status: unassisted  Hydration status: euvolemic  Follow-up not needed.              Vitals Value Taken Time   BP  11/08/24 1024   Temp  11/08/24 1024   Pulse  11/08/24 1024   Resp  11/08/24 1024   SpO2  11/08/24 1024         No case tracking events are documented in the log.      Pain/Luis Manuel Score: Luis Manuel Score: 8 (11/8/2024  8:05 AM)

## 2024-11-08 NOTE — PLAN OF CARE
Patient discharge criteria met. IV removed per order with catheter intact.  Patient voided per protocol.  Pain well controlled, VSS. Patient given discharge instructions, verbalized understanding and able to teach back.  No complications noted.

## 2024-11-19 NOTE — PROGRESS NOTES
Subjective:      Patient ID: Bryant Gil is a 84 y.o. male.    Chief Complaint: Post-op Evaluation of the Right Hand      HPI:  Bryant Gil is here for a postop visit.     Surgery: right trigger finger release of index and ring finger(s)  Date of Surgery: 11/8/2024 (2 weeks)  Surgeon: Dr. Cooper    He is doing well.   Pain has been controlled.   Preoperative symptoms related to triggering have improved.   Post surgical complaints include:  None.   No n/v, fever, chills, SOB, leg pain, surgical site irritation or drainage.      Past Medical History:   Diagnosis Date    Cataract of both eyes     Chronic gastritis without bleeding, negative H. pylori Jaunuary 2019 EGD 1/30/2020    Diabetes mellitus     Diverticulosis     Ectatic aorta 2/6/2018    HTN (hypertension)     HTN (hypertension) 10/9/2012    Lower GI bleeding 1/11/2021    Near syncope 2/5/2021    OHT (ocular hypertension)     Prostate cancer     Pyelonephritis, right no stone on CT scan. 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    Tubular adenoma of colon 01/10/2012    Type II or unspecified type diabetes mellitus with neurological manifestations, not stated as uncontrolled(250.60) 10/9/2012       Current Outpatient Medications:     amLODIPine (NORVASC) 10 MG tablet, TAKE 1 TABLET EVERY DAY, Disp: 90 tablet, Rfl: 3    aspirin (ECOTRIN) 81 MG EC tablet, Take 81 mg by mouth once daily., Disp: , Rfl:     atorvastatin (LIPITOR) 20 MG tablet, TAKE 1 TABLET EVERY DAY, Disp: 90 tablet, Rfl: 3    BD ALCOHOL SWABS PadM, USE FOR HOME GLUCOSE MONITORING DAILY, Disp: 100 each, Rfl: 3    blood sugar diagnostic (TRUE METRIX GLUCOSE TEST STRIP) Strp, Test Blood Sugar twice daily, Disp: 100 strip, Rfl: 1    blood-glucose meter (TRUE METRIX GLUCOSE METER)  "Misc, Test blood sugar twice a day, Disp: 1 each, Rfl: 0    clobetasoL (TEMOVATE) 0.05 % cream, Apply topically 2 (two) times daily. Bid-tid focally to ear scars until flat., Disp: 45 g, Rfl: 1    glipiZIDE 5 MG TR24, TAKE 1 TABLET EVERY DAY, Disp: 90 tablet, Rfl: 1    HYDROcodone-acetaminophen (NORCO) 5-325 mg per tablet, Take 1 tablet by mouth every 4 (four) hours as needed for Pain., Disp: 12 tablet, Rfl: 0    hydrocortisone 2.5 % cream, Apply topically 2 (two) times daily., Disp: 30 g, Rfl: 3    lancets (TRUEPLUS LANCETS) 30 gauge Misc, Check sugar twice daily, Disp: 200 each, Rfl: 3    losartan-hydrochlorothiazide 100-25 mg (HYZAAR) 100-25 mg per tablet, TAKE 1 TABLET EVERY MORNING, Disp: 90 tablet, Rfl: 3    metFORMIN (GLUCOPHAGE) 500 MG tablet, TAKE 1 TABLET TWICE DAILY WITH MEALS, Disp: 180 tablet, Rfl: 0    metoprolol succinate (TOPROL-XL) 100 MG 24 hr tablet, TAKE 1 TABLET ONE TIME DAILY, Disp: 90 tablet, Rfl: 3    pantoprazole (PROTONIX) 40 MG tablet, Take 1 tablet (40 mg total) by mouth 2 (two) times daily., Disp: 180 tablet, Rfl: 4    UNABLE TO FIND, medication name: Prevagen, Disp: , Rfl:     latanoprost 0.005 % ophthalmic solution, INSTILL 1 DROP INTO BOTH EYES ONE TIME DAILY, Disp: 7.5 mL, Rfl: 3    prednisoLONE-moxiflox-bromfen 1-0.5-0.075 % DrpS, Place 1 drop into the left eye 3 (three) times daily., Disp: 8 mL, Rfl: 2  Review of patient's allergies indicates:  No Known Allergies    Ht 5' 7" (1.702 m)   Wt 72.6 kg (160 lb 0.9 oz)   BMI 25.07 kg/m²     ROS      Objective:   Ortho Exam   right hand:  Significant for incision.   Sutures present  Wound margins are well approximated and healing nicely.   No redness, warmth, drainage, or other signs of infection.   minimal swelling.   ROM wrist and fingers full.     strength not tested.    Sensation intact.   Pulses present.    GEN: Well developed, well nourished. AAOX3. No acute distress.   Breathing unlabored.  Mood and affect pleasant. "     Imaging: None      Assessment/Plan:     1. Status post trigger finger release    2. Trigger index finger of right hand    3. Trigger ring finger of right hand        Wound care: sutures removed today in clinic. Regular wound care explained with soap and water. Patient encouraged to continue digit range-of-motion exercises as well as scar massage once wound fully heals.  Pain Management: continue current pain regimen  I explained symptoms will continue to resolve with time.   Work status: begin to use hand as tolerated  Follow up: in 4 weeks    All of the patient's questions were answered and the patient will contact us if they have any questions or concerns in the interim.      Tracie Bautista PA-C  [facility]  Orthopedic Surgery

## 2024-11-22 ENCOUNTER — OFFICE VISIT (OUTPATIENT)
Dept: ORTHOPEDICS | Facility: CLINIC | Age: 84
End: 2024-11-22
Payer: MEDICARE

## 2024-11-22 VITALS — HEIGHT: 67 IN | BODY MASS INDEX: 25.12 KG/M2 | WEIGHT: 160.06 LBS

## 2024-11-22 DIAGNOSIS — Z98.890 STATUS POST TRIGGER FINGER RELEASE: Primary | ICD-10-CM

## 2024-11-22 DIAGNOSIS — M65.341 TRIGGER RING FINGER OF RIGHT HAND: ICD-10-CM

## 2024-11-22 DIAGNOSIS — M65.321 TRIGGER INDEX FINGER OF RIGHT HAND: ICD-10-CM

## 2024-11-22 PROCEDURE — 99999 PR PBB SHADOW E&M-EST. PATIENT-LVL III: CPT | Mod: PBBFAC,,,

## 2025-01-09 ENCOUNTER — LAB VISIT (OUTPATIENT)
Dept: LAB | Facility: HOSPITAL | Age: 85
End: 2025-01-09
Attending: FAMILY MEDICINE
Payer: MEDICARE

## 2025-01-09 DIAGNOSIS — Z23 FLU VACCINE NEED: ICD-10-CM

## 2025-01-09 DIAGNOSIS — E11.69 TYPE 2 DIABETES MELLITUS WITH HYPERLIPIDEMIA: ICD-10-CM

## 2025-01-09 DIAGNOSIS — I47.10 PSVT (PAROXYSMAL SUPRAVENTRICULAR TACHYCARDIA): ICD-10-CM

## 2025-01-09 DIAGNOSIS — E11.42 TYPE 2 DIABETES MELLITUS WITH DIABETIC POLYNEUROPATHY, WITHOUT LONG-TERM CURRENT USE OF INSULIN: ICD-10-CM

## 2025-01-09 DIAGNOSIS — I70.0 AORTIC ATHEROSCLEROSIS: ICD-10-CM

## 2025-01-09 DIAGNOSIS — N18.32 STAGE 3B CHRONIC KIDNEY DISEASE: ICD-10-CM

## 2025-01-09 DIAGNOSIS — E78.2 MIXED HYPERLIPIDEMIA: ICD-10-CM

## 2025-01-09 DIAGNOSIS — Z12.5 ENCOUNTER FOR SCREENING FOR MALIGNANT NEOPLASM OF PROSTATE: ICD-10-CM

## 2025-01-09 DIAGNOSIS — E78.49 OTHER HYPERLIPIDEMIA: ICD-10-CM

## 2025-01-09 DIAGNOSIS — I10 ESSENTIAL HYPERTENSION: ICD-10-CM

## 2025-01-09 DIAGNOSIS — K21.9 GASTROESOPHAGEAL REFLUX DISEASE, UNSPECIFIED WHETHER ESOPHAGITIS PRESENT: ICD-10-CM

## 2025-01-09 DIAGNOSIS — E78.5 TYPE 2 DIABETES MELLITUS WITH HYPERLIPIDEMIA: ICD-10-CM

## 2025-01-09 LAB
ALBUMIN SERPL BCP-MCNC: 4.2 G/DL (ref 3.5–5.2)
ALBUMIN/CREAT UR: 26.6 UG/MG (ref 0–30)
ALP SERPL-CCNC: 65 U/L (ref 40–150)
ALT SERPL W/O P-5'-P-CCNC: 16 U/L (ref 10–44)
ANION GAP SERPL CALC-SCNC: 11 MMOL/L (ref 8–16)
AST SERPL-CCNC: 16 U/L (ref 10–40)
BASOPHILS # BLD AUTO: 0.05 K/UL (ref 0–0.2)
BASOPHILS NFR BLD: 1 % (ref 0–1.9)
BILIRUB SERPL-MCNC: 0.5 MG/DL (ref 0.1–1)
BILIRUB UR QL STRIP: NEGATIVE
BUN SERPL-MCNC: 20 MG/DL (ref 8–23)
CALCIUM SERPL-MCNC: 10 MG/DL (ref 8.7–10.5)
CHLORIDE SERPL-SCNC: 106 MMOL/L (ref 95–110)
CHOLEST SERPL-MCNC: 236 MG/DL (ref 120–199)
CHOLEST/HDLC SERPL: 5.5 {RATIO} (ref 2–5)
CLARITY UR: CLEAR
CO2 SERPL-SCNC: 25 MMOL/L (ref 23–29)
COLOR UR: YELLOW
COMPLEXED PSA SERPL-MCNC: <0.01 NG/ML (ref 0–4)
CREAT SERPL-MCNC: 1.3 MG/DL (ref 0.5–1.4)
CREAT UR-MCNC: 154 MG/DL (ref 23–375)
DIFFERENTIAL METHOD BLD: ABNORMAL
EOSINOPHIL # BLD AUTO: 0.1 K/UL (ref 0–0.5)
EOSINOPHIL NFR BLD: 1.8 % (ref 0–8)
ERYTHROCYTE [DISTWIDTH] IN BLOOD BY AUTOMATED COUNT: 14.2 % (ref 11.5–14.5)
EST. GFR  (NO RACE VARIABLE): 55 ML/MIN/1.73 M^2
ESTIMATED AVG GLUCOSE: 189 MG/DL (ref 68–131)
GLUCOSE SERPL-MCNC: 129 MG/DL (ref 70–110)
GLUCOSE UR QL STRIP: NEGATIVE
HBA1C MFR BLD: 8.2 % (ref 4–5.6)
HCT VFR BLD AUTO: 41.9 % (ref 40–54)
HDLC SERPL-MCNC: 43 MG/DL (ref 40–75)
HDLC SERPL: 18.2 % (ref 20–50)
HGB BLD-MCNC: 13.4 G/DL (ref 14–18)
HGB UR QL STRIP: ABNORMAL
IMM GRANULOCYTES # BLD AUTO: 0.01 K/UL (ref 0–0.04)
IMM GRANULOCYTES NFR BLD AUTO: 0.2 % (ref 0–0.5)
KETONES UR QL STRIP: NEGATIVE
LDLC SERPL CALC-MCNC: 171 MG/DL (ref 63–159)
LEUKOCYTE ESTERASE UR QL STRIP: NEGATIVE
LYMPHOCYTES # BLD AUTO: 2.1 K/UL (ref 1–4.8)
LYMPHOCYTES NFR BLD: 42.6 % (ref 18–48)
MCH RBC QN AUTO: 27.6 PG (ref 27–31)
MCHC RBC AUTO-ENTMCNC: 32 G/DL (ref 32–36)
MCV RBC AUTO: 86 FL (ref 82–98)
MICROALBUMIN UR DL<=1MG/L-MCNC: 41 UG/ML
MONOCYTES # BLD AUTO: 0.5 K/UL (ref 0.3–1)
MONOCYTES NFR BLD: 9.8 % (ref 4–15)
NEUTROPHILS # BLD AUTO: 2.2 K/UL (ref 1.8–7.7)
NEUTROPHILS NFR BLD: 44.6 % (ref 38–73)
NITRITE UR QL STRIP: NEGATIVE
NONHDLC SERPL-MCNC: 193 MG/DL
NRBC BLD-RTO: 0 /100 WBC
PH UR STRIP: 6 [PH] (ref 5–8)
PLATELET # BLD AUTO: 265 K/UL (ref 150–450)
PMV BLD AUTO: 10.6 FL (ref 9.2–12.9)
POTASSIUM SERPL-SCNC: 4 MMOL/L (ref 3.5–5.1)
PROT SERPL-MCNC: 8 G/DL (ref 6–8.4)
PROT UR QL STRIP: NEGATIVE
RBC # BLD AUTO: 4.86 M/UL (ref 4.6–6.2)
SODIUM SERPL-SCNC: 142 MMOL/L (ref 136–145)
SP GR UR STRIP: 1.01 (ref 1–1.03)
TRIGL SERPL-MCNC: 110 MG/DL (ref 30–150)
TSH SERPL DL<=0.005 MIU/L-ACNC: 1.87 UIU/ML (ref 0.4–4)
URN SPEC COLLECT METH UR: ABNORMAL
UROBILINOGEN UR STRIP-ACNC: NEGATIVE EU/DL
WBC # BLD AUTO: 5.02 K/UL (ref 3.9–12.7)

## 2025-01-09 PROCEDURE — 83036 HEMOGLOBIN GLYCOSYLATED A1C: CPT | Mod: HCNC | Performed by: FAMILY MEDICINE

## 2025-01-09 PROCEDURE — 82043 UR ALBUMIN QUANTITATIVE: CPT | Mod: HCNC | Performed by: FAMILY MEDICINE

## 2025-01-09 PROCEDURE — 85025 COMPLETE CBC W/AUTO DIFF WBC: CPT | Mod: HCNC | Performed by: FAMILY MEDICINE

## 2025-01-09 PROCEDURE — 81003 URINALYSIS AUTO W/O SCOPE: CPT | Mod: HCNC | Performed by: FAMILY MEDICINE

## 2025-01-09 PROCEDURE — 84153 ASSAY OF PSA TOTAL: CPT | Mod: HCNC | Performed by: FAMILY MEDICINE

## 2025-01-09 PROCEDURE — 80053 COMPREHEN METABOLIC PANEL: CPT | Mod: HCNC | Performed by: FAMILY MEDICINE

## 2025-01-09 PROCEDURE — 80061 LIPID PANEL: CPT | Mod: HCNC | Performed by: FAMILY MEDICINE

## 2025-01-09 PROCEDURE — 36415 COLL VENOUS BLD VENIPUNCTURE: CPT | Mod: HCNC | Performed by: FAMILY MEDICINE

## 2025-01-09 PROCEDURE — 84443 ASSAY THYROID STIM HORMONE: CPT | Mod: HCNC | Performed by: FAMILY MEDICINE

## 2025-01-10 ENCOUNTER — OFFICE VISIT (OUTPATIENT)
Dept: FAMILY MEDICINE | Facility: CLINIC | Age: 85
End: 2025-01-10
Attending: FAMILY MEDICINE
Payer: MEDICARE

## 2025-01-10 VITALS
DIASTOLIC BLOOD PRESSURE: 68 MMHG | OXYGEN SATURATION: 96 % | SYSTOLIC BLOOD PRESSURE: 122 MMHG | BODY MASS INDEX: 27.12 KG/M2 | TEMPERATURE: 98 F | HEART RATE: 65 BPM | WEIGHT: 172.81 LBS | HEIGHT: 67 IN

## 2025-01-10 DIAGNOSIS — E78.5 TYPE 2 DIABETES MELLITUS WITH HYPERLIPIDEMIA: ICD-10-CM

## 2025-01-10 DIAGNOSIS — I10 ESSENTIAL HYPERTENSION: ICD-10-CM

## 2025-01-10 DIAGNOSIS — L91.0 KELOID: ICD-10-CM

## 2025-01-10 DIAGNOSIS — N18.32 STAGE 3B CHRONIC KIDNEY DISEASE: ICD-10-CM

## 2025-01-10 DIAGNOSIS — E78.2 MIXED HYPERLIPIDEMIA: ICD-10-CM

## 2025-01-10 DIAGNOSIS — E11.42 TYPE 2 DIABETES MELLITUS WITH DIABETIC POLYNEUROPATHY, WITHOUT LONG-TERM CURRENT USE OF INSULIN: Primary | ICD-10-CM

## 2025-01-10 DIAGNOSIS — E11.69 TYPE 2 DIABETES MELLITUS WITH HYPERLIPIDEMIA: ICD-10-CM

## 2025-01-10 PROCEDURE — 99999 PR PBB SHADOW E&M-EST. PATIENT-LVL IV: CPT | Mod: PBBFAC,HCNC,, | Performed by: FAMILY MEDICINE

## 2025-01-10 RX ORDER — METFORMIN HYDROCHLORIDE 1000 MG/1
1000 TABLET ORAL 2 TIMES DAILY WITH MEALS
Qty: 180 TABLET | Refills: 3 | Status: SHIPPED | OUTPATIENT
Start: 2025-01-10

## 2025-01-10 RX ORDER — CLOBETASOL PROPIONATE 0.5 MG/G
CREAM TOPICAL
Qty: 45 G | Refills: 4 | Status: SHIPPED | OUTPATIENT
Start: 2025-01-10

## 2025-01-10 RX ORDER — ATORVASTATIN CALCIUM 40 MG/1
40 TABLET, FILM COATED ORAL DAILY
Qty: 90 TABLET | Refills: 4 | Status: SHIPPED | OUTPATIENT
Start: 2025-01-10

## 2025-01-10 RX ORDER — GLIPIZIDE 10 MG/1
10 TABLET, FILM COATED, EXTENDED RELEASE ORAL
Qty: 90 TABLET | Refills: 4 | Status: SHIPPED | OUTPATIENT
Start: 2025-01-10

## 2025-01-10 NOTE — PROGRESS NOTES
Subjective:       Patient ID: Bryant Gil is a 84 y.o. male.    Chief Complaint: Follow-up    84 yr old pleasant male with CKD 3, DM II, HTN, HLD, atrial tachycardia, presents today for follow up and also his 6 month check and lab work. Some mild memory issues. Never tried neuriva. Tried prevagen. MMSE mild and intact memory.      DM II - uncontrolled -  HGBA1C                   8.2 (H)             01/09/2025                                        - on metformin n glipizide - compliant - no hypoglycemic symptoms - mild CKD 3      HTN - controlled - on HYZAAR, amlodipine and metoprolol      HLD - improving - on statin -      LDLCALC                  171.0 (H)           01/09/2025              History as below - reviewed             Follow-up  Pertinent negatives include no arthralgias, chest pain, congestion, coughing, diaphoresis, headaches, joint swelling, myalgias, neck pain, rash, visual change, vomiting or weakness.   Medication Refill  Pertinent negatives include no arthralgias, chest pain, congestion, coughing, diaphoresis, headaches, joint swelling, myalgias, neck pain, rash, visual change, vomiting or weakness.   Diabetes  He presents for his follow-up diabetic visit. He has type 2 diabetes mellitus. His disease course has been stable. Pertinent negatives for hypoglycemia include no confusion, dizziness, headaches, hunger, nervousness/anxiousness, seizures, speech difficulty or tremors. Pertinent negatives for diabetes include no blurred vision, no chest pain, no foot paresthesias, no foot ulcerations, no polydipsia, no polyuria, no visual change, no weakness and no weight loss. Pertinent negatives for hypoglycemia complications include no blackouts, no nocturnal hypoglycemia and no required assistance. Symptoms are stable. Pertinent negatives for diabetic complications include no autonomic neuropathy, impotence, nephropathy, peripheral neuropathy or retinopathy. Risk factors for coronary artery disease  include diabetes mellitus, dyslipidemia and male sex. Current diabetic treatment includes oral agent (dual therapy). He is compliant with treatment all of the time. Meal planning includes avoidance of concentrated sweets. He has not had a previous visit with a dietitian. He rarely participates in exercise. There is no change in his home blood glucose trend. An ACE inhibitor/angiotensin II receptor blocker is being taken. He does not see a podiatrist.Eye exam is not current.   Hypertension  This is a chronic problem. The current episode started more than 1 year ago. The problem has been gradually improving since onset. The problem is controlled. Pertinent negatives include no blurred vision, chest pain, headaches, neck pain or palpitations. There are no associated agents to hypertension. Risk factors for coronary artery disease include dyslipidemia, male gender and diabetes mellitus. Past treatments include angiotensin blockers, diuretics, calcium channel blockers and beta blockers. The current treatment provides significant improvement. There are no compliance problems.  There is no history of angina, CAD/MI or retinopathy. There is no history of chronic renal disease, hyperaldosteronism, hyperparathyroidism, a hypertension causing med, renovascular disease or a thyroid problem.   Hyperlipidemia  This is a chronic problem. The current episode started more than 1 year ago. The problem is controlled. Recent lipid tests were reviewed and are normal. He has no history of chronic renal disease. There are no known factors aggravating his hyperlipidemia. Pertinent negatives include no chest pain or myalgias. Current antihyperlipidemic treatment includes statins. The current treatment provides significant improvement of lipids. There are no compliance problems.  Risk factors for coronary artery disease include diabetes mellitus, dyslipidemia, hypertension and male sex.     Review of Systems   Constitutional: Negative.   Negative for activity change, diaphoresis, unexpected weight change and weight loss.   HENT:  Negative for nasal congestion, ear pain, hearing loss, mouth sores, rhinorrhea, trouble swallowing and voice change.    Eyes: Negative.  Negative for blurred vision, pain, discharge and visual disturbance.   Respiratory: Negative.  Negative for apnea, cough, chest tightness and wheezing.    Cardiovascular: Negative.  Negative for chest pain and palpitations.   Gastrointestinal: Negative.  Negative for abdominal distention, anal bleeding, blood in stool, constipation, diarrhea and vomiting.   Endocrine: Negative.  Negative for cold intolerance, polydipsia and polyuria.   Genitourinary: Negative.  Negative for decreased urine volume, difficulty urinating, discharge, frequency, hematuria, impotence, scrotal swelling and urgency.   Musculoskeletal:  Negative for arthralgias, back pain, joint swelling, myalgias, neck pain and neck stiffness.   Integumentary:  Negative for color change and rash. Negative.   Allergic/Immunologic: Negative.  Negative for environmental allergies.   Neurological:  Negative for dizziness, tremors, seizures, speech difficulty, weakness, light-headedness and headaches.   Hematological: Negative.    Psychiatric/Behavioral: Negative.  Negative for agitation, confusion, dysphoric mood and suicidal ideas. The patient is not nervous/anxious.          PMH/PSH/FH/SH/MED/ALLERGY reviewed    Past Medical History:   Diagnosis Date    Cataract of both eyes     Chronic gastritis without bleeding, negative H. pylori Jaunuary 2019 EGD 1/30/2020    Diabetes mellitus     Diverticulosis     Ectatic aorta 2/6/2018    HTN (hypertension)     HTN (hypertension) 10/9/2012    Lower GI bleeding 1/11/2021    Near syncope 2/5/2021    OHT (ocular hypertension)     Prostate cancer     Pyelonephritis, right no stone on CT scan. 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    Tubular adenoma of colon 01/10/2012    Type II or unspecified type diabetes mellitus with neurological manifestations, not stated as uncontrolled(250.60) 10/9/2012       Past Surgical History:   Procedure Laterality Date    CARPAL TUNNEL RELEASE Right 08/25/2015    CATARACT EXTRACTION W/  INTRAOCULAR LENS IMPLANT Left 6/25/2024    Procedure: EXTRACTION, CATARACT, WITH IOL INSERTION;  Surgeon: Federico Sumner MD;  Location: [facility] OR;  Service: Ophthalmology;  Laterality: Left;    CATARACT EXTRACTION W/  INTRAOCULAR LENS IMPLANT Right 7/16/2024    Procedure: EXTRACTION, CATARACT, WITH IOL INSERTION;  Surgeon: Federico Sumner MD;  Location: [facility] OR;  Service: Ophthalmology;  Laterality: Right;    CIRCUMCISION  04/13/2005    COLONOSCOPY N/A 3/29/2017    Procedure: COLONOSCOPY Golytely prep;  Surgeon: Kavitha Cleaning MD;  Location: [facility];  Service: Endoscopy;  Laterality: N/A;    COLONOSCOPY N/A 1/12/2021    Procedure: COLONOSCOPY;  Surgeon: Dung Panda MD;  Location: [facility];  Service: Endoscopy;  Laterality: N/A;    COLONOSCOPY W/ POLYPECTOMY  01/10/2012    Repeat in 5 years     ESOPHAGOGASTRODUODENOSCOPY N/A 1/8/2019    Procedure: EGD (ESOPHAGOGASTRODUODENOSCOPY);  Surgeon: Rachel Burrows MD;  Location: [facility];  Service: Endoscopy;  Laterality: N/A;    ESOPHAGOGASTRODUODENOSCOPY N/A 5/14/2020    Procedure: ESOPHAGOGASTRODUODENOSCOPY (EGD);  Surgeon: Rachel Burrows MD;  Location: [facility];  Service: Endoscopy;  Laterality: N/A;  Dr. Luis Angel Burrows if possible.    FLEXIBLE SIGMOIDOSCOPY N/A 1/8/2019    Procedure: SIGMOIDOSCOPY, FLEXIBLE;  Surgeon: Rachel Burrows MD;  Location: [facility];  Service: Endoscopy;  Laterality: N/A;    PENILE PROSTHESIS IMPLANT      PROSTATE SURGERY  1999    Prostatectomy for prostate ca; [facility]     TRIGGER FINGER RELEASE Right 2015    TRIGGER FINGER RELEASE Right 2024    Procedure: RELEASE, TRIGGER FINGER;  Surgeon: Anthony Cooper Jr., MD;  Location: [facility];  Service: Orthopedics;  Laterality: Right;       Family History   Problem Relation Name Age of Onset    Hypertension Mother      No Known Problems Father unknown hx     Diabetes Sister Floridine     Cataracts Sister Rasheeda     No Known Problems Sister Cinthia     No Known Problems Sister Niki     No Known Problems Sister Shwetha     No Known Problems Brother Jeffery     Heart attack Brother Roverto     Coronary artery disease Brother Roverto     No Known Problems Brother Asad     Coronary artery disease Brother Milligan     Heart attack Brother Milligan     No Known Problems Daughter x5     No Known Problems Son x2     Blindness Neg Hx      Amblyopia Neg Hx      Glaucoma Neg Hx      Retinal detachment Neg Hx      Strabismus Neg Hx      Macular degeneration Neg Hx      Stroke Neg Hx      Thyroid disease Neg Hx      Colon cancer Neg Hx      Esophageal cancer Neg Hx         Social History     Socioeconomic History    Marital status:    Tobacco Use    Smoking status: Former     Current packs/day: 0.00     Average packs/day: 0.5 packs/day for 3.0 years (1.5 ttl pk-yrs)     Types: Cigarettes     Start date:      Quit date:      Years since quittin.0     Passive exposure: Never    Smokeless tobacco: Never    Tobacco comments:     smoked as a teenager   Substance and Sexual Activity    Alcohol use: Yes     Comment: social drinks a beer 1-2 x month    Drug use: Never    Sexual activity: Yes     Partners: Female   Social History Narrative    Works at [employer] in [employer]     Social Drivers of Health     Financial Resource Strain: Patient Declined (2024)    Overall Financial Resource Strain (CARDIA)     Difficulty of Paying Living Expenses: Patient declined   Food Insecurity: No Food Insecurity (2024)    Hunger Vital Sign      Worried About Running Out of Food in the Last Year: Never true     Ran Out of Food in the Last Year: Never true   Transportation Needs: No Transportation Needs (6/7/2024)    PRAPARE - Transportation     Lack of Transportation (Medical): No     Lack of Transportation (Non-Medical): No   Physical Activity: Inactive (7/16/2024)    Exercise Vital Sign     Days of Exercise per Week: 0 days     Minutes of Exercise per Session: 10 min   Stress: No Stress Concern Present (7/16/2024)    [employer] [place] [place] [employer] - Occupational Stress Questionnaire     Feeling of Stress : Not at all   Housing Stability: Low Risk  (7/16/2024)    Housing Stability Vital Sign     Unable to Pay for Housing in the Last Year: No     Homeless in the Last Year: No       Current Outpatient Medications   Medication Sig Dispense Refill    amLODIPine (NORVASC) 10 MG tablet TAKE 1 TABLET EVERY DAY 90 tablet 3    aspirin (ECOTRIN) 81 MG EC tablet Take 81 mg by mouth once daily.      BD ALCOHOL SWABS PadM USE FOR HOME GLUCOSE MONITORING DAILY 100 each 3    blood sugar diagnostic (TRUE METRIX GLUCOSE TEST STRIP) Strp Test Blood Sugar twice daily 100 strip 1    blood-glucose meter (TRUE METRIX GLUCOSE METER) Misc Test blood sugar twice a day 1 each 0    HYDROcodone-acetaminophen (NORCO) 5-325 mg per tablet Take 1 tablet by mouth every 4 (four) hours as needed for Pain. 12 tablet 0    hydrocortisone 2.5 % cream Apply topically 2 (two) times daily. 30 g 3    lancets (TRUEPLUS LANCETS) 30 gauge Misc Check sugar twice daily 200 each 3    losartan-hydrochlorothiazide 100-25 mg (HYZAAR) 100-25 mg per tablet TAKE 1 TABLET EVERY MORNING 90 tablet 3    metoprolol succinate (TOPROL-XL) 100 MG 24 hr tablet TAKE 1 TABLET ONE TIME DAILY 90 tablet 3    pantoprazole (PROTONIX) 40 MG tablet Take 1 tablet (40 mg total) by mouth 2 (two) times daily. 180 tablet 4    UNABLE TO FIND medication name: Prevagen      atorvastatin (LIPITOR) 40 MG tablet Take 1  tablet (40 mg total) by mouth once daily. 90 tablet 4    clobetasoL (TEMOVATE) 0.05 % cream Apply to scar in ear twice daily until flat. 45 g 4    glipiZIDE (GLUCOTROL) 10 MG TR24 Take 1 tablet (10 mg total) by mouth daily with breakfast. 90 tablet 4    metFORMIN (GLUCOPHAGE) 1000 MG tablet Take 1 tablet (1,000 mg total) by mouth 2 (two) times daily with meals. 180 tablet 3     No current facility-administered medications for this visit.       Review of patient's allergies indicates:  No Known Allergies      Objective:       Vitals:    01/10/25 0932   BP: 122/68   Pulse: 65   Temp: 97.7 °F (36.5 °C)       Physical Exam  Constitutional:       Appearance: He is well-developed.   HENT:      Head: Normocephalic and atraumatic.      Right Ear: External ear normal.      Left Ear: External ear normal.      Nose: Nose normal.      Mouth/Throat:      Pharynx: No oropharyngeal exudate.   Eyes:      General: No scleral icterus.        Right eye: No discharge.         Left eye: No discharge.      Conjunctiva/sclera: Conjunctivae normal.      Pupils: Pupils are equal, round, and reactive to light.   Neck:      Thyroid: No thyromegaly.      Vascular: No JVD.      Trachea: No tracheal deviation.   Cardiovascular:      Rate and Rhythm: Normal rate and regular rhythm.      Heart sounds: Normal heart sounds. No murmur heard.     No friction rub. No gallop.   Pulmonary:      Effort: Pulmonary effort is normal. No respiratory distress.      Breath sounds: Normal breath sounds. No stridor. No wheezing or rales.   Chest:      Chest wall: No tenderness.   Abdominal:      General: Bowel sounds are normal. There is no distension.      Palpations: Abdomen is soft. There is no mass.      Tenderness: There is no abdominal tenderness. There is no guarding or rebound.      Hernia: No hernia is present.   Musculoskeletal:         General: No tenderness. Normal range of motion.      Cervical back: Normal range of motion and neck supple.    Lymphadenopathy:      Cervical: No cervical adenopathy.   Skin:     General: Skin is warm and dry.      Coloration: Skin is not pale.      Findings: No erythema or rash.   Neurological:      Mental Status: He is alert and oriented to person, place, and time.      Cranial Nerves: No cranial nerve deficit.      Motor: No abnormal muscle tone.      Coordination: Coordination normal.      Deep Tendon Reflexes: Reflexes are normal and symmetric. Reflexes normal.   Psychiatric:         Behavior: Behavior normal.         Thought Content: Thought content normal.         Judgment: Judgment normal.         Assessment:       Problem List Items Addressed This Visit       Type 2 diabetes mellitus with diabetic polyneuropathy, without long-term current use of insulin - Primary (Chronic)    Relevant Medications    glipiZIDE (GLUCOTROL) 10 MG TR24    metFORMIN (GLUCOPHAGE) 1000 MG tablet    Other Relevant Orders    Comprehensive Metabolic Panel    Hemoglobin A1C    Lipid Panel    Mixed hyperlipidemia (Chronic)    Relevant Medications    atorvastatin (LIPITOR) 40 MG tablet    Essential hypertension (Chronic)    Type 2 diabetes mellitus with hyperlipidemia    Relevant Medications    glipiZIDE (GLUCOTROL) 10 MG TR24    metFORMIN (GLUCOPHAGE) 1000 MG tablet     Other Visit Diagnoses       Stage 3b chronic kidney disease        Keloid        Relevant Medications    clobetasoL (TEMOVATE) 0.05 % cream            Plan:           Bryant was seen today for follow-up.    Diagnoses and all orders for this visit:    Type 2 diabetes mellitus with diabetic polyneuropathy, without long-term current use of insulin  -     glipiZIDE (GLUCOTROL) 10 MG TR24; Take 1 tablet (10 mg total) by mouth daily with breakfast.  -     metFORMIN (GLUCOPHAGE) 1000 MG tablet; Take 1 tablet (1,000 mg total) by mouth 2 (two) times daily with meals.  -     Comprehensive Metabolic Panel; Future  -     Hemoglobin A1C; Future  -     Lipid Panel; Future    Mixed  hyperlipidemia  -     atorvastatin (LIPITOR) 40 MG tablet; Take 1 tablet (40 mg total) by mouth once daily.    Essential hypertension    Type 2 diabetes mellitus with hyperlipidemia    Stage 3b chronic kidney disease    Keloid  -     clobetasoL (TEMOVATE) 0.05 % cream; Apply to scar in ear twice daily until flat.          HTN  -controlled    DM II  -uncontrolled  -increase glipizide and metformin    HLD  -not at goal  -diet control n increase statin    Itching  -kenalog as directed    Ckd 3  -fluids      Spent adequate time in obtaining history and explaining differentials    40 minutes spent during this visit of which greater than 50% devoted to face-face counseling and coordination of care regarding diagnosis and management plan    Follow up in about 3 months (around 4/10/2025), or if symptoms worsen or fail to improve.

## 2025-01-29 DIAGNOSIS — I10 ESSENTIAL HYPERTENSION: ICD-10-CM

## 2025-01-29 RX ORDER — METOPROLOL SUCCINATE 100 MG/1
100 TABLET, EXTENDED RELEASE ORAL
Qty: 90 TABLET | Refills: 3 | Status: SHIPPED | OUTPATIENT
Start: 2025-01-29

## 2025-01-29 NOTE — TELEPHONE ENCOUNTER
No care due was identified.  [facility] Embedded Care Due Messages. Reference number: [id number].   1/29/2025 1:52:40 AM CST

## 2025-01-29 NOTE — TELEPHONE ENCOUNTER
Refill Routing Note   Medication(s) are not appropriate for processing by [facility] for the following reason(s):        Drug-disease interaction  Drug-Disease: metoprolol succinate and Hepatic cyst    [facility] action(s):  Defer               Appointments  past 12m or future 3m with PCP    Date Provider   Last Visit   1/10/2025 Luke Mcgee MD   Next Visit   4/11/2025 Luke Mcgee MD   ED visits in past 90 days: 0        Note composed:4:44 AM 01/29/2025

## 2025-02-24 DIAGNOSIS — Z00.00 ENCOUNTER FOR MEDICARE ANNUAL WELLNESS EXAM: ICD-10-CM

## 2025-03-07 ENCOUNTER — LAB VISIT (OUTPATIENT)
Dept: LAB | Facility: HOSPITAL | Age: 85
End: 2025-03-07
Attending: FAMILY MEDICINE
Payer: MEDICARE

## 2025-03-07 DIAGNOSIS — E11.42 TYPE 2 DIABETES MELLITUS WITH DIABETIC POLYNEUROPATHY, WITHOUT LONG-TERM CURRENT USE OF INSULIN: ICD-10-CM

## 2025-03-07 LAB
ALBUMIN SERPL BCP-MCNC: 4 G/DL (ref 3.5–5.2)
ALP SERPL-CCNC: 52 U/L (ref 40–150)
ALT SERPL W/O P-5'-P-CCNC: 9 U/L (ref 10–44)
ANION GAP SERPL CALC-SCNC: 10 MMOL/L (ref 8–16)
AST SERPL-CCNC: 16 U/L (ref 10–40)
BILIRUB SERPL-MCNC: 0.5 MG/DL (ref 0.1–1)
BUN SERPL-MCNC: 28 MG/DL (ref 8–23)
CALCIUM SERPL-MCNC: 9.4 MG/DL (ref 8.7–10.5)
CHLORIDE SERPL-SCNC: 108 MMOL/L (ref 95–110)
CHOLEST SERPL-MCNC: 154 MG/DL (ref 120–199)
CHOLEST/HDLC SERPL: 4.4 {RATIO} (ref 2–5)
CO2 SERPL-SCNC: 24 MMOL/L (ref 23–29)
CREAT SERPL-MCNC: 1.2 MG/DL (ref 0.5–1.4)
EST. GFR  (NO RACE VARIABLE): >60 ML/MIN/1.73 M^2
ESTIMATED AVG GLUCOSE: 148 MG/DL (ref 68–131)
GLUCOSE SERPL-MCNC: 100 MG/DL (ref 70–110)
HBA1C MFR BLD: 6.8 % (ref 4–5.6)
HDLC SERPL-MCNC: 35 MG/DL (ref 40–75)
HDLC SERPL: 22.7 % (ref 20–50)
LDLC SERPL CALC-MCNC: 102.2 MG/DL (ref 63–159)
NONHDLC SERPL-MCNC: 119 MG/DL
POTASSIUM SERPL-SCNC: 4.1 MMOL/L (ref 3.5–5.1)
PROT SERPL-MCNC: 7.7 G/DL (ref 6–8.4)
SODIUM SERPL-SCNC: 142 MMOL/L (ref 136–145)
TRIGL SERPL-MCNC: 84 MG/DL (ref 30–150)

## 2025-03-07 PROCEDURE — 80053 COMPREHEN METABOLIC PANEL: CPT | Mod: HCNC | Performed by: FAMILY MEDICINE

## 2025-03-07 PROCEDURE — 80061 LIPID PANEL: CPT | Mod: HCNC | Performed by: FAMILY MEDICINE

## 2025-03-07 PROCEDURE — 83036 HEMOGLOBIN GLYCOSYLATED A1C: CPT | Mod: HCNC | Performed by: FAMILY MEDICINE

## 2025-03-25 DIAGNOSIS — K21.9 GASTROESOPHAGEAL REFLUX DISEASE, UNSPECIFIED WHETHER ESOPHAGITIS PRESENT: ICD-10-CM

## 2025-03-25 RX ORDER — PANTOPRAZOLE SODIUM 40 MG/1
TABLET, DELAYED RELEASE ORAL
Qty: 180 TABLET | Refills: 0 | OUTPATIENT
Start: 2025-03-25

## 2025-03-25 NOTE — TELEPHONE ENCOUNTER
Refill Decision Note   Bryant Gil  is requesting a refill authorization.  Brief Assessment and Rationale for Refill:  Quick Discontinue     Medication Therapy Plan:    Pharmacy is requesting new scripts for the following medications without required information, (sig/ frequency/qty/etc)      Medication Reconciliation Completed: No     Comments: Pharmacies have been requesting medications for patients without required information, (sig, frequency, qty, etc.). In addition, requests are sent for medication(s) pt. are currently not taking, and medications patients have never taken.    We have spoken to the pharmacies about these request types and advised their teams previously that we are unable to assess these New Script requests and require all details for these requests. This is a known issue and has been reported.     Note composed:4:13 PM 03/25/2025

## 2025-03-25 NOTE — TELEPHONE ENCOUNTER
No care due was identified.  [facility] Embedded Care Due Messages. Reference number: [id number].   3/25/2025 3:59:25 PM CDT

## 2025-04-11 ENCOUNTER — OFFICE VISIT (OUTPATIENT)
Dept: FAMILY MEDICINE | Facility: CLINIC | Age: 85
End: 2025-04-11
Attending: FAMILY MEDICINE
Payer: MEDICARE

## 2025-04-11 VITALS
SYSTOLIC BLOOD PRESSURE: 126 MMHG | HEART RATE: 110 BPM | WEIGHT: 168.44 LBS | DIASTOLIC BLOOD PRESSURE: 72 MMHG | BODY MASS INDEX: 26.44 KG/M2 | HEIGHT: 67 IN

## 2025-04-11 DIAGNOSIS — K21.9 GASTROESOPHAGEAL REFLUX DISEASE, UNSPECIFIED WHETHER ESOPHAGITIS PRESENT: ICD-10-CM

## 2025-04-11 DIAGNOSIS — E11.69 TYPE 2 DIABETES MELLITUS WITH HYPERLIPIDEMIA: ICD-10-CM

## 2025-04-11 DIAGNOSIS — I10 ESSENTIAL HYPERTENSION: Primary | ICD-10-CM

## 2025-04-11 DIAGNOSIS — E78.2 MIXED HYPERLIPIDEMIA: ICD-10-CM

## 2025-04-11 DIAGNOSIS — E78.5 TYPE 2 DIABETES MELLITUS WITH HYPERLIPIDEMIA: ICD-10-CM

## 2025-04-11 DIAGNOSIS — I70.0 AORTIC ATHEROSCLEROSIS: ICD-10-CM

## 2025-04-11 DIAGNOSIS — E11.42 TYPE 2 DIABETES MELLITUS WITH DIABETIC POLYNEUROPATHY, WITHOUT LONG-TERM CURRENT USE OF INSULIN: ICD-10-CM

## 2025-04-11 PROCEDURE — 99999 PR PBB SHADOW E&M-EST. PATIENT-LVL IV: CPT | Mod: PBBFAC,HCNC,, | Performed by: FAMILY MEDICINE

## 2025-04-11 RX ORDER — PANTOPRAZOLE SODIUM 40 MG/1
40 TABLET, DELAYED RELEASE ORAL DAILY
Qty: 90 TABLET | Refills: 4 | Status: SHIPPED | OUTPATIENT
Start: 2025-04-11

## 2025-04-11 NOTE — PROGRESS NOTES
Subjective:       Patient ID: Bryant Gil is a 84 y.o. male.    Chief Complaint: Follow-up    84 yr old pleasant male with CKD 3, DM II, HTN, HLD, atrial tachycardia, presents today for follow up and also his 6 month check and lab work. Some mild memory issues. Never tried neuriva. Tried prevagen. MMSE mild and intact memory.      DM II - controlled -   HGBA1C                   6.8 (H)             03/07/2025                                                - on metformin n glipizide - compliant - no hypoglycemic symptoms - mild CKD 3      HTN - controlled - on HYZAAR, amlodipine and metoprolol      HLD - improving - on statin -      normal lipids -     History as below - reviewed             Follow-up  Pertinent negatives include no arthralgias, chest pain, congestion, coughing, diaphoresis, headaches, joint swelling, myalgias, neck pain, rash, visual change, vomiting or weakness.   Medication Refill  Pertinent negatives include no arthralgias, chest pain, congestion, coughing, diaphoresis, headaches, joint swelling, myalgias, neck pain, rash, visual change, vomiting or weakness.   Diabetes  He presents for his follow-up diabetic visit. He has type 2 diabetes mellitus. His disease course has been stable. Pertinent negatives for hypoglycemia include no confusion, dizziness, headaches, hunger, nervousness/anxiousness, seizures, speech difficulty or tremors. Pertinent negatives for diabetes include no blurred vision, no chest pain, no foot paresthesias, no foot ulcerations, no polydipsia, no polyuria, no visual change, no weakness and no weight loss. Pertinent negatives for hypoglycemia complications include no blackouts, no nocturnal hypoglycemia and no required assistance. Symptoms are stable. Pertinent negatives for diabetic complications include no autonomic neuropathy, impotence, nephropathy, peripheral neuropathy or retinopathy. Risk factors for coronary artery disease include diabetes mellitus,  dyslipidemia and male sex. Current diabetic treatment includes oral agent (dual therapy). He is compliant with treatment all of the time. Meal planning includes avoidance of concentrated sweets. He has not had a previous visit with a dietitian. He rarely participates in exercise. There is no change in his home blood glucose trend. An ACE inhibitor/angiotensin II receptor blocker is being taken. He does not see a podiatrist.Eye exam is not current.   Hypertension  This is a chronic problem. The current episode started more than 1 year ago. The problem has been gradually improving since onset. The problem is controlled. Pertinent negatives include no blurred vision, chest pain, headaches, neck pain or palpitations. There are no associated agents to hypertension. Risk factors for coronary artery disease include dyslipidemia, male gender and diabetes mellitus. Past treatments include angiotensin blockers, diuretics, calcium channel blockers and beta blockers. The current treatment provides significant improvement. There are no compliance problems.  There is no history of angina, CAD/MI or retinopathy. There is no history of chronic renal disease, hyperaldosteronism, hyperparathyroidism, a hypertension causing med, renovascular disease or a thyroid problem.   Hyperlipidemia  This is a chronic problem. The current episode started more than 1 year ago. The problem is controlled. Recent lipid tests were reviewed and are normal. He has no history of chronic renal disease. There are no known factors aggravating his hyperlipidemia. Pertinent negatives include no chest pain or myalgias. Current antihyperlipidemic treatment includes statins. The current treatment provides significant improvement of lipids. There are no compliance problems.  Risk factors for coronary artery disease include diabetes mellitus, dyslipidemia, hypertension and male sex.     Review of Systems   Constitutional: Negative.  Negative for activity change,  diaphoresis, unexpected weight change and weight loss.   HENT:  Negative for nasal congestion, ear pain, hearing loss, mouth sores, rhinorrhea, trouble swallowing and voice change.    Eyes: Negative.  Negative for blurred vision, pain, discharge and visual disturbance.   Respiratory: Negative.  Negative for apnea, cough, chest tightness and wheezing.    Cardiovascular: Negative.  Negative for chest pain and palpitations.   Gastrointestinal: Negative.  Negative for abdominal distention, anal bleeding, blood in stool, constipation, diarrhea and vomiting.   Endocrine: Negative.  Negative for cold intolerance, polydipsia and polyuria.   Genitourinary: Negative.  Negative for decreased urine volume, difficulty urinating, discharge, frequency, hematuria, impotence, scrotal swelling and urgency.   Musculoskeletal:  Negative for arthralgias, back pain, joint swelling, myalgias, neck pain and neck stiffness.   Integumentary:  Negative for color change and rash. Negative.   Allergic/Immunologic: Negative.  Negative for environmental allergies.   Neurological:  Negative for dizziness, tremors, seizures, speech difficulty, weakness, light-headedness and headaches.   Hematological: Negative.    Psychiatric/Behavioral: Negative.  Negative for agitation, confusion, dysphoric mood and suicidal ideas. The patient is not nervous/anxious.          PMH/PSH/FH/SH/MED/ALLERGY reviewed    Past Medical History:   Diagnosis Date    Cataract of both eyes     Chronic gastritis without bleeding, negative H. pylori Jaunuary 2019 EGD 1/30/2020    Diabetes mellitus     Diverticulosis     Ectatic aorta 2/6/2018    HTN (hypertension)     HTN (hypertension) 10/9/2012    Lower GI bleeding 1/11/2021    Near syncope 2/5/2021    OHT (ocular hypertension)     Prostate cancer     Pyelonephritis, right no stone on CT scan. 7/11/2013    Rectal bleeding 1/11/2021    Sepsis, same organism in urine grew in his blood, Klebsiella 7/11/2013    Sigmoid  diverticulosis 03/19/2017    SOB (shortness of breath) 2/5/2021    Tendinitis of both rotator cuffs 6/25/2013    Tortuous aorta 2/6/2018    Tubular adenoma of colon 01/10/2012    Type II or unspecified type diabetes mellitus with neurological manifestations, not stated as uncontrolled(250.60) 10/9/2012       Past Surgical History:   Procedure Laterality Date    CARPAL TUNNEL RELEASE Right 08/25/2015    CATARACT EXTRACTION W/  INTRAOCULAR LENS IMPLANT Left 6/25/2024    Procedure: EXTRACTION, CATARACT, WITH IOL INSERTION;  Surgeon: Federico Sumner MD;  Location: [facility] OR;  Service: Ophthalmology;  Laterality: Left;    CATARACT EXTRACTION W/  INTRAOCULAR LENS IMPLANT Right 7/16/2024    Procedure: EXTRACTION, CATARACT, WITH IOL INSERTION;  Surgeon: Federico Sumner MD;  Location: [facility] OR;  Service: Ophthalmology;  Laterality: Right;    CIRCUMCISION  04/13/2005    COLONOSCOPY N/A 3/29/2017    Procedure: COLONOSCOPY Golytely prep;  Surgeon: Kavitha Cleaning MD;  Location: [facility];  Service: Endoscopy;  Laterality: N/A;    COLONOSCOPY N/A 1/12/2021    Procedure: COLONOSCOPY;  Surgeon: Dung Panda MD;  Location: [facility];  Service: Endoscopy;  Laterality: N/A;    COLONOSCOPY W/ POLYPECTOMY  01/10/2012    Repeat in 5 years     ESOPHAGOGASTRODUODENOSCOPY N/A 1/8/2019    Procedure: EGD (ESOPHAGOGASTRODUODENOSCOPY);  Surgeon: Rachel Burrows MD;  Location: [facility];  Service: Endoscopy;  Laterality: N/A;    ESOPHAGOGASTRODUODENOSCOPY N/A 5/14/2020    Procedure: ESOPHAGOGASTRODUODENOSCOPY (EGD);  Surgeon: Rachel Burrows MD;  Location: [facility];  Service: Endoscopy;  Laterality: N/A;  Dr. Luis Angel Burrows if possible.    FLEXIBLE SIGMOIDOSCOPY N/A 1/8/2019    Procedure: SIGMOIDOSCOPY, FLEXIBLE;  Surgeon: Rachel Burrows MD;  Location: [facility];  Service: Endoscopy;  Laterality: N/A;    PENILE PROSTHESIS IMPLANT      PROSTATE SURGERY  1999    Prostatectomy for prostate ca; [facility]    TRIGGER FINGER  RELEASE Right 2015    TRIGGER FINGER RELEASE Right 2024    Procedure: RELEASE, TRIGGER FINGER;  Surgeon: Anthony Cooper Jr., MD;  Location: [facility];  Service: Orthopedics;  Laterality: Right;       Family History   Problem Relation Name Age of Onset    Hypertension Mother      No Known Problems Father unknown hx     Diabetes Sister Rasheeda     Cataracts Sister Rasheeda     No Known Problems Sister Cinthia     No Known Problems Sister Niki     No Known Problems Sister Shwetha     No Known Problems Brother Jeffery     Heart attack Brother Roverto     Coronary artery disease Brother Roverto     No Known Problems Brother Asad     Coronary artery disease Brother Milligan     Heart attack Brother Milligan     No Known Problems Daughter x5     No Known Problems Son x2     Blindness Neg Hx      Amblyopia Neg Hx      Glaucoma Neg Hx      Retinal detachment Neg Hx      Strabismus Neg Hx      Macular degeneration Neg Hx      Stroke Neg Hx      Thyroid disease Neg Hx      Colon cancer Neg Hx      Esophageal cancer Neg Hx         Social History     Socioeconomic History    Marital status:    Tobacco Use    Smoking status: Former     Current packs/day: 0.00     Average packs/day: 0.5 packs/day for 3.0 years (1.5 ttl pk-yrs)     Types: Cigarettes     Start date:      Quit date: 1960     Years since quittin.3     Passive exposure: Never    Smokeless tobacco: Never    Tobacco comments:     smoked as a teenager   Substance and Sexual Activity    Alcohol use: Yes     Comment: social drinks a beer 1-2 x month    Drug use: Never    Sexual activity: Yes     Partners: Female   Social History Narrative    Works at [employer] in [place]     Social Drivers of Health     Financial Resource Strain: Patient Declined (2024)    Overall Financial Resource Strain (CARDIA)     Difficulty of Paying Living Expenses: Patient declined   Food Insecurity: No Food Insecurity (2024)    Hunger Vital Sign     Worried About  Running Out of Food in the Last Year: Never true     Ran Out of Food in the Last Year: Never true   Transportation Needs: No Transportation Needs (6/7/2024)    PRAPARE - Transportation     Lack of Transportation (Medical): No     Lack of Transportation (Non-Medical): No   Physical Activity: Inactive (7/16/2024)    Exercise Vital Sign     Days of Exercise per Week: 0 days     Minutes of Exercise per Session: 10 min   Stress: No Stress Concern Present (7/16/2024)    [employer] [place] [employer] - Occupational Stress Questionnaire     Feeling of Stress : Not at all   Housing Stability: Unknown (7/16/2024)    Housing Stability Vital Sign     Unable to Pay for Housing in the Last Year: No     Homeless in the Last Year: No       Current Outpatient Medications   Medication Sig Dispense Refill    amLODIPine (NORVASC) 10 MG tablet TAKE 1 TABLET EVERY DAY 90 tablet 3    aspirin (ECOTRIN) 81 MG EC tablet Take 81 mg by mouth once daily.      atorvastatin (LIPITOR) 40 MG tablet Take 1 tablet (40 mg total) by mouth once daily. 90 tablet 4    BD ALCOHOL SWABS PadM USE FOR HOME GLUCOSE MONITORING DAILY 100 each 3    blood sugar diagnostic (TRUE METRIX GLUCOSE TEST STRIP) Strp Test Blood Sugar twice daily 100 strip 1    blood-glucose meter (TRUE METRIX GLUCOSE METER) Misc Test blood sugar twice a day 1 each 0    clobetasoL (TEMOVATE) 0.05 % cream Apply to scar in ear twice daily until flat. 45 g 4    glipiZIDE (GLUCOTROL) 10 MG TR24 Take 1 tablet (10 mg total) by mouth daily with breakfast. 90 tablet 4    HYDROcodone-acetaminophen (NORCO) 5-325 mg per tablet Take 1 tablet by mouth every 4 (four) hours as needed for Pain. 12 tablet 0    hydrocortisone 2.5 % cream Apply topically 2 (two) times daily. 30 g 3    lancets (TRUEPLUS LANCETS) 30 gauge Misc Check sugar twice daily 200 each 3    losartan-hydrochlorothiazide 100-25 mg (HYZAAR) 100-25 mg per tablet TAKE 1 TABLET EVERY MORNING 90 tablet 3    metFORMIN (GLUCOPHAGE)  1000 MG tablet Take 1 tablet (1,000 mg total) by mouth 2 (two) times daily with meals. 180 tablet 3    metoprolol succinate (TOPROL-XL) 100 MG 24 hr tablet TAKE 1 TABLET EVERY DAY 90 tablet 3    pantoprazole (PROTONIX) 40 MG tablet Take 1 tablet (40 mg total) by mouth once daily. 90 tablet 4     No current facility-administered medications for this visit.       Review of patient's allergies indicates:  No Known Allergies      Objective:       Vitals:    04/11/25 1006   BP: 126/72   Pulse: 110       Physical Exam  Constitutional:       Appearance: He is well-developed.   HENT:      Head: Normocephalic and atraumatic.      Right Ear: External ear normal.      Left Ear: External ear normal.      Nose: Nose normal.      Mouth/Throat:      Pharynx: No oropharyngeal exudate.   Eyes:      General: No scleral icterus.        Right eye: No discharge.         Left eye: No discharge.      Conjunctiva/sclera: Conjunctivae normal.      Pupils: Pupils are equal, round, and reactive to light.   Neck:      Thyroid: No thyromegaly.      Vascular: No JVD.      Trachea: No tracheal deviation.   Cardiovascular:      Rate and Rhythm: Normal rate and regular rhythm.      Heart sounds: Normal heart sounds. No murmur heard.     No friction rub. No gallop.   Pulmonary:      Effort: Pulmonary effort is normal. No respiratory distress.      Breath sounds: Normal breath sounds. No stridor. No wheezing or rales.   Chest:      Chest wall: No tenderness.   Abdominal:      General: Bowel sounds are normal. There is no distension.      Palpations: Abdomen is soft. There is no mass.      Tenderness: There is no abdominal tenderness. There is no guarding or rebound.      Hernia: No hernia is present.   Musculoskeletal:         General: No tenderness. Normal range of motion.      Cervical back: Normal range of motion and neck supple.   Lymphadenopathy:      Cervical: No cervical adenopathy.   Skin:     General: Skin is warm and dry.      Coloration:  Skin is not pale.      Findings: No erythema or rash.   Neurological:      Mental Status: He is alert and oriented to person, place, and time.      Cranial Nerves: No cranial nerve deficit.      Motor: No abnormal muscle tone.      Coordination: Coordination normal.      Deep Tendon Reflexes: Reflexes are normal and symmetric. Reflexes normal.   Psychiatric:         Behavior: Behavior normal.         Thought Content: Thought content normal.         Judgment: Judgment normal.         Assessment:       Problem List Items Addressed This Visit       Type 2 diabetes mellitus with diabetic polyneuropathy, without long-term current use of insulin (Chronic)    Mixed hyperlipidemia (Chronic)    Essential hypertension - Primary (Chronic)    Type 2 diabetes mellitus with hyperlipidemia    Aortic atherosclerosis     Other Visit Diagnoses         Gastroesophageal reflux disease, unspecified whether esophagitis present        Relevant Medications    pantoprazole (PROTONIX) 40 MG tablet            Plan:           Bryant was seen today for follow-up.    Diagnoses and all orders for this visit:    Essential hypertension    Gastroesophageal reflux disease, unspecified whether esophagitis present  -     pantoprazole (PROTONIX) 40 MG tablet; Take 1 tablet (40 mg total) by mouth once daily.    Type 2 diabetes mellitus with diabetic polyneuropathy, without long-term current use of insulin    Type 2 diabetes mellitus with hyperlipidemia    Mixed hyperlipidemia    Aortic atherosclerosis          HTN  -controlled    DM II  -controlled  -increase glipizide and metformin    HLD  -at goal  -diet control n statin    Itching  -kenalog as directed          Spent adequate time in obtaining history and explaining differentials    40 minutes spent during this visit of which greater than 50% devoted to face-face counseling and coordination of care regarding diagnosis and management plan    Follow up in about 3 months (around 7/11/2025), or if  symptoms worsen or fail to improve.

## 2025-05-20 ENCOUNTER — OFFICE VISIT (OUTPATIENT)
Dept: FAMILY MEDICINE | Facility: CLINIC | Age: 85
End: 2025-05-20
Payer: MEDICARE

## 2025-05-20 VITALS
TEMPERATURE: 99 F | OXYGEN SATURATION: 97 % | WEIGHT: 166 LBS | HEIGHT: 67 IN | DIASTOLIC BLOOD PRESSURE: 70 MMHG | SYSTOLIC BLOOD PRESSURE: 130 MMHG | BODY MASS INDEX: 26.06 KG/M2 | HEART RATE: 110 BPM

## 2025-05-20 DIAGNOSIS — E66.3 OVERWEIGHT WITH BODY MASS INDEX (BMI) OF 26 TO 26.9 IN ADULT: ICD-10-CM

## 2025-05-20 DIAGNOSIS — N18.31 STAGE 3A CHRONIC KIDNEY DISEASE: ICD-10-CM

## 2025-05-20 DIAGNOSIS — E11.69 TYPE 2 DIABETES MELLITUS WITH HYPERLIPIDEMIA: ICD-10-CM

## 2025-05-20 DIAGNOSIS — I70.0 AORTIC ATHEROSCLEROSIS: ICD-10-CM

## 2025-05-20 DIAGNOSIS — Z85.46 HISTORY OF PROSTATE CANCER: Chronic | ICD-10-CM

## 2025-05-20 DIAGNOSIS — E11.42 TYPE 2 DIABETES MELLITUS WITH DIABETIC POLYNEUROPATHY, WITHOUT LONG-TERM CURRENT USE OF INSULIN: Chronic | ICD-10-CM

## 2025-05-20 DIAGNOSIS — I10 ESSENTIAL HYPERTENSION: Chronic | ICD-10-CM

## 2025-05-20 DIAGNOSIS — E78.5 TYPE 2 DIABETES MELLITUS WITH HYPERLIPIDEMIA: ICD-10-CM

## 2025-05-20 DIAGNOSIS — Z00.00 ENCOUNTER FOR MEDICARE ANNUAL WELLNESS EXAM: Primary | ICD-10-CM

## 2025-05-20 DIAGNOSIS — E11.22 TYPE 2 DM WITH CKD STAGE 2 AND HYPERTENSION: ICD-10-CM

## 2025-05-20 DIAGNOSIS — N18.2 TYPE 2 DM WITH CKD STAGE 2 AND HYPERTENSION: ICD-10-CM

## 2025-05-20 DIAGNOSIS — I12.9 TYPE 2 DM WITH CKD STAGE 2 AND HYPERTENSION: ICD-10-CM

## 2025-05-20 DIAGNOSIS — K21.9 GASTROESOPHAGEAL REFLUX DISEASE WITHOUT ESOPHAGITIS: ICD-10-CM

## 2025-05-20 DIAGNOSIS — I47.10 PSVT (PAROXYSMAL SUPRAVENTRICULAR TACHYCARDIA): ICD-10-CM

## 2025-05-20 PROCEDURE — 99999 PR PBB SHADOW E&M-EST. PATIENT-LVL V: CPT | Mod: PBBFAC,HCNC,,

## 2025-05-20 NOTE — PATIENT INSTRUCTIONS
Counseling and Referral of Other Preventative  (Italic type indicates deductible and co-insurance are waived)    Patient Name: Bryant Gil  Today's Date: 5/20/2025    Health Maintenance       Date Due Completion Date    Diabetic Eye Exam 05/27/2025 5/27/2024    RSV Vaccine (Age 60+ and Pregnant patients) (1 - 1-dose 75+ series) 05/20/2025 (Originally 8/18/2017) ---    COVID-19 Vaccine (6 - 2024-25 season) 05/20/2026 (Originally 9/1/2024) 11/7/2022    Hemoglobin A1c 09/07/2025 3/7/2025    Diabetes Urine Screening 01/09/2026 1/9/2025    Lipid Panel 03/07/2026 3/7/2025    TETANUS VACCINE 01/01/2027 1/1/2017 (Done)    Override on 1/1/2017: Done (Done through work)        No orders of the defined types were placed in this encounter.      The following information is provided to all patients.  This information is to help you find resources for any of the problems found today that may be affecting your health:                  Living healthy guide: www.Count includes the [facility].[place].gov      Understanding Diabetes: www.diabetes.org      Eating healthy: www.cdc.gov/healthyweight      CDC home safety checklist: www.cdc.gov/steadi/patient.html      Agency on Aging: www.goea.[place].[facility]      Alcoholics anonymous (AA): www.aa.org      Physical Activity: www.juliette.nih.gov/jr4vztr      Tobacco use: www.quitwithusla.org

## 2025-05-20 NOTE — PROGRESS NOTES
"  Bryant Gil presented for a  Medicare AWV and comprehensive Health Risk Assessment today. The following components were reviewed and updated:    Medical history  Family History  Social history  Allergies and Current Medications  Health Risk Assessment  Health Maintenance  Care Team         ** See Completed Assessments for Annual Wellness Visit within the encounter summary.**         The following assessments were completed:  Living Situation  CAGE  Depression Screening  Timed Get Up and Go  Whisper Test  Cognitive Function Screening    Nutrition Screening  ADL Screening  PAQ Screening      Opioid documentation for eAWV      Patient does not have a current opioid prescription.        Review for Substance Use Disorders: Patient does not use substance      Current Medications[1]       Vitals:    05/20/25 1316   BP: 130/70   Pulse: 110   Temp: 98.5 °F (36.9 °C)   TempSrc: Oral   SpO2: 97%   Weight: 75.3 kg (166 lb 0.1 oz)   Height: 5' 7" (1.702 m)   PainSc: 0-No pain      Physical Exam  Vitals and nursing note reviewed.   Constitutional:       General: He is not in acute distress.     Appearance: Normal appearance. He is not ill-appearing.   HENT:      Head: Normocephalic and atraumatic.      Mouth/Throat:      Mouth: Mucous membranes are moist.   Eyes:      General: No scleral icterus.        Right eye: No discharge.         Left eye: No discharge.      Extraocular Movements: Extraocular movements intact.      Conjunctiva/sclera: Conjunctivae normal.   Cardiovascular:      Rate and Rhythm: Normal rate.   Pulmonary:      Effort: Pulmonary effort is normal. No respiratory distress.   Musculoskeletal:      Cervical back: Normal range of motion.   Skin:     General: Skin is warm and dry.   Neurological:      Mental Status: He is alert and oriented to person, place, and time.      GCS: GCS eye subscore is 4. GCS verbal subscore is 5. GCS motor subscore is 6.   Psychiatric:         Mood and Affect: Mood normal.         " Speech: Speech normal.         Behavior: Behavior normal. Behavior is cooperative.         Cognition and Memory: Cognition and memory normal.               Diagnoses and health risks identified today and associated recommendations/orders:    1. Encounter for Medicare annual wellness exam  - Chart reviewed. Problem list updated. Discussed current medical diagnosis, current medications, medical/surgical/family/social history; updated provider list; documented vital signs; identified any cognitive impairment; and updated risk factor list. Addressed any outstanding health maintenance. Provided patient with personalized health advice. Continue to follow up with PCP and any specialists.      2. PSVT (paroxysmal supraventricular tachycardia)  Chronic, stable, Taking metoprolol. Follow up with PCP.     3. Type 2 diabetes mellitus with diabetic polyneuropathy, without long-term current use of insulin  Chronic; improving on current treatment plan; follow up with PCP  - taking metformin, glipizide. Last A1C was 6.8 on 3/7/25.   - requested CGM- Dexcom ordered.   - blood-glucose sensor (DEXCOM G7 SENSOR) Shanice; Use continue glucose monitor to check sugar daily  Dispense: 2 each; Refill: 10    4. Type 2 diabetes mellitus with hyperlipidemia  Chronic; improving on current treatment plan; follow up with PCP  - taking metformin, glipizide. Taking Statin. Last A1C was 6.8 on 3/7/25.   - requested CGM- Dexcom ordered.   - blood-glucose sensor (DEXCOM G7 SENSOR) Shanice; Use continue glucose monitor to check sugar daily  Dispense: 2 each; Refill: 10    5. 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 Last A1C was 6.8 on 3/7/25. BP stable. Taking Hyzaar, metoprolol, and amlodipine.   - requested CGM- Dexcom ordered.   - blood-glucose sensor (DEXCOM G7 SENSOR) Shanice; Use continue glucose monitor to check sugar daily  Dispense: 2 each; Refill:  10    6. Stage 3a chronic kidney disease  Chronic; Stable- monitoring. GFR was 60 on 3/7/25. Follow up with PCP.     7. Essential hypertension  Chronic; stable on current treatment plan; follow up with PCP  BP stable. Taking Hyzaar, metoprolol, and amlodipine.     8. Gastroesophageal reflux disease without esophagitis  Chronic; stable on current treatment plan; follow up with PCP  Taking Protonix.     9. Aortic atherosclerosis  Chronic; stable on current treatment plan; follow up with PCP  Taking statin. Seen on CXR on 6/23/2021    10. History of prostate cancer  Chronic; stable; follow up with PCP. PSA <0.01 on 1/9/25. Monitoring. Follow up with PCP.     11. Overweight with body mass index (BMI) of 26 to 26.9 in adult   - Recommendation for healthy diet and increasing exercise as tolerated with goal of 150min/week . Recommend weight loss.    Provided Bryant with a 5-10 year written screening schedule and personal prevention plan. Recommendations were developed using the USPSTF age appropriate recommendations. Education, counseling, and referrals were provided as needed. After Visit Summary printed and given to patient which includes a list of additional screenings\tests needed.    Follow up in about 1 year (around 5/20/2026) for next annual wellness visit.    Anais Booker, FNP-C    Advance Care Planning     I offered to discuss advanced care planning, including how to pick a person who would make decisions for you if you were unable to make them for yourself, called a health care power of , and what kind of decisions you might make such as use of life sustaining treatments such as ventilators and tube feeding when faced with a life limiting illness recorded on a living will that they will need to know. (How you want to be cared for as you near the end of your natural life)     X  Patient has advanced directives on file, which we reviewed, and they do not wish to make changes.      Anais Booker, MSN,  JHONATAN, FNP-C  [facility]  Office #[phone]          [1]   Current Outpatient Medications:     amLODIPine (NORVASC) 10 MG tablet, TAKE 1 TABLET EVERY DAY, Disp: 90 tablet, Rfl: 3    aspirin (ECOTRIN) 81 MG EC tablet, Take 81 mg by mouth once daily., Disp: , Rfl:     atorvastatin (LIPITOR) 40 MG tablet, Take 1 tablet (40 mg total) by mouth once daily., Disp: 90 tablet, Rfl: 4    BD ALCOHOL SWABS PadM, USE FOR HOME GLUCOSE MONITORING DAILY, Disp: 100 each, Rfl: 3    blood sugar diagnostic (TRUE METRIX GLUCOSE TEST STRIP) Strp, Test Blood Sugar twice daily, Disp: 100 strip, Rfl: 1    blood-glucose meter (TRUE METRIX GLUCOSE METER) Misc, Test blood sugar twice a day, Disp: 1 each, Rfl: 0    clobetasoL (TEMOVATE) 0.05 % cream, Apply to scar in ear twice daily until flat., Disp: 45 g, Rfl: 4    glipiZIDE (GLUCOTROL) 10 MG TR24, Take 1 tablet (10 mg total) by mouth daily with breakfast., Disp: 90 tablet, Rfl: 4    HYDROcodone-acetaminophen (NORCO) 5-325 mg per tablet, Take 1 tablet by mouth every 4 (four) hours as needed for Pain., Disp: 12 tablet, Rfl: 0    hydrocortisone 2.5 % cream, Apply topically 2 (two) times daily., Disp: 30 g, Rfl: 3    lancets (TRUEPLUS LANCETS) 30 gauge Misc, Check sugar twice daily, Disp: 200 each, Rfl: 3    losartan-hydrochlorothiazide 100-25 mg (HYZAAR) 100-25 mg per tablet, TAKE 1 TABLET EVERY MORNING, Disp: 90 tablet, Rfl: 3    metFORMIN (GLUCOPHAGE) 1000 MG tablet, Take 1 tablet (1,000 mg total) by mouth 2 (two) times daily with meals., Disp: 180 tablet, Rfl: 3    metoprolol succinate (TOPROL-XL) 100 MG 24 hr tablet, TAKE 1 TABLET EVERY DAY, Disp: 90 tablet, Rfl: 3    pantoprazole (PROTONIX) 40 MG tablet, Take 1 tablet (40 mg total) by mouth once daily., Disp: 90 tablet, Rfl: 4    blood-glucose sensor (DEXCOM G7 SENSOR) Shanice, Use continue glucose monitor to check sugar daily, Disp: 2 each, Rfl: 10

## 2025-05-21 ENCOUNTER — PATIENT MESSAGE (OUTPATIENT)
Dept: FAMILY MEDICINE | Facility: CLINIC | Age: 85
End: 2025-05-21
Payer: MEDICARE

## 2025-05-21 RX ORDER — BLOOD-GLUCOSE SENSOR
EACH MISCELLANEOUS
Qty: 2 EACH | Refills: 10 | Status: SHIPPED | OUTPATIENT
Start: 2025-05-21

## 2025-06-09 ENCOUNTER — CLINICAL SUPPORT (OUTPATIENT)
Dept: LAB | Facility: HOSPITAL | Age: 85
End: 2025-06-09
Attending: FAMILY MEDICINE
Payer: MEDICARE

## 2025-06-09 ENCOUNTER — OFFICE VISIT (OUTPATIENT)
Dept: FAMILY MEDICINE | Facility: CLINIC | Age: 85
End: 2025-06-09
Attending: FAMILY MEDICINE
Payer: MEDICARE

## 2025-06-09 ENCOUNTER — HOSPITAL ENCOUNTER (OUTPATIENT)
Dept: CARDIOLOGY | Facility: HOSPITAL | Age: 85
Discharge: HOME | End: 2025-06-09
Attending: FAMILY MEDICINE
Payer: MEDICARE

## 2025-06-09 VITALS
HEIGHT: 67 IN | HEART RATE: 80 BPM | DIASTOLIC BLOOD PRESSURE: 70 MMHG | WEIGHT: 165.81 LBS | TEMPERATURE: 98 F | SYSTOLIC BLOOD PRESSURE: 124 MMHG | BODY MASS INDEX: 26.02 KG/M2 | OXYGEN SATURATION: 98 %

## 2025-06-09 VITALS — HEIGHT: 67 IN | WEIGHT: 165 LBS | BODY MASS INDEX: 25.9 KG/M2

## 2025-06-09 DIAGNOSIS — I47.10 PSVT (PAROXYSMAL SUPRAVENTRICULAR TACHYCARDIA): ICD-10-CM

## 2025-06-09 DIAGNOSIS — R06.02 SOB (SHORTNESS OF BREATH): ICD-10-CM

## 2025-06-09 DIAGNOSIS — I49.3 PVC (PREMATURE VENTRICULAR CONTRACTION): ICD-10-CM

## 2025-06-09 DIAGNOSIS — E11.42 TYPE 2 DIABETES MELLITUS WITH DIABETIC POLYNEUROPATHY, WITHOUT LONG-TERM CURRENT USE OF INSULIN: ICD-10-CM

## 2025-06-09 DIAGNOSIS — I10 ESSENTIAL HYPERTENSION: ICD-10-CM

## 2025-06-09 DIAGNOSIS — E78.2 MIXED HYPERLIPIDEMIA: ICD-10-CM

## 2025-06-09 DIAGNOSIS — E11.42 TYPE 2 DIABETES MELLITUS WITH DIABETIC POLYNEUROPATHY, WITHOUT LONG-TERM CURRENT USE OF INSULIN: Primary | ICD-10-CM

## 2025-06-09 DIAGNOSIS — Z13.6 ENCOUNTER FOR SCREENING FOR CARDIOVASCULAR DISORDERS: ICD-10-CM

## 2025-06-09 DIAGNOSIS — R06.02 SOB (SHORTNESS OF BREATH): Primary | ICD-10-CM

## 2025-06-09 LAB
OHS QRS DURATION: 110 MS
OHS QTC CALCULATION: 435 MS

## 2025-06-09 PROCEDURE — 1126F AMNT PAIN NOTED NONE PRSNT: CPT | Mod: CPTII,HCNC,S$GLB, | Performed by: FAMILY MEDICINE

## 2025-06-09 PROCEDURE — 3078F DIAST BP <80 MM HG: CPT | Mod: CPTII,HCNC,S$GLB, | Performed by: FAMILY MEDICINE

## 2025-06-09 PROCEDURE — 1101F PT FALLS ASSESS-DOCD LE1/YR: CPT | Mod: CPTII,HCNC,S$GLB, | Performed by: FAMILY MEDICINE

## 2025-06-09 PROCEDURE — 1159F MED LIST DOCD IN RCRD: CPT | Mod: CPTII,HCNC,S$GLB, | Performed by: FAMILY MEDICINE

## 2025-06-09 PROCEDURE — 3288F FALL RISK ASSESSMENT DOCD: CPT | Mod: CPTII,HCNC,S$GLB, | Performed by: FAMILY MEDICINE

## 2025-06-09 PROCEDURE — 3074F SYST BP LT 130 MM HG: CPT | Mod: CPTII,HCNC,S$GLB, | Performed by: FAMILY MEDICINE

## 2025-06-09 PROCEDURE — 99215 OFFICE O/P EST HI 40 MIN: CPT | Mod: HCNC,S$GLB,, | Performed by: FAMILY MEDICINE

## 2025-06-09 PROCEDURE — 93306 TTE W/DOPPLER COMPLETE: CPT | Mod: 26,HCNC,, | Performed by: STUDENT IN AN ORGANIZED HEALTH CARE EDUCATION/TRAINING PROGRAM

## 2025-06-09 PROCEDURE — 1157F ADVNC CARE PLAN IN RCRD: CPT | Mod: CPTII,HCNC,S$GLB, | Performed by: FAMILY MEDICINE

## 2025-06-09 PROCEDURE — 93005 ELECTROCARDIOGRAM TRACING: CPT | Mod: HCNC

## 2025-06-09 PROCEDURE — 93306 TTE W/DOPPLER COMPLETE: CPT | Mod: HCNC

## 2025-06-09 PROCEDURE — 93010 ELECTROCARDIOGRAM REPORT: CPT | Mod: HCNC,,, | Performed by: STUDENT IN AN ORGANIZED HEALTH CARE EDUCATION/TRAINING PROGRAM

## 2025-06-09 PROCEDURE — 1160F RVW MEDS BY RX/DR IN RCRD: CPT | Mod: CPTII,HCNC,S$GLB, | Performed by: FAMILY MEDICINE

## 2025-06-09 PROCEDURE — 99999 PR PBB SHADOW E&M-EST. PATIENT-LVL V: CPT | Mod: PBBFAC,HCNC,, | Performed by: FAMILY MEDICINE

## 2025-06-09 RX ORDER — LOSARTAN POTASSIUM 100 MG/1
100 TABLET ORAL DAILY
Qty: 90 TABLET | Refills: 3 | Status: SHIPPED | OUTPATIENT
Start: 2025-06-09 | End: 2026-06-09

## 2025-06-09 RX ORDER — SPIRONOLACTONE 50 MG/1
50 TABLET, FILM COATED ORAL DAILY
Qty: 30 TABLET | Refills: 11 | Status: SHIPPED | OUTPATIENT
Start: 2025-06-09 | End: 2026-06-09

## 2025-06-09 NOTE — PROGRESS NOTES
Subjective:       Patient ID: Bryant Gil is a 84 y.o. male.    Chief Complaint: breathing problems and Shortness of Breath (X 1 week)    84 yr old pleasant male with CKD 3, DM II, HTN, HLD, atrial tachycardia, presents today for follow up and also his follow up and evaluation of SOB at rest and lying flat. Took extra HCTZ and felt better. Last echo few years ago. Has PVCs.      DM II - controlled -   HGBA1C                   6.8 (H)             03/07/2025                                                - on metformin n glipizide - compliant - no hypoglycemic symptoms - mild CKD 3      HTN - controlled - on HYZAAR, amlodipine and metoprolol      HLD - improving - on statin -      normal lipids -     History as below - reviewed             Shortness of Breath  This is a recurrent problem. The current episode started 1 to 4 weeks ago. The problem occurs intermittently. The problem has been unchanged. Pertinent negatives include no chest pain, ear pain, headaches, neck pain, rash, rhinorrhea, vomiting or wheezing. Exacerbated by: lying flat. He has tried rest for the symptoms. The treatment provided mild relief. There is no history of allergies, bronchiolitis, CAD, DVT, PE or a recent surgery.   Follow-up  Pertinent negatives include no arthralgias, chest pain, congestion, coughing, diaphoresis, headaches, joint swelling, myalgias, neck pain, rash, visual change, vomiting or weakness.   Medication Refill  Pertinent negatives include no arthralgias, chest pain, congestion, coughing, diaphoresis, headaches, joint swelling, myalgias, neck pain, rash, visual change, vomiting or weakness.   Diabetes  He presents for his follow-up diabetic visit. He has type 2 diabetes mellitus. His disease course has been stable. Pertinent negatives for hypoglycemia include no confusion, dizziness, headaches, hunger, nervousness/anxiousness, seizures, speech difficulty or tremors. Pertinent negatives for diabetes include no blurred  vision, no chest pain, no foot paresthesias, no foot ulcerations, no polydipsia, no polyuria, no visual change, no weakness and no weight loss. Pertinent negatives for hypoglycemia complications include no blackouts, no nocturnal hypoglycemia and no required assistance. Symptoms are stable. Pertinent negatives for diabetic complications include no autonomic neuropathy, impotence, nephropathy, peripheral neuropathy or retinopathy. Risk factors for coronary artery disease include diabetes mellitus, dyslipidemia and male sex. Current diabetic treatment includes oral agent (dual therapy). He is compliant with treatment all of the time. Meal planning includes avoidance of concentrated sweets. He has not had a previous visit with a dietitian. He rarely participates in exercise. There is no change in his home blood glucose trend. An ACE inhibitor/angiotensin II receptor blocker is being taken. He does not see a podiatrist.Eye exam is not current.   Hypertension  This is a chronic problem. The current episode started more than 1 year ago. The problem has been gradually improving since onset. The problem is controlled. Associated symptoms include shortness of breath. Pertinent negatives include no blurred vision, chest pain, headaches, neck pain or palpitations. There are no associated agents to hypertension. Risk factors for coronary artery disease include dyslipidemia, male gender and diabetes mellitus. Past treatments include angiotensin blockers, diuretics, calcium channel blockers and beta blockers. The current treatment provides significant improvement. There are no compliance problems.  There is no history of angina, CAD/MI or retinopathy. There is no history of chronic renal disease, hyperaldosteronism, hyperparathyroidism, a hypertension causing med, renovascular disease or a thyroid problem.   Hyperlipidemia  This is a chronic problem. The current episode started more than 1 year ago. The problem is controlled.  Recent lipid tests were reviewed and are normal. He has no history of chronic renal disease. There are no known factors aggravating his hyperlipidemia. Associated symptoms include shortness of breath. Pertinent negatives include no chest pain or myalgias. Current antihyperlipidemic treatment includes statins. The current treatment provides significant improvement of lipids. There are no compliance problems.  Risk factors for coronary artery disease include diabetes mellitus, dyslipidemia, hypertension and male sex.     Review of Systems   Constitutional: Negative.  Negative for activity change, diaphoresis, unexpected weight change and weight loss.   HENT:  Negative for nasal congestion, ear pain, hearing loss, mouth sores, rhinorrhea, trouble swallowing and voice change.    Eyes: Negative.  Negative for blurred vision, pain, discharge and visual disturbance.   Respiratory:  Positive for shortness of breath. Negative for apnea, cough, chest tightness and wheezing.    Cardiovascular: Negative.  Negative for chest pain and palpitations.   Gastrointestinal: Negative.  Negative for abdominal distention, anal bleeding, blood in stool, constipation, diarrhea and vomiting.   Endocrine: Negative.  Negative for cold intolerance, polydipsia and polyuria.   Genitourinary: Negative.  Negative for decreased urine volume, difficulty urinating, discharge, frequency, hematuria, impotence, scrotal swelling and urgency.   Musculoskeletal:  Negative for arthralgias, back pain, joint swelling, myalgias, neck pain and neck stiffness.   Integumentary:  Negative for color change and rash. Negative.   Allergic/Immunologic: Negative.  Negative for environmental allergies.   Neurological:  Negative for dizziness, tremors, seizures, speech difficulty, weakness, light-headedness and headaches.   Hematological: Negative.    Psychiatric/Behavioral: Negative.  Negative for agitation, confusion, dysphoric mood and suicidal ideas. The patient is  not nervous/anxious.          PMH/PSH/FH/SH/MED/ALLERGY reviewed    Past Medical History:   Diagnosis Date    Cataract of both eyes     Chronic gastritis without bleeding, negative H. pylori Jaunuary 2019 EGD 01/30/2020    Diabetes mellitus     Diverticulosis     Ectatic aorta 02/06/2018    HTN (hypertension)     HTN (hypertension) 10/09/2012    Lower GI bleeding 01/11/2021    Near syncope 02/05/2021    OHT (ocular hypertension)     Prostate cancer     Pyelonephritis, right no stone on CT scan. 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    Tubular adenoma of colon 01/10/2012    Type II or unspecified type diabetes mellitus with neurological manifestations, not stated as uncontrolled(250.60) 10/09/2012       Past Surgical History:   Procedure Laterality Date    CARPAL TUNNEL RELEASE Right 08/25/2015    CATARACT EXTRACTION W/  INTRAOCULAR LENS IMPLANT Left 6/25/2024    Procedure: EXTRACTION, CATARACT, WITH IOL INSERTION;  Surgeon: Federico Sumner MD;  Location: [facility] OR;  Service: Ophthalmology;  Laterality: Left;    CATARACT EXTRACTION W/  INTRAOCULAR LENS IMPLANT Right 7/16/2024    Procedure: EXTRACTION, CATARACT, WITH IOL INSERTION;  Surgeon: Federico Sumner MD;  Location: [facility] OR;  Service: Ophthalmology;  Laterality: Right;    CIRCUMCISION  04/13/2005    COLONOSCOPY N/A 3/29/2017    Procedure: COLONOSCOPY Golytely prep;  Surgeon: Kavitha Cleaning MD;  Location: [facility] ENDO;  Service: Endoscopy;  Laterality: N/A;    COLONOSCOPY N/A 1/12/2021    Procedure: COLONOSCOPY;  Surgeon: Dung Panda MD;  Location: [facility] ENDO;  Service: Endoscopy;  Laterality: N/A;    COLONOSCOPY W/ POLYPECTOMY  01/10/2012    Repeat in 5 years      ESOPHAGOGASTRODUODENOSCOPY N/A 1/8/2019    Procedure: EGD (ESOPHAGOGASTRODUODENOSCOPY);  Surgeon: Rachel Burrows MD;  Location: [facility] ENDO;  Service: Endoscopy;  Laterality: N/A;    ESOPHAGOGASTRODUODENOSCOPY N/A 5/14/2020    Procedure: ESOPHAGOGASTRODUODENOSCOPY (EGD);  Surgeon: Rachel Burrows MD;  Location: [facility] ENDO;  Service: Endoscopy;  Laterality: N/A;  Dr. Luis Angel Burrows if possible.    FLEXIBLE SIGMOIDOSCOPY N/A 1/8/2019    Procedure: SIGMOIDOSCOPY, FLEXIBLE;  Surgeon: Rachel Burrows MD;  Location: [facility] ENDO;  Service: Endoscopy;  Laterality: N/A;    PENILE PROSTHESIS IMPLANT      PROSTATE SURGERY  1999    Prostatectomy for prostate ca; [facility]    TRIGGER FINGER RELEASE Right 2015    TRIGGER FINGER RELEASE Right 11/8/2024    Procedure: RELEASE, TRIGGER FINGER;  Surgeon: Anthony Cooper Jr., MD;  Location: [facility] OR;  Service: Orthopedics;  Laterality: Right;       Family History   Problem Relation Name Age of Onset    Hypertension Mother      No Known Problems Father unknown hx     Diabetes Sister Floridine     Cataracts Sister Floridine     No Known Problems Sister Cinthia     No Known Problems Sister Niki     No Known Problems Sister [place]     No Known Problems Brother Jeffery     Heart attack Brother Layne     Coronary artery disease Brother Layne     No Known Problems Brother Asad     Coronary artery disease Brother [place]     Heart attack Brother Milligan     No Known Problems Daughter x5     No Known Problems Son x2     Blindness Neg Hx      Amblyopia Neg Hx      Glaucoma Neg Hx      Retinal detachment Neg Hx      Strabismus Neg Hx      Macular degeneration Neg Hx      Stroke Neg Hx      Thyroid disease Neg Hx      Colon cancer Neg Hx      Esophageal cancer Neg Hx         Social History     Socioeconomic History    Marital status:    Tobacco Use    Smoking status: Former     Current packs/day: 0.00     Average packs/day: 0.5 packs/day for 3.0 years (1.5 ttl pk-yrs)     Types: Cigarettes      Start date:      Quit date:      Years since quittin.4     Passive exposure: Never    Smokeless tobacco: Never    Tobacco comments:     smoked as a teenager   Substance and Sexual Activity    Alcohol use: Yes     Comment: social drinks a beer 1-2 x month    Drug use: Never    Sexual activity: Yes     Partners: Female   Social History Narrative    Works at [employer] [place] [employer]     Social Drivers of Health     Financial Resource Strain: Low Risk  (2025)    Overall Financial Resource Strain (CARDIA)     Difficulty of Paying Living Expenses: Not hard at all   Food Insecurity: No Food Insecurity (2025)    Hunger Vital Sign     Worried About Running Out of Food in the Last Year: Never true     Ran Out of Food in the Last Year: Never true   Transportation Needs: No Transportation Needs (2025)    PRAPARE - Transportation     Lack of Transportation (Medical): No     Lack of Transportation (Non-Medical): No   Physical Activity: Insufficiently Active (2025)    Exercise Vital Sign     Days of Exercise per Week: 3 days     Minutes of Exercise per Session: 10 min   Stress: No Stress Concern Present (2025)    [employer] - Occupational Stress Questionnaire     Feeling of Stress : Not at all   Housing Stability: Low Risk  (2025)    Housing Stability Vital Sign     Unable to Pay for Housing in the Last Year: No     Homeless in the Last Year: No       Current Outpatient Medications   Medication Sig Dispense Refill    amLODIPine (NORVASC) 10 MG tablet TAKE 1 TABLET EVERY DAY 90 tablet 3    aspirin (ECOTRIN) 81 MG EC tablet Take 81 mg by mouth once daily.      atorvastatin (LIPITOR) 40 MG tablet Take 1 tablet (40 mg total) by mouth once daily. 90 tablet 4    BD ALCOHOL SWABS PadM USE FOR HOME GLUCOSE MONITORING DAILY 100 each 3    blood sugar diagnostic (TRUE METRIX GLUCOSE TEST STRIP) Strp Test Blood Sugar twice daily 100 strip 1    blood-glucose meter (TRUE  METRIX GLUCOSE METER) Misc Test blood sugar twice a day 1 each 0    blood-glucose sensor (DEXCOM G7 SENSOR) Shanice Use continue glucose monitor to check sugar daily 2 each 10    clobetasoL (TEMOVATE) 0.05 % cream Apply to scar in ear twice daily until flat. 45 g 4    glipiZIDE (GLUCOTROL) 10 MG TR24 Take 1 tablet (10 mg total) by mouth daily with breakfast. 90 tablet 4    HYDROcodone-acetaminophen (NORCO) 5-325 mg per tablet Take 1 tablet by mouth every 4 (four) hours as needed for Pain. 12 tablet 0    hydrocortisone 2.5 % cream Apply topically 2 (two) times daily. 30 g 3    lancets (TRUEPLUS LANCETS) 30 gauge Misc Check sugar twice daily 200 each 3    metFORMIN (GLUCOPHAGE) 1000 MG tablet Take 1 tablet (1,000 mg total) by mouth 2 (two) times daily with meals. 180 tablet 3    metoprolol succinate (TOPROL-XL) 100 MG 24 hr tablet TAKE 1 TABLET EVERY DAY 90 tablet 3    pantoprazole (PROTONIX) 40 MG tablet Take 1 tablet (40 mg total) by mouth once daily. 90 tablet 4    losartan (COZAAR) 100 MG tablet Take 1 tablet (100 mg total) by mouth once daily. 90 tablet 3    spironolactone (ALDACTONE) 50 MG tablet Take 1 tablet (50 mg total) by mouth once daily. 30 tablet 11     No current facility-administered medications for this visit.       Review of patient's allergies indicates:  No Known Allergies      Objective:       Vitals:    06/09/25 0839   BP: 124/70   Pulse: 80   Temp: 97.6 °F (36.4 °C)       Physical Exam  Constitutional:       Appearance: He is well-developed.   HENT:      Head: Normocephalic and atraumatic.      Right Ear: External ear normal.      Left Ear: External ear normal.      Nose: Nose normal.      Mouth/Throat:      Pharynx: No oropharyngeal exudate.   Eyes:      General: No scleral icterus.        Right eye: No discharge.         Left eye: No discharge.      Conjunctiva/sclera: Conjunctivae normal.      Pupils: Pupils are equal, round, and reactive to light.   Neck:      Thyroid: No thyromegaly.       Vascular: No JVD.      Trachea: No tracheal deviation.   Cardiovascular:      Rate and Rhythm: Normal rate and regular rhythm.      Heart sounds: Normal heart sounds. No murmur heard.     No friction rub. No gallop.   Pulmonary:      Effort: Pulmonary effort is normal. No respiratory distress.      Breath sounds: Normal breath sounds. No stridor. No wheezing or rales.   Chest:      Chest wall: No tenderness.   Abdominal:      General: Bowel sounds are normal. There is no distension.      Palpations: Abdomen is soft. There is no mass.      Tenderness: There is no abdominal tenderness. There is no guarding or rebound.      Hernia: No hernia is present.   Musculoskeletal:         General: No tenderness. Normal range of motion.      Cervical back: Normal range of motion and neck supple.   Lymphadenopathy:      Cervical: No cervical adenopathy.   Skin:     General: Skin is warm and dry.      Coloration: Skin is not pale.      Findings: No erythema or rash.   Neurological:      Mental Status: He is alert and oriented to person, place, and time.      Cranial Nerves: No cranial nerve deficit.      Motor: No abnormal muscle tone.      Coordination: Coordination normal.      Deep Tendon Reflexes: Reflexes are normal and symmetric. Reflexes normal.   Psychiatric:         Behavior: Behavior normal.         Thought Content: Thought content normal.         Judgment: Judgment normal.         Assessment:       Problem List Items Addressed This Visit       Type 2 diabetes mellitus with diabetic polyneuropathy, without long-term current use of insulin (Chronic)    Mixed hyperlipidemia (Chronic)    Relevant Orders    Comprehensive Metabolic Panel    Essential hypertension (Chronic)    Relevant Medications    losartan (COZAAR) 100 MG tablet    spironolactone (ALDACTONE) 50 MG tablet    PVC (premature ventricular contraction)    Relevant Orders    SCHEDULED EKG 12-LEAD (to [place])    Echo    Comprehensive Metabolic Panel    BNP    Lipid  Panel    Troponin I    Magnesium    Phosphorus    Ambulatory referral/consult to Cardiology    PSVT (paroxysmal supraventricular tachycardia)    Relevant Orders    SCHEDULED EKG 12-LEAD (to [place])    Echo    Comprehensive Metabolic Panel    BNP    Lipid Panel    Troponin I    Magnesium    Phosphorus    Ambulatory referral/consult to Cardiology     Other Visit Diagnoses         SOB (shortness of breath)    -  Primary    Relevant Orders    SCHEDULED EKG 12-LEAD (to [place])    Echo    Comprehensive Metabolic Panel    BNP    Lipid Panel    Troponin I    Ambulatory referral/consult to Cardiology      Encounter for screening for cardiovascular disorders        Relevant Orders    Lipid Panel            Plan:           Bryant was seen today for breathing problems and shortness of breath.    Diagnoses and all orders for this visit:    SOB (shortness of breath)  -     SCHEDULED EKG 12-LEAD (to [place]); Future  -     Echo; Future  -     Comprehensive Metabolic Panel; Future  -     BNP; Future  -     Lipid Panel; Future  -     Troponin I; Future  -     Troponin I  -     Ambulatory referral/consult to Cardiology; Future    PSVT (paroxysmal supraventricular tachycardia)  -     SCHEDULED EKG 12-LEAD (to [place]); Future  -     Echo; Future  -     Comprehensive Metabolic Panel; Future  -     BNP; Future  -     Lipid Panel; Future  -     Troponin I; Future  -     Magnesium; Future  -     Phosphorus; Future  -     Troponin I  -     Magnesium  -     Ambulatory referral/consult to Cardiology; Future    PVC (premature ventricular contraction)  -     SCHEDULED EKG 12-LEAD (to [place]); Future  -     Echo; Future  -     Comprehensive Metabolic Panel; Future  -     BNP; Future  -     Lipid Panel; Future  -     Troponin I; Future  -     Magnesium; Future  -     Phosphorus; Future  -     Troponin I  -     Magnesium  -     Ambulatory referral/consult to Cardiology; Future    Type 2 diabetes mellitus with diabetic polyneuropathy, without long-term  current use of insulin    Essential hypertension  -     losartan (COZAAR) 100 MG tablet; Take 1 tablet (100 mg total) by mouth once daily.  -     spironolactone (ALDACTONE) 50 MG tablet; Take 1 tablet (50 mg total) by mouth once daily.    Mixed hyperlipidemia  -     Comprehensive Metabolic Panel; Future    Encounter for screening for cardiovascular disorders  -     Lipid Panel; Future        SOB/PVC/PSVT  -EKG and echo  -labs and electrolytes  -refer cards  -ER precautions given    HTN  -controlled  -switch HYZAAR to losartan and spironolactone    DM II  -controlled  -glipizide and metformin    HLD  -at goal  -diet control n statin    Itching  -kenalog as directed          Spent adequate time in obtaining history and explaining differentials    40 minutes spent during this visit of which greater than 50% devoted to face-face counseling and coordination of care regarding diagnosis and management plan    Follow up in about 3 months (around 9/9/2025), or if symptoms worsen or fail to improve.

## 2025-06-10 LAB
AORTIC SIZE INDEX (SOV): 2.1 CM/M2
AORTIC SIZE INDEX: 2 CM/M2
APICAL FOUR CHAMBER EJECTION FRACTION: 44 %
APICAL TWO CHAMBER EJECTION FRACTION: 55 %
ASCENDING AORTA: 3.7 CM
AV INDEX (PROSTH): 0.76
AV MEAN GRADIENT: 5 MMHG
AV PEAK GRADIENT: 10 MMHG
AV REGURGITATION PRESSURE HALF TIME: 419 MS
AV VALVE AREA BY VELOCITY RATIO: 2.4 CM²
AV VALVE AREA: 2.6 CM²
AV VELOCITY RATIO: 0.69
BSA FOR ECHO PROCEDURE: 1.88 M2
CV ECHO LV RWT: 0.58 CM
DOP CALC AO PEAK VEL: 1.6 M/S
DOP CALC AO VTI: 28.3 CM
DOP CALC LVOT AREA: 3.5 CM2
DOP CALC LVOT DIAMETER: 2.1 CM
DOP CALC LVOT PEAK VEL: 1.1 M/S
DOP CALC LVOT STROKE VOLUME: 74.1 CM3
DOP CALC MV VTI: 27 CM
DOP CALCLVOT PEAK VEL VTI: 21.4 CM
E WAVE DECELERATION TIME: 228 MSEC
E/A RATIO: 3.2
E/E' RATIO: 16 M/S
ECHO LV POSTERIOR WALL: 1.3 CM (ref 0.6–1.1)
FRACTIONAL SHORTENING: 15.6 % (ref 28–44)
INTERVENTRICULAR SEPTUM: 1.3 CM (ref 0.6–1.1)
IVC DIAMETER: 1.94 CM
LEFT ATRIUM AREA SYSTOLIC (APICAL 2 CHAMBER): 19.7 CM2
LEFT ATRIUM AREA SYSTOLIC (APICAL 4 CHAMBER): 22.79 CM2
LEFT ATRIUM VOLUME INDEX MOD: 33 ML/M2
LEFT ATRIUM VOLUME MOD: 62 ML
LEFT INTERNAL DIMENSION IN SYSTOLE: 3.8 CM (ref 2.1–4)
LEFT VENTRICLE DIASTOLIC VOLUME INDEX: 50 ML/M2
LEFT VENTRICLE DIASTOLIC VOLUME: 93 ML
LEFT VENTRICLE END DIASTOLIC VOLUME APICAL 2 CHAMBER: 49.15 ML
LEFT VENTRICLE END DIASTOLIC VOLUME APICAL 4 CHAMBER: 81.02 ML
LEFT VENTRICLE END SYSTOLIC VOLUME APICAL 2 CHAMBER: 54.13 ML
LEFT VENTRICLE END SYSTOLIC VOLUME APICAL 4 CHAMBER: 71.97 ML
LEFT VENTRICLE MASS INDEX: 119.7 G/M2
LEFT VENTRICLE SYSTOLIC VOLUME INDEX: 32.3 ML/M2
LEFT VENTRICLE SYSTOLIC VOLUME: 60 ML
LEFT VENTRICULAR INTERNAL DIMENSION IN DIASTOLE: 4.5 CM (ref 3.5–6)
LEFT VENTRICULAR MASS: 222.6 G
LV LATERAL E/E' RATIO: 13.7 M/S
LV SEPTAL E/E' RATIO: 19.2 M/S
LVED V (TEICH): 92.9 ML
LVES V (TEICH): 60.01 ML
LVOT MG: 2.34 MMHG
LVOT MV: 0.71 CM/S
MV MEAN GRADIENT: 2 MMHG
MV PEAK A VEL: 0.3 M/S
MV PEAK E VEL: 0.96 M/S
MV PEAK GRADIENT: 5 MMHG
MV STENOSIS PRESSURE HALF TIME: 66.21 MS
MV VALVE AREA BY CONTINUITY EQUATION: 2.74 CM2
MV VALVE AREA P 1/2 METHOD: 3.32 CM2
OHS CV RV/LV RATIO: 0.8 CM
OHS LV EJECTION FRACTION SIMPSONS BIPLANE MOD: 48 %
PISA AR MAX VEL: 4 M/S
PISA MRMAX VEL: 4.57 M/S
PISA TR MAX VEL: 3 M/S
PV PEAK GRADIENT: 3 MMHG
PV PEAK VELOCITY: 0.93 M/S
RA PRESSURE ESTIMATED: 3 MMHG
RA VOL SYS: 49.15 ML
RIGHT ATRIAL AREA: 17.7 CM2
RIGHT ATRIUM END SYSTOLIC VOLUME APICAL 4 CHAMBER INDEX BSA: 25.45 ML/M2
RIGHT ATRIUM VOLUME AREA LENGTH APICAL 4 CHAMBER: 47.34 ML
RIGHT VENTRICLE DIASTOLIC BASEL DIMENSION: 3.6 CM
RV TB RVSP: 6 MMHG
SINUS: 3.94 CM
STJ: 3.1 CM
TDI LATERAL: 0.07 M/S
TDI SEPTAL: 0.05 M/S
TDI: 0.06 M/S
TR MAX PG: 37 MMHG
TRICUSPID ANNULAR PLANE SYSTOLIC EXCURSION: 1.2 CM
TV REST PULMONARY ARTERY PRESSURE: 39 MMHG
Z-SCORE OF LEFT VENTRICULAR DIMENSION IN END DIASTOLE: -1.4
Z-SCORE OF LEFT VENTRICULAR DIMENSION IN END SYSTOLE: 1.38

## 2025-06-11 ENCOUNTER — OFFICE VISIT (OUTPATIENT)
Dept: CARDIOLOGY | Facility: CLINIC | Age: 85
End: 2025-06-11
Attending: FAMILY MEDICINE
Payer: MEDICARE

## 2025-06-11 VITALS
DIASTOLIC BLOOD PRESSURE: 68 MMHG | WEIGHT: 159.19 LBS | HEART RATE: 74 BPM | BODY MASS INDEX: 24.99 KG/M2 | OXYGEN SATURATION: 98 % | HEIGHT: 67 IN | SYSTOLIC BLOOD PRESSURE: 105 MMHG

## 2025-06-11 DIAGNOSIS — R06.02 SOB (SHORTNESS OF BREATH): ICD-10-CM

## 2025-06-11 DIAGNOSIS — E78.5 HYPERLIPIDEMIA, UNSPECIFIED HYPERLIPIDEMIA TYPE: ICD-10-CM

## 2025-06-11 DIAGNOSIS — I10 HYPERTENSION, UNSPECIFIED TYPE: Primary | ICD-10-CM

## 2025-06-11 DIAGNOSIS — I49.3 PVC (PREMATURE VENTRICULAR CONTRACTION): ICD-10-CM

## 2025-06-11 DIAGNOSIS — I47.10 PSVT (PAROXYSMAL SUPRAVENTRICULAR TACHYCARDIA): ICD-10-CM

## 2025-06-11 LAB
OHS QRS DURATION: 102 MS
OHS QTC CALCULATION: 437 MS

## 2025-06-11 PROCEDURE — 93000 ELECTROCARDIOGRAM COMPLETE: CPT | Mod: HCNC,S$GLB,, | Performed by: INTERNAL MEDICINE

## 2025-06-11 PROCEDURE — 99999 PR PBB SHADOW E&M-EST. PATIENT-LVL IV: CPT | Mod: PBBFAC,HCNC,, | Performed by: INTERNAL MEDICINE

## 2025-06-11 RX ORDER — INSULIN PUMP SYRINGE, 3 ML
EACH MISCELLANEOUS
Qty: 1 EACH | Refills: 1 | Status: SHIPPED | OUTPATIENT
Start: 2025-06-11 | End: 2026-06-11

## 2025-06-11 RX ORDER — FUROSEMIDE 20 MG/1
20 TABLET ORAL DAILY PRN
Qty: 60 TABLET | Refills: 4 | Status: SHIPPED | OUTPATIENT
Start: 2025-06-11 | End: 2026-06-11

## 2025-06-11 RX ORDER — LANCETS
EACH MISCELLANEOUS
Qty: 100 EACH | Refills: 10 | Status: SHIPPED | OUTPATIENT
Start: 2025-06-11

## 2025-06-11 NOTE — PROGRESS NOTES
Subjective:   @Patient ID:  Bryant Gil is a 84 y.o. male who presents for evaluation of No chief complaint on file.      HPI:       Patient is a 84-year-old male, here for evaluation of shortness of breath at rest and lying flat.  Active medical problems include      -history of PVCs.  EKG per my read shows sinus rhythm with PAC.  On Toprol-XL.  -hypertension on Hyzaar, amlodipine, Toprol-XL and Aldactone.  Blood pressure in the low to normal range.  -hyperlipidemia on Lipitor 40 mg   -diabetes with A1c of 6.8 on metformin  -no exertional symptoms but does have shortness of breath on lying flat.  Improved with taking extra dose of HCTZ from friend.      No exertional symptoms.  We discussed echocardiogram showing possible concern for infiltrative cardiomyopathy.  PYP scan and serum light chains ordered.  Previously had 1.8% PVC burden on Holter monitor.  Intermittently has dizziness.  Has had intermittent right bundle-branch block in the past.      Results for orders placed during the hospital encounter of 06/09/25    Echo    Interpretation Summary    Left Ventricle: The left ventricle is normal in size. Mildly increased wall thickness. There is concentric hypertrophy. Septal motion is consistent with bundle branch block. There is low normal systolic function with a visually estimated ejection fraction of 50 - 55%. Quantitated ejection fraction is 48%. Unable to assess diastolic function due to E-A fusion.    Global longitudinal strain is -9.6% there is apical sparing pattern that can be seen with infiltrative cardiomyopathy. Consider cardiac amyloid differential diagnosis.    Right Ventricle: The right ventricle is normal in size Systolic function is reduced.    Left Atrium: The left atrium is at the upper limit of normal in size The left atrium is dilated    Aortic Valve: There is moderate aortic regurgitation.    Mitral Valve: There is mild to moderate regurgitation with a eccentrically posterolateral  directed jet.    Tricuspid Valve: There is mild to moderate regurgitation.    Pulmonic Valve: There is mild regurgitation.    Aorta: The aortic root is the upper limit of normal in size measuring 3.94 cm.    Pulmonary Artery: The estimated pulmonary artery systolic pressure is 39 mmHg.    IVC/SVC: Normal venous pressure at 3 mmHg.      No results found for this or any previous visit.      No results found for this or any previous visit.      Please document below the medical necessity for continuous telemetry monitoring or discontinue the current order if appropriate.    Current rhythm from flowsheet:               Patient Active Problem List    Diagnosis Date Noted    Trigger ring finger of right hand 11/08/2024    Nuclear sclerotic cataract of right eye 06/25/2024    Type 2 diabetes mellitus with hyperlipidemia 06/07/2024    PSVT (paroxysmal supraventricular tachycardia) 04/07/2021    PVC (premature ventricular contraction) 04/07/2021    Memory loss 03/24/2021    Chronic gastritis without bleeding, negative H. pylori Jaunuary 2019 and also May of 2020 EGD 01/30/2020    Gastroesophageal reflux disease without esophagitis 01/08/2020    Hepatic cyst 01/08/2019    Nonrheumatic aortic valve insufficiency 02/06/2018    Vitamin B 12 deficiency 02/06/2018    Personal history of colonic polyps 03/29/2017     Replacing diagnoses that were inactivated after the 4/1 regulatory import.      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 01/25/2017    Aortic atherosclerosis 11/11/2015    Trigger finger 07/27/2015    Type 2 diabetes mellitus with diabetic polyneuropathy, without long-term current use of insulin 03/12/2014    Unspecified hearing loss, unspecified ear 06/25/2013    History of prostate cancer 01/29/2013    Mixed hyperlipidemia 01/29/2013    Ocular hypertension - Both Eyes 10/09/2012    Essential hypertension 10/09/2012                    LAST HbA1c  Lab Results   Component Value  Date    HGBA1C 6.8 (H) 03/07/2025       Lipid panel  Lab Results   Component Value Date    CHOL 169 06/09/2025    CHOL 154 03/07/2025    CHOL 236 (H) 01/09/2025     Lab Results   Component Value Date    HDL 42 06/09/2025    HDL 35 (L) 03/07/2025    HDL 43 01/09/2025     Lab Results   Component Value Date    LDLCALC 108.0 06/09/2025    LDLCALC 102.2 03/07/2025    LDLCALC 171.0 (H) 01/09/2025     Lab Results   Component Value Date    TRIG 95 06/09/2025    TRIG 84 03/07/2025    TRIG 110 01/09/2025     Lab Results   Component Value Date    CHOLHDL 24.9 06/09/2025    CHOLHDL 22.7 03/07/2025    CHOLHDL 18.2 (L) 01/09/2025            Review of Systems   Constitutional: Negative for chills and fever.   HENT:  Negative for hearing loss and nosebleeds.    Eyes:  Negative for blurred vision.   Cardiovascular:         As in HPI    Respiratory:  Negative for cough, hemoptysis and shortness of breath.    Endocrine: Negative for cold intolerance and polyuria.   Hematologic/Lymphatic: Negative for bleeding problem.   Skin:  Negative for itching.   Musculoskeletal:  Negative for falls.   Gastrointestinal:  Negative for abdominal pain and hematochezia.   Genitourinary:  Negative for hematuria.   Neurological:  Negative for dizziness and loss of balance.   Psychiatric/Behavioral:  Negative for altered mental status and depression.        Objective:   Physical Exam  Constitutional:       Appearance: He is well-developed.   HENT:      Head: Normocephalic and atraumatic.   Eyes:      Conjunctiva/sclera: Conjunctivae normal.   Neck:      Vascular: No carotid bruit or JVD.   Cardiovascular:      Rate and Rhythm: Normal rate and regular rhythm.      Pulses:           Carotid pulses are 2+ on the right side and 2+ on the left side.       Radial pulses are 2+ on the right side and 2+ on the left side.      Heart sounds: Murmur heard.      No friction rub. No gallop.   Pulmonary:      Effort: Pulmonary effort is normal. No respiratory  distress.      Breath sounds: Normal breath sounds. No stridor. No wheezing.   Abdominal:      General: Abdomen is flat.      Palpations: Abdomen is soft.   Musculoskeletal:      Cervical back: Neck supple.      Right lower leg: No edema.      Left lower leg: No edema.   Skin:     General: Skin is warm and dry.   Neurological:      Mental Status: He is alert and oriented to person, place, and time.   Psychiatric:         Behavior: Behavior normal.         Assessment:     1. Hypertension, unspecified type    2. SOB (shortness of breath)    3. PSVT (paroxysmal supraventricular tachycardia)    4. PVC (premature ventricular contraction)    5. Hyperlipidemia, unspecified hyperlipidemia type        Plan:     -euvolemic on examination.  Starting workup of cardiac amyloid.  PYP scan and serum immune fixation/light chains ordered  -euvolemic today.  Take Lasix only as needed.  -check blood pressure at home every day.  May have component of orthostatic hypotension.  -Zio monitor ordered for dizziness.  -follow up with test results    Pertinent cardiac images and EKG reviewed independently.    Continue with current medical plan and lifestyle changes.  Return sooner for concerns or questions. If symptoms persist go to the ED  I have reviewed all pertinent data including patient's medical history in detail and updated the computerized patient record.     (Portions of this note were dictated using voice recognition software and may contain dictation related errors in spelling/grammar/syntax not found on text review)    Orders Placed This Encounter   Procedures    Protein Electrophoresis, Serum     Standing Status:   Future     Expected Date:   6/11/2025     Expiration Date:   9/9/2026     Send normal result to authorizing provider's In Basket if patient is active on MyChart::   Yes    Immunofixation Electrophoresis     Standing Status:   Future     Expected Date:   6/11/2025     Expiration Date:   9/9/2026     Send normal result  to authorizing provider's In Basket if patient is active on MyChart::   Yes    Immunoglobulin Free Light Chains     Standing Status:   Future     Expected Date:   6/11/2025     Expiration Date:   9/9/2026     Send normal result to authorizing provider's In Basket if patient is active on MyChart::   Yes    PYP ATTR related Amyloidosis (Cupid Only)     Standing Status:   Future     Expiration Date:   6/11/2026     Release to patient:   Immediate    Cardiac Monitor - 3-15 Day Adult ([facility] Only)     Standing Status:   Future     Expiration Date:   6/11/2026     Duration::   14 days     Release to patient:   Immediate    IN OFFICE EKG 12-LEAD (to [place])       Follow up as scheduled.     He expressed verbal understanding and agreed with the plan    Patient's Medications   New Prescriptions    FUROSEMIDE (LASIX) 20 MG TABLET    Take 1 tablet (20 mg total) by mouth daily as needed (shortness of breath).   Previous Medications    AMLODIPINE (NORVASC) 10 MG TABLET    TAKE 1 TABLET EVERY DAY    ASPIRIN (ECOTRIN) 81 MG EC TABLET    Take 81 mg by mouth once daily.    ATORVASTATIN (LIPITOR) 40 MG TABLET    Take 1 tablet (40 mg total) by mouth once daily.    BD ALCOHOL SWABS PADM    USE FOR HOME GLUCOSE MONITORING DAILY    BLOOD SUGAR DIAGNOSTIC (TRUE METRIX GLUCOSE TEST STRIP) STRP    Test Blood Sugar twice daily    BLOOD-GLUCOSE METER (TRUE METRIX GLUCOSE METER) MISC    Test blood sugar twice a day    BLOOD-GLUCOSE SENSOR (DEXCOM G7 SENSOR) MARTINEZ    Use continue glucose monitor to check sugar daily    CLOBETASOL (TEMOVATE) 0.05 % CREAM    Apply to scar in ear twice daily until flat.    GLIPIZIDE (GLUCOTROL) 10 MG TR24    Take 1 tablet (10 mg total) by mouth daily with breakfast.    HYDROCODONE-ACETAMINOPHEN (NORCO) 5-325 MG PER TABLET    Take 1 tablet by mouth every 4 (four) hours as needed for Pain.    HYDROCORTISONE 2.5 % CREAM    Apply topically 2 (two) times daily.    LANCETS (TRUEPLUS LANCETS) 30 GAUGE MISC    Check sugar  twice daily    LOSARTAN (COZAAR) 100 MG TABLET    Take 1 tablet (100 mg total) by mouth once daily.    METFORMIN (GLUCOPHAGE) 1000 MG TABLET    Take 1 tablet (1,000 mg total) by mouth 2 (two) times daily with meals.    METOPROLOL SUCCINATE (TOPROL-XL) 100 MG 24 HR TABLET    TAKE 1 TABLET EVERY DAY    PANTOPRAZOLE (PROTONIX) 40 MG TABLET    Take 1 tablet (40 mg total) by mouth once daily.    SPIRONOLACTONE (ALDACTONE) 50 MG TABLET    Take 1 tablet (50 mg total) by mouth once daily.   Modified Medications    No medications on file   Discontinued Medications    No medications on file        Geremias Gutierrez M.D

## 2025-06-14 DIAGNOSIS — I10 ESSENTIAL HYPERTENSION: ICD-10-CM

## 2025-06-14 RX ORDER — AMLODIPINE BESYLATE 10 MG/1
10 TABLET ORAL
Qty: 90 TABLET | Refills: 3 | Status: SHIPPED | OUTPATIENT
Start: 2025-06-14

## 2025-06-14 NOTE — TELEPHONE ENCOUNTER
Care Due:                  Date            Visit Type   Department     Provider  --------------------------------------------------------------------------------                                EP -                              [facility]  Last Visit: 06-      CARE ([facility])   MEDICINE       Luke Mcgee                              [facility]  Next Visit: 10-      CARE ([facility])   MEDICINE       Luke Mcgee                                                            Last  Test          Frequency    Reason                     Performed    Due Date  --------------------------------------------------------------------------------    HBA1C.......  6 months...  glipiZIDE, metFORMIN.....  03- 09-    [facility] Embedded Care Due Messages. Reference number: [id number].   6/14/2025 2:04:41 AM CDT

## 2025-06-14 NOTE — TELEPHONE ENCOUNTER
Refill Routing Note   Medication(s) are not appropriate for processing by [facility] for the following reason(s):        Drug-disease interaction    [facility] action(s):  Defer     Requires labs : Yes      Medication Therapy Plan: Drug-Disease: amLODIPine and Hepatic cyst; DEFER      Appointments  past 12m or future 3m with PCP    Date Provider   Last Visit   6/9/2025 Luke Mcgee MD   Next Visit   10/10/2025 Luke Mcgee MD   ED visits in past 90 days: 0        Note composed:6:48 AM 06/14/2025

## 2025-06-23 ENCOUNTER — OFFICE VISIT (OUTPATIENT)
Dept: OPTOMETRY | Facility: CLINIC | Age: 85
End: 2025-06-23
Payer: COMMERCIAL

## 2025-06-23 ENCOUNTER — TELEPHONE (OUTPATIENT)
Dept: OPTOMETRY | Facility: CLINIC | Age: 85
End: 2025-06-23
Payer: MEDICARE

## 2025-06-23 DIAGNOSIS — H26.493 BILATERAL POSTERIOR CAPSULAR OPACIFICATION: ICD-10-CM

## 2025-06-23 DIAGNOSIS — Z96.1 PSEUDOPHAKIA: ICD-10-CM

## 2025-06-23 DIAGNOSIS — H52.203 MYOPIA WITH ASTIGMATISM AND PRESBYOPIA, BILATERAL: ICD-10-CM

## 2025-06-23 DIAGNOSIS — H52.4 MYOPIA WITH ASTIGMATISM AND PRESBYOPIA, BILATERAL: ICD-10-CM

## 2025-06-23 DIAGNOSIS — H52.13 MYOPIA WITH ASTIGMATISM AND PRESBYOPIA, BILATERAL: ICD-10-CM

## 2025-06-23 DIAGNOSIS — E11.9 TYPE 2 DIABETES MELLITUS WITHOUT RETINOPATHY: ICD-10-CM

## 2025-06-23 DIAGNOSIS — H40.053 OCULAR HYPERTENSION, BILATERAL: Primary | ICD-10-CM

## 2025-06-23 PROCEDURE — 99999 PR PBB SHADOW E&M-EST. PATIENT-LVL II: CPT | Mod: PBBFAC,,, | Performed by: OPTOMETRIST

## 2025-06-23 PROCEDURE — 92015 DETERMINE REFRACTIVE STATE: CPT | Mod: S$GLB,,, | Performed by: OPTOMETRIST

## 2025-06-23 PROCEDURE — 99214 OFFICE O/P EST MOD 30 MIN: CPT | Mod: S$GLB,,, | Performed by: OPTOMETRIST

## 2025-06-23 NOTE — PROGRESS NOTES
HPI    GWENDOLYN: 08/24 with Dr. Sumner  Chief complaint (CC): Patient is here for annual eye exam today.  Patient   has had cataract surgery last year and is seeing much better.  Patient had   stopped the Latanoprost on his own after the surgery because he thought   they were related to the reason for taking them.  Patient has not taken   Latanoprost for about a year.  Glasses? +  Contacts? -  H/o eye surgery, injections or laser: PC IOL OU  H/o eye injury: -  Known eye conditions? See above  Family h/o eye conditions? Mother and sister with glaucoma  Eye gtts? -      (-) Flashes (-)  Floaters (-) Mucous   (-)  Tearing (-) Itching (-) Burning   (-) Headaches (-) Eye Pain/discomfort (-) Irritation   (-)  Redness (-) Double vision (-) Blurry vision    Diabetic? +  A1c? Hemoglobin A1C       Date                     Value               Ref Range             Status                03/07/2025               6.8 (H)             4.0 - 5.6 %           Final                 01/09/2025               8.2 (H)             4.0 - 5.6 %           Final                 07/24/2024               7.4 (H)             4.0 - 5.6 %           Final                  Last edited by Krystal Abebe on 6/23/2025 12:57 PM.            Assessment /Plan     For exam results, see Encounter Report.      Ocular hypertension, bilateral  -     Posterior Segment OCT Optic Nerve- Both eyes; Future  -     [place] Visual Field - OU - Extended - Both Eyes; Future  (-) FHX. IOP 17 OD, OS. Last 18 OD, 17 OS. C/d 0.4 OD, 0.35 OS. Prev testing w/Dr Forte. Tmax 32 OD, 27 OS. IOP target of high teens.   3/11/2024 OCT WNL OU  3/11/2024 HVF OD WNL, OS borderline  Educated pt on findings w/understanding.   Pt reports that he d/c Latanoprost QHS OU in Oct 2024 after CE. IOP is normal today. Would like to trial off of drops.  RTC IOP/pachy/OCT/24-2 Crystal faster    Myopia with astigmatism and presbyopia, bilateral  SRx released to patient. Patient educated on lens  options. Normal ocular health. RTC 1 year for routine exam.     Pseudophakia  Bilateral posterior capsular opacification  Good result. PCO OU not visually significant.     Type 2 diabetes without retinopathy, bilateral  BS control. No signs of diabetic retinopathy. Monitor with annual exam.

## 2025-06-27 ENCOUNTER — HOSPITAL ENCOUNTER (INPATIENT)
Facility: HOSPITAL | Age: 85
LOS: 2 days | Discharge: HOME | DRG: 291 | End: 2025-06-30
Attending: EMERGENCY MEDICINE | Admitting: INTERNAL MEDICINE
Payer: MEDICARE

## 2025-06-27 DIAGNOSIS — I50.9 ACUTE ON CHRONIC CONGESTIVE HEART FAILURE, UNSPECIFIED HEART FAILURE TYPE: Primary | ICD-10-CM

## 2025-06-27 DIAGNOSIS — R00.0 INCREASED HEART RATE: ICD-10-CM

## 2025-06-27 DIAGNOSIS — R06.02 SHORTNESS OF BREATH: ICD-10-CM

## 2025-06-27 DIAGNOSIS — R07.9 CHEST PAIN: ICD-10-CM

## 2025-06-27 LAB
FIO2: 36 %
LDH SERPL L TO P-CCNC: 1.9 MMOL/L (ref 0.5–2.2)
LPM: 4
PCO2 BLDA: 58.7 MMHG (ref 35–45)
PH SMN: 7.25 [PH] (ref 7.35–7.45)
PO2 BLDA: 23.1 MMHG (ref 40–60)
POC BASE DEFICIT: -2.1 MMOL/L (ref -2–2)
POC HCO3: 26 MMOL/L (ref 24–28)
POC PERFORMED BY: ABNORMAL
POC SATURATED O2: 27.5 % (ref 95–100)
POCT GLUCOSE: 188 MG/DL (ref 70–110)
SPECIMEN SOURCE: ABNORMAL

## 2025-06-27 PROCEDURE — 84484 ASSAY OF TROPONIN QUANT: CPT | Mod: HCNC | Performed by: EMERGENCY MEDICINE

## 2025-06-27 PROCEDURE — 99285 EMERGENCY DEPT VISIT HI MDM: CPT | Mod: HCNC

## 2025-06-27 PROCEDURE — 87040 BLOOD CULTURE FOR BACTERIA: CPT | Mod: HCNC | Performed by: EMERGENCY MEDICINE

## 2025-06-27 PROCEDURE — 99900035 HC TECH TIME PER 15 MIN (STAT): Mod: HCNC

## 2025-06-27 PROCEDURE — 82803 BLOOD GASES ANY COMBINATION: CPT | Mod: HCNC

## 2025-06-27 PROCEDURE — 80053 COMPREHEN METABOLIC PANEL: CPT | Mod: HCNC | Performed by: EMERGENCY MEDICINE

## 2025-06-27 PROCEDURE — 83605 ASSAY OF LACTIC ACID: CPT | Mod: HCNC

## 2025-06-27 PROCEDURE — 83735 ASSAY OF MAGNESIUM: CPT | Mod: HCNC | Performed by: EMERGENCY MEDICINE

## 2025-06-27 PROCEDURE — 84145 PROCALCITONIN (PCT): CPT | Mod: HCNC | Performed by: EMERGENCY MEDICINE

## 2025-06-27 PROCEDURE — 83880 ASSAY OF NATRIURETIC PEPTIDE: CPT | Mod: HCNC | Performed by: EMERGENCY MEDICINE

## 2025-06-27 PROCEDURE — 85025 COMPLETE CBC W/AUTO DIFF WBC: CPT | Mod: HCNC | Performed by: EMERGENCY MEDICINE

## 2025-06-27 PROCEDURE — 82962 GLUCOSE BLOOD TEST: CPT | Mod: HCNC

## 2025-06-27 PROCEDURE — 87635 SARS-COV-2 COVID-19 AMP PRB: CPT | Mod: HCNC | Performed by: EMERGENCY MEDICINE

## 2025-06-28 PROBLEM — I10 HTN (HYPERTENSION): Status: ACTIVE | Noted: 2025-06-28

## 2025-06-28 PROBLEM — I50.9 CHF EXACERBATION: Status: ACTIVE | Noted: 2025-06-28

## 2025-06-28 LAB
ABSOLUTE EOSINOPHIL (OHS): 0.01 K/UL
ABSOLUTE EOSINOPHIL (OHS): 0.19 K/UL
ABSOLUTE MONOCYTE (OHS): 0.41 K/UL (ref 0.3–1)
ABSOLUTE MONOCYTE (OHS): 0.63 K/UL (ref 0.3–1)
ABSOLUTE NEUTROPHIL COUNT (OHS): 3 K/UL (ref 1.8–7.7)
ABSOLUTE NEUTROPHIL COUNT (OHS): 6.26 K/UL (ref 1.8–7.7)
ALBUMIN SERPL BCP-MCNC: 3.7 G/DL (ref 3.5–5.2)
ALBUMIN SERPL BCP-MCNC: 3.8 G/DL (ref 3.5–5.2)
ALP SERPL-CCNC: 73 UNIT/L (ref 40–150)
ALP SERPL-CCNC: 76 UNIT/L (ref 40–150)
ALT SERPL W/O P-5'-P-CCNC: 56 UNIT/L (ref 10–44)
ALT SERPL W/O P-5'-P-CCNC: 66 UNIT/L (ref 10–44)
ANION GAP (OHS): 11 MMOL/L (ref 8–16)
ANION GAP (OHS): 7 MMOL/L (ref 8–16)
ANION GAP (OHS): 9 MMOL/L (ref 8–16)
AST SERPL-CCNC: 58 UNIT/L (ref 11–45)
AST SERPL-CCNC: 66 UNIT/L (ref 11–45)
BASOPHILS # BLD AUTO: 0.04 K/UL
BASOPHILS # BLD AUTO: 0.1 K/UL
BASOPHILS NFR BLD AUTO: 0.5 %
BASOPHILS NFR BLD AUTO: 1.3 %
BILIRUB SERPL-MCNC: 0.6 MG/DL (ref 0.1–1)
BILIRUB SERPL-MCNC: 0.6 MG/DL (ref 0.1–1)
BNP SERPL-MCNC: 482 PG/ML (ref 0–99)
BUN SERPL-MCNC: 26 MG/DL (ref 8–23)
BUN SERPL-MCNC: 27 MG/DL (ref 8–23)
BUN SERPL-MCNC: 27 MG/DL (ref 8–23)
CALCIUM SERPL-MCNC: 9.5 MG/DL (ref 8.7–10.5)
CALCIUM SERPL-MCNC: 9.5 MG/DL (ref 8.7–10.5)
CALCIUM SERPL-MCNC: 9.6 MG/DL (ref 8.7–10.5)
CHLORIDE SERPL-SCNC: 105 MMOL/L (ref 95–110)
CHLORIDE SERPL-SCNC: 107 MMOL/L (ref 95–110)
CHLORIDE SERPL-SCNC: 109 MMOL/L (ref 95–110)
CO2 SERPL-SCNC: 23 MMOL/L (ref 23–29)
CO2 SERPL-SCNC: 23 MMOL/L (ref 23–29)
CO2 SERPL-SCNC: 24 MMOL/L (ref 23–29)
CREAT SERPL-MCNC: 1.4 MG/DL (ref 0.5–1.4)
CTP QC/QA: YES
CTP QC/QA: YES
EAG (OHS): 143 MG/DL (ref 68–131)
ERYTHROCYTE [DISTWIDTH] IN BLOOD BY AUTOMATED COUNT: 14.2 % (ref 11.5–14.5)
ERYTHROCYTE [DISTWIDTH] IN BLOOD BY AUTOMATED COUNT: 14.4 % (ref 11.5–14.5)
GFR SERPLBLD CREATININE-BSD FMLA CKD-EPI: 50 ML/MIN/1.73/M2
GLUCOSE SERPL-MCNC: 178 MG/DL (ref 70–110)
GLUCOSE SERPL-MCNC: 181 MG/DL (ref 70–110)
GLUCOSE SERPL-MCNC: 227 MG/DL (ref 70–110)
HBA1C MFR BLD: 6.6 % (ref 4–5.6)
HCT VFR BLD AUTO: 36 % (ref 40–54)
HCT VFR BLD AUTO: 37.9 % (ref 40–54)
HGB BLD-MCNC: 11.6 GM/DL (ref 14–18)
HGB BLD-MCNC: 12.1 GM/DL (ref 14–18)
HOLD SPECIMEN: NORMAL
HOLD SPECIMEN: NORMAL
IMM GRANULOCYTES # BLD AUTO: 0.02 K/UL (ref 0–0.04)
IMM GRANULOCYTES # BLD AUTO: 0.02 K/UL (ref 0–0.04)
IMM GRANULOCYTES NFR BLD AUTO: 0.3 % (ref 0–0.5)
IMM GRANULOCYTES NFR BLD AUTO: 0.3 % (ref 0–0.5)
LACTATE SERPL-SCNC: 1.5 MMOL/L (ref 0.5–2.2)
LACTATE SERPL-SCNC: 2.4 MMOL/L (ref 0.5–2.2)
LYMPHOCYTES # BLD AUTO: 0.57 K/UL (ref 1–4.8)
LYMPHOCYTES # BLD AUTO: 3.77 K/UL (ref 1–4.8)
MAGNESIUM SERPL-MCNC: 1.8 MG/DL (ref 1.6–2.6)
MCH RBC QN AUTO: 28.2 PG (ref 27–31)
MCH RBC QN AUTO: 28.2 PG (ref 27–31)
MCHC RBC AUTO-ENTMCNC: 31.9 G/DL (ref 32–36)
MCHC RBC AUTO-ENTMCNC: 32.2 G/DL (ref 32–36)
MCV RBC AUTO: 87 FL (ref 82–98)
MCV RBC AUTO: 88 FL (ref 82–98)
NUCLEATED RBC (/100WBC) (OHS): 0 /100 WBC
NUCLEATED RBC (/100WBC) (OHS): 0 /100 WBC
PLATELET # BLD AUTO: 247 K/UL (ref 150–450)
PLATELET # BLD AUTO: 314 K/UL (ref 150–450)
PMV BLD AUTO: 10 FL (ref 9.2–12.9)
PMV BLD AUTO: 10.6 FL (ref 9.2–12.9)
POC MOLECULAR INFLUENZA A AGN: NEGATIVE
POC MOLECULAR INFLUENZA B AGN: NEGATIVE
POCT GLUCOSE: 145 MG/DL (ref 70–110)
POCT GLUCOSE: 191 MG/DL (ref 70–110)
POCT GLUCOSE: 235 MG/DL (ref 70–110)
POCT GLUCOSE: 255 MG/DL (ref 70–110)
POTASSIUM SERPL-SCNC: 4.1 MMOL/L (ref 3.5–5.1)
POTASSIUM SERPL-SCNC: 4.4 MMOL/L (ref 3.5–5.1)
POTASSIUM SERPL-SCNC: 4.7 MMOL/L (ref 3.5–5.1)
PROCALCITONIN SERPL-MCNC: 0.02 NG/ML
PROT SERPL-MCNC: 7.9 GM/DL (ref 6–8.4)
PROT SERPL-MCNC: 8.2 GM/DL (ref 6–8.4)
RBC # BLD AUTO: 4.12 M/UL (ref 4.6–6.2)
RBC # BLD AUTO: 4.29 M/UL (ref 4.6–6.2)
RELATIVE EOSINOPHIL (OHS): 0.1 %
RELATIVE EOSINOPHIL (OHS): 2.5 %
RELATIVE LYMPHOCYTE (OHS): 48.9 % (ref 18–48)
RELATIVE LYMPHOCYTE (OHS): 7.8 % (ref 18–48)
RELATIVE MONOCYTE (OHS): 5.6 % (ref 4–15)
RELATIVE MONOCYTE (OHS): 8.2 % (ref 4–15)
RELATIVE NEUTROPHIL (OHS): 38.8 % (ref 38–73)
RELATIVE NEUTROPHIL (OHS): 85.7 % (ref 38–73)
SARS-COV-2 RDRP RESP QL NAA+PROBE: NEGATIVE
SODIUM SERPL-SCNC: 137 MMOL/L (ref 136–145)
SODIUM SERPL-SCNC: 140 MMOL/L (ref 136–145)
SODIUM SERPL-SCNC: 141 MMOL/L (ref 136–145)
TROPONIN I SERPL DL<=0.01 NG/ML-MCNC: 0.05 NG/ML
TROPONIN I SERPL DL<=0.01 NG/ML-MCNC: 0.1 NG/ML
TROPONIN I SERPL DL<=0.01 NG/ML-MCNC: 0.14 NG/ML
TSH SERPL-ACNC: 0.94 UIU/ML (ref 0.4–4)
WBC # BLD AUTO: 7.31 K/UL (ref 3.9–12.7)
WBC # BLD AUTO: 7.71 K/UL (ref 3.9–12.7)

## 2025-06-28 PROCEDURE — 27100171 HC OXYGEN HIGH FLOW UP TO 24 HOURS: Mod: HCNC

## 2025-06-28 PROCEDURE — 93005 ELECTROCARDIOGRAM TRACING: CPT | Mod: HCNC

## 2025-06-28 PROCEDURE — 94660 CPAP INITIATION&MGMT: CPT | Mod: HCNC

## 2025-06-28 PROCEDURE — 80053 COMPREHEN METABOLIC PANEL: CPT | Mod: HCNC | Performed by: INTERNAL MEDICINE

## 2025-06-28 PROCEDURE — 94761 N-INVAS EAR/PLS OXIMETRY MLT: CPT | Mod: HCNC

## 2025-06-28 PROCEDURE — 85025 COMPLETE CBC W/AUTO DIFF WBC: CPT | Mod: HCNC | Performed by: INTERNAL MEDICINE

## 2025-06-28 PROCEDURE — 63600175 PHARM REV CODE 636 W HCPCS: Mod: HCNC | Performed by: INTERNAL MEDICINE

## 2025-06-28 PROCEDURE — 96372 THER/PROPH/DIAG INJ SC/IM: CPT | Performed by: INTERNAL MEDICINE

## 2025-06-28 PROCEDURE — 25000003 PHARM REV CODE 250: Mod: HCNC | Performed by: EMERGENCY MEDICINE

## 2025-06-28 PROCEDURE — 20000000 HC ICU ROOM: Mod: HCNC

## 2025-06-28 PROCEDURE — 84484 ASSAY OF TROPONIN QUANT: CPT | Mod: HCNC | Performed by: REGISTERED NURSE

## 2025-06-28 PROCEDURE — 25000003 PHARM REV CODE 250: Mod: HCNC | Performed by: INTERNAL MEDICINE

## 2025-06-28 PROCEDURE — 27000190 HC CPAP FULL FACE MASK W/VALVE: Mod: HCNC

## 2025-06-28 PROCEDURE — 93010 ELECTROCARDIOGRAM REPORT: CPT | Mod: HCNC,,, | Performed by: INTERNAL MEDICINE

## 2025-06-28 PROCEDURE — 36415 COLL VENOUS BLD VENIPUNCTURE: CPT | Mod: HCNC | Performed by: STUDENT IN AN ORGANIZED HEALTH CARE EDUCATION/TRAINING PROGRAM

## 2025-06-28 PROCEDURE — 93010 ELECTROCARDIOGRAM REPORT: CPT | Mod: 76,HCNC,, | Performed by: INTERNAL MEDICINE

## 2025-06-28 PROCEDURE — 63600175 PHARM REV CODE 636 W HCPCS: Mod: HCNC | Performed by: EMERGENCY MEDICINE

## 2025-06-28 PROCEDURE — 25000003 PHARM REV CODE 250: Mod: HCNC | Performed by: REGISTERED NURSE

## 2025-06-28 PROCEDURE — 36415 COLL VENOUS BLD VENIPUNCTURE: CPT | Mod: HCNC | Performed by: EMERGENCY MEDICINE

## 2025-06-28 PROCEDURE — 36415 COLL VENOUS BLD VENIPUNCTURE: CPT | Mod: HCNC | Performed by: INTERNAL MEDICINE

## 2025-06-28 PROCEDURE — 83036 HEMOGLOBIN GLYCOSYLATED A1C: CPT | Mod: HCNC | Performed by: INTERNAL MEDICINE

## 2025-06-28 PROCEDURE — 84484 ASSAY OF TROPONIN QUANT: CPT | Mod: HCNC | Performed by: STUDENT IN AN ORGANIZED HEALTH CARE EDUCATION/TRAINING PROGRAM

## 2025-06-28 PROCEDURE — 5A09357 ASSISTANCE WITH RESPIRATORY VENTILATION, LESS THAN 24 CONSECUTIVE HOURS, CONTINUOUS POSITIVE AIRWAY PRESSURE: ICD-10-PCS | Performed by: EMERGENCY MEDICINE

## 2025-06-28 PROCEDURE — 84443 ASSAY THYROID STIM HORMONE: CPT | Mod: HCNC | Performed by: INTERNAL MEDICINE

## 2025-06-28 PROCEDURE — 83605 ASSAY OF LACTIC ACID: CPT | Mod: HCNC | Performed by: EMERGENCY MEDICINE

## 2025-06-28 PROCEDURE — 25500020 PHARM REV CODE 255: Mod: HCNC | Performed by: EMERGENCY MEDICINE

## 2025-06-28 PROCEDURE — 99900035 HC TECH TIME PER 15 MIN (STAT): Mod: HCNC

## 2025-06-28 PROCEDURE — 96365 THER/PROPH/DIAG IV INF INIT: CPT

## 2025-06-28 PROCEDURE — 63600175 PHARM REV CODE 636 W HCPCS: Mod: HCNC | Performed by: STUDENT IN AN ORGANIZED HEALTH CARE EDUCATION/TRAINING PROGRAM

## 2025-06-28 PROCEDURE — 99291 CRITICAL CARE FIRST HOUR: CPT | Mod: HCNC,,, | Performed by: INTERNAL MEDICINE

## 2025-06-28 PROCEDURE — 96374 THER/PROPH/DIAG INJ IV PUSH: CPT | Mod: 59

## 2025-06-28 RX ORDER — NITROGLYCERIN 20 MG/G
1 OINTMENT TOPICAL
Status: COMPLETED | OUTPATIENT
Start: 2025-06-28 | End: 2025-06-28

## 2025-06-28 RX ORDER — METOPROLOL TARTRATE 1 MG/ML
INJECTION, SOLUTION INTRAVENOUS
Status: DISPENSED
Start: 2025-06-28 | End: 2025-06-28

## 2025-06-28 RX ORDER — ASPIRIN 81 MG/1
81 TABLET ORAL DAILY
Status: DISCONTINUED | OUTPATIENT
Start: 2025-06-29 | End: 2025-06-30 | Stop reason: HOSPADM

## 2025-06-28 RX ORDER — NALOXONE HCL 0.4 MG/ML
0.02 VIAL (ML) INJECTION
Status: DISCONTINUED | OUTPATIENT
Start: 2025-06-28 | End: 2025-06-30 | Stop reason: HOSPADM

## 2025-06-28 RX ORDER — NITROGLYCERIN 20 MG/G
1 OINTMENT TOPICAL EVERY 6 HOURS
Status: DISCONTINUED | OUTPATIENT
Start: 2025-06-28 | End: 2025-06-28

## 2025-06-28 RX ORDER — DIGOXIN 0.25 MG/ML
INJECTION INTRAMUSCULAR; INTRAVENOUS
Status: DISPENSED
Start: 2025-06-28 | End: 2025-06-28

## 2025-06-28 RX ORDER — FUROSEMIDE 10 MG/ML
40 INJECTION INTRAMUSCULAR; INTRAVENOUS 2 TIMES DAILY
Status: DISCONTINUED | OUTPATIENT
Start: 2025-06-28 | End: 2025-06-28

## 2025-06-28 RX ORDER — GLUCAGON 1 MG
1 KIT INJECTION
Status: DISCONTINUED | OUTPATIENT
Start: 2025-06-28 | End: 2025-06-30 | Stop reason: HOSPADM

## 2025-06-28 RX ORDER — HYDROCODONE BITARTRATE AND ACETAMINOPHEN 5; 325 MG/1; MG/1
1 TABLET ORAL EVERY 4 HOURS PRN
Refills: 0 | Status: DISCONTINUED | OUTPATIENT
Start: 2025-06-28 | End: 2025-06-30 | Stop reason: HOSPADM

## 2025-06-28 RX ORDER — IBUPROFEN 200 MG
16 TABLET ORAL
Status: DISCONTINUED | OUTPATIENT
Start: 2025-06-28 | End: 2025-06-30 | Stop reason: HOSPADM

## 2025-06-28 RX ORDER — ONDANSETRON HYDROCHLORIDE 2 MG/ML
4 INJECTION, SOLUTION INTRAVENOUS EVERY 8 HOURS PRN
Status: DISCONTINUED | OUTPATIENT
Start: 2025-06-28 | End: 2025-06-30 | Stop reason: HOSPADM

## 2025-06-28 RX ORDER — AMLODIPINE BESYLATE 5 MG/1
10 TABLET ORAL DAILY
Status: DISCONTINUED | OUTPATIENT
Start: 2025-06-28 | End: 2025-06-28

## 2025-06-28 RX ORDER — METOPROLOL SUCCINATE 50 MG/1
100 TABLET, EXTENDED RELEASE ORAL DAILY
Status: DISCONTINUED | OUTPATIENT
Start: 2025-06-28 | End: 2025-06-30 | Stop reason: HOSPADM

## 2025-06-28 RX ORDER — ENOXAPARIN SODIUM 100 MG/ML
40 INJECTION SUBCUTANEOUS EVERY 24 HOURS
Status: DISCONTINUED | OUTPATIENT
Start: 2025-06-28 | End: 2025-06-30 | Stop reason: HOSPADM

## 2025-06-28 RX ORDER — METOPROLOL TARTRATE 1 MG/ML
5 INJECTION, SOLUTION INTRAVENOUS ONCE
Status: COMPLETED | OUTPATIENT
Start: 2025-06-28 | End: 2025-06-28

## 2025-06-28 RX ORDER — SPIRONOLACTONE 25 MG/1
50 TABLET ORAL DAILY
Status: DISCONTINUED | OUTPATIENT
Start: 2025-06-28 | End: 2025-06-30 | Stop reason: HOSPADM

## 2025-06-28 RX ORDER — ASPIRIN 325 MG
325 TABLET ORAL
Status: COMPLETED | OUTPATIENT
Start: 2025-06-28 | End: 2025-06-28

## 2025-06-28 RX ORDER — FUROSEMIDE 10 MG/ML
80 INJECTION INTRAMUSCULAR; INTRAVENOUS
Status: COMPLETED | OUTPATIENT
Start: 2025-06-28 | End: 2025-06-28

## 2025-06-28 RX ORDER — ENOXAPARIN SODIUM 100 MG/ML
30 INJECTION SUBCUTANEOUS EVERY 24 HOURS
Status: DISCONTINUED | OUTPATIENT
Start: 2025-06-28 | End: 2025-06-28

## 2025-06-28 RX ORDER — DIGOXIN 0.25 MG/ML
500 INJECTION INTRAMUSCULAR; INTRAVENOUS ONCE
Status: COMPLETED | OUTPATIENT
Start: 2025-06-28 | End: 2025-06-28

## 2025-06-28 RX ORDER — FUROSEMIDE 20 MG/1
20 TABLET ORAL DAILY
Status: DISCONTINUED | OUTPATIENT
Start: 2025-06-29 | End: 2025-06-28

## 2025-06-28 RX ORDER — INSULIN ASPART 100 [IU]/ML
0-10 INJECTION, SOLUTION INTRAVENOUS; SUBCUTANEOUS EVERY 6 HOURS PRN
Status: DISCONTINUED | OUTPATIENT
Start: 2025-06-28 | End: 2025-06-28

## 2025-06-28 RX ORDER — LOSARTAN POTASSIUM 50 MG/1
100 TABLET ORAL DAILY
Status: DISCONTINUED | OUTPATIENT
Start: 2025-06-28 | End: 2025-06-30 | Stop reason: HOSPADM

## 2025-06-28 RX ORDER — IBUPROFEN 200 MG
24 TABLET ORAL
Status: DISCONTINUED | OUTPATIENT
Start: 2025-06-28 | End: 2025-06-30 | Stop reason: HOSPADM

## 2025-06-28 RX ORDER — INSULIN ASPART 100 [IU]/ML
0-10 INJECTION, SOLUTION INTRAVENOUS; SUBCUTANEOUS
Status: DISCONTINUED | OUTPATIENT
Start: 2025-06-28 | End: 2025-06-30 | Stop reason: HOSPADM

## 2025-06-28 RX ORDER — MAGNESIUM SULFATE HEPTAHYDRATE 40 MG/ML
2 INJECTION, SOLUTION INTRAVENOUS ONCE
Status: COMPLETED | OUTPATIENT
Start: 2025-06-28 | End: 2025-06-28

## 2025-06-28 RX ORDER — ATORVASTATIN CALCIUM 40 MG/1
40 TABLET, FILM COATED ORAL DAILY
Status: DISCONTINUED | OUTPATIENT
Start: 2025-06-28 | End: 2025-06-30 | Stop reason: HOSPADM

## 2025-06-28 RX ORDER — SODIUM CHLORIDE 0.9 % (FLUSH) 0.9 %
10 SYRINGE (ML) INJECTION EVERY 12 HOURS PRN
Status: DISCONTINUED | OUTPATIENT
Start: 2025-06-28 | End: 2025-06-30 | Stop reason: HOSPADM

## 2025-06-28 RX ORDER — FUROSEMIDE 10 MG/ML
40 INJECTION INTRAMUSCULAR; INTRAVENOUS 2 TIMES DAILY
Status: COMPLETED | OUTPATIENT
Start: 2025-06-28 | End: 2025-06-28

## 2025-06-28 RX ADMIN — HYDROCODONE BITARTRATE AND ACETAMINOPHEN 1 TABLET: 5; 325 TABLET ORAL at 07:06

## 2025-06-28 RX ADMIN — NITROGLYCERIN 1 INCH: 20 OINTMENT TOPICAL at 12:06

## 2025-06-28 RX ADMIN — ATORVASTATIN CALCIUM 40 MG: 40 TABLET, FILM COATED ORAL at 08:06

## 2025-06-28 RX ADMIN — NITROGLYCERIN 1 INCH: 20 OINTMENT TOPICAL at 06:06

## 2025-06-28 RX ADMIN — INSULIN ASPART 2 UNITS: 100 INJECTION, SOLUTION INTRAVENOUS; SUBCUTANEOUS at 04:06

## 2025-06-28 RX ADMIN — SPIRONOLACTONE 50 MG: 25 TABLET ORAL at 08:06

## 2025-06-28 RX ADMIN — IOHEXOL 100 ML: 350 INJECTION, SOLUTION INTRAVENOUS at 12:06

## 2025-06-28 RX ADMIN — PIPERACILLIN SODIUM AND TAZOBACTAM SODIUM 4.5 G: 4; .5 INJECTION, POWDER, LYOPHILIZED, FOR SOLUTION INTRAVENOUS at 12:06

## 2025-06-28 RX ADMIN — AMLODIPINE BESYLATE 10 MG: 5 TABLET ORAL at 08:06

## 2025-06-28 RX ADMIN — ASPIRIN 325 MG ORAL TABLET 325 MG: 325 PILL ORAL at 12:06

## 2025-06-28 RX ADMIN — METOPROLOL SUCCINATE 100 MG: 50 TABLET, EXTENDED RELEASE ORAL at 08:06

## 2025-06-28 RX ADMIN — DIGOXIN 500 MCG: 0.25 INJECTION INTRAMUSCULAR; INTRAVENOUS at 10:06

## 2025-06-28 RX ADMIN — FUROSEMIDE 40 MG: 10 INJECTION, SOLUTION INTRAVENOUS at 08:06

## 2025-06-28 RX ADMIN — LOSARTAN POTASSIUM 100 MG: 50 TABLET, FILM COATED ORAL at 08:06

## 2025-06-28 RX ADMIN — INSULIN ASPART 6 UNITS: 100 INJECTION, SOLUTION INTRAVENOUS; SUBCUTANEOUS at 09:06

## 2025-06-28 RX ADMIN — FUROSEMIDE 80 MG: 10 INJECTION, SOLUTION INTRAVENOUS at 01:06

## 2025-06-28 RX ADMIN — METOROPROLOL TARTRATE 5 MG: 5 INJECTION, SOLUTION INTRAVENOUS at 10:06

## 2025-06-28 RX ADMIN — ENOXAPARIN SODIUM 40 MG: 40 INJECTION SUBCUTANEOUS at 04:06

## 2025-06-28 RX ADMIN — MAGNESIUM SULFATE HEPTAHYDRATE 2 G: 40 INJECTION, SOLUTION INTRAVENOUS at 12:06

## 2025-06-28 NOTE — ASSESSMENT & PLAN NOTE
Patient has Diastolic (HFpEF) heart failure that is Acute on chronic. On presentation their CHF was decompensated. Evidence of decompensated CHF on presentation includes: crackles on lung auscultation, orthopnea, paroxysmal nocturnal dyspnea (PND), dyspnea on exertion (PAIGE), and shortness of breath. The etiology of their decompensation is likely due to new pleural effusions and pulmonary edema. Most recent BNP and echo results are listed below.  Recent Labs     06/27/25  2357   *     Latest ECHO  Results for orders placed during the hospital encounter of 06/09/25    Echo    Interpretation Summary    Left Ventricle: The left ventricle is normal in size. Mildly increased wall thickness. There is concentric hypertrophy. Septal motion is consistent with bundle branch block. There is low normal systolic function with a visually estimated ejection fraction of 50 - 55%. Quantitated ejection fraction is 48%. Unable to assess diastolic function due to E-A fusion.    Global longitudinal strain is -9.6% there is apical sparing pattern that can be seen with infiltrative cardiomyopathy. Consider cardiac amyloid differential diagnosis.    Right Ventricle: The right ventricle is normal in size Systolic function is reduced.    Left Atrium: The left atrium is at the upper limit of normal in size The left atrium is dilated    Aortic Valve: There is moderate aortic regurgitation.    Mitral Valve: There is mild to moderate regurgitation with a eccentrically posterolateral directed jet.    Tricuspid Valve: There is mild to moderate regurgitation.    Pulmonic Valve: There is mild regurgitation.    Aorta: The aortic root is the upper limit of normal in size measuring 3.94 cm.    Pulmonary Artery: The estimated pulmonary artery systolic pressure is 39 mmHg.    IVC/SVC: Normal venous pressure at 3 mmHg.    Current Heart Failure Medications  losartan tablet 100 mg, Daily, Oral  metoprolol succinate (TOPROL-XL) 24 hr tablet 100 mg,  Daily, Oral  spironolactone tablet 50 mg, Daily, Oral  furosemide injection 40 mg, 2 times daily, Intravenous  digoxin (LANOXIN) 250 mcg/mL (0.25 mg/mL) injection, ,     Plan  - Monitor strict I&Os and daily weights.    - Place on telemetry  - Low sodium diet  - Place on fluid restriction of 1.5 L.   - Cardiology has been consulted  - The patient's volume status is improving but not at their baseline as indicated by edema, crackles on lung auscultation, orthopnea, paroxysmal nocturnal dyspnea (PND), dyspnea on exertion (PAIGE), and shortness of breath  - given 80 mg of Lasix in ED  -Cardiology following.  Agrees with Lasix b.i.d..  Continue Toprol.  Last echo reviewed  -Continue BiPAP, Toprol/spironolactone, losartan  -Troponin elevation felt to be type 2 demand in the setting of CHF decompensation  We will add Jardiance at discharge

## 2025-06-28 NOTE — EICU
Brief Eicu admission review note.    Full H&P,notes, labs images reviewed.  Brief history: 85 y/o male with h/o HTN, DM, HLD , prostate Ca CKD II,  , HF with EF 48% concerns for amyloid infiltrative cardiomyopathy, work up per cardiology presented with c/o worsening SOB, hypoxemia in ER at 78%.  /99, CXR and CTA chest with pulmonary edema,  no  PE.  He was given lasix 80 mg, nitropaste, ASA, zosyn. Good UO  liter after lasix    Evaluation via interactive audio and video telecomminications:  HR 84, 143/80, 98 % on  bipap 12/6 50% RR 16 pulling TV [id number] cc    Problems:  Hypoxemic and hypercapneic respiratory failure  HFpEF exacerbation  Elevated trop, likely demand ischemia  Lactic acidosis likely form CHF exacerbation  HTN  DM      Plan:  Lasix spot doses, nitro, bipap  Serial  EKGs, trop, bipap  ASA, nitro, statin  S/p zosyn, hold off on futher ab  SSI      Best practices:    SUP: n/a    VTE prevention: Lovenox    D/w RN

## 2025-06-28 NOTE — PLAN OF CARE
Dr delatorre, cardiology, here, informed of status, told heart rate up since admit, when EKG done, it says sinus rhythm, but told I can not see distinct p waves,  shown EKG, s, I did, one says sinus with short pr, other accelerated junctional rhythm, he is not sure he may be in flutter

## 2025-06-28 NOTE — ED NOTES
Report received care assumed. Patient lying in bed supine awake, alert and oriented. Respiration's even, use of accessory muscles noted. O2/5L via nasal cannula intact. O2 sats 92%. Denies pain or discomfort at this time. Voiced no needs or concerns. Head of bed up. Bed low and locked. Call light within reach.

## 2025-06-28 NOTE — ASSESSMENT & PLAN NOTE
Patient has Diastolic (HFpEF) heart failure that is Acute on chronic. On presentation their CHF was decompensated. Evidence of decompensated CHF on presentation includes: crackles on lung auscultation, orthopnea, paroxysmal nocturnal dyspnea (PND), dyspnea on exertion (PAIGE), and shortness of breath. The etiology of their decompensation is likely due to new pleural effusions and pulmonary edema. Most recent BNP and echo results are listed below.  Recent Labs     06/27/25  2357   *     Latest ECHO  Results for orders placed during the hospital encounter of 06/09/25    Echo    Interpretation Summary    Left Ventricle: The left ventricle is normal in size. Mildly increased wall thickness. There is concentric hypertrophy. Septal motion is consistent with bundle branch block. There is low normal systolic function with a visually estimated ejection fraction of 50 - 55%. Quantitated ejection fraction is 48%. Unable to assess diastolic function due to E-A fusion.    Global longitudinal strain is -9.6% there is apical sparing pattern that can be seen with infiltrative cardiomyopathy. Consider cardiac amyloid differential diagnosis.    Right Ventricle: The right ventricle is normal in size Systolic function is reduced.    Left Atrium: The left atrium is at the upper limit of normal in size The left atrium is dilated    Aortic Valve: There is moderate aortic regurgitation.    Mitral Valve: There is mild to moderate regurgitation with a eccentrically posterolateral directed jet.    Tricuspid Valve: There is mild to moderate regurgitation.    Pulmonic Valve: There is mild regurgitation.    Aorta: The aortic root is the upper limit of normal in size measuring 3.94 cm.    Pulmonary Artery: The estimated pulmonary artery systolic pressure is 39 mmHg.    IVC/SVC: Normal venous pressure at 3 mmHg.    Current Heart Failure Medications  furosemide injection 40 mg, 2 times daily, Intravenous  furosemide tablet 20 mg, Daily,  Oral  losartan tablet 100 mg, Daily, Oral  metoprolol succinate (TOPROL-XL) 24 hr tablet 100 mg, Daily, Oral  spironolactone tablet 50 mg, Daily, Oral    Plan  - Monitor strict I&Os and daily weights.    - Place on telemetry  - Low sodium diet  - Place on fluid restriction of 1.5 L.   - Cardiology has been consulted  - The patient's volume status is improving but not at their baseline as indicated by edema, crackles on lung auscultation, orthopnea, paroxysmal nocturnal dyspnea (PND), dyspnea on exertion (PAIGE), and shortness of breath  - given 80 mg of Lasix in ED  -we will give 40 mg IV Lasix x2 doses  -consult Cardiology  Continue BiPAP

## 2025-06-28 NOTE — CONSULTS
Pulm/CC Fellow Consult Note    Attending Physician: Nabeel Olmedo MD    Date of Admit: 6/27/2025    Reason for Consult: Hypoxemic Respiratory Failure    History of Present Illness:  Bryant Gil is a 84 y.o.  male with a PMHx HTN, T2DM, and CHF who presented to [facility] ED the night of 6/27 with complaints of shortness of breath. The patient reported approximately 2 days of worsening shortness of breath. He denied fevers, chills, sick contacts. He did endorse a nonproductive cough. The patient also denied chest pains. In the ED the pateint was noted to be hypoxemic to 78%. CXR and CTA of the chest performed both with signs of pulmonary edema. No PE on CTA. He was given lasix and nitropaste, placed on bipap and transferred to the ICU.    Of note, the patient has an apical sparing abnormal strain pattern which may correlate with an infiltrative cardiomyopathy. He is being worked up for amyloid as an outpatient by his cardiologist but no diganosis yet.     Past Medical History:  Past Medical History:   Diagnosis Date    Cataract of both eyes     CHF exacerbation 6/28/2025    Chronic gastritis without bleeding, negative H. pylori Jaunuary 2019 EGD 01/30/2020    Diabetes mellitus     Diverticulosis     Ectatic aorta 02/06/2018    HTN (hypertension)     HTN (hypertension) 10/09/2012    Lower GI bleeding 01/11/2021    Near syncope 02/05/2021    OHT (ocular hypertension)     Prostate cancer     Pyelonephritis, right no stone on CT scan. 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    Tubular adenoma of colon 01/10/2012    Type II or unspecified type diabetes mellitus with neurological manifestations, not stated as uncontrolled(250.60) 10/09/2012        Past Surgical History:  Past Surgical History:   Procedure Laterality Date    CARPAL TUNNEL RELEASE Right 08/25/2015    CATARACT EXTRACTION W/  INTRAOCULAR LENS IMPLANT Left 6/25/2024    Procedure: EXTRACTION, CATARACT, WITH IOL INSERTION;  Surgeon: Federico Sumner MD;  Location: [facility];  Service: Ophthalmology;  Laterality: Left;    CATARACT EXTRACTION W/  INTRAOCULAR LENS IMPLANT Right 7/16/2024    Procedure: EXTRACTION, CATARACT, WITH IOL INSERTION;  Surgeon: Federico Sumner MD;  Location: [facility] OR;  Service: Ophthalmology;  Laterality: Right;    CIRCUMCISION  04/13/2005    COLONOSCOPY N/A 3/29/2017    Procedure: COLONOSCOPY Golytely prep;  Surgeon: Kavitha Cleaning MD;  Location: [facility];  Service: Endoscopy;  Laterality: N/A;    COLONOSCOPY N/A 1/12/2021    Procedure: COLONOSCOPY;  Surgeon: Dung Panda MD;  Location: [facility];  Service: Endoscopy;  Laterality: N/A;    COLONOSCOPY W/ POLYPECTOMY  01/10/2012    Repeat in 5 years     ESOPHAGOGASTRODUODENOSCOPY N/A 1/8/2019    Procedure: EGD (ESOPHAGOGASTRODUODENOSCOPY);  Surgeon: Rachel Burrows MD;  Location: [facility];  Service: Endoscopy;  Laterality: N/A;    ESOPHAGOGASTRODUODENOSCOPY N/A 5/14/2020    Procedure: ESOPHAGOGASTRODUODENOSCOPY (EGD);  Surgeon: Rachel Burrows MD;  Location: [facility];  Service: Endoscopy;  Laterality: N/A;  Dr. Luis Angel Burrows if possible.    FLEXIBLE SIGMOIDOSCOPY N/A 1/8/2019    Procedure: SIGMOIDOSCOPY, FLEXIBLE;  Surgeon: Rachel Burrows MD;  Location: [facility];  Service: Endoscopy;  Laterality: N/A;    PENILE PROSTHESIS IMPLANT      PROSTATE SURGERY  1999    Prostatectomy for prostate ca; [facility]    TRIGGER FINGER RELEASE Right 2015    TRIGGER FINGER RELEASE Right 11/8/2024    Procedure: RELEASE, TRIGGER FINGER;  Surgeon: Anthony Cooper Jr., MD;  Location: [facility];  Service: Orthopedics;  Laterality: Right;       Allergies:  Review of patient's allergies indicates:  No Known  "Allergies    Family History:  Family History   Problem Relation Name Age of Onset    Hypertension Mother      No Known Problems Father unknown hx     Diabetes Sister Rasheeda     Cataracts Sister Rasheeda     No Known Problems Sister Cinthia     No Known Problems Sister Niki     No Known Problems Sister Shwetha     No Known Problems Brother Jeffery     Heart attack Brother Layne     Coronary artery disease Brother Roverto     No Known Problems Brother Asad     Coronary artery disease Brother Milligan     Heart attack Brother Milligan     No Known Problems Daughter x5     No Known Problems Son x2     Blindness Neg Hx      Amblyopia Neg Hx      Glaucoma Neg Hx      Retinal detachment Neg Hx      Strabismus Neg Hx      Macular degeneration Neg Hx      Stroke Neg Hx      Thyroid disease Neg Hx      Colon cancer Neg Hx      Esophageal cancer Neg Hx         Social History:  Social History[1]    Review of Systems:  Review of Systems   All other systems reviewed and are negative.       Objective:   Last 24 Hour Vital Signs:  BP  Min: 138/80  Max: 188/104  Temp  Av.7 °F (36.5 °C)  Min: 97 °F (36.1 °C)  Max: 98.3 °F (36.8 °C)  Pulse  Av  Min: 79  Max: 133  Resp  Av.3  Min: 13  Max: 40  SpO2  Av.6 %  Min: 78 %  Max: 99 %  Height  Av' 7" (170.2 cm)  Min: 5' 7" (170.2 cm)  Max: 5' 7" (170.2 cm)  Weight  Av.6 kg (164 lb 7.4 oz)  Min: 72.2 kg (159 lb 2.8 oz)  Max: 77 kg (169 lb 12.1 oz)  Body mass index is 26.59 kg/m².  I/O last 3 completed shifts:  In: -   Out: 1700 [Urine:1700]    Physical Exam:  General: Alert and awake in NAD  HENT:  NCAT; anicteric sclera; OP clear with MMM  Cardio:  Regular rate and rhythm with normal S1 and S2; no murmurs or rubs  Resp:  Bilateral crackles. POCUS with R>L Simple pleural effusions  Abdom:  Soft, Non-tender  Extrem:  WWP with no clubbing, cyanosis or edema  Pulses:  2+ and symmetric distally  Neuro:  AAOx3; cooperative and pleasant with no focal " deficits    Imaging:    CTA Chest 6/28/2025:  Bilateral patchy, perihilar ground glass opacities an bilateral simple appearing effusions. Overall consistent with volume overload / pulmonary edema.    Assessment & Plan:     Neuro  No concerns, at baseline.    CVS  CHF Exacerbation  Presenting with CHF exacerbation 2/2 likely dietary indiscretion. Patient's family reports he had been eating a lot of fried food. Low normal EF on last echo but abnormal strain pattern which may indicate an infiltrative cardiomyopathy. Under evaluation as an outpatient. Initially with significant respiratory distress requiring BiPAP. Received lasix IV in the ED and diuresed very well.     - Taken off bipap this AM, saturating well on 2-3L of nasal cannula  - Bedside pocus with large bilateral pleural effusions and B-lines  - Continue aggressive diuresis with goal of negative 2L at least per day  - Will need education on diet, daily weights, etc. Poor insight into how to manage his CHF.    Tachycardia  Evaluated by cardiology, concern for an atrial tachycardia  - receiving dose of digoxin today by cardiology to assess.    Pulm  - As under CHF exacerbation above  - No significant snoring, family denies witnessed apneas    GI  - No acute concners    Renal  - Creatinine 1.4, appears 1.3 is his baseline    Heme  - No acute concerns    ID  - No acute concerns    Endocrine  - Goal blood glucose 140-180 while admitted    Dispo: Patient can step down to the floor today    Byron Chang M.D.  [facility] Pul[facility] Critical Care Fellow    Pt seen and examined with Pulmonary/Critical Care team and this note was reviewed and validated with the following additional comments: SOB better.  HR still 120s and did not change with 5 mg IV metoprolol. POCUS shows bilateral large effusions R>L. LVEF good. Diastology not evaluated but does have increased LV wall mass.  Diuresis started. Ful anticoagulation.    Critical Care time was spent validating the history and  physical exam, reviewing the lab and imaging results, and discussing the care of the patient with the bedside nurse and the patient and/or surrogates. This critical care time did not overlap with that of any other provider or involve time for any procedures.  This patient has a high probability of sudden clinically significant deterioration which requires the highest level of physician preparedness to intervene urgently. I managed/supervised life or organ supporting interventions that required frequent physician assessments. I devoted my full attention in the ICU to the direct care of this patient for this period of time. Organ systems which are failing and require intensive, critical care support are: cardiac, respiratory  Critical Care time: 30 minutes    Ta Silva MD  Phone [phone]                   [1]   Social History  Tobacco Use    Smoking status: Former     Current packs/day: 0.00     Average packs/day: 0.5 packs/day for 3.0 years (1.5 ttl pk-yrs)     Types: Cigarettes     Start date:      Quit date:      Years since quittin.5     Passive exposure: Never    Smokeless tobacco: Never    Tobacco comments:     smoked as a teenager   Substance Use Topics    Alcohol use: Yes     Comment: social drinks a beer 1-2 x month    Drug use: Never

## 2025-06-28 NOTE — PLAN OF CARE
Went to start the amiodarone, heart rate 78 regular, heart rate was 113 , started slowing to the 90's , EKG done , first one stated accelerated junctional rhythm,  repeated and 2nd EKG stated sinus rhythm with pac's, can see p waves in 2nd EKG,   Dr Gavin notified of the slowing of the heart rate, EKG's mused, and he will review them, stated to hold the amiodarone kvng

## 2025-06-28 NOTE — ASSESSMENT & PLAN NOTE
Creatine stable for now. BMP reviewed- noted Estimated Creatinine Clearance: 38 mL/min (based on SCr of 1.4 mg/dL). according to latest data. Based on current GFR, CKD stage is stage 3 - GFR 30-59.  Monitor UOP and serial BMP and adjust therapy as needed. Renally dose meds. Avoid nephrotoxic medications and procedures.    Patient's FSGs are controlled on current medication regimen.  Last A1c reviewed-   Lab Results   Component Value Date    HGBA1C 6.6 (H) 06/28/2025     Most recent fingerstick glucose reviewed-   Recent Labs   Lab 06/27/25  2354 06/28/25  0303 06/28/25  1147 06/28/25  1638   POCTGLUCOSE 188* 235* 145* 191*     Current correctional scale  Medium  Maintain anti-hyperglycemic dose as follows-   Antihyperglycemics (From admission, onward)      Start     Stop Route Frequency Ordered    06/28/25 1523  insulin aspart U-100 pen 0-10 Units         -- SubQ Before meals & nightly PRN 06/28/25 1424          Hold Oral hypoglycemics while patient is in the hospital.

## 2025-06-28 NOTE — PLAN OF CARE
Spoke with Dr Chang, critical care md, re new meds ordered, reviewed with him, asked if the ntg paste should be continued, will discontinue the ntg paste, and stated to remove present dose

## 2025-06-28 NOTE — ASSESSMENT & PLAN NOTE
Patient's blood pressure range in the last 24 hours was: BP  Min: 122/62  Max: 188/104.The patient's inpatient anti-hypertensive regimen is listed below:  Current Antihypertensives  losartan tablet 100 mg, Daily, Oral  metoprolol succinate (TOPROL-XL) 24 hr tablet 100 mg, Daily, Oral  spironolactone tablet 50 mg, Daily, Oral  furosemide injection 40 mg, 2 times daily, Intravenous  metoprolol (LOPRESSOR) 5 mg/5 mL injection, ,     Plan  - BP is controlled, no changes needed to their regimen

## 2025-06-28 NOTE — PLAN OF CARE
Heart rate remains in the 70's sinus, resp 18, no sob, 02 2lnc, still with crackles post bases, less jvd,

## 2025-06-28 NOTE — SUBJECTIVE & OBJECTIVE
Past Medical History:   Diagnosis Date    Cataract of both eyes     Chronic gastritis without bleeding, negative H. pylori Jaunuary 2019 EGD 01/30/2020    Diabetes mellitus     Diverticulosis     Ectatic aorta 02/06/2018    HTN (hypertension)     HTN (hypertension) 10/09/2012    Lower GI bleeding 01/11/2021    Near syncope 02/05/2021    OHT (ocular hypertension)     Prostate cancer     Pyelonephritis, right no stone on CT scan. 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    Tubular adenoma of colon 01/10/2012    Type II or unspecified type diabetes mellitus with neurological manifestations, not stated as uncontrolled(250.60) 10/09/2012       Past Surgical History:   Procedure Laterality Date    CARPAL TUNNEL RELEASE Right 08/25/2015    CATARACT EXTRACTION W/  INTRAOCULAR LENS IMPLANT Left 6/25/2024    Procedure: EXTRACTION, CATARACT, WITH IOL INSERTION;  Surgeon: Federico Sumner MD;  Location: [facility] OR;  Service: Ophthalmology;  Laterality: Left;    CATARACT EXTRACTION W/  INTRAOCULAR LENS IMPLANT Right 7/16/2024    Procedure: EXTRACTION, CATARACT, WITH IOL INSERTION;  Surgeon: Federico Sumner MD;  Location: [facility] OR;  Service: Ophthalmology;  Laterality: Right;    CIRCUMCISION  04/13/2005    COLONOSCOPY N/A 3/29/2017    Procedure: COLONOSCOPY Golytely prep;  Surgeon: Kavitha Cleaning MD;  Location: [facility] ENDO;  Service: Endoscopy;  Laterality: N/A;    COLONOSCOPY N/A 1/12/2021    Procedure: COLONOSCOPY;  Surgeon: Dung Panda MD;  Location: [facility] ENDO;  Service: Endoscopy;  Laterality: N/A;    COLONOSCOPY W/ POLYPECTOMY  01/10/2012    Repeat in 5 years     ESOPHAGOGASTRODUODENOSCOPY N/A 1/8/2019    Procedure: EGD  (ESOPHAGOGASTRODUODENOSCOPY);  Surgeon: Rachel Burrows MD;  Location: [facility] ENDO;  Service: Endoscopy;  Laterality: N/A;    ESOPHAGOGASTRODUODENOSCOPY N/A 5/14/2020    Procedure: ESOPHAGOGASTRODUODENOSCOPY (EGD);  Surgeon: Rachel Burrows MD;  Location: [facility] ENDO;  Service: Endoscopy;  Laterality: N/A;  Dr. Luis Angel Burrows if possible.    FLEXIBLE SIGMOIDOSCOPY N/A 1/8/2019    Procedure: SIGMOIDOSCOPY, FLEXIBLE;  Surgeon: Rachel Burrows MD;  Location: [facility] ENDO;  Service: Endoscopy;  Laterality: N/A;    PENILE PROSTHESIS IMPLANT      PROSTATE SURGERY  1999    Prostatectomy for prostate ca; [facility]    TRIGGER FINGER RELEASE Right 2015    TRIGGER FINGER RELEASE Right 11/8/2024    Procedure: RELEASE, TRIGGER FINGER;  Surgeon: Anthony Cooper Jr., MD;  Location: [facility] OR;  Service: Orthopedics;  Laterality: Right;       Review of patient's allergies indicates:  No Known Allergies    No current facility-administered medications on file prior to encounter.     Current Outpatient Medications on File Prior to Encounter   Medication Sig    amLODIPine (NORVASC) 10 MG tablet TAKE 1 TABLET EVERY DAY    aspirin (ECOTRIN) 81 MG EC tablet Take 81 mg by mouth once daily.    atorvastatin (LIPITOR) 40 MG tablet Take 1 tablet (40 mg total) by mouth once daily.    BD ALCOHOL SWABS PadM USE FOR HOME GLUCOSE MONITORING DAILY    blood sugar diagnostic (TRUE METRIX GLUCOSE TEST STRIP) Strp Test Blood Sugar twice daily    blood sugar diagnostic Strp To check BG 1 times daily, to use with insurance preferred meter    blood-glucose meter (TRUE METRIX GLUCOSE METER) Misc Test blood sugar twice a day    blood-glucose meter kit To check BG 1 times daily, to use with insurance preferred meter    blood-glucose sensor (DEXCOM G7 SENSOR) Shanice Use continue glucose monitor to check sugar daily    clobetasoL (TEMOVATE) 0.05 % cream Apply to scar in ear twice daily until flat.    furosemide (LASIX) 20 MG tablet Take 1 tablet (20 mg total) by mouth daily  as needed (shortness of breath).    glipiZIDE (GLUCOTROL) 10 MG TR24 Take 1 tablet (10 mg total) by mouth daily with breakfast.    HYDROcodone-acetaminophen (NORCO) 5-325 mg per tablet Take 1 tablet by mouth every 4 (four) hours as needed for Pain.    hydrocortisone 2.5 % cream Apply topically 2 (two) times daily.    lancets (TRUEPLUS LANCETS) 30 gauge Misc Check sugar twice daily    lancets Misc To check BG 1 times daily, to use with insurance preferred meter    losartan (COZAAR) 100 MG tablet Take 1 tablet (100 mg total) by mouth once daily.    metFORMIN (GLUCOPHAGE) 1000 MG tablet Take 1 tablet (1,000 mg total) by mouth 2 (two) times daily with meals.    metoprolol succinate (TOPROL-XL) 100 MG 24 hr tablet TAKE 1 TABLET EVERY DAY    pantoprazole (PROTONIX) 40 MG tablet Take 1 tablet (40 mg total) by mouth once daily.    spironolactone (ALDACTONE) 50 MG tablet Take 1 tablet (50 mg total) by mouth once daily.     Family History       Problem Relation (Age of Onset)    Cataracts Sister    Coronary artery disease Brother, Brother    Diabetes Sister    Heart attack Brother, Brother    Hypertension Mother    No Known Problems Father, Sister, Sister, Sister, Brother, Brother, Daughter, Son          Tobacco Use    Smoking status: Former     Current packs/day: 0.00     Average packs/day: 0.5 packs/day for 3.0 years (1.5 ttl pk-yrs)     Types: Cigarettes     Start date:      Quit date:      Years since quittin.5     Passive exposure: Never    Smokeless tobacco: Never    Tobacco comments:     smoked as a teenager   Substance and Sexual Activity    Alcohol use: Yes     Comment: social drinks a beer 1-2 x month    Drug use: Never    Sexual activity: Yes     Partners: Female     Review of Systems   Respiratory:  Positive for shortness of breath.    All other systems reviewed and are negative.    Objective:     Vital Signs (Most Recent):  Temp: 97 °F (36.1 °C) (25 0326)  Pulse: (!) 126 (25 0700)  Resp:  15 (06/28/25 0700)  BP: (!) 156/89 (06/28/25 0700)  SpO2: 97 % (06/28/25 0700) Vital Signs (24h Range):  Temp:  [97 °F (36.1 °C)-98.3 °F (36.8 °C)] 97 °F (36.1 °C)  Pulse:  [] 126  Resp:  [13-40] 15  SpO2:  [78 %-99 %] 97 %  BP: (138-188)/() 156/89     Weight: 77 kg (169 lb 12.1 oz)  Body mass index is 26.59 kg/m².     Physical Exam  Vitals reviewed.   Constitutional:       Appearance: He is ill-appearing.      Comments: BiPAP 12/6--40%   HENT:      Head: Normocephalic.      Mouth/Throat:      Mouth: Mucous membranes are dry.      Pharynx: Oropharynx is clear.   Eyes:      Pupils: Pupils are equal, round, and reactive to light.   Pulmonary:      Breath sounds: Rhonchi and rales present.   Abdominal:      General: Bowel sounds are normal.      Palpations: Abdomen is soft.   Neurological:      Mental Status: He is alert.              CRANIAL NERVES     CN III, IV, VI   Pupils are equal, round, and reactive to light.       Significant Labs: All pertinent labs within the past 24 hours have been reviewed.  Recent Lab Results  (Last 5 results in the past 24 hours)        06/28/25  0419   06/28/25  0303   06/28/25  0240   06/28/25  0219   06/28/25  0218        Albumin 3.7               ALP 73               ALT 66               Anion Gap 7               AST 58               Baso # 0.04               Basophil % 0.5               BILIRUBIN TOTAL 0.6  Comment: For infants and newborns, interpretation of results should be based   on gestational age, weight and in agreement with clinical   observations.    Premature Infant recommended reference ranges:   0-24 hours:  <8.0 mg/dL   24-48 hours: <12.0 mg/dL   3-5 days:    <15.0 mg/dL   6-29 days:   <15.0 mg/dL               BUN 27               Calcium 9.5               Chloride 107               CO2 23               Creatinine 1.4               eGFR 50  Comment: Estimated GFR calculated using the CKD-EPI creatinine (2021) equation.               Eos # 0.01                Eos % 0.1               Estimated Avg Glucose 143               Glucose 178               Gran # (ANC) 6.26               Hematocrit 36.0               Hemoglobin 11.6               Hemoglobin A1C External 6.6  Comment: ADA Screening Guidelines:  5.7-6.4%  Consistent with prediabetes  >=6.5%  Consistent with diabetes    High levels of fetal hemoglobin interfere with the HbA1C  assay. Heterozygous hemoglobin variants (HbS, HgC, etc)do  not significantly interfere with this assay.   However, presence of multiple variants may affect accuracy.               Extra Tube         Hold for add-ons.  Comment: Auto resulted.          Immature Grans (Abs) 0.02  Comment: Mild elevation in immature granulocytes is non specific and can be seen in a variety of conditions including stress response, acute inflammation, trauma and pregnancy. Correlation with other laboratory and clinical findings is essential.               Immature Granulocytes 0.3               Lactic Acid Level 1.5       2.4  Comment:   A repeat order for Lactic Acid has been placed for collection in 3 hours.         Lymph # 0.57               Lymph % 7.8               MCH 28.2               MCHC 32.2               MCV 87               Mono # 0.41               Mono % 5.6               MPV 10.0               Neut % 85.7               nRBC 0               Platelet Count 247               POCT Glucose   235             Potassium 4.7               PROTEIN TOTAL 7.9                Acceptable     Yes           RBC 4.12               RDW 14.2               SARS-CoV-2 RNA, Amplification, Qual     Negative           Sodium 137               WBC 7.31                                      Significant Imaging: I have reviewed all pertinent imaging results/findings within the past 24 hours.  I have reviewed and interpreted all pertinent imaging results/findings within the past 24 hours.

## 2025-06-28 NOTE — ED PROVIDER NOTES
Encounter Date: 6/27/2025       History     Chief Complaint   Patient presents with    Shortness of Breath     Wife stated patient has been SOB x 2 days with increased SOB tonight.  History of Diabetes and prostrate CA.  Pt has audible bilateral chest rales and O2 sat 78% in triage.  Pt alert oriented.       84-year-old male brought to the emergency department by wife complaining of shortness of breath.  Wife states onset a couple of days ago, patient states he began feeling much worse a few hours ago.  Notes cough that is productive of yellow/green sputum.  No fever reported.  No pain reported.  Symptoms have been constant him worsening over the past several hours.  Worse with ambulation and lying supine and without alleviating factors.  No other symptoms reported at this time.      Review of patient's allergies indicates:  No Known Allergies  Past Medical History:   Diagnosis Date    Cataract of both eyes     Chronic gastritis without bleeding, negative H. pylori Jaunuary 2019 EGD 01/30/2020    Diabetes mellitus     Diverticulosis     Ectatic aorta 02/06/2018    HTN (hypertension)     HTN (hypertension) 10/09/2012    Lower GI bleeding 01/11/2021    Near syncope 02/05/2021    OHT (ocular hypertension)     Prostate cancer     Pyelonephritis, right no stone on CT scan. 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    Tubular adenoma of colon 01/10/2012    Type II or unspecified type diabetes mellitus with neurological manifestations, not stated as uncontrolled(250.60) 10/09/2012     Past Surgical History:   Procedure Laterality Date    CARPAL TUNNEL RELEASE Right 08/25/2015    CATARACT EXTRACTION W/  INTRAOCULAR LENS IMPLANT Left 6/25/2024    Procedure:  EXTRACTION, CATARACT, WITH IOL INSERTION;  Surgeon: Federico Sumner MD;  Location: [facility] OR;  Service: Ophthalmology;  Laterality: Left;    CATARACT EXTRACTION W/  INTRAOCULAR LENS IMPLANT Right 7/16/2024    Procedure: EXTRACTION, CATARACT, WITH IOL INSERTION;  Surgeon: Federico Sumner MD;  Location: [facility] OR;  Service: Ophthalmology;  Laterality: Right;    CIRCUMCISION  04/13/2005    COLONOSCOPY N/A 3/29/2017    Procedure: COLONOSCOPY Golytely prep;  Surgeon: Kavitha Cleaning MD;  Location: [facility];  Service: Endoscopy;  Laterality: N/A;    COLONOSCOPY N/A 1/12/2021    Procedure: COLONOSCOPY;  Surgeon: Dung Panda MD;  Location: [facility];  Service: Endoscopy;  Laterality: N/A;    COLONOSCOPY W/ POLYPECTOMY  01/10/2012    Repeat in 5 years     ESOPHAGOGASTRODUODENOSCOPY N/A 1/8/2019    Procedure: EGD (ESOPHAGOGASTRODUODENOSCOPY);  Surgeon: Rachel Burrows MD;  Location: [facility];  Service: Endoscopy;  Laterality: N/A;    ESOPHAGOGASTRODUODENOSCOPY N/A 5/14/2020    Procedure: ESOPHAGOGASTRODUODENOSCOPY (EGD);  Surgeon: Rachel Burrows MD;  Location: [facility];  Service: Endoscopy;  Laterality: N/A;  Dr. Luis Angel Burrows if possible.    FLEXIBLE SIGMOIDOSCOPY N/A 1/8/2019    Procedure: SIGMOIDOSCOPY, FLEXIBLE;  Surgeon: Rachel Burrows MD;  Location: [facility];  Service: Endoscopy;  Laterality: N/A;    PENILE PROSTHESIS IMPLANT      PROSTATE SURGERY  1999    Prostatectomy for prostate ca; [facility]    TRIGGER FINGER RELEASE Right 2015    TRIGGER FINGER RELEASE Right 11/8/2024    Procedure: RELEASE, TRIGGER FINGER;  Surgeon: Anthony Cooper Jr., MD;  Location: [facility];  Service: Orthopedics;  Laterality: Right;     Family History   Problem Relation Name Age of Onset    Hypertension Mother      No Known Problems Father unknown hx     Diabetes Sister Floridine     Cataracts Sister Floridine     No Known Problems Sister Cinthia     No Known Problems Sister Niki     No Known Problems Sister  Shwetha     No Known Problems Brother Jeffery     Heart attack Brother Roverto     Coronary artery disease Brother Roverto     No Known Problems Brother sAad     Coronary artery disease Brother Milligan     Heart attack Brother Milligan     No Known Problems Daughter x5     No Known Problems Son x2     Blindness Neg Hx      Amblyopia Neg Hx      Glaucoma Neg Hx      Retinal detachment Neg Hx      Strabismus Neg Hx      Macular degeneration Neg Hx      Stroke Neg Hx      Thyroid disease Neg Hx      Colon cancer Neg Hx      Esophageal cancer Neg Hx       Social History[1]  Review of Systems   Constitutional:  Negative for chills and fever.   HENT:  Positive for congestion.    Respiratory:  Positive for cough and shortness of breath.    Cardiovascular:  Negative for chest pain.   Gastrointestinal:  Negative for abdominal pain.   Musculoskeletal:  Negative for back pain.   Neurological:  Negative for headaches.       Physical Exam     Initial Vitals [06/27/25 2327]   BP Pulse Resp Temp SpO2   (!) 187/99 79 (!) 28 98.3 °F (36.8 °C) (!) 78 %      MAP       --         Physical Exam    Nursing note and vitals reviewed.  Constitutional: He appears well-developed and well-nourished. No distress.   HENT:   Head: Normocephalic and atraumatic.   Eyes: Conjunctivae and EOM are normal. Pupils are equal, round, and reactive to light.   Neck: Neck supple. No tracheal deviation present.   Normal range of motion.  Cardiovascular:  Normal rate and intact distal pulses.           Pulmonary/Chest: No respiratory distress.   Musculoskeletal:         General: No tenderness. Normal range of motion.      Cervical back: Normal range of motion and neck supple.     Neurological: He is alert and oriented to person, place, and time. He has normal strength. No cranial nerve deficit. GCS score is 15. GCS eye subscore is 4. GCS verbal subscore is 5. GCS motor subscore is 6.   Skin: Skin is warm and dry.         ED Course   Critical Care    Date/Time:  6/28/2025 1:27 AM    Performed by: Robert Lai MD  Authorized by: Robert Lai MD  Direct patient critical care time: 15 minutes  Additional history critical care time: 15 minutes  Ordering / reviewing critical care time: 15 minutes  Documentation critical care time: 15 minutes  Consulting other physicians critical care time: 15 minutes  Consult with family critical care time: 10 minutes  Total critical care time (exclusive of procedural time) : 85 minutes  Critical care time was exclusive of separately billable procedures and treating other patients.  Critical care was necessary to treat or prevent imminent or life-threatening deterioration of the following conditions: respiratory failure.  Critical care was time spent personally by me on the following activities: development of treatment plan with patient or surrogate, interpretation of cardiac output measurements, evaluation of patient's response to treatment, examination of patient, obtaining history from patient or surrogate, ordering and performing treatments and interventions, ordering and review of laboratory studies, ordering and review of radiographic studies, re-evaluation of patient's condition, pulse oximetry and review of old charts.        Labs Reviewed   COMPREHENSIVE METABOLIC PANEL - Abnormal       Result Value    Sodium 141      Potassium 4.4      Chloride 109      CO2 23      Glucose 181 (*)     BUN 26 (*)     Creatinine 1.4      Calcium 9.6      Protein Total 8.2      Albumin 3.8      Bilirubin Total 0.6      ALP 76      AST 66 (*)     ALT 56 (*)     Anion Gap 9      eGFR 50 (*)    TROPONIN I - Abnormal    Troponin-I 0.054 (*)    B-TYPE NATRIURETIC PEPTIDE - Abnormal     (*)    CBC WITH DIFFERENTIAL - Abnormal    WBC 7.71      RBC 4.29 (*)     HGB 12.1 (*)     HCT 37.9 (*)     MCV 88      MCH 28.2      MCHC 31.9 (*)     RDW 14.4      Platelet Count 314      MPV 10.6      Nucleated RBC 0      Neut % 38.8      Lymph % 48.9  (*)     Mono % 8.2      Eos % 2.5      Basophil % 1.3      Imm Grans % 0.3      Neut # 3.00      Lymph # 3.77      Mono # 0.63      Eos # 0.19      Baso # 0.10      Imm Grans # 0.02     POCT GLUCOSE - Abnormal    POCT Glucose 188 (*)    MAGNESIUM - Normal    Magnesium  1.8     PROCALCITONIN - Normal    Procalcitonin 0.02     CULTURE, BLOOD   CULTURE, BLOOD   CBC W/ AUTO DIFFERENTIAL    Narrative:     The following orders were created for panel order CBC auto differential.  Procedure                               Abnormality         Status                     ---------                               -----------         ------                     CBC with Differential[[id number]]       Abnormal            Final result                 Please view results for these tests on the individual orders.   EXTRA TUBES    Narrative:     The following orders were created for panel order EXTRA TUBES.  Procedure                               Abnormality         Status                     ---------                               -----------         ------                     Light Green Top Hold[[id number]]                                                       Lavender Top Hold[[id number]]                                                            Please view results for these tests on the individual orders.   LIGHT GREEN TOP HOLD   LAVENDER TOP HOLD   LACTIC ACID, PLASMA   POCT INFLUENZA A/B MOLECULAR    POC Molecular Influenza A Ag Negative      POC Molecular Influenza B Ag Negative       Acceptable Yes     SARS-COV-2 RDRP GENE   POCT GLUCOSE MONITORING CONTINUOUS     EKG Readings: (Independently Interpreted)   Initial Reading: No STEMI. Previous EKG: Compared with most recent EKG Previous EKG Date: 6/11/2025 (Nonspecific change). Rhythm: Sinus Tachycardia. Heart Rate: 115. Ectopy: PVCs. ST Segments: Normal ST Segments. Axis: Normal.   EKG independently interpreted by me pending Cardiology review           X-Rays:    Independently Interpreted Readings:   Other Readings:  Chest x-ray independently interpreted by me pending radiology review: Concerning for pulmonary edema    Imaging Results              CTA Chest Non-Coronary (PE Studies) (Final result)  Result time 06/28/25 01:04:28      Final result by Sebas Edwards DO (06/28/25 01:04:28)                   Impression:      1. No pulmonary embolism to the segmental level.  2. Pulmonary edema.  3. Small bilateral pleural effusions.      Electronically signed by: Sebas Edwards  Date:    06/28/2025  Time:    01:04               Narrative:    EXAMINATION:  CTA CHEST NON CORONARY (PE STUDIES)    CLINICAL HISTORY:  Pulmonary embolism (PE) suspected, high prob;    TECHNIQUE:  Low dose axial images, sagittal and coronal reformations were obtained from the thoracic inlet to the lung bases following the IV administration of 100 mL of Omnipaque 350.  Contrast timing was optimized to evaluate the pulmonary arteries.  Maximum intensity projection images were provided for review.    COMPARISON:  CT abdomen and pelvis from 01/08/2019.    FINDINGS:  Pulmonary vasculature: Satisfactory opacification of the pulmonary arterial system with no filling defect to the segmental level.    Aorta: Left-sided aortic arch.  No aneurysm.  There is mild atherosclerosis.    Base of Neck: No significant abnormality.    Thoracic soft tissues: Normal.    Heart: Normal size. No effusion.    Gretta/Mediastinum: No pathologic mayo enlargement.    Airways: The large airways are patent.  There is diffuse bronchial wall thickening.    Lungs/Pleura: There are small bilateral pleural effusions.  There is interlobular septal thickening and there are scattered ground-glass opacities throughout the bilateral lungs, compatible with pulmonary edema.    Esophagus: Normal.    Upper Abdomen: There is a nonobstructing left renal calculus.  There is a peripherally calcified cystic lesion in the right hepatic lobe.  Partially  imaged upper abdomen is otherwise unremarkable.    Bones: No acute fracture. No suspicious lytic or sclerotic lesions.                                       X-Ray Chest AP Portable (Final result)  Result time 06/28/25 00:54:49      Final result by Sebas Edwards DO (06/28/25 00:54:49)                   Impression:      Pulmonary edema.      Electronically signed by: Sebas Edwards  Date:    06/28/2025  Time:    00:54               Narrative:    EXAMINATION:  XR CHEST AP PORTABLE    CLINICAL HISTORY:  Shortness of breath;    TECHNIQUE:  Single frontal view of the chest was performed.    COMPARISON:  06/23/2021.    FINDINGS:  There are hazy interstitial and alveolar opacities in the bilateral lungs, compatible with pulmonary edema.  The pleural spaces are clear.  The cardiac silhouette is enlarged.  Osseous structures are intact.  There are degenerative changes.                                      Medications   furosemide injection 80 mg (has no administration in time range)   piperacillin-tazobactam (ZOSYN) 4.5 g in D5W 100 mL IVPB (MB+) (4.5 g Intravenous New Bag 6/28/25 0030)   nitroGLYCERIN 2% TD oint ointment 1 inch (1 inch Topical (Top) Given 6/28/25 0053)   iohexoL (OMNIPAQUE 350) injection 100 mL (100 mLs Intravenous Given 6/28/25 0049)   aspirin tablet 325 mg (325 mg Oral Given 6/28/25 0059)     Medical Decision Making  84-year-old male brought to the emergency department complaining of shortness of breath      Differential: ACS, dissection, pneumonia, pneumothorax, CHF, COPD, anemia,      Patient met SIRS criteria on arrival.  However, his chest x-ray does not show any evidence of pneumonia, nor is there any other evidence of infection.  He did receive Zosyn as his broad-spectrum antibiotic but obviously did not receive IV fluid as he was ultimately diagnosed with a significant CHF exacerbation.        Patient given aspirin, nitro paste, Lasix.  He is still pretty hypoxic at rest.  I have ordered BiPAP.   Discussed with Hospital Medicine team who will see and admit the patient for further evaluation and management.  Patient comfortable with admission at this time.    Problems Addressed:  Acute on chronic congestive heart failure, unspecified heart failure type: acute illness or injury with systemic symptoms that poses a threat to life or bodily functions    Amount and/or Complexity of Data Reviewed  External Data Reviewed: ECG and notes.     Details: Reviewed most recent EKG for comparison     Reviewed most recent cardiology note documenting baseline medications and past medical history  Labs: ordered.     Details: CBC without leukocytosis, mild anemia similar to baseline; CMP with CKD similar to baseline, normal LFTs and electrolytes; troponin mildly elevated, BNP moderately elevated  Radiology: ordered and independent interpretation performed. Decision-making details documented in ED Course.  ECG/medicine tests: ordered and independent interpretation performed. Decision-making details documented in ED Course.  Discussion of management or test interpretation with external provider(s): Discussed the case with Hospital Medicine team regarding patient's past medical history, presentation, labs, imaging, interventions, plan to admit    Risk  OTC drugs.  Prescription drug management.  Decision regarding hospitalization.    Critical Care  Total time providing critical care: 85 minutes    This patient does not have evidence of infective focus  My overall impression is SIRS without sepsis.  Source: Respiratory Infection  Antibiotics given-   Antibiotics (72h ago, onward)      None          Latest lactate reviewed-  Recent Labs   Lab 06/27/25  2357   POCLAC 1.9     Organ dysfunction indicated by Acute respiratory failure    Fluid challenge Fluid Not Needed - Patient is not hypotensive and/or lactate is less than 4.0.     Post- resuscitation assessment Yes - I attest a sepsis perfusion exam was performed within 6 hours of  sepsis, severe sepsis, or septic shock presentation, following fluid resuscitation.      Will Not start Pressors- Levophed for MAP of 65  Source control achieved by: zosyn                                    Clinical Impression:  Final diagnoses:  [R06.02] Shortness of breath  [I50.9] Acute on chronic congestive heart failure, unspecified heart failure type (Primary)          ED Disposition Condition    Observation                     Robert Lai MD  25 0130         [1]   Social History  Tobacco Use    Smoking status: Former     Current packs/day: 0.00     Average packs/day: 0.5 packs/day for 3.0 years (1.5 ttl pk-yrs)     Types: Cigarettes     Start date:      Quit date:      Years since quittin.5     Passive exposure: Never    Smokeless tobacco: Never    Tobacco comments:     smoked as a teenager   Substance Use Topics    Alcohol use: Yes     Comment: social drinks a beer 1-2 x month    Drug use: Never        Robert Lai MD  25 0144

## 2025-06-28 NOTE — H&P
[facility] Medicine  History & Physical    Patient Name: Bryant Gil  MRN: [id number]  Patient Class: OP- Observation  Admission Date: 6/27/2025  Attending Physician: Nabeel Olmedo MD   Primary Care Provider: Luke Mcgee MD         Patient information was obtained from patient, spouse/SO, and ER records.     Subjective:     Principal Problem:<principal problem not specified>    Chief Complaint:   Chief Complaint   Patient presents with    Shortness of Breath     Wife stated patient has been SOB x 2 days with increased SOB tonight.  History of Diabetes and prostrate CA.  Pt has audible bilateral chest rales and O2 sat 78% in triage.  Pt alert oriented.          HPI: Patient is an 84-year-old male who presented to ED today with shortness a breath x2 days.  Patient is accompanied by wife reports worsening dyspnea on exertion.  In triage patient's O2 sats were 78%.  Labwork remarkable for elevated BNP and slightly bumped troponin.  Patient denies any chest pain.  Patient with respiratory acidosis, placed on BiPAP.  CTA of the chest shows pulmonary edema and bilateral small pleural effusions.  Patient was given 325 aspirin, 80 mg Lasix with good response, patient will be admitted to Hospital Medicine with a cardiology consult.    Recently saw Dr. Gutierrez o/p for new patient consult.  This month echo with 48% EF, moderate aortic valve regurg.  There was findings concerning for infiltrative cardiomyopathy.     Past Medical History:   Diagnosis Date    Cataract of both eyes     Chronic gastritis without bleeding, negative H. pylori Jaunuary 2019 EGD 01/30/2020    Diabetes mellitus     Diverticulosis     Ectatic aorta 02/06/2018    HTN (hypertension)     HTN (hypertension) 10/09/2012    Lower GI bleeding 01/11/2021    Near syncope 02/05/2021    OHT (ocular hypertension)     Prostate cancer     Pyelonephritis, right no stone on CT scan. 07/11/2013    Rectal bleeding 01/11/2021    Sepsis, same  organism in urine grew in his blood, Klebsiella 07/11/2013    Sigmoid diverticulosis 03/19/2017    SOB (shortness of breath) 02/05/2021    Stage 2 chronic kidney disease 01/25/2017    Tendinitis of both rotator cuffs 06/25/2013    Tortuous aorta 02/06/2018    Tubular adenoma of colon 01/10/2012    Type II or unspecified type diabetes mellitus with neurological manifestations, not stated as uncontrolled(250.60) 10/09/2012       Past Surgical History:   Procedure Laterality Date    CARPAL TUNNEL RELEASE Right 08/25/2015    CATARACT EXTRACTION W/  INTRAOCULAR LENS IMPLANT Left 6/25/2024    Procedure: EXTRACTION, CATARACT, WITH IOL INSERTION;  Surgeon: Federico Sumner MD;  Location: [facility] OR;  Service: Ophthalmology;  Laterality: Left;    CATARACT EXTRACTION W/  INTRAOCULAR LENS IMPLANT Right 7/16/2024    Procedure: EXTRACTION, CATARACT, WITH IOL INSERTION;  Surgeon: Federico Sumner MD;  Location: [facility] OR;  Service: Ophthalmology;  Laterality: Right;    CIRCUMCISION  04/13/2005    COLONOSCOPY N/A 3/29/2017    Procedure: COLONOSCOPY Golytely prep;  Surgeon: Kavitha Cleaning MD;  Location: [facility];  Service: Endoscopy;  Laterality: N/A;    COLONOSCOPY N/A 1/12/2021    Procedure: COLONOSCOPY;  Surgeon: Dung Panda MD;  Location: [facility];  Service: Endoscopy;  Laterality: N/A;    COLONOSCOPY W/ POLYPECTOMY  01/10/2012    Repeat in 5 years     ESOPHAGOGASTRODUODENOSCOPY N/A 1/8/2019    Procedure: EGD (ESOPHAGOGASTRODUODENOSCOPY);  Surgeon: Rachel Burrows MD;  Location: [facility];  Service: Endoscopy;  Laterality: N/A;    ESOPHAGOGASTRODUODENOSCOPY N/A 5/14/2020    Procedure: ESOPHAGOGASTRODUODENOSCOPY (EGD);  Surgeon: Rachel Burrows MD;  Location: [facility];  Service: Endoscopy;  Laterality: N/A;  Dr. Luis Angel Burrows if possible.    FLEXIBLE SIGMOIDOSCOPY N/A 1/8/2019    Procedure: SIGMOIDOSCOPY, FLEXIBLE;  Surgeon: Rachel Burrows MD;  Location: [facility];  Service: Endoscopy;  Laterality:  N/A;    PENILE PROSTHESIS IMPLANT      PROSTATE SURGERY  1999    Prostatectomy for prostate ca; [facility]    TRIGGER FINGER RELEASE Right 2015    TRIGGER FINGER RELEASE Right 11/8/2024    Procedure: RELEASE, TRIGGER FINGER;  Surgeon: Anthony Cooper Jr., MD;  Location: [facility];  Service: Orthopedics;  Laterality: Right;       Review of patient's allergies indicates:  No Known Allergies    No current facility-administered medications on file prior to encounter.     Current Outpatient Medications on File Prior to Encounter   Medication Sig    amLODIPine (NORVASC) 10 MG tablet TAKE 1 TABLET EVERY DAY    aspirin (ECOTRIN) 81 MG EC tablet Take 81 mg by mouth once daily.    atorvastatin (LIPITOR) 40 MG tablet Take 1 tablet (40 mg total) by mouth once daily.    BD ALCOHOL SWABS PadM USE FOR HOME GLUCOSE MONITORING DAILY    blood sugar diagnostic (TRUE METRIX GLUCOSE TEST STRIP) Strp Test Blood Sugar twice daily    blood sugar diagnostic Strp To check BG 1 times daily, to use with insurance preferred meter    blood-glucose meter (TRUE METRIX GLUCOSE METER) Misc Test blood sugar twice a day    blood-glucose meter kit To check BG 1 times daily, to use with insurance preferred meter    blood-glucose sensor (DEXCOM G7 SENSOR) Shanice Use continue glucose monitor to check sugar daily    clobetasoL (TEMOVATE) 0.05 % cream Apply to scar in ear twice daily until flat.    furosemide (LASIX) 20 MG tablet Take 1 tablet (20 mg total) by mouth daily as needed (shortness of breath).    glipiZIDE (GLUCOTROL) 10 MG TR24 Take 1 tablet (10 mg total) by mouth daily with breakfast.    HYDROcodone-acetaminophen (NORCO) 5-325 mg per tablet Take 1 tablet by mouth every 4 (four) hours as needed for Pain.    hydrocortisone 2.5 % cream Apply topically 2 (two) times daily.    lancets (TRUEPLUS LANCETS) 30 gauge Misc Check sugar twice daily    lancets Misc To check BG 1 times daily, to use with insurance preferred meter    losartan (COZAAR) 100 MG tablet  Take 1 tablet (100 mg total) by mouth once daily.    metFORMIN (GLUCOPHAGE) 1000 MG tablet Take 1 tablet (1,000 mg total) by mouth 2 (two) times daily with meals.    metoprolol succinate (TOPROL-XL) 100 MG 24 hr tablet TAKE 1 TABLET EVERY DAY    pantoprazole (PROTONIX) 40 MG tablet Take 1 tablet (40 mg total) by mouth once daily.    spironolactone (ALDACTONE) 50 MG tablet Take 1 tablet (50 mg total) by mouth once daily.     Family History       Problem Relation (Age of Onset)    Cataracts Sister    Coronary artery disease Brother, Brother    Diabetes Sister    Heart attack Brother, Brother    Hypertension Mother    No Known Problems Father, Sister, Sister, Sister, Brother, Brother, Daughter, Son          Tobacco Use    Smoking status: Former     Current packs/day: 0.00     Average packs/day: 0.5 packs/day for 3.0 years (1.5 ttl pk-yrs)     Types: Cigarettes     Start date:      Quit date:      Years since quittin.5     Passive exposure: Never    Smokeless tobacco: Never    Tobacco comments:     smoked as a teenager   Substance and Sexual Activity    Alcohol use: Yes     Comment: social drinks a beer 1-2 x month    Drug use: Never    Sexual activity: Yes     Partners: Female     Review of Systems   Respiratory:  Positive for shortness of breath.    All other systems reviewed and are negative.    Objective:     Vital Signs (Most Recent):  Temp: 97 °F (36.1 °C) (25 0326)  Pulse: (!) 126 (25 0700)  Resp: 15 (25 0700)  BP: (!) 156/89 (25 0700)  SpO2: 97 % (25 0700) Vital Signs (24h Range):  Temp:  [97 °F (36.1 °C)-98.3 °F (36.8 °C)] 97 °F (36.1 °C)  Pulse:  [] 126  Resp:  [13-40] 15  SpO2:  [78 %-99 %] 97 %  BP: (138-188)/() 156/89     Weight: 77 kg (169 lb 12.1 oz)  Body mass index is 26.59 kg/m².     Physical Exam  Vitals reviewed.   Constitutional:       Appearance: He is ill-appearing.      Comments: BiPAP --40%   HENT:      Head: Normocephalic.       Mouth/Throat:      Mouth: Mucous membranes are dry.      Pharynx: Oropharynx is clear.   Eyes:      Pupils: Pupils are equal, round, and reactive to light.   Pulmonary:      Breath sounds: Rhonchi and rales present.   Abdominal:      General: Bowel sounds are normal.      Palpations: Abdomen is soft.   Neurological:      Mental Status: He is alert.              CRANIAL NERVES     CN III, IV, VI   Pupils are equal, round, and reactive to light.       Significant Labs: All pertinent labs within the past 24 hours have been reviewed.  Recent Lab Results  (Last 5 results in the past 24 hours)        06/28/25  0419   06/28/25  0303   06/28/25  0240   06/28/25  0219   06/28/25  0218        Albumin 3.7               ALP 73               ALT 66               Anion Gap 7               AST 58               Baso # 0.04               Basophil % 0.5               BILIRUBIN TOTAL 0.6  Comment: For infants and newborns, interpretation of results should be based   on gestational age, weight and in agreement with clinical   observations.    Premature Infant recommended reference ranges:   0-24 hours:  <8.0 mg/dL   24-48 hours: <12.0 mg/dL   3-5 days:    <15.0 mg/dL   6-29 days:   <15.0 mg/dL               BUN 27               Calcium 9.5               Chloride 107               CO2 23               Creatinine 1.4               eGFR 50  Comment: Estimated GFR calculated using the CKD-EPI creatinine (2021) equation.               Eos # 0.01               Eos % 0.1               Estimated Avg Glucose 143               Glucose 178               Gran # (ANC) 6.26               Hematocrit 36.0               Hemoglobin 11.6               Hemoglobin A1C External 6.6  Comment: ADA Screening Guidelines:  5.7-6.4%  Consistent with prediabetes  >=6.5%  Consistent with diabetes    High levels of fetal hemoglobin interfere with the HbA1C  assay. Heterozygous hemoglobin variants (HbS, HgC, etc)do  not significantly interfere with this assay.    However, presence of multiple variants may affect accuracy.               Extra Tube         Hold for add-ons.  Comment: Auto resulted.          Immature Grans (Abs) 0.02  Comment: Mild elevation in immature granulocytes is non specific and can be seen in a variety of conditions including stress response, acute inflammation, trauma and pregnancy. Correlation with other laboratory and clinical findings is essential.               Immature Granulocytes 0.3               Lactic Acid Level 1.5       2.4  Comment:   A repeat order for Lactic Acid has been placed for collection in 3 hours.         Lymph # 0.57               Lymph % 7.8               MCH 28.2               MCHC 32.2               MCV 87               Mono # 0.41               Mono % 5.6               MPV 10.0               Neut % 85.7               nRBC 0               Platelet Count 247               POCT Glucose   235             Potassium 4.7               PROTEIN TOTAL 7.9                Acceptable     Yes           RBC 4.12               RDW 14.2               SARS-CoV-2 RNA, Amplification, Qual     Negative           Sodium 137               WBC 7.31                                      Significant Imaging: I have reviewed all pertinent imaging results/findings within the past 24 hours.  I have reviewed and interpreted all pertinent imaging results/findings within the past 24 hours.  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 BMP reviewed- noted Estimated Creatinine Clearance: 38 mL/min (based on SCr of 1.4 mg/dL). according to latest data. Based on current GFR, CKD stage is stage 3 - GFR 30-59.  Monitor UOP and serial BMP and adjust therapy as needed. Renally dose meds. Avoid nephrotoxic medications and procedures.    Patient's FSGs are controlled on current medication regimen.  Last A1c reviewed-   Lab Results   Component Value Date    HGBA1C 6.6 (H) 06/28/2025      Most recent fingerstick glucose reviewed-   Recent Labs   Lab 06/27/25  2354 06/28/25  0303   POCTGLUCOSE 188* 235*     Current correctional scale  Medium  Maintain anti-hyperglycemic dose as follows-   Antihyperglycemics (From admission, onward)      Start     Stop Route Frequency Ordered    06/28/25 0510  insulin aspart U-100 pen 0-10 Units         -- SubQ Every 6 hours PRN 06/28/25 0410          Hold Oral hypoglycemics while patient is in the hospital.    CHF exacerbation  Patient has Diastolic (HFpEF) heart failure that is Acute on chronic. On presentation their CHF was decompensated. Evidence of decompensated CHF on presentation includes: crackles on lung auscultation, orthopnea, paroxysmal nocturnal dyspnea (PND), dyspnea on exertion (PAIGE), and shortness of breath. The etiology of their decompensation is likely due to new pleural effusions and pulmonary edema. Most recent BNP and echo results are listed below.  Recent Labs     06/27/25  2357   *     Latest ECHO  Results for orders placed during the hospital encounter of 06/09/25    Echo    Interpretation Summary    Left Ventricle: The left ventricle is normal in size. Mildly increased wall thickness. There is concentric hypertrophy. Septal motion is consistent with bundle branch block. There is low normal systolic function with a visually estimated ejection fraction of 50 - 55%. Quantitated ejection fraction is 48%. Unable to assess diastolic function due to E-A fusion.    Global longitudinal strain is -9.6% there is apical sparing pattern that can be seen with infiltrative cardiomyopathy. Consider cardiac amyloid differential diagnosis.    Right Ventricle: The right ventricle is normal in size Systolic function is reduced.    Left Atrium: The left atrium is at the upper limit of normal in size The left atrium is dilated    Aortic Valve: There is moderate aortic regurgitation.    Mitral Valve: There is mild to moderate regurgitation with a  eccentrically posterolateral directed jet.    Tricuspid Valve: There is mild to moderate regurgitation.    Pulmonic Valve: There is mild regurgitation.    Aorta: The aortic root is the upper limit of normal in size measuring 3.94 cm.    Pulmonary Artery: The estimated pulmonary artery systolic pressure is 39 mmHg.    IVC/SVC: Normal venous pressure at 3 mmHg.    Current Heart Failure Medications  furosemide injection 40 mg, 2 times daily, Intravenous  furosemide tablet 20 mg, Daily, Oral  losartan tablet 100 mg, Daily, Oral  metoprolol succinate (TOPROL-XL) 24 hr tablet 100 mg, Daily, Oral  spironolactone tablet 50 mg, Daily, Oral    Plan  - Monitor strict I&Os and daily weights.    - Place on telemetry  - Low sodium diet  - Place on fluid restriction of 1.5 L.   - Cardiology has been consulted  - The patient's volume status is improving but not at their baseline as indicated by edema, crackles on lung auscultation, orthopnea, paroxysmal nocturnal dyspnea (PND), dyspnea on exertion (PAIGE), and shortness of breath  - given 80 mg of Lasix in ED  -we will give 40 mg IV Lasix x2 doses  -consult Cardiology  Continue BiPAP          HTN (hypertension)  Patient's blood pressure range in the last 24 hours was: BP  Min: 138/80  Max: 188/104.The patient's inpatient anti-hypertensive regimen is listed below:  Current Antihypertensives  nitroGLYCERIN 2% TD oint ointment 1 inch, Every 6 hours, Topical (Top)  furosemide injection 40 mg, 2 times daily, Intravenous  amLODIPine tablet 10 mg, Daily, Oral  furosemide tablet 20 mg, Daily, Oral  losartan tablet 100 mg, Daily, Oral  metoprolol succinate (TOPROL-XL) 24 hr tablet 100 mg, Daily, Oral  spironolactone tablet 50 mg, Daily, Oral    Plan  - BP is controlled, no changes needed to their regimen  VTE Risk Mitigation (From admission, onward)           Ordered     enoxaparin injection 30 mg  Daily         06/28/25 0732     IP VTE HIGH RISK PATIENT  Once         06/28/25 0732     Place  sequential compression device  Until discontinued         06/28/25 0732                    SDOH Screening:  The patient was screened for utility difficulties, food insecurity, transport difficulties, housing insecurity, and interpersonal safety and there were no concerns identified this admission.            Critical care time spent on the evaluation and treatment of severe organ dysfunction, review of pertinent labs and imaging studies, discussions with consulting providers and discussions with patient/family: 25 minutes.       On 06/28/2025, patient should be placed in hospital observation services under my care in collaboration with Dr Olmedo.           Damion Russo NP  Department of Hospital Medicine  [facility] - Intensive Care

## 2025-06-28 NOTE — HPI
Patient is an 84-year-old male who presented to ED today with shortness a breath x2 days.  Patient is accompanied by wife reports worsening dyspnea on exertion.  In triage patient's O2 sats were 78%.  Labwork remarkable for elevated BNP and slightly bumped troponin.  Patient denies any chest pain.  Patient with respiratory acidosis, placed on BiPAP.  CTA of the chest shows pulmonary edema and bilateral small pleural effusions.  Patient was given 325 aspirin, 80 mg Lasix with good response, patient will be admitted to Hospital Medicine with a cardiology consult.    Recently saw Dr. Gutierrez o/p for new patient consult.  This month echo with 48% EF, moderate aortic valve regurg.  There was findings concerning for infiltrative cardiomyopathy.

## 2025-06-28 NOTE — PROGRESS NOTES
[facility] Medicine  Progress Note    Patient Name: Bryant iGl  MRN: [id number]  Patient Class: IP- Inpatient   Admission Date: 6/27/2025  Length of Stay: 0 days  Attending Physician: Nabeel Olmedo MD  Primary Care Provider: Luke Mcgee MD        Subjective     Principal Problem:<principal problem not specified>        HPI:  Patient is an 84-year-old male who presented to ED today with shortness a breath x2 days.  Patient is accompanied by wife reports worsening dyspnea on exertion.  In triage patient's O2 sats were 78%.  Labwork remarkable for elevated BNP and slightly bumped troponin.  Patient denies any chest pain.  Patient with respiratory acidosis, placed on BiPAP.  CTA of the chest shows pulmonary edema and bilateral small pleural effusions.  Patient was given 325 aspirin, 80 mg Lasix with good response, patient will be admitted to Hospital Medicine with a cardiology consult.    Recently saw Dr. Gutierrez o/p for new patient consult.  This month echo with 48% EF, moderate aortic valve regurg.  There was findings concerning for infiltrative cardiomyopathy.     Overview/Hospital Course:  No notes on file    Interval History:  Patient was examined in his bed, with family by his side.  He was calm and not in distress.  He states his shortness of breath has resolved.  He denies chest pain, palpitation, GI/ symptoms.    Review of Systems   Constitutional:  Negative for activity change, chills and diaphoresis.   Respiratory:  Negative for cough, chest tightness and shortness of breath.    Gastrointestinal:  Negative for abdominal distention, abdominal pain, constipation, diarrhea, nausea and vomiting.   Genitourinary:  Negative for difficulty urinating.   Musculoskeletal:  Negative for arthralgias.   Neurological:  Negative for dizziness.   Psychiatric/Behavioral:  Negative for agitation.    All other systems reviewed and are negative.    Objective:     Vital Signs (Most Recent):  Temp:  (P) 98.8 °F (37.1 °C) (06/28/25 1600)  Pulse: 78 (06/28/25 1645)  Resp: (!) 29 (06/28/25 1645)  BP: (!) 146/65 (06/28/25 1630)  SpO2: (!) 90 % (06/28/25 1645) Vital Signs (24h Range):  Temp:  [97 °F (36.1 °C)-98.8 °F (37.1 °C)] (P) 98.8 °F (37.1 °C)  Pulse:  [] 78  Resp:  [12-40] 29  SpO2:  [78 %-99 %] 90 %  BP: (122-188)/() 146/65     Weight: 77 kg (169 lb 12.1 oz)  Body mass index is 26.59 kg/m².    Intake/Output Summary (Last 24 hours) at 6/28/2025 1716  Last data filed at 6/28/2025 1653  Gross per 24 hour   Intake 637.58 ml   Output 4850 ml   Net -4212.42 ml         Physical Exam  Vitals reviewed.   Constitutional:       Appearance: He is not ill-appearing.      Comments: BiPAP 12/6--40%   HENT:      Head: Normocephalic.      Mouth/Throat:      Mouth: Mucous membranes are dry.      Pharynx: Oropharynx is clear.   Eyes:      Pupils: Pupils are equal, round, and reactive to light.   Cardiovascular:      Rate and Rhythm: Tachycardia present.   Pulmonary:      Breath sounds: Rales present. No rhonchi.   Abdominal:      General: Bowel sounds are normal.      Palpations: Abdomen is soft.   Musculoskeletal:      Right lower leg: No edema.      Left lower leg: No edema.   Skin:     Coloration: Skin is not jaundiced.   Neurological:      Mental Status: He is alert and oriented to person, place, and time. Mental status is at baseline.      Cranial Nerves: No cranial nerve deficit.   Psychiatric:         Mood and Affect: Mood normal.               Significant Labs: All pertinent labs within the past 24 hours have been reviewed.    Significant Imaging: I have reviewed all pertinent imaging results/findings within the past 24 hours.      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 BMP reviewed- noted Estimated Creatinine Clearance: 38 mL/min (based on SCr of 1.4 mg/dL). according to latest data. Based on current GFR, CKD stage is stage 3 - GFR 30-59.  Monitor UOP and  serial BMP and adjust therapy as needed. Renally dose meds. Avoid nephrotoxic medications and procedures.    Patient's FSGs are controlled on current medication regimen.  Last A1c reviewed-   Lab Results   Component Value Date    HGBA1C 6.6 (H) 06/28/2025     Most recent fingerstick glucose reviewed-   Recent Labs   Lab 06/27/25  2354 06/28/25  0303 06/28/25  1147 06/28/25  1638   POCTGLUCOSE 188* 235* 145* 191*     Current correctional scale  Medium  Maintain anti-hyperglycemic dose as follows-   Antihyperglycemics (From admission, onward)      Start     Stop Route Frequency Ordered    06/28/25 1523  insulin aspart U-100 pen 0-10 Units         -- SubQ Before meals & nightly PRN 06/28/25 1424          Hold Oral hypoglycemics while patient is in the hospital.    CHF exacerbation  Patient has Diastolic (HFpEF) heart failure that is Acute on chronic. On presentation their CHF was decompensated. Evidence of decompensated CHF on presentation includes: crackles on lung auscultation, orthopnea, paroxysmal nocturnal dyspnea (PND), dyspnea on exertion (PAIGE), and shortness of breath. The etiology of their decompensation is likely due to new pleural effusions and pulmonary edema. Most recent BNP and echo results are listed below.  Recent Labs     06/27/25  2357   *     Latest ECHO  Results for orders placed during the hospital encounter of 06/09/25    Echo    Interpretation Summary    Left Ventricle: The left ventricle is normal in size. Mildly increased wall thickness. There is concentric hypertrophy. Septal motion is consistent with bundle branch block. There is low normal systolic function with a visually estimated ejection fraction of 50 - 55%. Quantitated ejection fraction is 48%. Unable to assess diastolic function due to E-A fusion.    Global longitudinal strain is -9.6% there is apical sparing pattern that can be seen with infiltrative cardiomyopathy. Consider cardiac amyloid differential diagnosis.    Right  Ventricle: The right ventricle is normal in size Systolic function is reduced.    Left Atrium: The left atrium is at the upper limit of normal in size The left atrium is dilated    Aortic Valve: There is moderate aortic regurgitation.    Mitral Valve: There is mild to moderate regurgitation with a eccentrically posterolateral directed jet.    Tricuspid Valve: There is mild to moderate regurgitation.    Pulmonic Valve: There is mild regurgitation.    Aorta: The aortic root is the upper limit of normal in size measuring 3.94 cm.    Pulmonary Artery: The estimated pulmonary artery systolic pressure is 39 mmHg.    IVC/SVC: Normal venous pressure at 3 mmHg.    Current Heart Failure Medications  losartan tablet 100 mg, Daily, Oral  metoprolol succinate (TOPROL-XL) 24 hr tablet 100 mg, Daily, Oral  spironolactone tablet 50 mg, Daily, Oral  furosemide injection 40 mg, 2 times daily, Intravenous  digoxin (LANOXIN) 250 mcg/mL (0.25 mg/mL) injection, ,     Plan  - Monitor strict I&Os and daily weights.    - Place on telemetry  - Low sodium diet  - Place on fluid restriction of 1.5 L.   - Cardiology has been consulted  - The patient's volume status is improving but not at their baseline as indicated by edema, crackles on lung auscultation, orthopnea, paroxysmal nocturnal dyspnea (PND), dyspnea on exertion (PAIGE), and shortness of breath  - given 80 mg of Lasix in ED  -Cardiology following.  Agrees with Lasix b.i.d..  Continue Toprol.  Last echo reviewed  -Continue BiPAP, Toprol/spironolactone, losartan  -Troponin elevation felt to be type 2 demand in the setting of CHF decompensation  We will add Jardiance at discharge          HTN (hypertension)  Patient's blood pressure range in the last 24 hours was: BP  Min: 122/62  Max: 188/104.The patient's inpatient anti-hypertensive regimen is listed below:  Current Antihypertensives  losartan tablet 100 mg, Daily, Oral  metoprolol succinate (TOPROL-XL) 24 hr tablet 100 mg, Daily,  Oral  spironolactone tablet 50 mg, Daily, Oral  furosemide injection 40 mg, 2 times daily, Intravenous  metoprolol (LOPRESSOR) 5 mg/5 mL injection, ,     Plan  - BP is controlled, no changes needed to their regimen  VTE Risk Mitigation (From admission, onward)           Ordered     enoxaparin injection 40 mg  Daily         06/28/25 1054     IP VTE HIGH RISK PATIENT  Once         06/28/25 0732     Place sequential compression device  Until discontinued         06/28/25 0732                    Discharge Planning   BURAK:      Code Status: Full Code   Medical Readiness for Discharge Date:                  Critical care time spent on the evaluation and treatment of severe organ dysfunction, review of pertinent labs and imaging studies, discussions with consulting providers and discussions with patient/family: 40 minutes.          Nabeel Olmedo MD  Department of Hospital Medicine   [facility] - Intensive Care

## 2025-06-28 NOTE — PLAN OF CARE
Presently on bipap, 12/6 50% fi02, sat's 98%, will decrease the fi02 to 35%, pt denies sob, resp rate 22, ant lobes, ess clear, post lobes with diffuse crackles throughout, jvd, no edema noted, ap 122, regular, not sure, if he is in sinus rhythm, hard to see distinct p waves, ,supraventricular rhythm?, did EKG, stated sinus with short pr, with arrhythmia, pt states breathing much better, feels better, denies chest pain, has never had chest pain, no edema noted, abd distended, taut, large hernia  present, denies pain, diuresing from Dr salomón granado here, will transition to nasal cannula,     0815 to nc, 3l

## 2025-06-28 NOTE — EICU
"Virtual ICU Admission    Admit Date: 2025  LOS: 0  Code Status: Prior   : 1940  Bed: K560/K560 A:     Diagnosis: <principal problem not specified>    Patient  has a past medical history of Cataract of both eyes, Chronic gastritis without bleeding, negative H. pylori Jaunuary  EGD, Diabetes mellitus, Diverticulosis, Ectatic aorta, HTN (hypertension), HTN (hypertension), Lower GI bleeding, Near syncope, OHT (ocular hypertension), Prostate cancer, Pyelonephritis, right no stone on CT scan., Rectal bleeding, Sepsis, same organism in urine grew in his blood, Klebsiella, Sigmoid diverticulosis, SOB (shortness of breath), Stage 2 chronic kidney disease, Tendinitis of both rotator cuffs, Tortuous aorta, Tubular adenoma of colon, and Type II or unspecified type diabetes mellitus with neurological manifestations, not stated as uncontrolled(250.60).    Last VS: BP (!) 183/95   Pulse 106   Temp 98.3 °F (36.8 °C)   Resp (!) 23   Ht 5' 7" (1.702 m)   Wt 72.2 kg (159 lb 2.8 oz)   SpO2 95%   BMI 24.93 kg/m²       VICU Review    VICU nurse assessment :  [place] completed, LDA documentation reconciliation completed, and VTE prophylaxis review                  "

## 2025-06-28 NOTE — CONSULTS
[facility] - Int[facility] Care  Cardiology  Consult Note    Patient Name: Bryant Gil  MRN: [id number]  Admission Date: 6/27/2025  Hospital Length of Stay: 0 days  Code Status: Full Code   Attending Provider: Nabeel Olmedo MD   Consulting Provider: Jose Gavin MD  Primary Care Physician: Luke Mcgee MD  Principal Problem:<principal problem not specified>    Patient information was obtained from patient, past medical records, and ER records.     Inpatient consult to [facility]  Consult performed by: Jose Gavin MD  Consult ordered by: Damion Russo NP        Subjective:     Chief Complaint:  Shortness of breath  HPI:  Cardiology consulted for CHF.  Patient with heart failure mild reduced EF, moderate aortic regurgitation, HTN, HLP, type 2 DM, PVCs 0 presented with a worsening shortness of breath.  CT chest negative for PE but showed pulmonary edema and bilateral pleural effusion.  He is being worked up for infiltrative cardiomyopathy.  Patient was given 80 IV Lasix in the ER and placed on BiPAP with good response.  Troponins borderline elevation non ACS trend.  Initial lactic acid mildly elevated 2.4 and decreased to 1.5.  EKG with baseline artifacts but appears to be sinus tachycardia with frequent PACs.  This morning when seen appears to be in sustained atrial tachycardia 120s not responding to IV push Lopressor or digoxin.  He feels better this morning.      Past Medical History:   Diagnosis Date    Cataract of both eyes     CHF exacerbation 6/28/2025    Chronic gastritis without bleeding, negative H. pylori Jaunuary 2019 EGD 01/30/2020    Diabetes mellitus     Diverticulosis     Ectatic aorta 02/06/2018    HTN (hypertension)     HTN (hypertension) 10/09/2012    Lower GI bleeding 01/11/2021    Near syncope 02/05/2021    OHT (ocular hypertension)     Prostate cancer     Pyelonephritis, right no stone on CT scan. 07/11/2013    Rectal bleeding 01/11/2021    Sepsis, same organism in urine  grew in his blood, Klebsiella 07/11/2013    Sigmoid diverticulosis 03/19/2017    SOB (shortness of breath) 02/05/2021    Stage 2 chronic kidney disease 01/25/2017    Tendinitis of both rotator cuffs 06/25/2013    Tortuous aorta 02/06/2018    Tubular adenoma of colon 01/10/2012    Type II or unspecified type diabetes mellitus with neurological manifestations, not stated as uncontrolled(250.60) 10/09/2012       Past Surgical History:   Procedure Laterality Date    CARPAL TUNNEL RELEASE Right 08/25/2015    CATARACT EXTRACTION W/  INTRAOCULAR LENS IMPLANT Left 6/25/2024    Procedure: EXTRACTION, CATARACT, WITH IOL INSERTION;  Surgeon: Federico Sumner MD;  Location: [facility] OR;  Service: Ophthalmology;  Laterality: Left;    CATARACT EXTRACTION W/  INTRAOCULAR LENS IMPLANT Right 7/16/2024    Procedure: EXTRACTION, CATARACT, WITH IOL INSERTION;  Surgeon: Federico Sumner MD;  Location: [facility] OR;  Service: Ophthalmology;  Laterality: Right;    CIRCUMCISION  04/13/2005    COLONOSCOPY N/A 3/29/2017    Procedure: COLONOSCOPY Golytely prep;  Surgeon: Kavitha Clenaing MD;  Location: [facility];  Service: Endoscopy;  Laterality: N/A;    COLONOSCOPY N/A 1/12/2021    Procedure: COLONOSCOPY;  Surgeon: Dung Panda MD;  Location: [facility];  Service: Endoscopy;  Laterality: N/A;    COLONOSCOPY W/ POLYPECTOMY  01/10/2012    Repeat in 5 years     ESOPHAGOGASTRODUODENOSCOPY N/A 1/8/2019    Procedure: EGD (ESOPHAGOGASTRODUODENOSCOPY);  Surgeon: Rachel Burrows MD;  Location: [facility];  Service: Endoscopy;  Laterality: N/A;    ESOPHAGOGASTRODUODENOSCOPY N/A 5/14/2020    Procedure: ESOPHAGOGASTRODUODENOSCOPY (EGD);  Surgeon: Rachel Burrows MD;  Location: [facility];  Service: Endoscopy;  Laterality: N/A;  Dr. Luis Angel Burrows if possible.    FLEXIBLE SIGMOIDOSCOPY N/A 1/8/2019    Procedure: SIGMOIDOSCOPY, FLEXIBLE;  Surgeon: Rachel Burrows MD;  Location: [facility];  Service: Endoscopy;  Laterality: N/A;    PENILE  PROSTHESIS IMPLANT      PROSTATE SURGERY  1999    Prostatectomy for prostate ca; [facility]    TRIGGER FINGER RELEASE Right 2015    TRIGGER FINGER RELEASE Right 11/8/2024    Procedure: RELEASE, TRIGGER FINGER;  Surgeon: Anthony Cooper Jr., MD;  Location: [facility];  Service: Orthopedics;  Laterality: Right;       Review of patient's allergies indicates:  No Known Allergies    No current facility-administered medications on file prior to encounter.     Current Outpatient Medications on File Prior to Encounter   Medication Sig    amLODIPine (NORVASC) 10 MG tablet TAKE 1 TABLET EVERY DAY    aspirin (ECOTRIN) 81 MG EC tablet Take 81 mg by mouth once daily.    atorvastatin (LIPITOR) 40 MG tablet Take 1 tablet (40 mg total) by mouth once daily.    BD ALCOHOL SWABS PadM USE FOR HOME GLUCOSE MONITORING DAILY    blood sugar diagnostic (TRUE METRIX GLUCOSE TEST STRIP) Strp Test Blood Sugar twice daily    blood sugar diagnostic Strp To check BG 1 times daily, to use with insurance preferred meter    blood-glucose meter (TRUE METRIX GLUCOSE METER) Misc Test blood sugar twice a day    blood-glucose meter kit To check BG 1 times daily, to use with insurance preferred meter    blood-glucose sensor (DEXCOM G7 SENSOR) Shanice Use continue glucose monitor to check sugar daily    clobetasoL (TEMOVATE) 0.05 % cream Apply to scar in ear twice daily until flat.    furosemide (LASIX) 20 MG tablet Take 1 tablet (20 mg total) by mouth daily as needed (shortness of breath).    glipiZIDE (GLUCOTROL) 10 MG TR24 Take 1 tablet (10 mg total) by mouth daily with breakfast.    HYDROcodone-acetaminophen (NORCO) 5-325 mg per tablet Take 1 tablet by mouth every 4 (four) hours as needed for Pain.    hydrocortisone 2.5 % cream Apply topically 2 (two) times daily.    lancets (TRUEPLUS LANCETS) 30 gauge Misc Check sugar twice daily    lancets Misc To check BG 1 times daily, to use with insurance preferred meter    losartan (COZAAR) 100 MG tablet Take 1 tablet  (100 mg total) by mouth once daily.    metFORMIN (GLUCOPHAGE) 1000 MG tablet Take 1 tablet (1,000 mg total) by mouth 2 (two) times daily with meals.    metoprolol succinate (TOPROL-XL) 100 MG 24 hr tablet TAKE 1 TABLET EVERY DAY    pantoprazole (PROTONIX) 40 MG tablet Take 1 tablet (40 mg total) by mouth once daily.    spironolactone (ALDACTONE) 50 MG tablet Take 1 tablet (50 mg total) by mouth once daily.     Family History       Problem Relation (Age of Onset)    Cataracts Sister    Coronary artery disease Brother, Brother    Diabetes Sister    Heart attack Brother, Brother    Hypertension Mother    No Known Problems Father, Sister, Sister, Sister, Brother, Brother, Daughter, Son          Tobacco Use    Smoking status: Former     Current packs/day: 0.00     Average packs/day: 0.5 packs/day for 3.0 years (1.5 ttl pk-yrs)     Types: Cigarettes     Start date:      Quit date:      Years since quittin.5     Passive exposure: Never    Smokeless tobacco: Never    Tobacco comments:     smoked as a teenager   Substance and Sexual Activity    Alcohol use: Yes     Comment: social drinks a beer 1-2 x month    Drug use: Never    Sexual activity: Yes     Partners: Female     Review of Systems   Constitutional: Positive for malaise/fatigue. Negative for chills and fever.   HENT:  Negative for hearing loss and nosebleeds.    Eyes:  Negative for blurred vision.   Cardiovascular:         As in HPI    Respiratory:  Positive for cough and shortness of breath. Negative for hemoptysis.    Endocrine: Negative for cold intolerance and polyuria.   Hematologic/Lymphatic: Negative for bleeding problem.   Skin:  Negative for itching.   Musculoskeletal:  Negative for falls.   Gastrointestinal:  Negative for abdominal pain and hematochezia.   Genitourinary:  Negative for hematuria.   Neurological:  Negative for dizziness and loss of balance.   Psychiatric/Behavioral:  Negative for altered mental status and depression.       Objective:     Vital Signs (Most Recent):  Temp: 97.9 °F (36.6 °C) (06/28/25 0730)  Pulse: (!) 127 (06/28/25 0832)  Resp: (!) 25 (06/28/25 0816)  BP: (!) 159/82 (06/28/25 0833)  SpO2: (!) 91 % (06/28/25 0816) Vital Signs (24h Range):  Temp:  [97 °F (36.1 °C)-98.3 °F (36.8 °C)] 97.9 °F (36.6 °C)  Pulse:  [] 127  Resp:  [13-40] 25  SpO2:  [78 %-99 %] 91 %  BP: (138-188)/() 159/82     Weight: 77 kg (169 lb 12.1 oz)  Body mass index is 26.59 kg/m².    SpO2: (!) 91 %         Intake/Output Summary (Last 24 hours) at 6/28/2025 0854  Last data filed at 6/28/2025 0832  Gross per 24 hour   Intake --   Output 2200 ml   Net -2200 ml       Lines/Drains/Airways       Drain  Duration             Male External Urine Management Device w/ Suction 06/28/25 0320 Other (Comment) <1 day              Peripheral Intravenous Line  Duration             Peripheral IV 06/27/25 2325 18 G Left Antecubital <1 day    Peripheral IV 06/27/25 2345 20 G Right Antecubital <1 day                    Physical Exam  Constitutional:       Appearance: He is ill-appearing.   HENT:      Head: Normocephalic and atraumatic.   Cardiovascular:      Rate and Rhythm: Regular rhythm. Tachycardia present.   Pulmonary:      Breath sounds: Rhonchi and rales present.   Abdominal:      General: Abdomen is flat.      Palpations: Abdomen is soft.   Musculoskeletal:      Right lower leg: Edema present.      Left lower leg: Edema present.   Neurological:      Mental Status: He is oriented to person, place, and time.   Psychiatric:         Behavior: Behavior normal.         Significant Labs: BMP:   Recent Labs   Lab 06/27/25  2357 06/28/25  0419   * 178*    137   K 4.4 4.7    107   CO2 23 23   BUN 26* 27*   CREATININE 1.4 1.4   CALCIUM 9.6 9.5   MG 1.8  --    , CMP   Recent Labs   Lab 06/27/25  7091 06/28/25  0419    137   K 4.4 4.7    107   CO2 23 23   * 178*   BUN 26* 27*   CREATININE 1.4 1.4   CALCIUM 9.6 9.5   PROT 8.2  "7.9   ALBUMIN 3.8 3.7   BILITOT 0.6 0.6   ALKPHOS 76 73   AST 66* 58*   ALT 56* 66*   ANIONGAP 9 7*   , CBC   Recent Labs   Lab 06/27/25  2357 06/28/25  0419   WBC 7.71 7.31   HGB 12.1* 11.6*   HCT 37.9* 36.0*    247   , Lipid Panel No results for input(s): "CHOL", "HDL", "LDLCALC", "TRIG", "CHOLHDL" in the last 48 hours., Troponin No results for input(s): "TROPONINIHS" in the last 48 hours., and All pertinent lab results from the last 24 hours have been reviewed.    Significant Imaging: Echocardiogram: 2D echo with color flow doppler:   Results for orders placed or performed during the hospital encounter of 02/06/18   2D echo with color flow doppler   Result Value Ref Range    EF + QEF 65 55 - 65    Diastolic Dysfunction No     Aortic Valve Regurgitation MILD     Est. PA Systolic Pressure 25.09     Tricuspid Valve Regurgitation MILD     Narrative    Date of Procedure: 02/06/2018        TEST DESCRIPTION   Technical Quality: This is a technically good study.     Aorta: The aortic root is normal in size, measuring 2.9 cm at sinotubular junction and 3.3 cm at Sinuses of Valsalva. The proximal ascending aorta is normal in size, measuring 3.4 cm across.     Left Atrium: The left atrial volume index is normal, measuring 33.90 cc/m2.     Left Ventricle: The left ventricle is normal in size, with an end-diastolic diameter of 5.1 cm, and an end-systolic diameter of 3.5 cm. LV wall thickness is normal, with the septum measuring 0.9 cm and the posterior wall measuring 0.8 cm across. Relative   wall thickness was normal at 0.31, and the LV mass index was 91.9 g/m2 consistent with normal left ventricular mass. There are no regional wall motion abnormalities. Left ventricular systolic function appears normal. Visually estimated ejection fraction   is 60-65%. The LV Doppler derived stroke volume equals 73.0 ccs.     Diastolic indices: E wave velocity 0.5 m/s, E/A ratio 0.6,  msec., E/e' ratio(avg) 9. Diastolic " function is normal.     Right Atrium: The right atrium is normal in size, measuring 4.2 cm in length and 3.0 cm in width in the apical view.     Right Ventricle: The right ventricle is normal in size measuring 2.8 cm at the base in the apical right ventricle-focused view. Global right ventricular systolic function appears normal. Tricuspid annular plane systolic excursion (TAPSE) is 2.8 cm.   Tissue Doppler-derived tricuspid annular peak systolic velocity (S prime) is 17.0 cm/s. The estimated PA systolic pressure is 25 mmHg.     Aortic Valve:  Aortic valve is normal in structure with normal leaflet mobility. Additionally, there is mild aortic regurgitation.     Mitral Valve:  Mitral valve is normal in structure with normal leaflet mobility.     Tricuspid Valve:  Tricuspid valve is normal in structure with normal leaflet mobility. There is mild tricuspid regurgitation.     Pulmonary Valve:  Pulmonary valve is normal in structure with normal leaflet mobility.     IVC: IVC is normal in size and collapses > 50% with a sniff, suggesting normal right atrial pressure of 3 mmHg.     Intracavitary: There is no evidence of pericardial effusion, intracavity mass, thrombi, or vegetation.         CONCLUSIONS     1 - Normal left ventricular systolic function (EF 60-65%).     2 - No wall motion abnormalities.     3 - Normal left ventricular diastolic function.     4 - Normal right ventricular systolic function .     5 - The estimated PA systolic pressure is 25 mmHg.     6 - Mild aortic regurgitation.     7 - Mild tricuspid regurgitation.             This document has been electronically    SIGNED BY: Nolvia Aaron MD On: 02/06/2018 16:13    and Transthoracic echo (TTE) complete (Cupid Only):   Results for orders placed or performed during the hospital encounter of 06/09/25   Echo   Result Value Ref Range    BSA 1.88 m2    Rainey's Biplane MOD Ejection Fraction 48 %    A2C EF 55 %    A4C EF 44 %    LVOT stroke volume 74.1 cm3     LVIDd 4.5 3.5 - 6.0 cm    LV Systolic Volume 60 mL    LV Systolic Volume Index 32.3 mL/m2    LVIDs 3.8 2.1 - 4.0 cm    LV ESV A2C 54.13 mL    LV Diastolic Volume 93 mL    LV ESV A4C 71.97 mL    LV Diastolic Volume Index 50.00 mL/m2    LV EDV A2C [id number] mL    LV EDV A4C 81.02 mL    Left Ventricular End Systolic Volume by Teichholz Method 60.01 mL    Left Ventricular End Diastolic Volume by Teichholz Method 92.90 mL    IVS 1.3 (A) 0.6 - 1.1 cm    LVOT diameter 2.1 cm    LVOT area 3.5 cm2    FS 15.6 (A) 28 - 44 %    Left Ventricle Relative Wall Thickness 0.58 cm    PW 1.3 (A) 0.6 - 1.1 cm    LV mass 222.6 g    LV Mass Index 119.7 g/m2    MV Peak E Abdifatah 0.96 m/s    TDI LATERAL 0.07 m/s    TDI SEPTAL 0.05 m/s    E/E' ratio 16 m/s    MV Peak A Abdifatah 0.30 m/s    TR Max Abdifatah 3.0 m/s    E/A ratio 3.20     E wave deceleration time 228 msec    LV SEPTAL E/E' RATIO 19.2 m/s    LV LATERAL E/E' RATIO 13.7 m/s    LVOT peak abdifatah 1.1 m/s    Left Ventricular Outflow Tract Mean Velocity 0.71 cm/s    Left Ventricular Outflow Tract Mean Gradient 2.34 mmHg    RV- duke basal diam 3.6 cm    TAPSE 1.2 cm    RV/LV Ratio 0.80 cm    LA Vol (MOD) 62 mL    CRUZ (MOD) 33 mL/m2    RA area length vol 47.34 mL    RA Area 17.7 cm2    RA vol index 25.45 mL/m2    RA Vol 49.15 mL    AV regurgitation pressure 1/2 time 419 ms    AR Max Abdifatah 4.00 m/s    AV mean gradient 5 mmHg    AV peak gradient 10 mmHg    Ao peak abdifatah 1.6 m/s    Ao VTI 28.3 cm    LVOT peak VTI 21.4 cm    AV valve area 2.6 cm²    AV Velocity Ratio 0.69     AV index (prosthetic) 0.76     LISA by Velocity Ratio 2.4 cm²    Mr max abdifatah 4.57 m/s    MV mean gradient 2 mmHg    MV peak gradient 5 mmHg    MV stenosis pressure 1/2 time 66.21 ms    MV valve area p 1/2 method 3.32 cm2    MV valve area by continuity eq 2.74 cm2    MV VTI 27.0 cm    Triscuspid Valve Regurgitation Peak Gradient 37 mmHg    PV PEAK VELOCITY 0.93 m/s    PV peak gradient 3 mmHg    Sinus 3.94 cm    ASI 2.1 cm/m2    STJ  3.1 cm    Ascending aorta 3.7 cm    ASI 2.0 cm/m2    IVC diameter 1.94 cm    Mean e' 0.06 m/s    ZLVIDS 1.38     ZLVIDD -1.40     LA area A4C 22.79 cm2    LA area A2C 19.70 cm2    TV resting pulmonary artery pressure 39 mmHg    RV TB RVSP 6 mmHg    Est. RA pres 3 mmHg    Narrative      Left Ventricle: The left ventricle is normal in size. Mildly increased   wall thickness. There is concentric hypertrophy. Septal motion is   consistent with bundle branch block. There is low normal systolic function   with a visually estimated ejection fraction of 50 - 55%. Quantitated   ejection fraction is 48%. Unable to assess diastolic function due to E-A   fusion.    Global longitudinal strain is -9.6% there is apical sparing pattern   that can be seen with infiltrative cardiomyopathy. Consider cardiac   amyloid differential diagnosis.    Right Ventricle: The right ventricle is normal in size Systolic   function is reduced.    Left Atrium: The left atrium is at the upper limit of normal in size   The left atrium is dilated    Aortic Valve: There is moderate aortic regurgitation.    Mitral Valve: There is mild to moderate regurgitation with a   eccentrically posterolateral directed jet.    Tricuspid Valve: There is mild to moderate regurgitation.    Pulmonic Valve: There is mild regurgitation.    Aorta: The aortic root is the upper limit of normal in size measuring   3.94 cm.    Pulmonary Artery: The estimated pulmonary artery systolic pressure is   39 mmHg.    IVC/SVC: Normal venous pressure at 3 mmHg.       Assessment and Plan:   84 years old male with a      1. Heart failure reduced ejection fraction decompensation/combined systolic and diastolic  2. Acute hypoxic respiratory failure secondary to above  3. Concerns for infiltrative cardiomyopathy?  Pending workup for AL amyloid  4. Moderate aortic regurgitation  5. Atrial tachycardia   6. Elevated troponins     Plan  Responded well to IV diuresis  Continue IV diuresis 40 mg  b.i.d..  I's and O's and daily weight  I have reviewed his monitor and EKG.  The rhythm currently appears to be sustained atrial tachycardia.  He is on Toprol 100 mg.  Anticipate improvement in the tachycardia with diuresis.  He did not respond to IV Lopressor or digoxin.  May consider amiodarone temporarily  Replace magnesium to keep level above 2  Troponin elevation felt to be type 2 demand in the setting of CHF decompensation  Continue Toprol/spironolactone  We will add Jardiance at discharge  Continue losartan      Total critical care time: Approximately 45 minutes     Due to a high probability of clinically significant, life threatening deterioration, I personally spent this critical care time directly and personally managing the patient. This critical care time included obtaining a history; examining the patient; ordering and review of studies; arranging urgent treatment with development of a management plan; evaluation of patient's response to treatment; frequent reassessment; and, discussions with other providers.   This critical care time was performed to assess and manage the high probability of imminent, life-threatening deterioration that could result in multi-organ failure. It was medically reasonable and necessary. 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 I will  follow-up with patient. Please contact us if you have any additional questions.    Jose Gavin MD  Cardiology   [facility] - Intensive Care

## 2025-06-28 NOTE — SUBJECTIVE & OBJECTIVE
Interval History:  Patient was examined in his bed, with family by his side.  He was calm and not in distress.  He states his shortness of breath has resolved.  He denies chest pain, palpitation, GI/ symptoms.    Review of Systems   Constitutional:  Negative for activity change, chills and diaphoresis.   Respiratory:  Negative for cough, chest tightness and shortness of breath.    Gastrointestinal:  Negative for abdominal distention, abdominal pain, constipation, diarrhea, nausea and vomiting.   Genitourinary:  Negative for difficulty urinating.   Musculoskeletal:  Negative for arthralgias.   Neurological:  Negative for dizziness.   Psychiatric/Behavioral:  Negative for agitation.    All other systems reviewed and are negative.    Objective:     Vital Signs (Most Recent):  Temp: (P) 98.8 °F (37.1 °C) (06/28/25 1600)  Pulse: 78 (06/28/25 1645)  Resp: (!) 29 (06/28/25 1645)  BP: (!) 146/65 (06/28/25 1630)  SpO2: (!) 90 % (06/28/25 1645) Vital Signs (24h Range):  Temp:  [97 °F (36.1 °C)-98.8 °F (37.1 °C)] (P) 98.8 °F (37.1 °C)  Pulse:  [] 78  Resp:  [12-40] 29  SpO2:  [78 %-99 %] 90 %  BP: (122-188)/() 146/65     Weight: 77 kg (169 lb 12.1 oz)  Body mass index is 26.59 kg/m².    Intake/Output Summary (Last 24 hours) at 6/28/2025 1716  Last data filed at 6/28/2025 1653  Gross per 24 hour   Intake 637.58 ml   Output 4850 ml   Net -4212.42 ml         Physical Exam  Vitals reviewed.   Constitutional:       Appearance: He is not ill-appearing.      Comments: BiPAP 12/6--40%   HENT:      Head: Normocephalic.      Mouth/Throat:      Mouth: Mucous membranes are dry.      Pharynx: Oropharynx is clear.   Eyes:      Pupils: Pupils are equal, round, and reactive to light.   Cardiovascular:      Rate and Rhythm: Tachycardia present.   Pulmonary:      Breath sounds: Rales present. No rhonchi.   Abdominal:      General: Bowel sounds are normal.      Palpations: Abdomen is soft.   Musculoskeletal:      Right lower leg:  No edema.      Left lower leg: No edema.   Skin:     Coloration: Skin is not jaundiced.   Neurological:      Mental Status: He is alert and oriented to person, place, and time. Mental status is at baseline.      Cranial Nerves: No cranial nerve deficit.   Psychiatric:         Mood and Affect: Mood normal.               Significant Labs: All pertinent labs within the past 24 hours have been reviewed.    Significant Imaging: I have reviewed all pertinent imaging results/findings within the past 24 hours.

## 2025-06-28 NOTE — ASSESSMENT & PLAN NOTE
Patient's blood pressure range in the last 24 hours was: BP  Min: 138/80  Max: 188/104.The patient's inpatient anti-hypertensive regimen is listed below:  Current Antihypertensives  nitroGLYCERIN 2% TD oint ointment 1 inch, Every 6 hours, Topical (Top)  furosemide injection 40 mg, 2 times daily, Intravenous  amLODIPine tablet 10 mg, Daily, Oral  furosemide tablet 20 mg, Daily, Oral  losartan tablet 100 mg, Daily, Oral  metoprolol succinate (TOPROL-XL) 24 hr tablet 100 mg, Daily, Oral  spironolactone tablet 50 mg, Daily, Oral    Plan  - BP is controlled, no changes needed to their regimen

## 2025-06-28 NOTE — PLAN OF CARE
Lopressor 5 mgm slow ivp , Dr Gavin, and pulm team at bedside, attempting to slow rhythm, so he can see if there are any flutter waves    Pulm, ultrasounded lungs, large pleural effussions

## 2025-06-28 NOTE — ASSESSMENT & PLAN NOTE
Creatine stable for now. BMP reviewed- noted Estimated Creatinine Clearance: 38 mL/min (based on SCr of 1.4 mg/dL). according to latest data. Based on current GFR, CKD stage is stage 3 - GFR 30-59.  Monitor UOP and serial BMP and adjust therapy as needed. Renally dose meds. Avoid nephrotoxic medications and procedures.    Patient's FSGs are controlled on current medication regimen.  Last A1c reviewed-   Lab Results   Component Value Date    HGBA1C 6.6 (H) 06/28/2025     Most recent fingerstick glucose reviewed-   Recent Labs   Lab 06/27/25  2354 06/28/25  0303   POCTGLUCOSE 188* 235*     Current correctional scale  Medium  Maintain anti-hyperglycemic dose as follows-   Antihyperglycemics (From admission, onward)      Start     Stop Route Frequency Ordered    06/28/25 0510  insulin aspart U-100 pen 0-10 Units         -- SubQ Every 6 hours PRN 06/28/25 0410          Hold Oral hypoglycemics while patient is in the hospital.

## 2025-06-28 NOTE — PLAN OF CARE
Pt. Came from ED at 0258, on BiPap 12/6, 50% FiO2. Sinus tach on monitor, O2 sats > 94%. BP WNL, no chest pain reported. 1L urine output overnight. No complaints at this time. Wife at bedside, plan of care reviewed with pt and wife. Bed in lowest position, bed alarm set, side rails raised x2, call bell within reach.     Problem: Adult Inpatient Plan of Care  Goal: Plan of Care Review  Outcome: Progressing  Goal: Patient-Specific Goal (Individualized)  Outcome: Progressing  Goal: Absence of Hospital-Acquired Illness or Injury  Outcome: Progressing  Goal: Optimal Comfort and Wellbeing  Outcome: Progressing  Goal: Readiness for Transition of Care  Outcome: Progressing     Problem: Diabetes Comorbidity  Goal: Blood Glucose Level Within Targeted Range  Outcome: Progressing

## 2025-06-28 NOTE — PLAN OF CARE
Pt is in sinus rhythm, rate 77-80, did not have to start the amiodarone, did rec lopressor 5 mgm ivp and digoxin 500mcg, at 1030 , and rate slowed at 1200,  no chest pain, diuresed from am lasix and lasix rec in er, total close to 4000cc, but pt has large amt of fluid in lungs, large pleural effusions, needs further diuresis, lungs where clear, but now has crackles again in post bases, less jvd, ,pt on 1500cc fluid restriction, safety maintained, bed alarm on , side rails up x4, bed low position, call bell easy reach, instructed to call for anything, understood

## 2025-06-29 LAB
ANION GAP (OHS): 9 MMOL/L (ref 8–16)
BUN SERPL-MCNC: 26 MG/DL (ref 8–23)
CALCIUM SERPL-MCNC: 9.1 MG/DL (ref 8.7–10.5)
CHLORIDE SERPL-SCNC: 104 MMOL/L (ref 95–110)
CO2 SERPL-SCNC: 26 MMOL/L (ref 23–29)
CREAT SERPL-MCNC: 1.3 MG/DL (ref 0.5–1.4)
ERYTHROCYTE [DISTWIDTH] IN BLOOD BY AUTOMATED COUNT: 14 % (ref 11.5–14.5)
GFR SERPLBLD CREATININE-BSD FMLA CKD-EPI: 55 ML/MIN/1.73/M2
GLUCOSE SERPL-MCNC: 173 MG/DL (ref 70–110)
HCT VFR BLD AUTO: 35.7 % (ref 40–54)
HGB BLD-MCNC: 11.5 GM/DL (ref 14–18)
MAGNESIUM SERPL-MCNC: 2.2 MG/DL (ref 1.6–2.6)
MCH RBC QN AUTO: 27.6 PG (ref 27–31)
MCHC RBC AUTO-ENTMCNC: 32.2 G/DL (ref 32–36)
MCV RBC AUTO: 86 FL (ref 82–98)
PLATELET # BLD AUTO: 254 K/UL (ref 150–450)
PMV BLD AUTO: 10.4 FL (ref 9.2–12.9)
POCT GLUCOSE: 162 MG/DL (ref 70–110)
POCT GLUCOSE: 169 MG/DL (ref 70–110)
POCT GLUCOSE: 260 MG/DL (ref 70–110)
POCT GLUCOSE: 99 MG/DL (ref 70–110)
POTASSIUM SERPL-SCNC: 3.9 MMOL/L (ref 3.5–5.1)
RBC # BLD AUTO: 4.16 M/UL (ref 4.6–6.2)
SODIUM SERPL-SCNC: 139 MMOL/L (ref 136–145)
WBC # BLD AUTO: 5.09 K/UL (ref 3.9–12.7)

## 2025-06-29 PROCEDURE — 63600175 PHARM REV CODE 636 W HCPCS: Mod: HCNC | Performed by: INTERNAL MEDICINE

## 2025-06-29 PROCEDURE — 11000001 HC ACUTE MED/SURG PRIVATE ROOM: Mod: HCNC

## 2025-06-29 PROCEDURE — 27000221 HC OXYGEN, UP TO 24 HOURS: Mod: HCNC

## 2025-06-29 PROCEDURE — 80048 BASIC METABOLIC PNL TOTAL CA: CPT | Mod: HCNC | Performed by: INTERNAL MEDICINE

## 2025-06-29 PROCEDURE — 20000000 HC ICU ROOM: Mod: HCNC

## 2025-06-29 PROCEDURE — 83735 ASSAY OF MAGNESIUM: CPT | Mod: HCNC | Performed by: INTERNAL MEDICINE

## 2025-06-29 PROCEDURE — 36415 COLL VENOUS BLD VENIPUNCTURE: CPT | Mod: HCNC | Performed by: INTERNAL MEDICINE

## 2025-06-29 PROCEDURE — 99900035 HC TECH TIME PER 15 MIN (STAT): Mod: HCNC

## 2025-06-29 PROCEDURE — 85027 COMPLETE CBC AUTOMATED: CPT | Mod: HCNC | Performed by: INTERNAL MEDICINE

## 2025-06-29 PROCEDURE — 25000003 PHARM REV CODE 250: Mod: HCNC | Performed by: REGISTERED NURSE

## 2025-06-29 PROCEDURE — 94761 N-INVAS EAR/PLS OXIMETRY MLT: CPT | Mod: HCNC

## 2025-06-29 PROCEDURE — 99291 CRITICAL CARE FIRST HOUR: CPT | Mod: HCNC,,, | Performed by: INTERNAL MEDICINE

## 2025-06-29 RX ORDER — FUROSEMIDE 10 MG/ML
40 INJECTION INTRAMUSCULAR; INTRAVENOUS ONCE
Status: COMPLETED | OUTPATIENT
Start: 2025-06-29 | End: 2025-06-29

## 2025-06-29 RX ADMIN — ASPIRIN 81 MG: 81 TABLET, COATED ORAL at 09:06

## 2025-06-29 RX ADMIN — INSULIN ASPART 2 UNITS: 100 INJECTION, SOLUTION INTRAVENOUS; SUBCUTANEOUS at 11:06

## 2025-06-29 RX ADMIN — ENOXAPARIN SODIUM 40 MG: 40 INJECTION SUBCUTANEOUS at 05:06

## 2025-06-29 RX ADMIN — METOPROLOL SUCCINATE 100 MG: 50 TABLET, EXTENDED RELEASE ORAL at 09:06

## 2025-06-29 RX ADMIN — FUROSEMIDE 40 MG: 10 INJECTION, SOLUTION INTRAVENOUS at 08:06

## 2025-06-29 RX ADMIN — ATORVASTATIN CALCIUM 40 MG: 40 TABLET, FILM COATED ORAL at 09:06

## 2025-06-29 RX ADMIN — INSULIN ASPART 6 UNITS: 100 INJECTION, SOLUTION INTRAVENOUS; SUBCUTANEOUS at 07:06

## 2025-06-29 RX ADMIN — INSULIN ASPART 1 UNITS: 100 INJECTION, SOLUTION INTRAVENOUS; SUBCUTANEOUS at 08:06

## 2025-06-29 RX ADMIN — SPIRONOLACTONE 50 MG: 25 TABLET ORAL at 09:06

## 2025-06-29 RX ADMIN — FUROSEMIDE 40 MG: 10 INJECTION, SOLUTION INTRAVENOUS at 09:06

## 2025-06-29 RX ADMIN — LOSARTAN POTASSIUM 100 MG: 50 TABLET, FILM COATED ORAL at 09:06

## 2025-06-29 NOTE — SUBJECTIVE & OBJECTIVE
Interval History:  NAEON. No acute complaints. Can step-down to the floors today. Remains in NSR. Will discontinue amio gtt for AT and continue metoprolol.     Review of Systems   Constitutional:  Negative for activity change, chills and diaphoresis.   Respiratory:  Negative for cough, chest tightness and shortness of breath.    Gastrointestinal:  Negative for abdominal distention, abdominal pain, constipation, diarrhea, nausea and vomiting.   Genitourinary:  Negative for difficulty urinating.   Musculoskeletal:  Negative for arthralgias.   Neurological:  Negative for dizziness.   Psychiatric/Behavioral:  Negative for agitation.    All other systems reviewed and are negative.    Objective:     Vital Signs (Most Recent):  Temp: 97.8 °F (36.6 °C) (06/29/25 1115)  Pulse: 74 (06/29/25 1200)  Resp: (!) 25 (06/29/25 1200)  BP: 126/67 (06/29/25 1200)  SpO2: (!) 93 % (06/29/25 1200) Vital Signs (24h Range):  Temp:  [97.8 °F (36.6 °C)-98.8 °F (37.1 °C)] 97.8 °F (36.6 °C)  Pulse:  [] 74  Resp:  [10-32] 25  SpO2:  [90 %-99 %] 93 %  BP: (103-154)/(57-80) 126/67     Weight: 77 kg (169 lb 12.1 oz)  Body mass index is 26.59 kg/m².    Intake/Output Summary (Last 24 hours) at 6/29/2025 1424  Last data filed at 6/29/2025 1200  Gross per 24 hour   Intake 240 ml   Output 1850 ml   Net -1610 ml         Physical Exam  Vitals reviewed.   Constitutional:       Appearance: He is not ill-appearing.      Comments: NC   HENT:      Head: Normocephalic.      Mouth/Throat:      Mouth: Mucous membranes are dry.      Pharynx: Oropharynx is clear.   Eyes:      Pupils: Pupils are equal, round, and reactive to light.   Neck:      Comments: JVP 14cm of H20  Cardiovascular:      Rate and Rhythm: Normal rate.   Abdominal:      General: Bowel sounds are normal.      Palpations: Abdomen is soft.   Musculoskeletal:      Right lower leg: No edema.      Left lower leg: No edema.   Skin:     Coloration: Skin is not jaundiced.   Neurological:      Mental  Status: He is alert and oriented to person, place, and time. Mental status is at baseline.      Cranial Nerves: No cranial nerve deficit.   Psychiatric:         Mood and Affect: Mood normal.               Significant Labs: All pertinent labs within the past 24 hours have been reviewed.    Significant Imaging: I have reviewed all pertinent imaging results/findings within the past 24 hours.

## 2025-06-29 NOTE — EICU
[facility] Night Rounds Checklist  24H Vital Sign Range:  Temp:  [97 °F (36.1 °C)-98.8 °F (37.1 °C)]   Pulse:  []   Resp:  [12-40]   BP: (120-188)/()   SpO2:  [78 %-99 %]     Video rounds

## 2025-06-29 NOTE — ASSESSMENT & PLAN NOTE
Creatine stable for now. BMP reviewed- noted Estimated Creatinine Clearance: 41 mL/min (based on SCr of 1.3 mg/dL). according to latest data. Based on current GFR, CKD stage is stage 3 - GFR 30-59.  Monitor UOP and serial BMP and adjust therapy as needed. Renally dose meds. Avoid nephrotoxic medications and procedures.    Patient's FSGs are controlled on current medication regimen.  Last A1c reviewed-   Lab Results   Component Value Date    HGBA1C 6.6 (H) 06/28/2025     Most recent fingerstick glucose reviewed-   Recent Labs   Lab 06/28/25  1638 06/28/25  2040 06/29/25  0738 06/29/25  1131   POCTGLUCOSE 191* 255* 260* 162*     Current correctional scale  Medium  Maintain anti-hyperglycemic dose as follows-   Antihyperglycemics (From admission, onward)      Start     Stop Route Frequency Ordered    06/28/25 1523  insulin aspart U-100 pen 0-10 Units         -- SubQ Before meals & nightly PRN 06/28/25 1424          Hold Oral hypoglycemics while patient is in the hospital.

## 2025-06-29 NOTE — EICU
Virtual ICU Quality Rounds    Admit Date: 6/27/2025  Hospital Day: 1    ICU Day: 1d 6h    24H Vital Sign Range:  Temp:  [97.8 °F (36.6 °C)-98.8 °F (37.1 °C)]   Pulse:  []   Resp:  [10-29]   BP: (103-164)/()   SpO2:  [88 %-99 %]     VICU Surveillance Screening  LDA reconciliation : Yes

## 2025-06-29 NOTE — ASSESSMENT & PLAN NOTE
Patient's blood pressure range in the last 24 hours was: BP  Min: 103/72  Max: 154/71.The patient's inpatient anti-hypertensive regimen is listed below:  Current Antihypertensives  losartan tablet 100 mg, Daily, Oral  metoprolol succinate (TOPROL-XL) 24 hr tablet 100 mg, Daily, Oral  spironolactone tablet 50 mg, Daily, Oral  furosemide injection 40 mg, Once, Intravenous    Plan  - BP is controlled, no changes needed to their regimen

## 2025-06-29 NOTE — PLAN OF CARE
Dr Salessef here, informed of status, shown rhythm strips of svt, and pvc's, k 3.9 ,mag added to am blood work , wants another dose of lasix tonight, told weaning 02

## 2025-06-29 NOTE — PLAN OF CARE
No co of chest pain or sob, pulse ap 78 regular, crackles post bases, no jvd noted, no edema, states breathing much better, wants to go home

## 2025-06-29 NOTE — PLAN OF CARE
Pt was able to come off 02, sat's 96%, no sob, chest pain, remained in normal sinus rhythm, rare run of svt, not sustained, will rec 2 doses of lasix today, not diuresing as much, lungs clear, was up in chair, and to bsc, took steps in room, no sob, no drop in sat's, plan to possibly discharge tomorrow, safety maintained, side rails up x4, call bell easy reach, bed alarm on, bed low position, instructed to call for assistance, understands,

## 2025-06-29 NOTE — PROGRESS NOTES
[facility] - Intensive Care  Cardiology  Consult Note    Patient Name: Bryant Gil  MRN: [id number]  Admission Date: 6/27/2025  Hospital Length of Stay: 1 days  Code Status: Full Code   Attending Provider: David Bell,*   Consulting Provider: Jose Gavin MD  Primary Care Physician: Luke Mcgee MD  Principal Problem:<principal problem not specified>    Patient information was obtained from patient, past medical records, and ER records.     Consults  Subjective:     Chief Complaint:  Shortness of breath  HPI:  Cardiology consulted for CHF.  Patient with heart failure mild reduced EF, moderate aortic regurgitation, HTN, HLP, type 2 DM, PVCs 0 presented with a worsening shortness of breath.  CT chest negative for PE but showed pulmonary edema and bilateral pleural effusion.  He is being worked up for infiltrative cardiomyopathy.  Patient was given 80 IV Lasix in the ER and placed on BiPAP with good response.  Troponins borderline elevation non ACS trend.  Initial lactic acid mildly elevated 2.4 and decreased to 1.5.  EKG with baseline artifacts but appears to be sinus tachycardia with frequent PACs.  This morning when seen appears to be in sustained atrial tachycardia 120s not responding to IV push Lopressor or digoxin.  He feels better this morning.      06/29/2025:  Seen and examined this morning.  Doing well.  Breathing is much better.  Back to sinus rhythm with occasional few episodes of atrial tachycardia    Past Medical History:   Diagnosis Date    Cataract of both eyes     CHF exacerbation 6/28/2025    Chronic gastritis without bleeding, negative H. pylori Jaunuary 2019 EGD 01/30/2020    Diabetes mellitus     Diverticulosis     Ectatic aorta 02/06/2018    HTN (hypertension)     HTN (hypertension) 10/09/2012    Lower GI bleeding 01/11/2021    Near syncope 02/05/2021    OHT (ocular hypertension)     Prostate cancer     Pyelonephritis, right no stone on CT scan. 07/11/2013    Rectal bleeding  01/11/2021    Sepsis, same organism in urine grew in his blood, Klebsiella 07/11/2013    Sigmoid diverticulosis 03/19/2017    SOB (shortness of breath) 02/05/2021    Stage 2 chronic kidney disease 01/25/2017    Tendinitis of both rotator cuffs 06/25/2013    Tortuous aorta 02/06/2018    Tubular adenoma of colon 01/10/2012    Type II or unspecified type diabetes mellitus with neurological manifestations, not stated as uncontrolled(250.60) 10/09/2012       Past Surgical History:   Procedure Laterality Date    CARPAL TUNNEL RELEASE Right 08/25/2015    CATARACT EXTRACTION W/  INTRAOCULAR LENS IMPLANT Left 6/25/2024    Procedure: EXTRACTION, CATARACT, WITH IOL INSERTION;  Surgeon: Federico Sumner MD;  Location: [facility];  Service: Ophthalmology;  Laterality: Left;    CATARACT EXTRACTION W/  INTRAOCULAR LENS IMPLANT Right 7/16/2024    Procedure: EXTRACTION, CATARACT, WITH IOL INSERTION;  Surgeon: Federico Sumner MD;  Location: [facility] OR;  Service: Ophthalmology;  Laterality: Right;    CIRCUMCISION  04/13/2005    COLONOSCOPY N/A 3/29/2017    Procedure: COLONOSCOPY Golytely prep;  Surgeon: Kavitha Cleaning MD;  Location: [facility];  Service: Endoscopy;  Laterality: N/A;    COLONOSCOPY N/A 1/12/2021    Procedure: COLONOSCOPY;  Surgeon: Dung Panda MD;  Location: [facility];  Service: Endoscopy;  Laterality: N/A;    COLONOSCOPY W/ POLYPECTOMY  01/10/2012    Repeat in 5 years     ESOPHAGOGASTRODUODENOSCOPY N/A 1/8/2019    Procedure: EGD (ESOPHAGOGASTRODUODENOSCOPY);  Surgeon: Rachel Burrows MD;  Location: [facility];  Service: Endoscopy;  Laterality: N/A;    ESOPHAGOGASTRODUODENOSCOPY N/A 5/14/2020    Procedure: ESOPHAGOGASTRODUODENOSCOPY (EGD);  Surgeon: Rachel Burrows MD;  Location: [facility];  Service: Endoscopy;  Laterality: N/A;  Dr. Luis Angel Burrows if possible.    FLEXIBLE SIGMOIDOSCOPY N/A 1/8/2019    Procedure: SIGMOIDOSCOPY, FLEXIBLE;  Surgeon: Rachel Burrows MD;  Location: [facility];  Service:  Endoscopy;  Laterality: N/A;    PENILE PROSTHESIS IMPLANT      PROSTATE SURGERY  1999    Prostatectomy for prostate ca; [facility]    TRIGGER FINGER RELEASE Right 2015    TRIGGER FINGER RELEASE Right 11/8/2024    Procedure: RELEASE, TRIGGER FINGER;  Surgeon: Anthony Cooper Jr., MD;  Location: [facility];  Service: Orthopedics;  Laterality: Right;       Review of patient's allergies indicates:  No Known Allergies    No current facility-administered medications on file prior to encounter.     Current Outpatient Medications on File Prior to Encounter   Medication Sig    amLODIPine (NORVASC) 10 MG tablet TAKE 1 TABLET EVERY DAY    aspirin (ECOTRIN) 81 MG EC tablet Take 81 mg by mouth once daily.    atorvastatin (LIPITOR) 40 MG tablet Take 1 tablet (40 mg total) by mouth once daily.    BD ALCOHOL SWABS PadM USE FOR HOME GLUCOSE MONITORING DAILY    blood sugar diagnostic (TRUE METRIX GLUCOSE TEST STRIP) Strp Test Blood Sugar twice daily    blood sugar diagnostic Strp To check BG 1 times daily, to use with insurance preferred meter    blood-glucose meter (TRUE METRIX GLUCOSE METER) Misc Test blood sugar twice a day    blood-glucose meter kit To check BG 1 times daily, to use with insurance preferred meter    blood-glucose sensor (DEXCOM G7 SENSOR) Shanice Use continue glucose monitor to check sugar daily    clobetasoL (TEMOVATE) 0.05 % cream Apply to scar in ear twice daily until flat.    furosemide (LASIX) 20 MG tablet Take 1 tablet (20 mg total) by mouth daily as needed (shortness of breath).    glipiZIDE (GLUCOTROL) 10 MG TR24 Take 1 tablet (10 mg total) by mouth daily with breakfast.    HYDROcodone-acetaminophen (NORCO) 5-325 mg per tablet Take 1 tablet by mouth every 4 (four) hours as needed for Pain.    hydrocortisone 2.5 % cream Apply topically 2 (two) times daily.    lancets (TRUEPLUS LANCETS) 30 gauge Misc Check sugar twice daily    lancets Misc To check BG 1 times daily, to use with insurance preferred meter    losartan  (COZAAR) 100 MG tablet Take 1 tablet (100 mg total) by mouth once daily.    metFORMIN (GLUCOPHAGE) 1000 MG tablet Take 1 tablet (1,000 mg total) by mouth 2 (two) times daily with meals.    metoprolol succinate (TOPROL-XL) 100 MG 24 hr tablet TAKE 1 TABLET EVERY DAY    pantoprazole (PROTONIX) 40 MG tablet Take 1 tablet (40 mg total) by mouth once daily.    spironolactone (ALDACTONE) 50 MG tablet Take 1 tablet (50 mg total) by mouth once daily.     Family History       Problem Relation (Age of Onset)    Cataracts Sister    Coronary artery disease Brother, Brother    Diabetes Sister    Heart attack Brother, Brother    Hypertension Mother    No Known Problems Father, Sister, Sister, Sister, Brother, Brother, Daughter, Son          Tobacco Use    Smoking status: Former     Current packs/day: 0.00     Average packs/day: 0.5 packs/day for 3.0 years (1.5 ttl pk-yrs)     Types: Cigarettes     Start date:      Quit date:      Years since quittin.5     Passive exposure: Never    Smokeless tobacco: Never    Tobacco comments:     smoked as a teenager   Substance and Sexual Activity    Alcohol use: Yes     Comment: social drinks a beer 1-2 x month    Drug use: Never    Sexual activity: Yes     Partners: Female     Review of Systems   Constitutional: Positive for malaise/fatigue. Negative for chills and fever.   HENT:  Negative for hearing loss and nosebleeds.    Eyes:  Negative for blurred vision.   Cardiovascular:         As in HPI    Respiratory:  Positive for cough and shortness of breath. Negative for hemoptysis.    Endocrine: Negative for cold intolerance and polyuria.   Hematologic/Lymphatic: Negative for bleeding problem.   Skin:  Negative for itching.   Musculoskeletal:  Negative for falls.   Gastrointestinal:  Negative for abdominal pain and hematochezia.   Genitourinary:  Negative for hematuria.   Neurological:  Negative for dizziness and loss of balance.   Psychiatric/Behavioral:  Negative for altered  mental status and depression.      Objective:     Vital Signs (Most Recent):  Temp: 97.8 °F (36.6 °C) (06/29/25 1115)  Pulse: 74 (06/29/25 1200)  Resp: (!) 25 (06/29/25 1200)  BP: 126/67 (06/29/25 1200)  SpO2: (!) 93 % (06/29/25 1200) Vital Signs (24h Range):  Temp:  [97.8 °F (36.6 °C)-98.8 °F (37.1 °C)] 97.8 °F (36.6 °C)  Pulse:  [] 74  Resp:  [10-32] 25  SpO2:  [90 %-99 %] 93 %  BP: (103-154)/(57-80) 126/67     Weight: 77 kg (169 lb 12.1 oz)  Body mass index is 26.59 kg/m².    SpO2: (!) 93 %         Intake/Output Summary (Last 24 hours) at 6/29/2025 1331  Last data filed at 6/29/2025 1200  Gross per 24 hour   Intake 277.58 ml   Output 1850 ml   Net -1572.42 ml       Lines/Drains/Airways       Drain  Duration             Male External Urine Management Device w/ Suction 06/28/25 0320 Other (Comment) 1 day              Peripheral Intravenous Line  Duration             Peripheral IV Single Lumen 06/27/25 2325 18 G Left Antecubital 1 day    Peripheral IV Single Lumen 06/27/25 2345 20 G Right Antecubital 1 day                    Physical Exam  Constitutional:       Appearance: He is ill-appearing.   HENT:      Head: Normocephalic and atraumatic.   Cardiovascular:      Rate and Rhythm: Regular rhythm. Tachycardia present.   Pulmonary:      Breath sounds: Rhonchi and rales present.   Abdominal:      General: Abdomen is flat.      Palpations: Abdomen is soft.   Musculoskeletal:      Right lower leg: No edema.      Left lower leg: No edema.   Neurological:      Mental Status: He is oriented to person, place, and time.   Psychiatric:         Behavior: Behavior normal.         Significant Labs: BMP:   Recent Labs   Lab 06/27/25  2357 06/28/25  0419 06/28/25 2009 06/29/25  0306   * 178* 227* 173*    137 140 139   K 4.4 4.7 4.1 3.9    107 105 104   CO2 23 23 24 26   BUN 26* 27* 27* 26*   CREATININE 1.4 1.4 1.4 1.3   CALCIUM 9.6 9.5 9.5 9.1   MG 1.8  --   --  2.2   , CMP   Recent Labs   Lab  "06/27/25 2357 06/28/25 0419 06/28/25 2009 06/29/25  0306    137 140 139   K 4.4 4.7 4.1 3.9    107 105 104   CO2 23 23 24 26   * 178* 227* 173*   BUN 26* 27* 27* 26*   CREATININE 1.4 1.4 1.4 1.3   CALCIUM 9.6 9.5 9.5 9.1   PROT 8.2 7.9  --   --    ALBUMIN 3.8 3.7  --   --    BILITOT 0.6 0.6  --   --    ALKPHOS 76 73  --   --    AST 66* 58*  --   --    ALT 56* 66*  --   --    ANIONGAP 9 7* 11 9   , CBC   Recent Labs   Lab 06/27/25 2357 06/28/25 0419 06/29/25  0306   WBC 7.71 7.31 5.09   HGB 12.1* 11.6* 11.5*   HCT 37.9* 36.0* 35.7*    247 254   , Lipid Panel No results for input(s): "CHOL", "HDL", "LDLCALC", "TRIG", "CHOLHDL" in the last 48 hours., Troponin No results for input(s): "TROPONINIHS" in the last 48 hours., and All pertinent lab results from the last 24 hours have been reviewed.    Significant Imaging: Echocardiogram: 2D echo with color flow doppler:   Results for orders placed or performed during the hospital encounter of 02/06/18   2D echo with color flow doppler   Result Value Ref Range    EF + QEF 65 55 - 65    Diastolic Dysfunction No     Aortic Valve Regurgitation MILD     Est. PA Systolic Pressure 25.09     Tricuspid Valve Regurgitation MILD     Narrative    Date of Procedure: 02/06/2018        TEST DESCRIPTION   Technical Quality: This is a technically good study.     Aorta: The aortic root is normal in size, measuring 2.9 cm at sinotubular junction and 3.3 cm at Sinuses of Valsalva. The proximal ascending aorta is normal in size, measuring 3.4 cm across.     Left Atrium: The left atrial volume index is normal, measuring 33.90 cc/m2.     Left Ventricle: The left ventricle is normal in size, with an end-diastolic diameter of 5.1 cm, and an end-systolic diameter of 3.5 cm. LV wall thickness is normal, with the septum measuring 0.9 cm and the posterior wall measuring 0.8 cm across. Relative   wall thickness was normal at 0.31, and the LV mass index was 91.9 g/m2 " consistent with normal left ventricular mass. There are no regional wall motion abnormalities. Left ventricular systolic function appears normal. Visually estimated ejection fraction   is 60-65%. The LV Doppler derived stroke volume equals 73.0 ccs.     Diastolic indices: E wave velocity 0.5 m/s, E/A ratio 0.6,  msec., E/e' ratio(avg) 9. Diastolic function is normal.     Right Atrium: The right atrium is normal in size, measuring 4.2 cm in length and 3.0 cm in width in the apical view.     Right Ventricle: The right ventricle is normal in size measuring 2.8 cm at the base in the apical right ventricle-focused view. Global right ventricular systolic function appears normal. Tricuspid annular plane systolic excursion (TAPSE) is 2.8 cm.   Tissue Doppler-derived tricuspid annular peak systolic velocity (S prime) is 17.0 cm/s. The estimated PA systolic pressure is 25 mmHg.     Aortic Valve:  Aortic valve is normal in structure with normal leaflet mobility. Additionally, there is mild aortic regurgitation.     Mitral Valve:  Mitral valve is normal in structure with normal leaflet mobility.     Tricuspid Valve:  Tricuspid valve is normal in structure with normal leaflet mobility. There is mild tricuspid regurgitation.     Pulmonary Valve:  Pulmonary valve is normal in structure with normal leaflet mobility.     IVC: IVC is normal in size and collapses > 50% with a sniff, suggesting normal right atrial pressure of 3 mmHg.     Intracavitary: There is no evidence of pericardial effusion, intracavity mass, thrombi, or vegetation.         CONCLUSIONS     1 - Normal left ventricular systolic function (EF 60-65%).     2 - No wall motion abnormalities.     3 - Normal left ventricular diastolic function.     4 - Normal right ventricular systolic function .     5 - The estimated PA systolic pressure is 25 mmHg.     6 - Mild aortic regurgitation.     7 - Mild tricuspid regurgitation.             This document has been  electronically    SIGNED BY: Nolvia Aaron MD On: 02/06/2018 16:13    and Transthoracic echo (TTE) complete (Cupid Only):   Results for orders placed or performed during the hospital encounter of 06/09/25   Echo   Result Value Ref Range    BSA 1.88 m2    Rainey's Biplane MOD Ejection Fraction 48 %    A2C EF 55 %    A4C EF 44 %    LVOT stroke volume 74.1 cm3    LVIDd 4.5 3.5 - 6.0 cm    LV Systolic Volume 60 mL    LV Systolic Volume Index 32.3 mL/m2    LVIDs 3.8 2.1 - 4.0 cm    LV ESV A2C 54.13 mL    LV Diastolic Volume 93 mL    LV ESV A4C 71.97 mL    LV Diastolic Volume Index 50.00 mL/m2    LV EDV A2C 49.740326038412756 mL    LV EDV A4C 81.02 mL    Left Ventricular End Systolic Volume by Teichholz Method 60.01 mL    Left Ventricular End Diastolic Volume by Teichholz Method 92.90 mL    IVS 1.3 (A) 0.6 - 1.1 cm    LVOT diameter 2.1 cm    LVOT area 3.5 cm2    FS 15.6 (A) 28 - 44 %    Left Ventricle Relative Wall Thickness 0.58 cm    PW 1.3 (A) 0.6 - 1.1 cm    LV mass 222.6 g    LV Mass Index 119.7 g/m2    MV Peak E Abdifatah 0.96 m/s    TDI LATERAL 0.07 m/s    TDI SEPTAL 0.05 m/s    E/E' ratio 16 m/s    MV Peak A Abdifatah 0.30 m/s    TR Max Abdifatah 3.0 m/s    E/A ratio 3.20     E wave deceleration time 228 msec    LV SEPTAL E/E' RATIO 19.2 m/s    LV LATERAL E/E' RATIO 13.7 m/s    LVOT peak abdifatah 1.1 m/s    Left Ventricular Outflow Tract Mean Velocity 0.71 cm/s    Left Ventricular Outflow Tract Mean Gradient 2.34 mmHg    RV- duke basal diam 3.6 cm    TAPSE 1.2 cm    RV/LV Ratio 0.80 cm    LA Vol (MOD) 62 mL    CRUZ (MOD) 33 mL/m2    RA area length vol 47.34 mL    RA Area 17.7 cm2    RA vol index 25.45 mL/m2    RA Vol 49.15 mL    AV regurgitation pressure 1/2 time 419 ms    AR Max Abdifatah 4.00 m/s    AV mean gradient 5 mmHg    AV peak gradient 10 mmHg    Ao peak abdifatah 1.6 m/s    Ao VTI 28.3 cm    LVOT peak VTI 21.4 cm    AV valve area 2.6 cm²    AV Velocity Ratio 0.69     AV index (prosthetic) 0.76     LISA by Velocity Ratio 2.4 cm²    Mr  max maikol 4.57 m/s    MV mean gradient 2 mmHg    MV peak gradient 5 mmHg    MV stenosis pressure 1/2 time 66.21 ms    MV valve area p 1/2 method 3.32 cm2    MV valve area by continuity eq 2.74 cm2    MV VTI 27.0 cm    Triscuspid Valve Regurgitation Peak Gradient 37 mmHg    PV PEAK VELOCITY 0.93 m/s    PV peak gradient 3 mmHg    Sinus 3.94 cm    ASI 2.1 cm/m2    STJ 3.1 cm    Ascending aorta 3.7 cm    ASI 2.0 cm/m2    IVC diameter 1.94 cm    Mean e' 0.06 m/s    ZLVIDS 1.38     ZLVIDD -1.40     LA area A4C 22.79 cm2    LA area A2C 19.70 cm2    TV resting pulmonary artery pressure 39 mmHg    RV TB RVSP 6 mmHg    Est. RA pres 3 mmHg    Narrative      Left Ventricle: The left ventricle is normal in size. Mildly increased   wall thickness. There is concentric hypertrophy. Septal motion is   consistent with bundle branch block. There is low normal systolic function   with a visually estimated ejection fraction of 50 - 55%. Quantitated   ejection fraction is 48%. Unable to assess diastolic function due to E-A   fusion.    Global longitudinal strain is -9.6% there is apical sparing pattern   that can be seen with infiltrative cardiomyopathy. Consider cardiac   amyloid differential diagnosis.    Right Ventricle: The right ventricle is normal in size Systolic   function is reduced.    Left Atrium: The left atrium is at the upper limit of normal in size   The left atrium is dilated    Aortic Valve: There is moderate aortic regurgitation.    Mitral Valve: There is mild to moderate regurgitation with a   eccentrically posterolateral directed jet.    Tricuspid Valve: There is mild to moderate regurgitation.    Pulmonic Valve: There is mild regurgitation.    Aorta: The aortic root is the upper limit of normal in size measuring   3.94 cm.    Pulmonary Artery: The estimated pulmonary artery systolic pressure is   39 mmHg.    IVC/SVC: Normal venous pressure at 3 mmHg.       Assessment and Plan:   84 years old male with a      1. Heart  failure reduced ejection fraction decompensation/combined systolic and diastolic  2. Acute hypoxic respiratory failure secondary to above  3. Concerns for infiltrative cardiomyopathy?  Pending workup for AL amyloid  4. Moderate aortic regurgitation  5. Atrial tachycardia   6. Elevated troponins     Plan  Responded well to IV diuresis  Continue IV diuresis 40 mg b.i.d..  I's and O's and daily weight  Back sinus rhythm  Continue Toprol 100 mg daily  Troponin elevation felt to be type 2 demand in the setting of CHF decompensation  Continue Toprol/spironolactone  We will add Jardiance at discharge  Continue losartan      Total critical care time: Approximately 45 minutes     Due to a high probability of clinically significant, life threatening deterioration, I personally spent this critical care time directly and personally managing the patient. This critical care time included obtaining a history; examining the patient; ordering and review of studies; arranging urgent treatment with development of a management plan; evaluation of patient's response to treatment; frequent reassessment; and, discussions with other providers.   This critical care time was performed to assess and manage the high probability of imminent, life-threatening deterioration that could result in multi-organ failure. It was medically reasonable and necessary. 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 I will follow-up with patient. Please contact us if you have any additional questions.    Jose Gavin MD  Cardiology   [facility] - Intensive Care

## 2025-06-29 NOTE — ASSESSMENT & PLAN NOTE
Patient has Diastolic (HFpEF) heart failure that is Acute on chronic. On presentation their CHF was decompensated. Evidence of decompensated CHF on presentation includes: crackles on lung auscultation, orthopnea, paroxysmal nocturnal dyspnea (PND), dyspnea on exertion (PAIGE), and shortness of breath. The etiology of their decompensation is likely due to new pleural effusions and pulmonary edema. Most recent BNP and echo results are listed below.  Recent Labs     06/27/25  2357   *     Latest ECHO  Results for orders placed during the hospital encounter of 06/09/25    Echo    Interpretation Summary    Left Ventricle: The left ventricle is normal in size. Mildly increased wall thickness. There is concentric hypertrophy. Septal motion is consistent with bundle branch block. There is low normal systolic function with a visually estimated ejection fraction of 50 - 55%. Quantitated ejection fraction is 48%. Unable to assess diastolic function due to E-A fusion.    Global longitudinal strain is -9.6% there is apical sparing pattern that can be seen with infiltrative cardiomyopathy. Consider cardiac amyloid differential diagnosis.    Right Ventricle: The right ventricle is normal in size Systolic function is reduced.    Left Atrium: The left atrium is at the upper limit of normal in size The left atrium is dilated    Aortic Valve: There is moderate aortic regurgitation.    Mitral Valve: There is mild to moderate regurgitation with a eccentrically posterolateral directed jet.    Tricuspid Valve: There is mild to moderate regurgitation.    Pulmonic Valve: There is mild regurgitation.    Aorta: The aortic root is the upper limit of normal in size measuring 3.94 cm.    Pulmonary Artery: The estimated pulmonary artery systolic pressure is 39 mmHg.    IVC/SVC: Normal venous pressure at 3 mmHg.    Current Heart Failure Medications  losartan tablet 100 mg, Daily, Oral  metoprolol succinate (TOPROL-XL) 24 hr tablet 100 mg,  Daily, Oral  spironolactone tablet 50 mg, Daily, Oral  furosemide injection 40 mg, Once, Intravenous    Plan  - Monitor strict I&Os and daily weights.    - Place on telemetry  - Low sodium diet  - Place on fluid restriction of 1.5 L.   - Cardiology has been consulted  - The patient's volume status is improving but not at their baseline as indicated by edema, crackles on lung auscultation, orthopnea, paroxysmal nocturnal dyspnea (PND), dyspnea on exertion (PAIGE), and shortness of breath  - given 80 mg of Lasix in ED  -Cardiology following.  Agrees with Lasix b.i.d..  Continue Toprol.  Last echo reviewed  -Continueoprol/spironolactone, losartan  -Troponin elevation felt to be type 2 demand in the setting of CHF decompensation  We will add Jardiance at discharge

## 2025-06-29 NOTE — PLAN OF CARE
Problem: Adult Inpatient Plan of Care  Goal: Plan of Care Review  Outcome: Ongoing  Goal: Patient-Specific Goal (Individualized)  Outcome: Ongoing  Goal: Absence of Hospital-Acquired Illness or Injury  Outcome: Ongoing  Goal: Optimal Comfort and Wellbeing  Outcome: Ongoing  Goal: Readiness for Transition of Care  Outcome: Ongoing     Problem: Diabetes Comorbidity  Goal: Blood Glucose Level Within Targeted Range  Outcome: Ongoing     Problem: Wound  Goal: Optimal Coping  Outcome: Ongoing  Goal: Optimal Functional Ability  Outcome: Ongoing  Goal: Absence of Infection Signs and Symptoms  Outcome: Ongoing  Goal: Improved Oral Intake  Outcome: Ongoing  Goal: Optimal Pain Control and Function  Outcome: Ongoing  Goal: Skin Health and Integrity  Outcome: Ongoing  Goal: Optimal Wound Healing  Outcome: Ongoing     Problem: Skin Injury Risk Increased  Goal: Skin Health and Integrity  Outcome: Ongoing     Problem: Fluid Volume Excess  Goal: Fluid Balance  Outcome: Ongoing     Problem: Cardiac Output Decreased  Goal: Effective Cardiac Output  Outcome: Ongoing     Problem: Dysrhythmia  Goal: Normalized Cardiac Rhythm  Outcome: Ongoing

## 2025-06-29 NOTE — PROGRESS NOTES
[facility] Medicine  Progress Note    Patient Name: Bryant Gil  MRN: [id number]  Patient Class: IP- Inpatient   Admission Date: 6/27/2025  Length of Stay: 1 days  Attending Physician: David Bell,*  Primary Care Provider: Luke Mcgee MD        Subjective     Principal Problem:CHF exacerbation        HPI:  Patient is an 84-year-old male who presented to ED today with shortness a breath x2 days.  Patient is accompanied by wife reports worsening dyspnea on exertion.  In triage patient's O2 sats were 78%.  Labwork remarkable for elevated BNP and slightly bumped troponin.  Patient denies any chest pain.  Patient with respiratory acidosis, placed on BiPAP.  CTA of the chest shows pulmonary edema and bilateral small pleural effusions.  Patient was given 325 aspirin, 80 mg Lasix with good response, patient will be admitted to Hospital Medicine with a cardiology consult.    Recently saw Dr. Gutierrez o/p for new patient consult.  This month echo with 48% EF, moderate aortic valve regurg.  There was findings concerning for infiltrative cardiomyopathy.     Overview/Hospital Course:  No notes on file    Interval History:  NAEON. No acute complaints. Can step-down to the floors today. Remains in NSR. Will discontinue amio gtt for AT and continue metoprolol.     Review of Systems   Constitutional:  Negative for activity change, chills and diaphoresis.   Respiratory:  Negative for cough, chest tightness and shortness of breath.    Gastrointestinal:  Negative for abdominal distention, abdominal pain, constipation, diarrhea, nausea and vomiting.   Genitourinary:  Negative for difficulty urinating.   Musculoskeletal:  Negative for arthralgias.   Neurological:  Negative for dizziness.   Psychiatric/Behavioral:  Negative for agitation.    All other systems reviewed and are negative.    Objective:     Vital Signs (Most Recent):  Temp: 97.8 °F (36.6 °C) (06/29/25 1115)  Pulse: 74 (06/29/25  1200)  Resp: (!) 25 (06/29/25 1200)  BP: 126/67 (06/29/25 1200)  SpO2: (!) 93 % (06/29/25 1200) Vital Signs (24h Range):  Temp:  [97.8 °F (36.6 °C)-98.8 °F (37.1 °C)] 97.8 °F (36.6 °C)  Pulse:  [] 74  Resp:  [10-32] 25  SpO2:  [90 %-99 %] 93 %  BP: (103-154)/(57-80) 126/67     Weight: 77 kg (169 lb 12.1 oz)  Body mass index is 26.59 kg/m².    Intake/Output Summary (Last 24 hours) at 6/29/2025 1424  Last data filed at 6/29/2025 1200  Gross per 24 hour   Intake 240 ml   Output 1850 ml   Net -1610 ml         Physical Exam  Vitals reviewed.   Constitutional:       Appearance: He is not ill-appearing.      Comments: NC   HENT:      Head: Normocephalic.      Mouth/Throat:      Mouth: Mucous membranes are dry.      Pharynx: Oropharynx is clear.   Eyes:      Pupils: Pupils are equal, round, and reactive to light.   Neck:      Comments: JVP 14cm of H20  Cardiovascular:      Rate and Rhythm: Normal rate.   Abdominal:      General: Bowel sounds are normal.      Palpations: Abdomen is soft.   Musculoskeletal:      Right lower leg: No edema.      Left lower leg: No edema.   Skin:     Coloration: Skin is not jaundiced.   Neurological:      Mental Status: He is alert and oriented to person, place, and time. Mental status is at baseline.      Cranial Nerves: No cranial nerve deficit.   Psychiatric:         Mood and Affect: Mood normal.               Significant Labs: All pertinent labs within the past 24 hours have been reviewed.    Significant Imaging: I have reviewed all pertinent imaging results/findings within the past 24 hours.      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 BMP reviewed- noted Estimated Creatinine Clearance: 41 mL/min (based on SCr of 1.3 mg/dL). according to latest data. Based on current GFR, CKD stage is stage 3 - GFR 30-59.  Monitor UOP and serial BMP and adjust therapy as needed. Renally dose meds. Avoid nephrotoxic medications and  procedures.    Patient's FSGs are controlled on current medication regimen.  Last A1c reviewed-   Lab Results   Component Value Date    HGBA1C 6.6 (H) 06/28/2025     Most recent fingerstick glucose reviewed-   Recent Labs   Lab 06/28/25  1638 06/28/25  2040 06/29/25  0738 06/29/25  1131   POCTGLUCOSE 191* 255* 260* 162*     Current correctional scale  Medium  Maintain anti-hyperglycemic dose as follows-   Antihyperglycemics (From admission, onward)      Start     Stop Route Frequency Ordered    06/28/25 1523  insulin aspart U-100 pen 0-10 Units         -- SubQ Before meals & nightly PRN 06/28/25 1424          Hold Oral hypoglycemics while patient is in the hospital.    CHF exacerbation  Patient has Diastolic (HFpEF) heart failure that is Acute on chronic. On presentation their CHF was decompensated. Evidence of decompensated CHF on presentation includes: crackles on lung auscultation, orthopnea, paroxysmal nocturnal dyspnea (PND), dyspnea on exertion (PAIGE), and shortness of breath. The etiology of their decompensation is likely due to new pleural effusions and pulmonary edema. Most recent BNP and echo results are listed below.  Recent Labs     06/27/25  2357   *     Latest ECHO  Results for orders placed during the hospital encounter of 06/09/25    Echo    Interpretation Summary    Left Ventricle: The left ventricle is normal in size. Mildly increased wall thickness. There is concentric hypertrophy. Septal motion is consistent with bundle branch block. There is low normal systolic function with a visually estimated ejection fraction of 50 - 55%. Quantitated ejection fraction is 48%. Unable to assess diastolic function due to E-A fusion.    Global longitudinal strain is -9.6% there is apical sparing pattern that can be seen with infiltrative cardiomyopathy. Consider cardiac amyloid differential diagnosis.    Right Ventricle: The right ventricle is normal in size Systolic function is reduced.    Left Atrium:  The left atrium is at the upper limit of normal in size The left atrium is dilated    Aortic Valve: There is moderate aortic regurgitation.    Mitral Valve: There is mild to moderate regurgitation with a eccentrically posterolateral directed jet.    Tricuspid Valve: There is mild to moderate regurgitation.    Pulmonic Valve: There is mild regurgitation.    Aorta: The aortic root is the upper limit of normal in size measuring 3.94 cm.    Pulmonary Artery: The estimated pulmonary artery systolic pressure is 39 mmHg.    IVC/SVC: Normal venous pressure at 3 mmHg.    Current Heart Failure Medications  losartan tablet 100 mg, Daily, Oral  metoprolol succinate (TOPROL-XL) 24 hr tablet 100 mg, Daily, Oral  spironolactone tablet 50 mg, Daily, Oral  furosemide injection 40 mg, Once, Intravenous    Plan  - Monitor strict I&Os and daily weights.    - Place on telemetry  - Low sodium diet  - Place on fluid restriction of 1.5 L.   - Cardiology has been consulted  - The patient's volume status is improving but not at their baseline as indicated by edema, crackles on lung auscultation, orthopnea, paroxysmal nocturnal dyspnea (PND), dyspnea on exertion (PAIGE), and shortness of breath  - given 80 mg of Lasix in ED  -Cardiology following.  Agrees with Lasix b.i.d..  Continue Toprol.  Last echo reviewed  -Continueoprol/spironolactone, losartan  -Troponin elevation felt to be type 2 demand in the setting of CHF decompensation  We will add Jardiance at discharge          HTN (hypertension)  Patient's blood pressure range in the last 24 hours was: BP  Min: 103/72  Max: 154/71.The patient's inpatient anti-hypertensive regimen is listed below:  Current Antihypertensives  losartan tablet 100 mg, Daily, Oral  metoprolol succinate (TOPROL-XL) 24 hr tablet 100 mg, Daily, Oral  spironolactone tablet 50 mg, Daily, Oral  furosemide injection 40 mg, Once, Intravenous    Plan  - BP is controlled, no changes needed to their regimen  VTE Risk  Mitigation (From admission, onward)           Ordered     enoxaparin injection 40 mg  Daily         06/28/25 1054     IP VTE HIGH RISK PATIENT  Once         06/28/25 0732     Place sequential compression device  Until discontinued         06/28/25 0732                    Discharge Planning   BURAK:      Code Status: Full Code   Medical Readiness for Discharge Date:                  Critical care time spent on the evaluation and treatment of severe organ dysfunction, review of pertinent labs and imaging studies, discussions with consulting providers and discussions with patient/family: 45 minutes.          Yazan Spaulding MD  Department of Hospital Medicine   [place] - Intensive Care

## 2025-06-29 NOTE — PROGRESS NOTES
Pulm/CC Fellow Consult Note    Attending Physician: David Bell,*    Date of Admit: 6/27/2025    Reason for Consult: Hypoxemic Respiratory Failure    Interval History  Patient significantly improved today. On 2L supplemental O2 and better rate control. Educated at the bedside about daily weights and importance of managing CHF moving forward.    History of Present Illness:  Bryant Gil is a 84 y.o.  male with a PMHx HTN, T2DM, and CHF who presented to [facility] ED the night of 6/27 with complaints of shortness of breath. The patient reported approximately 2 days of worsening shortness of breath. He denied fevers, chills, sick contacts. He did endorse a nonproductive cough. The patient also denied chest pains. In the ED the pateint was noted to be hypoxemic to 78%. CXR and CTA of the chest performed both with signs of pulmonary edema. No PE on CTA. He was given lasix and nitropaste, placed on bipap and transferred to the ICU.    Of note, the patient has an apical sparing abnormal strain pattern which may correlate with an infiltrative cardiomyopathy. He is being worked up for amyloid as an outpatient by his cardiologist but no diganosis yet.     Past Medical History:  Past Medical History:   Diagnosis Date    Cataract of both eyes     CHF exacerbation 6/28/2025    Chronic gastritis without bleeding, negative H. pylori Jaunuary 2019 EGD 01/30/2020    Diabetes mellitus     Diverticulosis     Ectatic aorta 02/06/2018    HTN (hypertension)     HTN (hypertension) 10/09/2012    Lower GI bleeding 01/11/2021    Near syncope 02/05/2021    OHT (ocular hypertension)     Prostate cancer     Pyelonephritis, right no stone on CT scan. 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    Tubular adenoma of colon 01/10/2012    Type II or unspecified type diabetes mellitus with neurological manifestations, not stated as uncontrolled(250.60) 10/09/2012       Past Surgical History:  Past Surgical History:   Procedure Laterality Date    CARPAL TUNNEL RELEASE Right 08/25/2015    CATARACT EXTRACTION W/  INTRAOCULAR LENS IMPLANT Left 6/25/2024    Procedure: EXTRACTION, CATARACT, WITH IOL INSERTION;  Surgeon: Federico Sumner MD;  Location: [facility] OR;  Service: Ophthalmology;  Laterality: Left;    CATARACT EXTRACTION W/  INTRAOCULAR LENS IMPLANT Right 7/16/2024    Procedure: EXTRACTION, CATARACT, WITH IOL INSERTION;  Surgeon: Federico Sumner MD;  Location: [facility] OR;  Service: Ophthalmology;  Laterality: Right;    CIRCUMCISION  04/13/2005    COLONOSCOPY N/A 3/29/2017    Procedure: COLONOSCOPY Golytely prep;  Surgeon: Kavitha Cleaning MD;  Location: [facility];  Service: Endoscopy;  Laterality: N/A;    COLONOSCOPY N/A 1/12/2021    Procedure: COLONOSCOPY;  Surgeon: Dung Panda MD;  Location: [facility];  Service: Endoscopy;  Laterality: N/A;    COLONOSCOPY W/ POLYPECTOMY  01/10/2012    Repeat in 5 years     ESOPHAGOGASTRODUODENOSCOPY N/A 1/8/2019    Procedure: EGD (ESOPHAGOGASTRODUODENOSCOPY);  Surgeon: Rachel Burrows MD;  Location: [facility];  Service: Endoscopy;  Laterality: N/A;    ESOPHAGOGASTRODUODENOSCOPY N/A 5/14/2020    Procedure: ESOPHAGOGASTRODUODENOSCOPY (EGD);  Surgeon: Rachel Burrows MD;  Location: [facility];  Service: Endoscopy;  Laterality: N/A;  Dr. Luis Angel Burrows if possible.    FLEXIBLE SIGMOIDOSCOPY N/A 1/8/2019    Procedure: SIGMOIDOSCOPY, FLEXIBLE;  Surgeon: Rachel Burrows MD;  Location: [facility];  Service: Endoscopy;  Laterality: N/A;    PENILE PROSTHESIS IMPLANT      PROSTATE SURGERY  1999    Prostatectomy for prostate ca; [facility]    TRIGGER FINGER RELEASE Right 2015    TRIGGER FINGER RELEASE Right 11/8/2024    Procedure:  RELEASE, TRIGGER FINGER;  Surgeon: Anthony Cooper Jr., MD;  Location: [facility];  Service: Orthopedics;  Laterality: Right;       Allergies:  Review of patient's allergies indicates:  No Known Allergies    Family History:  Family History   Problem Relation Name Age of Onset    Hypertension Mother      No Known Problems Father unknown hx     Diabetes Sister Rasheeda     Cataracts Sister Rasheeda     No Known Problems Sister Cinthia     No Known Problems Sister Niki     No Known Problems Sister Shwetha     No Known Problems Brother Jeffery     Heart attack Brother Roverto     Coronary artery disease Brother Roverto     No Known Problems Brother Asad     Coronary artery disease Brother Milligan     Heart attack Brother Milligan     No Known Problems Daughter x5     No Known Problems Son x2     Blindness Neg Hx      Amblyopia Neg Hx      Glaucoma Neg Hx      Retinal detachment Neg Hx      Strabismus Neg Hx      Macular degeneration Neg Hx      Stroke Neg Hx      Thyroid disease Neg Hx      Colon cancer Neg Hx      Esophageal cancer Neg Hx         Social History:  Social History[1]    Review of Systems:  Review of Systems   All other systems reviewed and are negative.       Objective:   Last 24 Hour Vital Signs:  BP  Min: 103/72  Max: 154/71  Temp  Av.2 °F (36.8 °C)  Min: 97.8 °F (36.6 °C)  Max: 98.8 °F (37.1 °C)  Pulse  Av.8  Min: 69  Max: 114  Resp  Av.6  Min: 10  Max: 32  SpO2  Av.1 %  Min: 90 %  Max: 99 %  Body mass index is 26.59 kg/m².  I/O last 3 completed shifts:  In: 637.6 [P.O.:600; I.V.:37.6]  Out: 5850 [Urine:5850]    Physical Exam:  General: Alert and awake in NAD  HENT:  NCAT; anicteric sclera; OP clear with MMM  Cardio:  Regular rate and rhythm with normal S1 and S2; no murmurs or rubs  Resp:  Bilateral crackles. POCUS with R>L Simple pleural effusions  Abdom:  Soft, Non-tender  Extrem:  WWP with no clubbing, cyanosis or edema  Pulses:  2+ and symmetric distally  Neuro:  AAOx3;  cooperative and pleasant with no focal deficits    Imaging:    CTA Chest 6/28/2025:  Bilateral patchy, perihilar ground glass opacities an bilateral simple appearing effusions. Overall consistent with volume overload / pulmonary edema.    Assessment & Plan:     Neuro  No concerns, at baseline.    CVS  CHF Exacerbation  Presenting with CHF exacerbation 2/2 likely dietary indiscretion. Patient's family reports he had been eating a lot of fried food. Low normal EF on last echo but abnormal strain pattern which may indicate an infiltrative cardiomyopathy. Under evaluation as an outpatient. Initially with significant respiratory distress requiring BiPAP. Received lasix IV in the ED and diuresed very well. Continued to do well, weaned off BiPAP. Bedside pocus with large bilateral pleural effusions and B-lines    - Continue aggressive diuresis with goal of negative 2L at least per day  - Educated patient today regarding diet, daily weights, etc. Patient was not taking lasix as an outpatient  - Would likely discharge on lasix 40 po daily.    Tachycardia  Evaluated by cardiology, concern for an atrial tachycardia  - rate better controlled on amiodarone  - Cardiology following    Pulm  - As under CHF exacerbation above  - No significant snoring, family denies witnessed apneas    GI  - No acute concners    Renal  - Creatinine 1.4, appears 1.3 is his baseline    Heme  - No acute concerns    ID  - No acute concerns    Endocrine  - Goal blood glucose 140-180 while admitted    Dispo: Patient can step down to the floor today    Byron Chang M.D.  [facility] Pul[facility] Critical Care Fellow    Pt seen and examined with Pulmonary/Critical Care team and this note reviewed and validated with the following additional comments: Much improved.  Needs a little more diuresis but can step down.    The complexity of Medical Decision Making (MDM) was moderate.  The number of problems addressed was 2.  The risk of complications and/or  morbidity/mortality was moderate.  The tests ordered were EKG.  I communicated with the following providers [facility], .  Time spent in the care of this patient was 25 minutes.    Ta Silva MD  Phone [phone]                         [1]   Social History  Tobacco Use    Smoking status: Former     Current packs/day: 0.00     Average packs/day: 0.5 packs/day for 3.0 years (1.5 ttl pk-yrs)     Types: Cigarettes     Start date:      Quit date:      Years since quittin.5     Passive exposure: Never    Smokeless tobacco: Never    Tobacco comments:     smoked as a teenager   Substance Use Topics    Alcohol use: Yes     Comment: social drinks a beer 1-2 x month    Drug use: Never

## 2025-06-30 ENCOUNTER — TELEPHONE (OUTPATIENT)
Dept: FAMILY MEDICINE | Facility: CLINIC | Age: 85
End: 2025-06-30
Payer: MEDICARE

## 2025-06-30 VITALS
HEART RATE: 73 BPM | RESPIRATION RATE: 21 BRPM | OXYGEN SATURATION: 98 % | TEMPERATURE: 98 F | SYSTOLIC BLOOD PRESSURE: 117 MMHG | WEIGHT: 169.75 LBS | HEIGHT: 67 IN | BODY MASS INDEX: 26.64 KG/M2 | DIASTOLIC BLOOD PRESSURE: 57 MMHG

## 2025-06-30 LAB
ANION GAP (OHS): 9 MMOL/L (ref 8–16)
BUN SERPL-MCNC: 27 MG/DL (ref 8–23)
CALCIUM SERPL-MCNC: 9.2 MG/DL (ref 8.7–10.5)
CHLORIDE SERPL-SCNC: 102 MMOL/L (ref 95–110)
CO2 SERPL-SCNC: 27 MMOL/L (ref 23–29)
CREAT SERPL-MCNC: 1.3 MG/DL (ref 0.5–1.4)
ERYTHROCYTE [DISTWIDTH] IN BLOOD BY AUTOMATED COUNT: 13.8 % (ref 11.5–14.5)
GFR SERPLBLD CREATININE-BSD FMLA CKD-EPI: 55 ML/MIN/1.73/M2
GLUCOSE SERPL-MCNC: 155 MG/DL (ref 70–110)
HCT VFR BLD AUTO: 37.7 % (ref 40–54)
HGB BLD-MCNC: 12.4 GM/DL (ref 14–18)
MCH RBC QN AUTO: 28.1 PG (ref 27–31)
MCHC RBC AUTO-ENTMCNC: 32.9 G/DL (ref 32–36)
MCV RBC AUTO: 85 FL (ref 82–98)
OHS QRS DURATION: 100 MS
OHS QRS DURATION: 102 MS
OHS QRS DURATION: 102 MS
OHS QRS DURATION: 98 MS
OHS QTC CALCULATION: 456 MS
OHS QTC CALCULATION: 473 MS
OHS QTC CALCULATION: 477 MS
OHS QTC CALCULATION: 497 MS
PLATELET # BLD AUTO: 278 K/UL (ref 150–450)
PMV BLD AUTO: 10.3 FL (ref 9.2–12.9)
POCT GLUCOSE: 240 MG/DL (ref 70–110)
POTASSIUM SERPL-SCNC: 4.1 MMOL/L (ref 3.5–5.1)
RBC # BLD AUTO: 4.42 M/UL (ref 4.6–6.2)
SODIUM SERPL-SCNC: 138 MMOL/L (ref 136–145)
WBC # BLD AUTO: 5.03 K/UL (ref 3.9–12.7)

## 2025-06-30 PROCEDURE — 36415 COLL VENOUS BLD VENIPUNCTURE: CPT | Mod: HCNC | Performed by: REGISTERED NURSE

## 2025-06-30 PROCEDURE — 85027 COMPLETE CBC AUTOMATED: CPT | Mod: HCNC | Performed by: REGISTERED NURSE

## 2025-06-30 PROCEDURE — 25000003 PHARM REV CODE 250: Mod: HCNC | Performed by: NURSE PRACTITIONER

## 2025-06-30 PROCEDURE — 80048 BASIC METABOLIC PNL TOTAL CA: CPT | Mod: HCNC | Performed by: REGISTERED NURSE

## 2025-06-30 PROCEDURE — 99233 SBSQ HOSP IP/OBS HIGH 50: CPT | Mod: HCNC,,, | Performed by: NURSE PRACTITIONER

## 2025-06-30 PROCEDURE — 25000003 PHARM REV CODE 250: Mod: HCNC | Performed by: REGISTERED NURSE

## 2025-06-30 PROCEDURE — 94761 N-INVAS EAR/PLS OXIMETRY MLT: CPT | Mod: HCNC

## 2025-06-30 RX ORDER — FUROSEMIDE 40 MG/1
20 TABLET ORAL DAILY
Qty: 15 TABLET | Refills: 11 | Status: SHIPPED | OUTPATIENT
Start: 2025-06-30 | End: 2026-06-30

## 2025-06-30 RX ORDER — FUROSEMIDE 40 MG/1
40 TABLET ORAL 2 TIMES DAILY
Qty: 60 TABLET | Refills: 11 | Status: SHIPPED | OUTPATIENT
Start: 2025-06-30 | End: 2025-06-30

## 2025-06-30 RX ORDER — FUROSEMIDE 40 MG/1
40 TABLET ORAL 2 TIMES DAILY
Qty: 60 TABLET | Refills: 11 | Status: SHIPPED | OUTPATIENT
Start: 2025-06-30 | End: 2025-06-30 | Stop reason: HOSPADM

## 2025-06-30 RX ORDER — FUROSEMIDE 20 MG/1
20 TABLET ORAL DAILY
Status: DISCONTINUED | OUTPATIENT
Start: 2025-06-30 | End: 2025-06-30 | Stop reason: HOSPADM

## 2025-06-30 RX ADMIN — SPIRONOLACTONE 50 MG: 25 TABLET ORAL at 08:06

## 2025-06-30 RX ADMIN — ATORVASTATIN CALCIUM 40 MG: 40 TABLET, FILM COATED ORAL at 08:06

## 2025-06-30 RX ADMIN — ASPIRIN 81 MG: 81 TABLET, COATED ORAL at 08:06

## 2025-06-30 RX ADMIN — FUROSEMIDE 20 MG: 20 TABLET ORAL at 11:06

## 2025-06-30 RX ADMIN — METOPROLOL SUCCINATE 100 MG: 50 TABLET, EXTENDED RELEASE ORAL at 08:06

## 2025-06-30 RX ADMIN — LOSARTAN POTASSIUM 100 MG: 50 TABLET, FILM COATED ORAL at 08:06

## 2025-06-30 RX ADMIN — INSULIN ASPART 4 UNITS: 100 INJECTION, SOLUTION INTRAVENOUS; SUBCUTANEOUS at 11:06

## 2025-06-30 NOTE — NURSING
Ambulated around the ICU unit O2 SATs 95-98% HR 84-88. No complaints of SOB, No dizziness, no CP. Tolerated well

## 2025-06-30 NOTE — PLAN OF CARE
06/29/25 1907   Patient Assessment/Suction   Level of Consciousness (AVPU) alert   Respiratory Effort Normal;Unlabored   Expansion/Accessory Muscles/Retractions no retractions;no use of accessory muscles   Rhythm/Pattern, Respiratory depth regular;pattern regular;unlabored   PRE-TX-O2   Device (Oxygen Therapy) room air   SpO2 (!) 92 %   Pulse Oximetry Type Intermittent   $ Pulse Oximetry - Multiple Charge Pulse Oximetry - Multiple   Pulse 74   Resp 11   BP (!) 147/67   Respiratory Evaluation   $ Care Plan Tech Time 15 min

## 2025-06-30 NOTE — PLAN OF CARE
Pt AAOx4. Afebrile overnight. VSS. Lasix given once per dose ordered; UOP of 1550 mL.  Frequent checks for safety done during shift. Report given to BARNEY MCMULLEN.

## 2025-06-30 NOTE — HOSPITAL COURSE
84-year-old male who presented to ED today with shortness a breath x2 days. Patient is accompanied by wife reports worsening dyspnea on exertion. In triage patient's O2 sats were 78%. Labwork remarkable for elevated BNP and slightly bumped troponin. Patient denies any chest pain. Patient with respiratory acidosis, placed on BiPAP. CTA of the chest shows pulmonary edema and bilateral small pleural effusions.    He was started on IV diuresis with quick improvement in his symptoms and was weaned to RA. He was transitioned to PO lasix 40 mg daily at discharge, with instructions to adjust dose as needed based on weight change. He will follow-up in cardiology clinic for further care.

## 2025-06-30 NOTE — ASSESSMENT & PLAN NOTE
Creatine stable for now. BMP reviewed- noted Estimated Creatinine Clearance: 41 mL/min (based on SCr of 1.3 mg/dL). according to latest data. Based on current GFR, CKD stage is stage 3 - GFR 30-59.  Monitor UOP and serial BMP and adjust therapy as needed. Renally dose meds. Avoid nephrotoxic medications and procedures.    Patient's FSGs are controlled on current medication regimen.  Last A1c reviewed-   Lab Results   Component Value Date    HGBA1C 6.6 (H) 06/28/2025     Most recent fingerstick glucose reviewed-   Recent Labs   Lab 06/29/25  1626 06/29/25 2004 06/30/25  1025   POCTGLUCOSE 99 169* 240*     Current correctional scale  Medium  Maintain anti-hyperglycemic dose as follows-   Antihyperglycemics (From admission, onward)      Start     Stop Route Frequency Ordered    06/28/25 1523  insulin aspart U-100 pen 0-10 Units         -- SubQ Before meals & nightly PRN 06/28/25 1424          Hold Oral hypoglycemics while patient is in the hospital.

## 2025-06-30 NOTE — TELEPHONE ENCOUNTER
Copied from CRM #[id number]. Topic: Appointments - Appointment Access  >> Jun 30, 2025  2:13 PM Freya wrote:  Patient is requesting a sooner appointment.  Patient declined first available appointment listed as well as another facility and provider .  Patient will not accept being placed on the waitlist and is requesting a message be sent to doctor. (Hospital f/u)      Name of Caller: pt nurse       When is the first available appointment?:7/16      Would the patient rather a call back or a response via [facility]?: call       Best Call Back Number:.[phone]

## 2025-06-30 NOTE — ASSESSMENT & PLAN NOTE
Patient has Diastolic (HFpEF) heart failure that is Acute on chronic. On presentation their CHF was decompensated. Evidence of decompensated CHF on presentation includes: crackles on lung auscultation, orthopnea, paroxysmal nocturnal dyspnea (PND), dyspnea on exertion (PAIGE), and shortness of breath. The etiology of their decompensation is likely due to new pleural effusions and pulmonary edema. Most recent BNP and echo results are listed below.  Recent Labs     06/27/25  2357   *     Latest ECHO  Results for orders placed during the hospital encounter of 06/09/25    Echo    Interpretation Summary    Left Ventricle: The left ventricle is normal in size. Mildly increased wall thickness. There is concentric hypertrophy. Septal motion is consistent with bundle branch block. There is low normal systolic function with a visually estimated ejection fraction of 50 - 55%. Quantitated ejection fraction is 48%. Unable to assess diastolic function due to E-A fusion.    Global longitudinal strain is -9.6% there is apical sparing pattern that can be seen with infiltrative cardiomyopathy. Consider cardiac amyloid differential diagnosis.    Right Ventricle: The right ventricle is normal in size Systolic function is reduced.    Left Atrium: The left atrium is at the upper limit of normal in size The left atrium is dilated    Aortic Valve: There is moderate aortic regurgitation.    Mitral Valve: There is mild to moderate regurgitation with a eccentrically posterolateral directed jet.    Tricuspid Valve: There is mild to moderate regurgitation.    Pulmonic Valve: There is mild regurgitation.    Aorta: The aortic root is the upper limit of normal in size measuring 3.94 cm.    Pulmonary Artery: The estimated pulmonary artery systolic pressure is 39 mmHg.    IVC/SVC: Normal venous pressure at 3 mmHg.    Current Heart Failure Medications  losartan tablet 100 mg, Daily, Oral  metoprolol succinate (TOPROL-XL) 24 hr tablet 100 mg,  Daily, Oral  spironolactone tablet 50 mg, Daily, Oral  furosemide tablet 20 mg, Daily, Oral  furosemide (LASIX) tablet, Daily, Oral    Plan  - Monitor strict I&Os and daily weights.    - Place on telemetry  - Low sodium diet  - Place on fluid restriction of 1.5 L.   - Cardiology has been consulted  - The patient's volume status is improving but not at their baseline as indicated by edema, crackles on lung auscultation, orthopnea, paroxysmal nocturnal dyspnea (PND), dyspnea on exertion (PAIGE), and shortness of breath  - given 80 mg of Lasix in ED  -Cardiology following.  Agrees with Lasix b.i.d..  Continue Toprol.  Last echo reviewed  -Continueoprol/spironolactone, losartan  -Troponin elevation felt to be type 2 demand in the setting of CHF decompensation  We will add Jardiance at discharge

## 2025-06-30 NOTE — ASSESSMENT & PLAN NOTE
Patient's blood pressure range in the last 24 hours was: BP  Min: 115/58  Max: 153/70.The patient's inpatient anti-hypertensive regimen is listed below:  Current Antihypertensives  losartan tablet 100 mg, Daily, Oral  metoprolol succinate (TOPROL-XL) 24 hr tablet 100 mg, Daily, Oral  spironolactone tablet 50 mg, Daily, Oral  furosemide tablet 20 mg, Daily, Oral  furosemide (LASIX) tablet, Daily, Oral    Plan  - BP is controlled, no changes needed to their regimen

## 2025-06-30 NOTE — PLAN OF CARE
SW met with pt and pts wife at bedside to discuss dc planning. All information on chart confirmed. Pt resides in home with his wife. Pt is independent in ADLs. Pt has no dme/hh services at home. At time of dc pts wife will provide transport home. SW will continue to follow pt throughout his transitions of care and assist with any dc needs.       Future Appointments   Date Time Provider Department Center   7/14/2025  2:40 PM Geremias Gutierrez MD [facility] CARDIO [place] [facility]   7/16/2025  9:20 AM Luke Mcgee MD [facility] FAM MED [place] [facility]   7/31/2025  8:45 AM METAIRIE, VISUAL FIELDS [facility] OPHTHAL [place]   7/31/2025  9:40 AM Markus Zazueta OD [facility] OPTOMTY [place]   10/10/2025 10:40 AM Luke Mcgee MD [facility] MED [place] [facility]        06/30/25 1410   Discharge Assessment   Assessment Type Discharge Planning Assessment   Confirmed/corrected address, phone number and insurance Yes   Confirmed Demographics Correct on Facesheet   Source of Information patient   Communicated BURAK with patient/caregiver Yes   People in Home spouse   Name(s) of People in Home Dina Gil (Spouse)  [phone]   Do you expect to return to your current living situation? Yes   Do you have help at home or someone to help you manage your care at home? Yes   Who are your caregiver(s) and their phone number(s)? Dina Gil (Spouse)  [phone]   Prior to hospitilization cognitive status: Alert/Oriented   Current cognitive status: Alert/Oriented   Walking or Climbing Stairs Difficulty no   Dressing/Bathing Difficulty no   Home Layout Able to live on 1st floor   Equipment Currently Used at Home none   Readmission within 30 days? No   Patient currently being followed by outpatient case management? No   Do you currently have service(s) that help you manage your care at home? No   Do you take prescription medications? Yes   Do you have prescription coverage? Yes   Do you have any problems affording any of your prescribed medications? No   Is  the patient taking medications as prescribed? no   Who is going to help you get home at discharge? Dina Gil (Spouse)  [phone]   How do you get to doctors appointments? family or friend will provide;car, drives self   Are you on dialysis? No   Do you take coumadin? No   Discharge Plan A Home with family   DME Needed Upon Discharge  none   Discharge Plan discussed with: Patient;Spouse/sig other   Name(s) and Number(s) Dina Gil (Spouse)  [phone]   Transition of Care Barriers None   Financial Resource Strain   How hard is it for you to pay for the very basics like food, housing, medical care, and heating? Not hard   Housing Stability   In the last 12 months, was there a time when you were not able to pay the mortgage or rent on time? N   At any time in the past 12 months, were you homeless or living in a shelter (including now)? N   Transportation Needs   In the past 12 months, has lack of transportation kept you from medical appointments or from getting medications? no   In the past 12 months, has lack of transportation kept you from meetings, work, or from getting things needed for daily living? No   Food Insecurity   Within the past 12 months, you worried that your food would run out before you got the money to buy more. Never true   Within the past 12 months, the food you bought just didn't last and you didn't have money to get more. Never true   Stress   Do you feel stress - tense, restless, nervous, or anxious, or unable to sleep at night because your mind is troubled all the time - these days? Not at all   Social Isolation   How often do you feel lonely or isolated from those around you?  Never   Alcohol Use   Q1: How often do you have a drink containing alcohol? Monthly or l   Q2: How many drinks containing alcohol do you have on a typical day when you are drinking? 1 or 2   Q3: How often do you have six or more drinks on one occasion? Less than mo   Utilities   In the past 12 months has  the electric, gas, oil, or water company threatened to shut off services in your home? No   Health Literacy   How often do you need to have someone help you when you read instructions, pamphlets, or other written material from your doctor or pharmacy? Never

## 2025-06-30 NOTE — SUBJECTIVE & OBJECTIVE
Review of Systems   Constitutional: Negative for chills, decreased appetite, diaphoresis, fever, malaise/fatigue, weight gain and weight loss.   Cardiovascular:  Positive for dyspnea on exertion. Negative for chest pain, claudication, irregular heartbeat, leg swelling, near-syncope, orthopnea, palpitations and paroxysmal nocturnal dyspnea.   Respiratory:  Negative for cough, shortness of breath, snoring, sputum production and wheezing.    Endocrine: Negative for cold intolerance, heat intolerance, polydipsia, polyphagia and polyuria.   Skin:  Negative for color change, dry skin, itching, nail changes and poor wound healing.   Musculoskeletal:  Negative for back pain, gout, joint pain and joint swelling.   Gastrointestinal:  Negative for bloating, abdominal pain, constipation, diarrhea, hematemesis, hematochezia, melena, nausea and vomiting.   Genitourinary:  Negative for dysuria, hematuria and nocturia.   Neurological:  Negative for dizziness, headaches, light-headedness, numbness, paresthesias and weakness.   Psychiatric/Behavioral:  Negative for altered mental status, depression and memory loss.      Objective:     Vital Signs (Most Recent):  Temp: 97.7 °F (36.5 °C) (06/30/25 1026)  Pulse: 76 (06/30/25 1200)  Resp: 20 (06/30/25 1200)  BP: 139/65 (06/30/25 1200)  SpO2: 98 % (06/30/25 1200) Vital Signs (24h Range):  Temp:  [97.7 °F (36.5 °C)-98.6 °F (37 °C)] 97.7 °F (36.5 °C)  Pulse:  [67-87] 76  Resp:  [11-29] 20  SpO2:  [92 %-99 %] 98 %  BP: (115-153)/(58-89) 139/65     Weight: 77 kg (169 lb 12.1 oz)  Body mass index is 26.59 kg/m².     SpO2: 98 %         Intake/Output Summary (Last 24 hours) at 6/30/2025 1218  Last data filed at 6/30/2025 1143  Gross per 24 hour   Intake 600 ml   Output 2000 ml   Net -1400 ml       Lines/Drains/Airways       Drain  Duration             Male External Urine Management Device w/ Suction 06/28/25 0320 Other (Comment) 2 days              Peripheral Intravenous Line  Duration              Peripheral IV Single Lumen 06/27/25 2325 18 G Left Antecubital 2 days    Peripheral IV Single Lumen 06/27/25 2345 20 G Right Antecubital 2 days                       Physical Exam  Constitutional:       General: He is not in acute distress.     Appearance: He is well-developed.   Neck:      Vascular: No JVD.   Cardiovascular:      Rate and Rhythm: Normal rate and regular rhythm.      Heart sounds: No murmur heard.     No gallop.   Pulmonary:      Effort: Pulmonary effort is normal. No respiratory distress.      Breath sounds: Normal breath sounds. No wheezing.   Abdominal:      General: Bowel sounds are normal. There is no distension.      Palpations: Abdomen is soft.      Tenderness: There is no abdominal tenderness.   Musculoskeletal:      Cervical back: Normal range of motion and neck supple.   Skin:     General: Skin is warm and dry.   Neurological:      Mental Status: He is alert.      Comments: Disoriented    Psychiatric:         Behavior: Behavior normal.         Thought Content: Thought content normal.         Judgment: Judgment normal.                Significant Imaging: Echocardiogram: Transthoracic echo (TTE) complete (Cupid Only):   Results for orders placed or performed during the hospital encounter of 06/09/25   Echo   Result Value Ref Range    BSA 1.88 m2    Rainey's Biplane MOD Ejection Fraction 48 %    A2C EF 55 %    A4C EF 44 %    LVOT stroke volume 74.1 cm3    LVIDd 4.5 3.5 - 6.0 cm    LV Systolic Volume 60 mL    LV Systolic Volume Index 32.3 mL/m2    LVIDs 3.8 2.1 - 4.0 cm    LV ESV A2C 54.13 mL    LV Diastolic Volume 93 mL    LV ESV A4C 71.97 mL    LV Diastolic Volume Index 50.00 mL/m2    LV EDV A2C 49.315505470915304 mL    LV EDV A4C 81.02 mL    Left Ventricular End Systolic Volume by Teichholz Method 60.01 mL    Left Ventricular End Diastolic Volume by Teichholz Method 92.90 mL    IVS 1.3 (A) 0.6 - 1.1 cm    LVOT diameter 2.1 cm    LVOT area 3.5 cm2    FS 15.6 (A) 28 - 44 %    Left  Ventricle Relative Wall Thickness 0.58 cm    PW 1.3 (A) 0.6 - 1.1 cm    LV mass 222.6 g    LV Mass Index 119.7 g/m2    MV Peak E Abdifatah 0.96 m/s    TDI LATERAL 0.07 m/s    TDI SEPTAL 0.05 m/s    E/E' ratio 16 m/s    MV Peak A Abdifatah 0.30 m/s    TR Max Abdifatah 3.0 m/s    E/A ratio 3.20     E wave deceleration time 228 msec    LV SEPTAL E/E' RATIO 19.2 m/s    LV LATERAL E/E' RATIO 13.7 m/s    LVOT peak abdifatah 1.1 m/s    Left Ventricular Outflow Tract Mean Velocity 0.71 cm/s    Left Ventricular Outflow Tract Mean Gradient 2.34 mmHg    RV- duke basal diam 3.6 cm    TAPSE 1.2 cm    RV/LV Ratio 0.80 cm    LA Vol (MOD) 62 mL    CRUZ (MOD) 33 mL/m2    RA area length vol 47.34 mL    RA Area 17.7 cm2    RA vol index 25.45 mL/m2    RA Vol 49.15 mL    AV regurgitation pressure 1/2 time 419 ms    AR Max Abdifatah 4.00 m/s    AV mean gradient 5 mmHg    AV peak gradient 10 mmHg    Ao peak abdifatah 1.6 m/s    Ao VTI 28.3 cm    LVOT peak VTI 21.4 cm    AV valve area 2.6 cm²    AV Velocity Ratio 0.69     AV index (prosthetic) 0.76     LISA by Velocity Ratio 2.4 cm²    Mr max abdifatah 4.57 m/s    MV mean gradient 2 mmHg    MV peak gradient 5 mmHg    MV stenosis pressure 1/2 time 66.21 ms    MV valve area p 1/2 method 3.32 cm2    MV valve area by continuity eq 2.74 cm2    MV VTI 27.0 cm    Triscuspid Valve Regurgitation Peak Gradient 37 mmHg    PV PEAK VELOCITY 0.93 m/s    PV peak gradient 3 mmHg    Sinus 3.94 cm    ASI 2.1 cm/m2    STJ 3.1 cm    Ascending aorta 3.7 cm    ASI 2.0 cm/m2    IVC diameter 1.94 cm    Mean e' 0.06 m/s    ZLVIDS 1.38     ZLVIDD -1.40     LA area A4C 22.79 cm2    LA area A2C 19.70 cm2    TV resting pulmonary artery pressure 39 mmHg    RV TB RVSP 6 mmHg    Est. RA pres 3 mmHg    Narrative      Left Ventricle: The left ventricle is normal in size. Mildly increased   wall thickness. There is concentric hypertrophy. Septal motion is   consistent with bundle branch block. There is low normal systolic function   with a visually estimated  ejection fraction of 50 - 55%. Quantitated   ejection fraction is 48%. Unable to assess diastolic function due to E-A   fusion.    Global longitudinal strain is -9.6% there is apical sparing pattern   that can be seen with infiltrative cardiomyopathy. Consider cardiac   amyloid differential diagnosis.    Right Ventricle: The right ventricle is normal in size Systolic   function is reduced.    Left Atrium: The left atrium is at the upper limit of normal in size   The left atrium is dilated    Aortic Valve: There is moderate aortic regurgitation.    Mitral Valve: There is mild to moderate regurgitation with a   eccentrically posterolateral directed jet.    Tricuspid Valve: There is mild to moderate regurgitation.    Pulmonic Valve: There is mild regurgitation.    Aorta: The aortic root is the upper limit of normal in size measuring   3.94 cm.    Pulmonary Artery: The estimated pulmonary artery systolic pressure is   39 mmHg.    IVC/SVC: Normal venous pressure at 3 mmHg.

## 2025-06-30 NOTE — PLAN OF CARE
Pt will dc with no needs noted. Pts wife is at bedside and will provide transport home. SW will continue to follow pt throughout his transitions of care and assist with any dc needs.     Pt and wife aware pt is on waiting list to be seen sooner for pcp.     Cleared from CM . Bedside Nurse and VN notified.    Future Appointments   Date Time Provider Department Center   7/14/2025  2:40 PM Geremias Gutierrez MD [facility] CARDIO [place] [facility]   7/16/2025  9:20 AM Luke Mcgee MD [facility] MED [place] [facility]   7/31/2025  8:45 AM METAIRIE, VISUAL FIELDS [facility] OPHTHAL [place]   7/31/2025  9:40 AM Markus Zazueta OD [facility] OPTOMTY [place]   10/10/2025 10:40 AM Luke Mcgee MD [facility]        06/30/25 1420   Final Note   Assessment Type Final Discharge Note   Anticipated Discharge Disposition Home   Hospital Resources/Appts/Education Provided Appointments scheduled and added to AVS   Post-Acute Status   Discharge Delays None known at this time

## 2025-06-30 NOTE — EICU
Virtual ICU Quality Rounds    Admit Date: 6/27/2025  Hospital Day: 2    ICU Day: 2d 4h    24H Vital Sign Range:  Temp:  [97.8 °F (36.6 °C)-98.6 °F (37 °C)]   Pulse:  [67-81]   Resp:  [11-32]   BP: (115-153)/(58-89)   SpO2:  [92 %-97 %]     VICU Surveillance Screening  LDA reconciliation : Yes

## 2025-06-30 NOTE — DISCHARGE SUMMARY
[facility]  Discharge Summary      Patient Name: Bryant Gil  MRN: [id number]  MYRON: [id number]  Patient Class: IP- Inpatient  Admission Date: 6/27/2025  Hospital Length of Stay: 2 days  Discharge Date and Time: 06/30/2025 11:41 AM  Attending Physician: Andrew Dubois MD   Discharging Provider: Andrew Dubois MD  Primary Care Provider: Luke Mcgee MD    Primary Care Team: Networked reference to record PCT     HPI:   Patient is an 84-year-old male who presented to ED today with shortness a breath x2 days.  Patient is accompanied by wife reports worsening dyspnea on exertion.  In triage patient's O2 sats were 78%.  Labwork remarkable for elevated BNP and slightly bumped troponin.  Patient denies any chest pain.  Patient with respiratory acidosis, placed on BiPAP.  CTA of the chest shows pulmonary edema and bilateral small pleural effusions.  Patient was given 325 aspirin, 80 mg Lasix with good response, patient will be admitted to Hospital Medicine with a cardiology consult.    Recently saw Dr. Gutierrez o/p for new patient consult.  This month echo with 48% EF, moderate aortic valve regurg.  There was findings concerning for infiltrative cardiomyopathy.     * No surgery found *      Hospital Course:   84-year-old male who presented to ED today with shortness a breath x2 days. Patient is accompanied by wife reports worsening dyspnea on exertion. In triage patient's O2 sats were 78%. Labwork remarkable for elevated BNP and slightly bumped troponin. Patient denies any chest pain. Patient with respiratory acidosis, placed on BiPAP. CTA of the chest shows pulmonary edema and bilateral small pleural effusions.    He was started on IV diuresis with quick improvement in his symptoms and was weaned to RA. He was transitioned to PO lasix 20 mg daily at discharge, with instructions to adjust dose as needed based on weight change. He will follow-up in cardiology clinic for further care.       Goals of Care Treatment Preferences:  Code Status: Full Code         Consults:   Consults (From admission, onward)          Status Ordering Provider     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 BMP reviewed- noted Estimated Creatinine Clearance: 41 mL/min (based on SCr of 1.3 mg/dL). according to latest data. Based on current GFR, CKD stage is stage 3 - GFR 30-59.  Monitor UOP and serial BMP and adjust therapy as needed. Renally dose meds. Avoid nephrotoxic medications and procedures.    Patient's FSGs are controlled on current medication regimen.  Last A1c reviewed-   Lab Results   Component Value Date    HGBA1C 6.6 (H) 06/28/2025     Most recent fingerstick glucose reviewed-   Recent Labs   Lab 06/29/25  1626 06/29/25 2004 06/30/25  1025   POCTGLUCOSE 99 169* 240*     Current correctional scale  Medium  Maintain anti-hyperglycemic dose as follows-   Antihyperglycemics (From admission, onward)      Start     Stop Route Frequency Ordered    06/28/25 1523  insulin aspart U-100 pen 0-10 Units         -- SubQ Before meals & nightly PRN 06/28/25 1424          Hold Oral hypoglycemics while patient is in the hospital.    CHF exacerbation  Patient has Diastolic (HFpEF) heart failure that is Acute on chronic. On presentation their CHF was decompensated. Evidence of decompensated CHF on presentation includes: crackles on lung auscultation, orthopnea, paroxysmal nocturnal dyspnea (PND), dyspnea on exertion (PAIGE), and shortness of breath. The etiology of their decompensation is likely due to new pleural effusions and pulmonary edema. Most recent BNP and echo results are listed below.  Recent Labs     06/27/25  2357   *     Latest ECHO  Results for orders placed during the hospital encounter of 06/09/25    Echo    Interpretation Summary     Left Ventricle: The left ventricle is normal in size. Mildly increased wall thickness. There is concentric hypertrophy. Septal motion is consistent with bundle branch block. There is low normal systolic function with a visually estimated ejection fraction of 50 - 55%. Quantitated ejection fraction is 48%. Unable to assess diastolic function due to E-A fusion.    Global longitudinal strain is -9.6% there is apical sparing pattern that can be seen with infiltrative cardiomyopathy. Consider cardiac amyloid differential diagnosis.    Right Ventricle: The right ventricle is normal in size Systolic function is reduced.    Left Atrium: The left atrium is at the upper limit of normal in size The left atrium is dilated    Aortic Valve: There is moderate aortic regurgitation.    Mitral Valve: There is mild to moderate regurgitation with a eccentrically posterolateral directed jet.    Tricuspid Valve: There is mild to moderate regurgitation.    Pulmonic Valve: There is mild regurgitation.    Aorta: The aortic root is the upper limit of normal in size measuring 3.94 cm.    Pulmonary Artery: The estimated pulmonary artery systolic pressure is 39 mmHg.    IVC/SVC: Normal venous pressure at 3 mmHg.    Current Heart Failure Medications  losartan tablet 100 mg, Daily, Oral  metoprolol succinate (TOPROL-XL) 24 hr tablet 100 mg, Daily, Oral  spironolactone tablet 50 mg, Daily, Oral  furosemide tablet 20 mg, Daily, Oral  furosemide (LASIX) tablet, Daily, Oral    Plan  - Monitor strict I&Os and daily weights.    - Place on telemetry  - Low sodium diet  - Place on fluid restriction of 1.5 L.   - Cardiology has been consulted  - The patient's volume status is improving but not at their baseline as indicated by edema, crackles on lung auscultation, orthopnea, paroxysmal nocturnal dyspnea (PND), dyspnea on exertion (PAIGE), and shortness of breath  - given 80 mg of Lasix in ED  -Cardiology following.  Agrees with Lasix b.i.d..  Continue  Toprol.  Last echo reviewed  -Continueoprol/spironolactone, losartan  -Troponin elevation felt to be type 2 demand in the setting of CHF decompensation  We will add Jardiance at discharge          HTN (hypertension)  Patient's blood pressure range in the last 24 hours was: BP  Min: 115/58  Max: 153/70.The patient's inpatient anti-hypertensive regimen is listed below:  Current Antihypertensives  losartan tablet 100 mg, Daily, Oral  metoprolol succinate (TOPROL-XL) 24 hr tablet 100 mg, Daily, Oral  spironolactone tablet 50 mg, Daily, Oral  furosemide tablet 20 mg, Daily, Oral  furosemide (LASIX) tablet, Daily, Oral    Plan  - BP is controlled, no changes needed to their regimen    Final Active Diagnoses:    Diagnosis Date Noted POA    PRINCIPAL PROBLEM:  CHF exacerbation [I50.9] 06/28/2025 Yes    HTN (hypertension) [I10] 06/28/2025 Yes    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    Significant Diagnostic Studies: Labs: BMP:   Recent Labs   Lab 06/28/25 2009 06/29/25  0306 06/30/25  0344   * 173* 155*    139 138   K 4.1 3.9 4.1    104 102   CO2 24 26 27   BUN 27* 26* 27*   CREATININE 1.4 1.3 1.3   CALCIUM 9.5 9.1 9.2   MG  --  2.2  --     and CMP   Recent Labs   Lab 06/28/25 2009 06/29/25  0306 06/30/25  0344    139 138   K 4.1 3.9 4.1    104 102   CO2 24 26 27   * 173* 155*   BUN 27* 26* 27*   CREATININE 1.4 1.3 1.3   CALCIUM 9.5 9.1 9.2   ANIONGAP 11 9 9       Pending Diagnostic Studies:       None           Medications:  Reconciled Home Medications:      Medication List        CHANGE how you take these medications      furosemide 40 MG tablet  Commonly known as: LASIX  Take 0.5 tablets (20 mg total) by mouth once daily.  What changed:   medication strength  when to take  this  reasons to take this            CONTINUE taking these medications      ALCOHOL PREP PAD SPACER  Generic drug: alcohol swabs  USE FOR HOME GLUCOSE MONITORING DAILY     amLODIPine 10 MG tablet  Commonly known as: NORVASC  TAKE 1 TABLET EVERY DAY     aspirin 81 MG EC tablet  Commonly known as: ECOTRIN  Take 81 mg by mouth once daily.     atorvastatin 40 MG tablet  Commonly known as: LIPITOR  Take 1 tablet (40 mg total) by mouth once daily.     * blood sugar diagnostic Strp  Commonly known as: TRUE METRIX GLUCOSE TEST STRIP  Test Blood Sugar twice daily     * blood sugar diagnostic Strp  To check BG 1 times daily, to use with insurance preferred meter     * blood-glucose meter Misc  Commonly known as: TRUE METRIX GLUCOSE METER  Test blood sugar twice a day     * blood-glucose meter kit  To check BG 1 times daily, to use with insurance preferred meter     clobetasoL 0.05 % cream  Commonly known as: TEMOVATE  Apply to scar in ear twice daily until flat.     DEXCOM G7 SENSOR Shanice  Generic drug: blood-glucose sensor  Use continue glucose monitor to check sugar daily     glipiZIDE 10 MG Tr24  Commonly known as: GLUCOTROL  Take 1 tablet (10 mg total) by mouth daily with breakfast.     HYDROcodone-acetaminophen 5-325 mg per tablet  Commonly known as: NORCO  Take 1 tablet by mouth every 4 (four) hours as needed for Pain.     hydrocortisone 2.5 % cream  Apply topically 2 (two) times daily.     * lancets 30 gauge Misc  Commonly known as: TRUEPLUS LANCETS  Check sugar twice daily     * lancets Misc  To check BG 1 times daily, to use with insurance preferred meter     losartan 100 MG tablet  Commonly known as: COZAAR  Take 1 tablet (100 mg total) by mouth once daily.     metFORMIN 1000 MG tablet  Commonly known as: GLUCOPHAGE  Take 1 tablet (1,000 mg total) by mouth 2 (two) times daily with meals.     metoprolol succinate 100 MG 24 hr tablet  Commonly known as: TOPROL-XL  TAKE 1 TABLET EVERY DAY     pantoprazole 40 MG  tablet  Commonly known as: PROTONIX  Take 1 tablet (40 mg total) by mouth once daily.     spironolactone 50 MG tablet  Commonly known as: ALDACTONE  Take 1 tablet (50 mg total) by mouth once daily.           * This list has 6 medication(s) that are the same as other medications prescribed for you. Read the directions carefully, and ask your doctor or other care provider to review them with you.                  Indwelling Lines/Drains at time of discharge:   Lines/Drains/Airways       Drain  Duration             Male External Urine Management Device w/ Suction 06/28/25 0320 Other (Comment) 2 days                        Time spent on the discharge of patient: 50 minutes        Andrew Dubois MD  Department of Hospital Medicine  [facility] - Intensive Care

## 2025-06-30 NOTE — DISCHARGE INSTRUCTIONS
You were admitted due to trouble breathing which was caused by heart failure exacerbation. Please start taking lasix 40 mg every day to help get rid of extra fluid from your body. We will get you follow-up in cardiology clinic.    It was a pleasure taking care of you!

## 2025-06-30 NOTE — ASSESSMENT & PLAN NOTE
- SBP 120s-140s  - on Losartan 100 Toprol  Aldactone 50 as an outpatient- continued while admitted  - BP at goal; continue current regimen  - monitor BP and adjust meds as needed

## 2025-06-30 NOTE — ASSESSMENT & PLAN NOTE
- related to diastolic etiology  -  upon presentation; volume overloaded upon presentation; volume overload felt to be related to dietary indiscretion of salt intake  - aggressively diuresed with IV Lasix with good response with 2.1L out overnight negative 6.1L since admission   - BP stable; will transition to oral Lasix daily upon discharge; stressed importance of dietary restrictions  - OOB and ambulate and anticipate discharge later today vs tomorrow

## 2025-06-30 NOTE — NURSING
Removed IV and cardiac monitoring. AVS handed education provided. No questions or concerns. Medications delivered to [facility] pharmacy.

## 2025-06-30 NOTE — TELEPHONE ENCOUNTER
Pt d/c'd today from hospital  Called patient, had to leave message on voice mail for pt to call back

## 2025-06-30 NOTE — HOSPITAL COURSE
Per Dr. Gavin Consult noted 85yo male with mildly reduced EF, moderate aortic regurgitation, HTN, HLP, type 2 DM, PVCs 0 presented with a worsening shortness of breath.  CT chest negative for PE but showed pulmonary edema and bilateral pleural effusion.  He is being worked up for infiltrative cardiomyopathy.  Patient was given 80 IV Lasix in the ER and placed on BiPAP with good response.  Troponins borderline elevation non ACS trend.  Initial lactic acid mildly elevated 2.4 and decreased to 1.5.  EKG with baseline artifacts but appears to be sinus tachycardia with frequent PACs.  This morning when seen appears to be in sustained atrial tachycardia 120s not responding to IV push Lopressor or digoxin.  He feels better this morning.         1. Heart failure reduced ejection fraction decompensation/combined systolic and diastolic  2. Acute hypoxic respiratory failure secondary to above  3. Concerns for infiltrative cardiomyopathy?  Pending workup for AL amyloid  4. Moderate aortic regurgitation  5. Atrial tachycardia   6. Elevated troponins      Plan  Responded well to IV diuresis  Continue IV diuresis 40 mg b.i.d..  I's and O's and daily weight  Back sinus rhythm  Continue Toprol 100 mg daily  Troponin elevation felt to be type 2 demand in the setting of CHF decompensation  Continue Toprol/spironolactone  We will add Jardiance at discharge  Continue losartan    6/30/3025 Aggressively diuresed with IV Lasix with 2.1L out overnight negative 6.1L since admission. HR and BP stable overnight. CBC WNL BMP WNL.

## 2025-06-30 NOTE — PROGRESS NOTES
[facility] - Intensive Care  Cardiology  Progress Note    Patient Name: Bryant Gil  MRN: [id number]  Admission Date: 6/27/2025  Hospital Length of Stay: 2 days  Code Status: Full Code   Attending Physician: Andrew Dubois MD   Primary Care Physician: Luke Mcgee MD  Expected Discharge Date: 6/30/2025  Principal Problem:CHF exacerbation    Subjective:     Hospital Course:   Per Dr. Gavin Consult noted 85yo male with mildly reduced EF, moderate aortic regurgitation, HTN, HLP, type 2 DM, PVCs 0 presented with a worsening shortness of breath.  CT chest negative for PE but showed pulmonary edema and bilateral pleural effusion.  He is being worked up for infiltrative cardiomyopathy.  Patient was given 80 IV Lasix in the ER and placed on BiPAP with good response.  Troponins borderline elevation non ACS trend.  Initial lactic acid mildly elevated 2.4 and decreased to 1.5.  EKG with baseline artifacts but appears to be sinus tachycardia with frequent PACs.  This morning when seen appears to be in sustained atrial tachycardia 120s not responding to IV push Lopressor or digoxin.  He feels better this morning.         1. Heart failure reduced ejection fraction decompensation/combined systolic and diastolic  2. Acute hypoxic respiratory failure secondary to above  3. Concerns for infiltrative cardiomyopathy?  Pending workup for AL amyloid  4. Moderate aortic regurgitation  5. Atrial tachycardia   6. Elevated troponins      Plan  Responded well to IV diuresis  Continue IV diuresis 40 mg b.i.d..  I's and O's and daily weight  Back sinus rhythm  Continue Toprol 100 mg daily  Troponin elevation felt to be type 2 demand in the setting of CHF decompensation  Continue Toprol/spironolactone  We will add Jardiance at discharge  Continue losartan    6/30/3025 Aggressively diuresed with IV Lasix with 2.1L out overnight negative 6.1L since admission. HR and BP stable overnight. CBC WNL BMP WNL.            Review of Systems    Constitutional: Negative for chills, decreased appetite, diaphoresis, fever, malaise/fatigue, weight gain and weight loss.   Cardiovascular:  Positive for dyspnea on exertion. Negative for chest pain, claudication, irregular heartbeat, leg swelling, near-syncope, orthopnea, palpitations and paroxysmal nocturnal dyspnea.   Respiratory:  Negative for cough, shortness of breath, snoring, sputum production and wheezing.    Endocrine: Negative for cold intolerance, heat intolerance, polydipsia, polyphagia and polyuria.   Skin:  Negative for color change, dry skin, itching, nail changes and poor wound healing.   Musculoskeletal:  Negative for back pain, gout, joint pain and joint swelling.   Gastrointestinal:  Negative for bloating, abdominal pain, constipation, diarrhea, hematemesis, hematochezia, melena, nausea and vomiting.   Genitourinary:  Negative for dysuria, hematuria and nocturia.   Neurological:  Negative for dizziness, headaches, light-headedness, numbness, paresthesias and weakness.   Psychiatric/Behavioral:  Negative for altered mental status, depression and memory loss.      Objective:     Vital Signs (Most Recent):  Temp: 97.7 °F (36.5 °C) (06/30/25 1026)  Pulse: 76 (06/30/25 1200)  Resp: 20 (06/30/25 1200)  BP: 139/65 (06/30/25 1200)  SpO2: 98 % (06/30/25 1200) Vital Signs (24h Range):  Temp:  [97.7 °F (36.5 °C)-98.6 °F (37 °C)] 97.7 °F (36.5 °C)  Pulse:  [67-87] 76  Resp:  [11-29] 20  SpO2:  [92 %-99 %] 98 %  BP: (115-153)/(58-89) 139/65     Weight: 77 kg (169 lb 12.1 oz)  Body mass index is 26.59 kg/m².     SpO2: 98 %         Intake/Output Summary (Last 24 hours) at 6/30/2025 1218  Last data filed at 6/30/2025 1143  Gross per 24 hour   Intake 600 ml   Output 2000 ml   Net -1400 ml       Lines/Drains/Airways       Drain  Duration             Male External Urine Management Device w/ Suction 06/28/25 0320 Other (Comment) 2 days              Peripheral Intravenous Line  Duration             Peripheral IV  Single Lumen 06/27/25 2325 18 G Left Antecubital 2 days    Peripheral IV Single Lumen 06/27/25 2345 20 G Right Antecubital 2 days                       Physical Exam  Constitutional:       General: He is not in acute distress.     Appearance: He is well-developed.   Neck:      Vascular: No JVD.   Cardiovascular:      Rate and Rhythm: Normal rate and regular rhythm.      Heart sounds: No murmur heard.     No gallop.   Pulmonary:      Effort: Pulmonary effort is normal. No respiratory distress.      Breath sounds: Normal breath sounds. No wheezing.   Abdominal:      General: Bowel sounds are normal. There is no distension.      Palpations: Abdomen is soft.      Tenderness: There is no abdominal tenderness.   Musculoskeletal:      Cervical back: Normal range of motion and neck supple.   Skin:     General: Skin is warm and dry.   Neurological:      Mental Status: He is alert.      Comments: Disoriented    Psychiatric:         Behavior: Behavior normal.         Thought Content: Thought content normal.         Judgment: Judgment normal.                Significant Imaging: Echocardiogram: Transthoracic echo (TTE) complete ([facility] Only):   Results for orders placed or performed during the hospital encounter of 06/09/25   Echo   Result Value Ref Range    BSA 1.88 m2    Rainey's Biplane MOD Ejection Fraction 48 %    A2C EF 55 %    A4C EF 44 %    LVOT stroke volume 74.1 cm3    LVIDd 4.5 3.5 - 6.0 cm    LV Systolic Volume 60 mL    LV Systolic Volume Index 32.3 mL/m2    LVIDs 3.8 2.1 - 4.0 cm    LV ESV A2C 54.13 mL    LV Diastolic Volume 93 mL    LV ESV A4C 71.97 mL    LV Diastolic Volume Index 50.00 mL/m2    LV EDV A2C 49.629187489114499 mL    LV EDV A4C 81.02 mL    Left Ventricular End Systolic Volume by Teichholz Method 60.01 mL    Left Ventricular End Diastolic Volume by Teichholz Method 92.90 mL    IVS 1.3 (A) 0.6 - 1.1 cm    LVOT diameter 2.1 cm    LVOT area 3.5 cm2    FS 15.6 (A) 28 - 44 %    Left Ventricle Relative Wall  Thickness 0.58 cm    PW 1.3 (A) 0.6 - 1.1 cm    LV mass 222.6 g    LV Mass Index 119.7 g/m2    MV Peak E Abdifatah 0.96 m/s    TDI LATERAL 0.07 m/s    TDI SEPTAL 0.05 m/s    E/E' ratio 16 m/s    MV Peak A Abdifatah 0.30 m/s    TR Max Abdifatah 3.0 m/s    E/A ratio 3.20     E wave deceleration time 228 msec    LV SEPTAL E/E' RATIO 19.2 m/s    LV LATERAL E/E' RATIO 13.7 m/s    LVOT peak abdifatah 1.1 m/s    Left Ventricular Outflow Tract Mean Velocity 0.71 cm/s    Left Ventricular Outflow Tract Mean Gradient 2.34 mmHg    RV- duke basal diam 3.6 cm    TAPSE 1.2 cm    RV/LV Ratio 0.80 cm    LA Vol (MOD) 62 mL    CRUZ (MOD) 33 mL/m2    RA area length vol 47.34 mL    RA Area 17.7 cm2    RA vol index 25.45 mL/m2    RA Vol 49.15 mL    AV regurgitation pressure 1/2 time 419 ms    AR Max Abdifatah 4.00 m/s    AV mean gradient 5 mmHg    AV peak gradient 10 mmHg    Ao peak abdifatah 1.6 m/s    Ao VTI 28.3 cm    LVOT peak VTI 21.4 cm    AV valve area 2.6 cm²    AV Velocity Ratio 0.69     AV index (prosthetic) 0.76     LISA by Velocity Ratio 2.4 cm²    Mr max abdifatah 4.57 m/s    MV mean gradient 2 mmHg    MV peak gradient 5 mmHg    MV stenosis pressure 1/2 time 66.21 ms    MV valve area p 1/2 method 3.32 cm2    MV valve area by continuity eq 2.74 cm2    MV VTI 27.0 cm    Triscuspid Valve Regurgitation Peak Gradient 37 mmHg    PV PEAK VELOCITY 0.93 m/s    PV peak gradient 3 mmHg    Sinus 3.94 cm    ASI 2.1 cm/m2    STJ 3.1 cm    Ascending aorta 3.7 cm    ASI 2.0 cm/m2    IVC diameter 1.94 cm    Mean e' 0.06 m/s    ZLVIDS 1.38     ZLVIDD -1.40     LA area A4C 22.79 cm2    LA area A2C 19.70 cm2    TV resting pulmonary artery pressure 39 mmHg    RV TB RVSP 6 mmHg    Est. RA pres 3 mmHg    Narrative      Left Ventricle: The left ventricle is normal in size. Mildly increased   wall thickness. There is concentric hypertrophy. Septal motion is   consistent with bundle branch block. There is low normal systolic function   with a visually estimated ejection fraction of 50 - 55%.  Quantitated   ejection fraction is 48%. Unable to assess diastolic function due to E-A   fusion.    Global longitudinal strain is -9.6% there is apical sparing pattern   that can be seen with infiltrative cardiomyopathy. Consider cardiac   amyloid differential diagnosis.    Right Ventricle: The right ventricle is normal in size Systolic   function is reduced.    Left Atrium: The left atrium is at the upper limit of normal in size   The left atrium is dilated    Aortic Valve: There is moderate aortic regurgitation.    Mitral Valve: There is mild to moderate regurgitation with a   eccentrically posterolateral directed jet.    Tricuspid Valve: There is mild to moderate regurgitation.    Pulmonic Valve: There is mild regurgitation.    Aorta: The aortic root is the upper limit of normal in size measuring   3.94 cm.    Pulmonary Artery: The estimated pulmonary artery systolic pressure is   39 mmHg.    IVC/SVC: Normal venous pressure at 3 mmHg.       Assessment and Plan:     Brief HPI: Seen this morning on AM NP rounds while resting in bed. Reports feeling better since admission. Discussed POC as detailed below-verbalized understanding and agrees with POC      * CHF exacerbation  - related to diastolic etiology  -  upon presentation; volume overloaded upon presentation; volume overload felt to be related to dietary indiscretion of salt intake  - aggressively diuresed with IV Lasix with good response with 2.1L out overnight negative 6.1L since admission   - BP stable; will transition to oral Lasix daily upon discharge; stressed importance of dietary restrictions  - OOB and ambulate and anticipate discharge later today vs tomorrow     HTN (hypertension)  - SBP 120s-140s  - on Losartan 100 Toprol  Aldactone 50 as an outpatient- continued while admitted  - BP at goal; continue current regimen  - monitor BP and adjust meds as needed         VTE Risk Mitigation (From admission, onward)           Ordered      enoxaparin injection 40 mg  Daily         06/28/25 1054     IP VTE HIGH RISK PATIENT  Once         06/28/25 0732     Place sequential compression device  Until discontinued         06/28/25 0732                    JHONATAN Blackwell, ANP  Cardiology  [facility] - Intensive Care

## 2025-07-01 ENCOUNTER — OFFICE VISIT (OUTPATIENT)
Dept: FAMILY MEDICINE | Facility: CLINIC | Age: 85
End: 2025-07-01
Attending: FAMILY MEDICINE
Payer: MEDICARE

## 2025-07-01 VITALS
DIASTOLIC BLOOD PRESSURE: 64 MMHG | HEIGHT: 67 IN | BODY MASS INDEX: 25.47 KG/M2 | OXYGEN SATURATION: 99 % | WEIGHT: 162.25 LBS | HEART RATE: 79 BPM | SYSTOLIC BLOOD PRESSURE: 118 MMHG

## 2025-07-01 DIAGNOSIS — I49.3 PVC (PREMATURE VENTRICULAR CONTRACTION): ICD-10-CM

## 2025-07-01 DIAGNOSIS — E11.42 TYPE 2 DIABETES MELLITUS WITH DIABETIC POLYNEUROPATHY, WITHOUT LONG-TERM CURRENT USE OF INSULIN: ICD-10-CM

## 2025-07-01 DIAGNOSIS — I50.9 ACUTE ON CHRONIC CONGESTIVE HEART FAILURE, UNSPECIFIED HEART FAILURE TYPE: ICD-10-CM

## 2025-07-01 DIAGNOSIS — I10 ESSENTIAL HYPERTENSION: ICD-10-CM

## 2025-07-01 DIAGNOSIS — R79.89 ELEVATED TROPONIN I LEVEL: ICD-10-CM

## 2025-07-01 DIAGNOSIS — E78.2 MIXED HYPERLIPIDEMIA: ICD-10-CM

## 2025-07-01 DIAGNOSIS — N18.31 STAGE 3A CHRONIC KIDNEY DISEASE: ICD-10-CM

## 2025-07-01 DIAGNOSIS — Z09 HOSPITAL DISCHARGE FOLLOW-UP: Primary | ICD-10-CM

## 2025-07-01 DIAGNOSIS — I47.10 PSVT (PAROXYSMAL SUPRAVENTRICULAR TACHYCARDIA): ICD-10-CM

## 2025-07-01 PROCEDURE — 99999 PR PBB SHADOW E&M-EST. PATIENT-LVL V: CPT | Mod: PBBFAC,HCNC,, | Performed by: FAMILY MEDICINE

## 2025-07-01 NOTE — PROGRESS NOTES
Subjective:       Patient ID: Bryant Gil is a 84 y.o. male.    Chief Complaint: Hospital Follow Up (Was d/c'd yesterday )    84 yr old pleasant male with CKD 3, DM II, HTN, HLD, atrial tachycardia, presents today for follow up from previous hospitalization. He was admitted for ADHF, diuretics started. He is much better now and has follow up with cardiology. His BNP and troponin were elevated while he was hospitalized.       DM II - controlled -   HGBA1C                   6.8 (H)             03/07/2025                                                - on metformin n glipizide - compliant - no hypoglycemic symptoms - mild CKD 3      HTN - controlled - on HYZAAR, amlodipine and metoprolol      HLD - improving - on statin -      normal lipids -     History as below - reviewed             Shortness of Breath  This is a recurrent problem. The current episode started 1 to 4 weeks ago. The problem occurs intermittently. The problem has been unchanged. Pertinent negatives include no chest pain, ear pain, headaches, neck pain, rash, rhinorrhea, vomiting or wheezing. Exacerbated by: lying flat. He has tried rest for the symptoms. The treatment provided mild relief. There is no history of allergies, bronchiolitis, CAD, DVT, PE or a recent surgery.   Follow-up  Pertinent negatives include no arthralgias, chest pain, congestion, coughing, diaphoresis, headaches, joint swelling, myalgias, neck pain, rash, visual change, vomiting or weakness.   Medication Refill  Pertinent negatives include no arthralgias, chest pain, congestion, coughing, diaphoresis, headaches, joint swelling, myalgias, neck pain, rash, visual change, vomiting or weakness.   Diabetes  He presents for his follow-up diabetic visit. He has type 2 diabetes mellitus. His disease course has been stable. Pertinent negatives for hypoglycemia include no confusion, dizziness, headaches, hunger, nervousness/anxiousness, seizures, speech difficulty or tremors.  Pertinent negatives for diabetes include no blurred vision, no chest pain, no foot paresthesias, no foot ulcerations, no polydipsia, no polyuria, no visual change, no weakness and no weight loss. Pertinent negatives for hypoglycemia complications include no blackouts, no nocturnal hypoglycemia and no required assistance. Symptoms are stable. Pertinent negatives for diabetic complications include no autonomic neuropathy, impotence, nephropathy, peripheral neuropathy or retinopathy. Risk factors for coronary artery disease include diabetes mellitus, dyslipidemia and male sex. Current diabetic treatment includes oral agent (dual therapy). He is compliant with treatment all of the time. Meal planning includes avoidance of concentrated sweets. He has not had a previous visit with a dietitian. He rarely participates in exercise. There is no change in his home blood glucose trend. An ACE inhibitor/angiotensin II receptor blocker is being taken. He does not see a podiatrist.Eye exam is not current.   Hypertension  This is a chronic problem. The current episode started more than 1 year ago. The problem has been gradually improving since onset. The problem is controlled. Associated symptoms include shortness of breath. Pertinent negatives include no blurred vision, chest pain, headaches, neck pain or palpitations. There are no associated agents to hypertension. Risk factors for coronary artery disease include dyslipidemia, male gender and diabetes mellitus. Past treatments include angiotensin blockers, diuretics, calcium channel blockers and beta blockers. The current treatment provides significant improvement. There are no compliance problems.  There is no history of angina, CAD/MI or retinopathy. There is no history of chronic renal disease, hyperaldosteronism, hyperparathyroidism, a hypertension causing med, renovascular disease or a thyroid problem.   Hyperlipidemia  This is a chronic problem. The current episode started  more than 1 year ago. The problem is controlled. Recent lipid tests were reviewed and are normal. He has no history of chronic renal disease. There are no known factors aggravating his hyperlipidemia. Associated symptoms include shortness of breath. Pertinent negatives include no chest pain or myalgias. Current antihyperlipidemic treatment includes statins. The current treatment provides significant improvement of lipids. There are no compliance problems.  Risk factors for coronary artery disease include diabetes mellitus, dyslipidemia, hypertension and male sex.     Review of Systems   Constitutional: Negative.  Negative for activity change, diaphoresis, unexpected weight change and weight loss.   HENT:  Negative for nasal congestion, ear pain, hearing loss, mouth sores, rhinorrhea, trouble swallowing and voice change.    Eyes: Negative.  Negative for blurred vision, pain, discharge and visual disturbance.   Respiratory:  Positive for shortness of breath. Negative for apnea, cough, chest tightness and wheezing.    Cardiovascular: Negative.  Negative for chest pain and palpitations.   Gastrointestinal: Negative.  Negative for abdominal distention, anal bleeding, blood in stool, constipation, diarrhea and vomiting.   Endocrine: Negative.  Negative for cold intolerance, polydipsia and polyuria.   Genitourinary: Negative.  Negative for decreased urine volume, difficulty urinating, discharge, frequency, hematuria, impotence, scrotal swelling and urgency.   Musculoskeletal:  Negative for arthralgias, back pain, joint swelling, myalgias, neck pain and neck stiffness.   Integumentary:  Negative for color change and rash. Negative.   Allergic/Immunologic: Negative.  Negative for environmental allergies.   Neurological:  Negative for dizziness, tremors, seizures, speech difficulty, weakness, light-headedness and headaches.   Hematological: Negative.    Psychiatric/Behavioral: Negative.  Negative for agitation, confusion,  dysphoric mood and suicidal ideas. The patient is not nervous/anxious.          PMH/PSH/FH/SH/MED/ALLERGY reviewed    Past Medical History:   Diagnosis Date    Cataract of both eyes     CHF exacerbation 6/28/2025    Chronic gastritis without bleeding, negative H. pylori Jaunuary 2019 EGD 01/30/2020    Diabetes mellitus     Diverticulosis     Ectatic aorta 02/06/2018    HTN (hypertension)     HTN (hypertension) 10/09/2012    Lower GI bleeding 01/11/2021    Near syncope 02/05/2021    OHT (ocular hypertension)     Prostate cancer     Pyelonephritis, right no stone on CT scan. 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    Tubular adenoma of colon 01/10/2012    Type II or unspecified type diabetes mellitus with neurological manifestations, not stated as uncontrolled(250.60) 10/09/2012       Past Surgical History:   Procedure Laterality Date    CARPAL TUNNEL RELEASE Right 08/25/2015    CATARACT EXTRACTION W/  INTRAOCULAR LENS IMPLANT Left 6/25/2024    Procedure: EXTRACTION, CATARACT, WITH IOL INSERTION;  Surgeon: Federico Sumner MD;  Location: [facility] OR;  Service: Ophthalmology;  Laterality: Left;    CATARACT EXTRACTION W/  INTRAOCULAR LENS IMPLANT Right 7/16/2024    Procedure: EXTRACTION, CATARACT, WITH IOL INSERTION;  Surgeon: Federico Sumner MD;  Location: [facility] OR;  Service: Ophthalmology;  Laterality: Right;    CIRCUMCISION  04/13/2005    COLONOSCOPY N/A 3/29/2017    Procedure: COLONOSCOPY Golytely prep;  Surgeon: Kavitha Cleaning MD;  Location: [facility] ENDO;  Service: Endoscopy;  Laterality: N/A;    COLONOSCOPY N/A 1/12/2021    Procedure: COLONOSCOPY;  Surgeon: Dung Panda MD;  Location: [facility] ENDO;  Service: Endoscopy;   Laterality: N/A;    COLONOSCOPY W/ POLYPECTOMY  01/10/2012    Repeat in 5 years     ESOPHAGOGASTRODUODENOSCOPY N/A 1/8/2019    Procedure: EGD (ESOPHAGOGASTRODUODENOSCOPY);  Surgeon: Rachel Burrows MD;  Location: [facility] ENDO;  Service: Endoscopy;  Laterality: N/A;    ESOPHAGOGASTRODUODENOSCOPY N/A 5/14/2020    Procedure: ESOPHAGOGASTRODUODENOSCOPY (EGD);  Surgeon: Rachel Burrows MD;  Location: [facility] ENDO;  Service: Endoscopy;  Laterality: N/A;  Dr. Luis Angel Burrows if possible.    FLEXIBLE SIGMOIDOSCOPY N/A 1/8/2019    Procedure: SIGMOIDOSCOPY, FLEXIBLE;  Surgeon: Rachel Burrows MD;  Location: [facility] ENDO;  Service: Endoscopy;  Laterality: N/A;    PENILE PROSTHESIS IMPLANT      PROSTATE SURGERY  1999    Prostatectomy for prostate ca; [facility]    TRIGGER FINGER RELEASE Right 2015    TRIGGER FINGER RELEASE Right 11/8/2024    Procedure: RELEASE, TRIGGER FINGER;  Surgeon: Anthony Cooper Jr., MD;  Location: [facility] OR;  Service: Orthopedics;  Laterality: Right;       Family History   Problem Relation Name Age of Onset    Hypertension Mother Liane castillo     Arthritis Mother Liane castillo     No Known Problems Father unknown hx     Diabetes Sister Floridine     Cataracts Sister Floridine     Arthritis Sister Floridine     No Known Problems Sister Cinthia     No Known Problems Sister Niki     Arthritis Sister [place]     No Known Problems Brother Jeffery     Heart attack Brother Layne     Coronary artery disease Brother Layne     No Known Problems Brother Asad     Coronary artery disease Brother Milligan     Heart attack Brother Milligan     No Known Problems Daughter x5     No Known Problems Son x2     Blindness Neg Hx      Amblyopia Neg Hx      Glaucoma Neg Hx      Retinal detachment Neg Hx      Strabismus Neg Hx      Macular degeneration Neg Hx      Stroke Neg Hx      Thyroid disease Neg Hx      Colon cancer Neg Hx      Esophageal cancer Neg Hx         Social History     Socioeconomic History    Marital status:     Tobacco Use    Smoking status: Former     Current packs/day: 0.00     Average packs/day: 0.5 packs/day for 3.0 years (1.5 ttl pk-yrs)     Types: Cigarettes     Start date:      Quit date:      Years since quittin.5     Passive exposure: Never    Smokeless tobacco: Never    Tobacco comments:     smoked as a teenager   Substance and Sexual Activity    Alcohol use: Yes     Comment: social drinks a beer 1-2 x month    Drug use: Never    Sexual activity: Not Currently     Partners: Female     Birth control/protection: None   Social History Narrative    Works at [employer] in [employer]     Social Drivers of Health     Financial Resource Strain: Low Risk  (2025)    Overall Financial Resource Strain (CARDIA)     Difficulty of Paying Living Expenses: Not hard at all   Food Insecurity: No Food Insecurity (2025)    Hunger Vital Sign     Worried About Running Out of Food in the Last Year: Never true     Ran Out of Food in the Last Year: Never true   Transportation Needs: No Transportation Needs (2025)    PRAPARE - Transportation     Lack of Transportation (Medical): No     Lack of Transportation (Non-Medical): No   Physical Activity: Insufficiently Active (2025)    Exercise Vital Sign     Days of Exercise per Week: 3 days     Minutes of Exercise per Session: 10 min   Stress: No Stress Concern Present (2025)    [employer] [place] [employer] - Occupational Stress Questionnaire     Feeling of Stress : Not at all   Housing Stability: Low Risk  (2025)    Housing Stability Vital Sign     Unable to Pay for Housing in the Last Year: No     Number of Times Moved in the Last Year: 0     Homeless in the Last Year: No       Current Outpatient Medications   Medication Sig Dispense Refill    aspirin (ECOTRIN) 81 MG EC tablet Take 81 mg by mouth once daily.      atorvastatin (LIPITOR) 40 MG tablet Take 1 tablet (40 mg total) by mouth once daily. 90 tablet 4    BD ALCOHOL SWABS PadM USE FOR  HOME GLUCOSE MONITORING DAILY 100 each 3    blood sugar diagnostic (TRUE METRIX GLUCOSE TEST STRIP) Strp Test Blood Sugar twice daily 100 strip 1    blood sugar diagnostic Strp To check BG 1 times daily, to use with insurance preferred meter 100 each 10    blood-glucose meter (TRUE METRIX GLUCOSE METER) Misc Test blood sugar twice a day 1 each 0    blood-glucose meter kit To check BG 1 times daily, to use with insurance preferred meter 1 each 1    blood-glucose sensor (DEXCOM G7 SENSOR) Shanice Use continue glucose monitor to check sugar daily 2 each 10    clobetasoL (TEMOVATE) 0.05 % cream Apply to scar in ear twice daily until flat. 45 g 4    furosemide (LASIX) 40 MG tablet Take 0.5 tablets (20 mg total) by mouth once daily. 15 tablet 11    glipiZIDE (GLUCOTROL) 10 MG TR24 Take 1 tablet (10 mg total) by mouth daily with breakfast. 90 tablet 4    HYDROcodone-acetaminophen (NORCO) 5-325 mg per tablet Take 1 tablet by mouth every 4 (four) hours as needed for Pain. 12 tablet 0    hydrocortisone 2.5 % cream Apply topically 2 (two) times daily. 30 g 3    lancets (TRUEPLUS LANCETS) 30 gauge Misc Check sugar twice daily 200 each 3    lancets Misc To check BG 1 times daily, to use with insurance preferred meter 100 each 10    losartan (COZAAR) 100 MG tablet Take 1 tablet (100 mg total) by mouth once daily. 90 tablet 3    metFORMIN (GLUCOPHAGE) 1000 MG tablet Take 1 tablet (1,000 mg total) by mouth 2 (two) times daily with meals. 180 tablet 3    metoprolol succinate (TOPROL-XL) 100 MG 24 hr tablet TAKE 1 TABLET EVERY DAY 90 tablet 3    pantoprazole (PROTONIX) 40 MG tablet Take 1 tablet (40 mg total) by mouth once daily. 90 tablet 4    spironolactone (ALDACTONE) 50 MG tablet Take 1 tablet (50 mg total) by mouth once daily. 30 tablet 11     No current facility-administered medications for this visit.       Review of patient's allergies indicates:  No Known Allergies      Objective:       Vitals:    07/01/25 1440   BP: 118/64    Pulse: 79       Physical Exam  Constitutional:       Appearance: He is well-developed.   HENT:      Head: Normocephalic and atraumatic.      Right Ear: External ear normal.      Left Ear: External ear normal.      Nose: Nose normal.      Mouth/Throat:      Pharynx: No oropharyngeal exudate.   Eyes:      General: No scleral icterus.        Right eye: No discharge.         Left eye: No discharge.      Conjunctiva/sclera: Conjunctivae normal.      Pupils: Pupils are equal, round, and reactive to light.   Neck:      Thyroid: No thyromegaly.      Vascular: No JVD.      Trachea: No tracheal deviation.   Cardiovascular:      Rate and Rhythm: Normal rate and regular rhythm.      Heart sounds: Normal heart sounds. No murmur heard.     No friction rub. No gallop.   Pulmonary:      Effort: Pulmonary effort is normal. No respiratory distress.      Breath sounds: Normal breath sounds. No stridor. No wheezing or rales.   Chest:      Chest wall: No tenderness.   Abdominal:      General: Bowel sounds are normal. There is no distension.      Palpations: Abdomen is soft. There is no mass.      Tenderness: There is no abdominal tenderness. There is no guarding or rebound.      Hernia: No hernia is present.   Musculoskeletal:         General: No tenderness. Normal range of motion.      Cervical back: Normal range of motion and neck supple.   Lymphadenopathy:      Cervical: No cervical adenopathy.   Skin:     General: Skin is warm and dry.      Coloration: Skin is not pale.      Findings: No erythema or rash.   Neurological:      Mental Status: He is alert and oriented to person, place, and time.      Cranial Nerves: No cranial nerve deficit.      Motor: No abnormal muscle tone.      Coordination: Coordination normal.      Deep Tendon Reflexes: Reflexes are normal and symmetric. Reflexes normal.   Psychiatric:         Behavior: Behavior normal.         Thought Content: Thought content normal.         Judgment: Judgment normal.          Assessment:       Problem List Items Addressed This Visit       Type 2 diabetes mellitus with diabetic polyneuropathy, without long-term current use of insulin (Chronic)    Relevant Orders    Magnesium    Mixed hyperlipidemia (Chronic)    Relevant Orders    Magnesium    Essential hypertension (Chronic)    Relevant Orders    Magnesium    PVC (premature ventricular contraction)    Relevant Orders    Magnesium    PSVT (paroxysmal supraventricular tachycardia)    Relevant Orders    Magnesium    CHF exacerbation    Relevant Orders    Comprehensive Metabolic Panel    BNP    Magnesium     Other Visit Diagnoses         Hospital discharge follow-up    -  Primary    Relevant Orders    Troponin I    Magnesium      Stage 3a chronic kidney disease        Relevant Orders    Comprehensive Metabolic Panel    Magnesium      Elevated troponin I level        Relevant Orders    Troponin I    Magnesium            Plan:           Bryant was seen today for hospital follow up.    Diagnoses and all orders for this visit:    Hospital discharge follow-up  -     Troponin I; Future  -     Magnesium; Future    Essential hypertension  -     Magnesium; Future    PSVT (paroxysmal supraventricular tachycardia)  -     Magnesium; Future    Mixed hyperlipidemia  -     Magnesium; Future    PVC (premature ventricular contraction)  -     Magnesium; Future    Type 2 diabetes mellitus with diabetic polyneuropathy, without long-term current use of insulin  -     Magnesium; Future    Acute on chronic congestive heart failure, unspecified heart failure type  -     Comprehensive Metabolic Panel; Future  -     Cancel: Troponin I; Future  -     BNP; Future  -     Cancel: Troponin I  -     Magnesium; Future    Stage 3a chronic kidney disease  -     Comprehensive Metabolic Panel; Future  -     Magnesium; Future    Elevated troponin I level  -     Troponin I; Future  -     Magnesium; Future      Hospital DC follow up  -lab repeat  -follow cards on 7/14  -ER  precautions given    HTN  -controlled  -switch HYZAAR to losartan and spironolactone    DM II  -controlled  -glipizide and metformin    HLD  -at goal  -diet control n statin    Itching  -kenalog as directed          Spent adequate time in obtaining history and explaining differentials    30 minutes spent during this visit of which greater than 50% devoted to face-face counseling and coordination of care regarding diagnosis and management plan        Rtc 3 m r prn

## 2025-07-03 LAB
BACTERIA BLD CULT: NORMAL
BACTERIA BLD CULT: NORMAL

## 2025-07-07 ENCOUNTER — LAB VISIT (OUTPATIENT)
Dept: LAB | Facility: HOSPITAL | Age: 85
End: 2025-07-07
Attending: FAMILY MEDICINE
Payer: MEDICARE

## 2025-07-07 DIAGNOSIS — I49.3 PVC (PREMATURE VENTRICULAR CONTRACTION): ICD-10-CM

## 2025-07-07 DIAGNOSIS — I10 ESSENTIAL HYPERTENSION: ICD-10-CM

## 2025-07-07 DIAGNOSIS — I50.9 ACUTE ON CHRONIC CONGESTIVE HEART FAILURE, UNSPECIFIED HEART FAILURE TYPE: ICD-10-CM

## 2025-07-07 DIAGNOSIS — R79.89 ELEVATED TROPONIN I LEVEL: ICD-10-CM

## 2025-07-07 DIAGNOSIS — E78.2 MIXED HYPERLIPIDEMIA: ICD-10-CM

## 2025-07-07 DIAGNOSIS — Z09 HOSPITAL DISCHARGE FOLLOW-UP: ICD-10-CM

## 2025-07-07 DIAGNOSIS — N18.31 STAGE 3A CHRONIC KIDNEY DISEASE: ICD-10-CM

## 2025-07-07 DIAGNOSIS — I47.10 PSVT (PAROXYSMAL SUPRAVENTRICULAR TACHYCARDIA): ICD-10-CM

## 2025-07-07 DIAGNOSIS — E11.42 TYPE 2 DIABETES MELLITUS WITH DIABETIC POLYNEUROPATHY, WITHOUT LONG-TERM CURRENT USE OF INSULIN: ICD-10-CM

## 2025-07-07 LAB
ALBUMIN SERPL BCP-MCNC: 3.6 G/DL (ref 3.5–5.2)
ALP SERPL-CCNC: 71 UNIT/L (ref 40–150)
ALT SERPL W/O P-5'-P-CCNC: 20 UNIT/L (ref 10–44)
ANION GAP (OHS): 8 MMOL/L (ref 8–16)
AST SERPL-CCNC: 16 UNIT/L (ref 11–45)
BILIRUB SERPL-MCNC: 0.4 MG/DL (ref 0.1–1)
BNP SERPL-MCNC: 226 PG/ML (ref 0–99)
BUN SERPL-MCNC: 36 MG/DL (ref 8–23)
CALCIUM SERPL-MCNC: 10 MG/DL (ref 8.7–10.5)
CHLORIDE SERPL-SCNC: 106 MMOL/L (ref 95–110)
CO2 SERPL-SCNC: 22 MMOL/L (ref 23–29)
CREAT SERPL-MCNC: 1.5 MG/DL (ref 0.5–1.4)
GFR SERPLBLD CREATININE-BSD FMLA CKD-EPI: 46 ML/MIN/1.73/M2
GLUCOSE SERPL-MCNC: 244 MG/DL (ref 70–110)
MAGNESIUM SERPL-MCNC: 2 MG/DL (ref 1.6–2.6)
POTASSIUM SERPL-SCNC: 4.8 MMOL/L (ref 3.5–5.1)
PROT SERPL-MCNC: 8 GM/DL (ref 6–8.4)
SODIUM SERPL-SCNC: 136 MMOL/L (ref 136–145)
TROPONIN I SERPL DL<=0.01 NG/ML-MCNC: 0.07 NG/ML

## 2025-07-07 PROCEDURE — 84484 ASSAY OF TROPONIN QUANT: CPT | Mod: HCNC

## 2025-07-07 PROCEDURE — 83880 ASSAY OF NATRIURETIC PEPTIDE: CPT | Mod: HCNC

## 2025-07-07 PROCEDURE — 83735 ASSAY OF MAGNESIUM: CPT | Mod: HCNC

## 2025-07-07 PROCEDURE — 82040 ASSAY OF SERUM ALBUMIN: CPT | Mod: HCNC

## 2025-07-07 PROCEDURE — 36415 COLL VENOUS BLD VENIPUNCTURE: CPT | Mod: HCNC

## 2025-08-10 PROCEDURE — 96376 TX/PRO/DX INJ SAME DRUG ADON: CPT | Mod: HCNC

## 2025-08-10 PROCEDURE — 82962 GLUCOSE BLOOD TEST: CPT | Mod: HCNC

## 2025-08-10 PROCEDURE — 99285 EMERGENCY DEPT VISIT HI MDM: CPT | Mod: 25,HCNC

## 2025-08-10 PROCEDURE — 96374 THER/PROPH/DIAG INJ IV PUSH: CPT | Mod: HCNC

## 2025-08-11 ENCOUNTER — HOSPITAL ENCOUNTER (INPATIENT)
Facility: HOSPITAL | Age: 85
LOS: 1 days | Discharge: HOME | DRG: 291 | End: 2025-08-12
Admitting: STUDENT IN AN ORGANIZED HEALTH CARE EDUCATION/TRAINING PROGRAM
Payer: MEDICARE

## 2025-08-11 DIAGNOSIS — R06.02 SOB (SHORTNESS OF BREATH): Primary | ICD-10-CM

## 2025-08-11 DIAGNOSIS — R06.00 DYSPNEA: ICD-10-CM

## 2025-08-11 DIAGNOSIS — R07.9 CHEST PAIN: ICD-10-CM

## 2025-08-11 DIAGNOSIS — I50.9 CHF EXACERBATION: ICD-10-CM

## 2025-08-11 PROBLEM — E11.9 DM (DIABETES MELLITUS), TYPE 2: Status: ACTIVE | Noted: 2025-08-11

## 2025-08-11 PROBLEM — I50.33 ACUTE ON CHRONIC DIASTOLIC HEART FAILURE: Status: ACTIVE | Noted: 2025-08-11

## 2025-08-11 LAB
ABSOLUTE EOSINOPHIL (OHS): 0.09 K/UL
ABSOLUTE MONOCYTE (OHS): 0.62 K/UL (ref 0.3–1)
ABSOLUTE NEUTROPHIL COUNT (OHS): 3.08 K/UL (ref 1.8–7.7)
ALBUMIN SERPL BCP-MCNC: 4.1 G/DL (ref 3.5–5.2)
ALP SERPL-CCNC: 67 UNIT/L (ref 40–150)
ALT SERPL W/O P-5'-P-CCNC: 24 UNIT/L (ref 10–44)
ANION GAP (OHS): 7 MMOL/L (ref 8–16)
AST SERPL-CCNC: 26 UNIT/L (ref 11–45)
BASOPHILS # BLD AUTO: 0.06 K/UL
BASOPHILS NFR BLD AUTO: 1.1 %
BILIRUB SERPL-MCNC: 0.5 MG/DL (ref 0.1–1)
BUN SERPL-MCNC: 19 MG/DL (ref 8–23)
CALCIUM SERPL-MCNC: 9.6 MG/DL (ref 8.7–10.5)
CHLORIDE SERPL-SCNC: 108 MMOL/L (ref 95–110)
CO2 SERPL-SCNC: 27 MMOL/L (ref 23–29)
CREAT SERPL-MCNC: 1.3 MG/DL (ref 0.5–1.4)
ERYTHROCYTE [DISTWIDTH] IN BLOOD BY AUTOMATED COUNT: 14 % (ref 11.5–14.5)
GFR SERPLBLD CREATININE-BSD FMLA CKD-EPI: 55 ML/MIN/1.73/M2
GLUCOSE SERPL-MCNC: 99 MG/DL (ref 70–110)
HCT VFR BLD AUTO: 36 % (ref 40–54)
HGB BLD-MCNC: 11.8 GM/DL (ref 14–18)
IMM GRANULOCYTES # BLD AUTO: 0.01 K/UL (ref 0–0.04)
IMM GRANULOCYTES NFR BLD AUTO: 0.2 % (ref 0–0.5)
INFLUENZA A MOLECULAR (OHS): NEGATIVE
INFLUENZA B MOLECULAR (OHS): NEGATIVE
LYMPHOCYTES # BLD AUTO: 1.67 K/UL (ref 1–4.8)
MCH RBC QN AUTO: 28 PG (ref 27–31)
MCHC RBC AUTO-ENTMCNC: 32.8 G/DL (ref 32–36)
MCV RBC AUTO: 86 FL (ref 82–98)
NT-PROBNP SERPL-MCNC: 1809 PG/ML
NUCLEATED RBC (/100WBC) (OHS): 0 /100 WBC
PLATELET # BLD AUTO: 252 K/UL (ref 150–450)
PMV BLD AUTO: 10.3 FL (ref 9.2–12.9)
POCT GLUCOSE: 114 MG/DL (ref 70–110)
POCT GLUCOSE: 158 MG/DL (ref 70–110)
POTASSIUM SERPL-SCNC: 4.5 MMOL/L (ref 3.5–5.1)
PROT SERPL-MCNC: 7.6 GM/DL (ref 6–8.4)
RBC # BLD AUTO: 4.21 M/UL (ref 4.6–6.2)
RELATIVE EOSINOPHIL (OHS): 1.6 %
RELATIVE LYMPHOCYTE (OHS): 30.2 % (ref 18–48)
RELATIVE MONOCYTE (OHS): 11.2 % (ref 4–15)
RELATIVE NEUTROPHIL (OHS): 55.7 % (ref 38–73)
SARS-COV-2 RDRP RESP QL NAA+PROBE: NEGATIVE
SODIUM SERPL-SCNC: 142 MMOL/L (ref 136–145)
TROPONIN I SERPL HS-MCNC: 56 NG/L
TROPONIN I SERPL HS-MCNC: 62 NG/L
WBC # BLD AUTO: 5.53 K/UL (ref 3.9–12.7)

## 2025-08-11 PROCEDURE — U0002 COVID-19 LAB TEST NON-CDC: HCPCS | Mod: HCNC

## 2025-08-11 PROCEDURE — 84484 ASSAY OF TROPONIN QUANT: CPT | Mod: HCNC | Performed by: STUDENT IN AN ORGANIZED HEALTH CARE EDUCATION/TRAINING PROGRAM

## 2025-08-11 PROCEDURE — 25000003 PHARM REV CODE 250: Mod: HCNC | Performed by: REGISTERED NURSE

## 2025-08-11 PROCEDURE — 63600175 PHARM REV CODE 636 W HCPCS: Mod: HCNC | Performed by: REGISTERED NURSE

## 2025-08-11 PROCEDURE — 36415 COLL VENOUS BLD VENIPUNCTURE: CPT | Mod: HCNC | Performed by: REGISTERED NURSE

## 2025-08-11 PROCEDURE — 11000001 HC ACUTE MED/SURG PRIVATE ROOM: Mod: HCNC

## 2025-08-11 PROCEDURE — 80053 COMPREHEN METABOLIC PANEL: CPT | Mod: HCNC | Performed by: STUDENT IN AN ORGANIZED HEALTH CARE EDUCATION/TRAINING PROGRAM

## 2025-08-11 PROCEDURE — 63600175 PHARM REV CODE 636 W HCPCS: Mod: HCNC | Performed by: STUDENT IN AN ORGANIZED HEALTH CARE EDUCATION/TRAINING PROGRAM

## 2025-08-11 PROCEDURE — 84484 ASSAY OF TROPONIN QUANT: CPT | Mod: 91,HCNC | Performed by: REGISTERED NURSE

## 2025-08-11 PROCEDURE — 94760 N-INVAS EAR/PLS OXIMETRY 1: CPT | Mod: HCNC

## 2025-08-11 PROCEDURE — 87502 INFLUENZA DNA AMP PROBE: CPT | Mod: HCNC

## 2025-08-11 PROCEDURE — 99223 1ST HOSP IP/OBS HIGH 75: CPT | Mod: HCNC,,, | Performed by: NURSE PRACTITIONER

## 2025-08-11 PROCEDURE — 25000003 PHARM REV CODE 250: Mod: HCNC | Performed by: STUDENT IN AN ORGANIZED HEALTH CARE EDUCATION/TRAINING PROGRAM

## 2025-08-11 PROCEDURE — 85025 COMPLETE CBC W/AUTO DIFF WBC: CPT | Mod: HCNC | Performed by: STUDENT IN AN ORGANIZED HEALTH CARE EDUCATION/TRAINING PROGRAM

## 2025-08-11 PROCEDURE — 83880 ASSAY OF NATRIURETIC PEPTIDE: CPT | Mod: HCNC | Performed by: STUDENT IN AN ORGANIZED HEALTH CARE EDUCATION/TRAINING PROGRAM

## 2025-08-11 RX ORDER — NAPROXEN SODIUM 220 MG/1
162 TABLET, FILM COATED ORAL
Status: COMPLETED | OUTPATIENT
Start: 2025-08-11 | End: 2025-08-11

## 2025-08-11 RX ORDER — GLUCAGON 1 MG
1 KIT INJECTION
Status: DISCONTINUED | OUTPATIENT
Start: 2025-08-11 | End: 2025-08-11

## 2025-08-11 RX ORDER — GLUCAGON 1 MG
1 KIT INJECTION
Status: DISCONTINUED | OUTPATIENT
Start: 2025-08-11 | End: 2025-08-12

## 2025-08-11 RX ORDER — FUROSEMIDE 10 MG/ML
40 INJECTION INTRAMUSCULAR; INTRAVENOUS
Status: COMPLETED | OUTPATIENT
Start: 2025-08-11 | End: 2025-08-11

## 2025-08-11 RX ORDER — IBUPROFEN 200 MG
16 TABLET ORAL
Status: DISCONTINUED | OUTPATIENT
Start: 2025-08-11 | End: 2025-08-12 | Stop reason: HOSPADM

## 2025-08-11 RX ORDER — IBUPROFEN 200 MG
16 TABLET ORAL
Status: DISCONTINUED | OUTPATIENT
Start: 2025-08-11 | End: 2025-08-11

## 2025-08-11 RX ORDER — IPRATROPIUM BROMIDE AND ALBUTEROL SULFATE 2.5; .5 MG/3ML; MG/3ML
3 SOLUTION RESPIRATORY (INHALATION) EVERY 6 HOURS PRN
Status: DISCONTINUED | OUTPATIENT
Start: 2025-08-11 | End: 2025-08-12 | Stop reason: HOSPADM

## 2025-08-11 RX ORDER — IBUPROFEN 200 MG
24 TABLET ORAL
Status: DISCONTINUED | OUTPATIENT
Start: 2025-08-11 | End: 2025-08-12 | Stop reason: HOSPADM

## 2025-08-11 RX ORDER — ONDANSETRON HYDROCHLORIDE 2 MG/ML
4 INJECTION, SOLUTION INTRAVENOUS EVERY 8 HOURS PRN
Status: DISCONTINUED | OUTPATIENT
Start: 2025-08-11 | End: 2025-08-12 | Stop reason: HOSPADM

## 2025-08-11 RX ORDER — SODIUM CHLORIDE 0.9 % (FLUSH) 0.9 %
10 SYRINGE (ML) INJECTION EVERY 12 HOURS PRN
Status: DISCONTINUED | OUTPATIENT
Start: 2025-08-11 | End: 2025-08-12 | Stop reason: HOSPADM

## 2025-08-11 RX ORDER — LOSARTAN POTASSIUM 50 MG/1
100 TABLET ORAL DAILY
Status: DISCONTINUED | OUTPATIENT
Start: 2025-08-11 | End: 2025-08-12 | Stop reason: HOSPADM

## 2025-08-11 RX ORDER — FUROSEMIDE 10 MG/ML
40 INJECTION INTRAMUSCULAR; INTRAVENOUS EVERY 12 HOURS
Status: COMPLETED | OUTPATIENT
Start: 2025-08-11 | End: 2025-08-11

## 2025-08-11 RX ORDER — IBUPROFEN 200 MG
24 TABLET ORAL
Status: DISCONTINUED | OUTPATIENT
Start: 2025-08-11 | End: 2025-08-11

## 2025-08-11 RX ORDER — NALOXONE HCL 0.4 MG/ML
0.02 VIAL (ML) INJECTION
Status: DISCONTINUED | OUTPATIENT
Start: 2025-08-11 | End: 2025-08-12 | Stop reason: HOSPADM

## 2025-08-11 RX ORDER — ASPIRIN 81 MG/1
81 TABLET ORAL DAILY
Status: DISCONTINUED | OUTPATIENT
Start: 2025-08-12 | End: 2025-08-12 | Stop reason: HOSPADM

## 2025-08-11 RX ORDER — INSULIN ASPART 100 [IU]/ML
0-5 INJECTION, SOLUTION INTRAVENOUS; SUBCUTANEOUS
Status: DISCONTINUED | OUTPATIENT
Start: 2025-08-11 | End: 2025-08-11

## 2025-08-11 RX ORDER — SPIRONOLACTONE 25 MG/1
50 TABLET ORAL DAILY
Status: DISCONTINUED | OUTPATIENT
Start: 2025-08-11 | End: 2025-08-12 | Stop reason: HOSPADM

## 2025-08-11 RX ORDER — METOPROLOL SUCCINATE 50 MG/1
100 TABLET, EXTENDED RELEASE ORAL DAILY
Status: DISCONTINUED | OUTPATIENT
Start: 2025-08-11 | End: 2025-08-12 | Stop reason: HOSPADM

## 2025-08-11 RX ORDER — ATORVASTATIN CALCIUM 40 MG/1
40 TABLET, FILM COATED ORAL DAILY
Status: DISCONTINUED | OUTPATIENT
Start: 2025-08-11 | End: 2025-08-12 | Stop reason: HOSPADM

## 2025-08-11 RX ADMIN — SPIRONOLACTONE 50 MG: 25 TABLET, FILM COATED ORAL at 09:08

## 2025-08-11 RX ADMIN — ATORVASTATIN CALCIUM 40 MG: 40 TABLET, FILM COATED ORAL at 09:08

## 2025-08-11 RX ADMIN — FUROSEMIDE 40 MG: 10 INJECTION, SOLUTION INTRAMUSCULAR; INTRAVENOUS at 01:08

## 2025-08-11 RX ADMIN — FUROSEMIDE 40 MG: 10 INJECTION, SOLUTION INTRAVENOUS at 09:08

## 2025-08-11 RX ADMIN — LOSARTAN POTASSIUM 100 MG: 50 TABLET, FILM COATED ORAL at 09:08

## 2025-08-11 RX ADMIN — METOPROLOL SUCCINATE 100 MG: 50 TABLET, EXTENDED RELEASE ORAL at 09:08

## 2025-08-11 RX ADMIN — ASPIRIN 81 MG CHEWABLE TABLET 162 MG: 81 TABLET CHEWABLE at 02:08

## 2025-08-12 ENCOUNTER — PATIENT OUTREACH (OUTPATIENT)
Dept: ADMINISTRATIVE | Facility: HOSPITAL | Age: 85
End: 2025-08-12
Payer: MEDICARE

## 2025-08-12 VITALS
TEMPERATURE: 98 F | HEIGHT: 67 IN | BODY MASS INDEX: 25.44 KG/M2 | DIASTOLIC BLOOD PRESSURE: 77 MMHG | WEIGHT: 162.06 LBS | SYSTOLIC BLOOD PRESSURE: 135 MMHG | HEART RATE: 111 BPM | OXYGEN SATURATION: 95 % | RESPIRATION RATE: 18 BRPM

## 2025-08-12 LAB
ABSOLUTE EOSINOPHIL (OHS): 0.15 K/UL
ABSOLUTE MONOCYTE (OHS): 0.7 K/UL (ref 0.3–1)
ABSOLUTE NEUTROPHIL COUNT (OHS): 2.72 K/UL (ref 1.8–7.7)
ANION GAP (OHS): 12 MMOL/L (ref 8–16)
BASOPHILS # BLD AUTO: 0.07 K/UL
BASOPHILS NFR BLD AUTO: 1.2 %
BUN SERPL-MCNC: 25 MG/DL (ref 8–23)
CALCIUM SERPL-MCNC: 10.1 MG/DL (ref 8.7–10.5)
CHLORIDE SERPL-SCNC: 103 MMOL/L (ref 95–110)
CO2 SERPL-SCNC: 27 MMOL/L (ref 23–29)
CREAT SERPL-MCNC: 1.4 MG/DL (ref 0.5–1.4)
ERYTHROCYTE [DISTWIDTH] IN BLOOD BY AUTOMATED COUNT: 13.8 % (ref 11.5–14.5)
GFR SERPLBLD CREATININE-BSD FMLA CKD-EPI: 50 ML/MIN/1.73/M2
GLUCOSE SERPL-MCNC: 163 MG/DL (ref 70–110)
HCT VFR BLD AUTO: 43.6 % (ref 40–54)
HGB BLD-MCNC: 14.2 GM/DL (ref 14–18)
IMM GRANULOCYTES # BLD AUTO: 0.01 K/UL (ref 0–0.04)
IMM GRANULOCYTES NFR BLD AUTO: 0.2 % (ref 0–0.5)
LYMPHOCYTES # BLD AUTO: 2.16 K/UL (ref 1–4.8)
MCH RBC QN AUTO: 27.9 PG (ref 27–31)
MCHC RBC AUTO-ENTMCNC: 32.6 G/DL (ref 32–36)
MCV RBC AUTO: 86 FL (ref 82–98)
NUCLEATED RBC (/100WBC) (OHS): 0 /100 WBC
PLATELET # BLD AUTO: 313 K/UL (ref 150–450)
PMV BLD AUTO: 10.4 FL (ref 9.2–12.9)
POTASSIUM SERPL-SCNC: 3.9 MMOL/L (ref 3.5–5.1)
RBC # BLD AUTO: 5.09 M/UL (ref 4.6–6.2)
RELATIVE EOSINOPHIL (OHS): 2.6 %
RELATIVE LYMPHOCYTE (OHS): 37.2 % (ref 18–48)
RELATIVE MONOCYTE (OHS): 12 % (ref 4–15)
RELATIVE NEUTROPHIL (OHS): 46.8 % (ref 38–73)
SODIUM SERPL-SCNC: 142 MMOL/L (ref 136–145)
WBC # BLD AUTO: 5.81 K/UL (ref 3.9–12.7)

## 2025-08-12 PROCEDURE — 99233 SBSQ HOSP IP/OBS HIGH 50: CPT | Mod: HCNC,,, | Performed by: NURSE PRACTITIONER

## 2025-08-12 PROCEDURE — 94761 N-INVAS EAR/PLS OXIMETRY MLT: CPT | Mod: HCNC

## 2025-08-12 PROCEDURE — 85025 COMPLETE CBC W/AUTO DIFF WBC: CPT | Mod: HCNC | Performed by: REGISTERED NURSE

## 2025-08-12 PROCEDURE — 99900035 HC TECH TIME PER 15 MIN (STAT): Mod: HCNC

## 2025-08-12 PROCEDURE — 25000003 PHARM REV CODE 250: Mod: HCNC | Performed by: NURSE PRACTITIONER

## 2025-08-12 PROCEDURE — 80048 BASIC METABOLIC PNL TOTAL CA: CPT | Mod: HCNC | Performed by: REGISTERED NURSE

## 2025-08-12 PROCEDURE — 36415 COLL VENOUS BLD VENIPUNCTURE: CPT | Mod: HCNC | Performed by: REGISTERED NURSE

## 2025-08-12 PROCEDURE — 25000003 PHARM REV CODE 250: Mod: HCNC | Performed by: REGISTERED NURSE

## 2025-08-12 RX ORDER — FUROSEMIDE 40 MG/1
40 TABLET ORAL DAILY
Status: DISCONTINUED | OUTPATIENT
Start: 2025-08-12 | End: 2025-08-12 | Stop reason: HOSPADM

## 2025-08-12 RX ORDER — INSULIN ASPART 100 [IU]/ML
0-5 INJECTION, SOLUTION INTRAVENOUS; SUBCUTANEOUS EVERY 6 HOURS PRN
Status: DISCONTINUED | OUTPATIENT
Start: 2025-08-12 | End: 2025-08-12 | Stop reason: HOSPADM

## 2025-08-12 RX ORDER — GLUCAGON 1 MG
1 KIT INJECTION
Status: DISCONTINUED | OUTPATIENT
Start: 2025-08-12 | End: 2025-08-12 | Stop reason: HOSPADM

## 2025-08-12 RX ADMIN — LOSARTAN POTASSIUM 100 MG: 50 TABLET, FILM COATED ORAL at 08:08

## 2025-08-12 RX ADMIN — ASPIRIN 81 MG: 81 TABLET, COATED ORAL at 08:08

## 2025-08-12 RX ADMIN — ATORVASTATIN CALCIUM 40 MG: 40 TABLET, FILM COATED ORAL at 08:08

## 2025-08-12 RX ADMIN — FUROSEMIDE 40 MG: 40 TABLET ORAL at 09:08

## 2025-08-12 RX ADMIN — METOPROLOL SUCCINATE 100 MG: 50 TABLET, EXTENDED RELEASE ORAL at 08:08

## 2025-08-12 RX ADMIN — SPIRONOLACTONE 50 MG: 25 TABLET, FILM COATED ORAL at 08:08

## 2025-08-14 ENCOUNTER — PATIENT OUTREACH (OUTPATIENT)
Dept: ADMINISTRATIVE | Facility: CLINIC | Age: 85
End: 2025-08-14
Payer: MEDICARE

## 2025-08-19 ENCOUNTER — PATIENT OUTREACH (OUTPATIENT)
Dept: ADMINISTRATIVE | Facility: HOSPITAL | Age: 85
End: 2025-08-19
Payer: MEDICARE

## 2025-08-20 ENCOUNTER — PATIENT OUTREACH (OUTPATIENT)
Dept: ADMINISTRATIVE | Facility: HOSPITAL | Age: 85
End: 2025-08-20
Payer: MEDICARE

## 2025-08-20 ENCOUNTER — OFFICE VISIT (OUTPATIENT)
Dept: FAMILY MEDICINE | Facility: CLINIC | Age: 85
End: 2025-08-20
Attending: FAMILY MEDICINE
Payer: MEDICARE

## 2025-08-20 VITALS
HEART RATE: 67 BPM | TEMPERATURE: 98 F | OXYGEN SATURATION: 98 % | BODY MASS INDEX: 25.03 KG/M2 | SYSTOLIC BLOOD PRESSURE: 129 MMHG | WEIGHT: 159.81 LBS | DIASTOLIC BLOOD PRESSURE: 68 MMHG

## 2025-08-20 DIAGNOSIS — E78.2 MIXED HYPERLIPIDEMIA: Chronic | ICD-10-CM

## 2025-08-20 DIAGNOSIS — I10 ESSENTIAL HYPERTENSION: Primary | ICD-10-CM

## 2025-08-20 DIAGNOSIS — I10 ESSENTIAL HYPERTENSION: Primary | Chronic | ICD-10-CM

## 2025-08-20 DIAGNOSIS — E11.42 TYPE 2 DIABETES MELLITUS WITH DIABETIC POLYNEUROPATHY, WITHOUT LONG-TERM CURRENT USE OF INSULIN: ICD-10-CM

## 2025-08-20 DIAGNOSIS — R79.89 TROPONIN LEVEL ELEVATED: ICD-10-CM

## 2025-08-20 DIAGNOSIS — E78.2 MIXED HYPERLIPIDEMIA: ICD-10-CM

## 2025-08-20 DIAGNOSIS — I50.22 CHRONIC SYSTOLIC CONGESTIVE HEART FAILURE: ICD-10-CM

## 2025-08-20 DIAGNOSIS — N18.31 STAGE 3A CHRONIC KIDNEY DISEASE: ICD-10-CM

## 2025-08-20 DIAGNOSIS — E11.42 TYPE 2 DIABETES MELLITUS WITH DIABETIC POLYNEUROPATHY, WITHOUT LONG-TERM CURRENT USE OF INSULIN: Chronic | ICD-10-CM

## 2025-08-20 PROCEDURE — 1157F ADVNC CARE PLAN IN RCRD: CPT | Mod: CPTII,HCNC,S$GLB, | Performed by: FAMILY MEDICINE

## 2025-08-20 PROCEDURE — 3288F FALL RISK ASSESSMENT DOCD: CPT | Mod: CPTII,HCNC,S$GLB, | Performed by: FAMILY MEDICINE

## 2025-08-20 PROCEDURE — 1159F MED LIST DOCD IN RCRD: CPT | Mod: CPTII,HCNC,S$GLB, | Performed by: FAMILY MEDICINE

## 2025-08-20 PROCEDURE — 99999 PR PBB SHADOW E&M-EST. PATIENT-LVL IV: CPT | Mod: PBBFAC,HCNC,, | Performed by: FAMILY MEDICINE

## 2025-08-20 PROCEDURE — 3078F DIAST BP <80 MM HG: CPT | Mod: CPTII,HCNC,S$GLB, | Performed by: FAMILY MEDICINE

## 2025-08-20 PROCEDURE — 99215 OFFICE O/P EST HI 40 MIN: CPT | Mod: HCNC,S$GLB,, | Performed by: FAMILY MEDICINE

## 2025-08-20 PROCEDURE — 3074F SYST BP LT 130 MM HG: CPT | Mod: CPTII,HCNC,S$GLB, | Performed by: FAMILY MEDICINE

## 2025-08-20 PROCEDURE — 1160F RVW MEDS BY RX/DR IN RCRD: CPT | Mod: CPTII,HCNC,S$GLB, | Performed by: FAMILY MEDICINE

## 2025-08-20 PROCEDURE — 1111F DSCHRG MED/CURRENT MED MERGE: CPT | Mod: CPTII,HCNC,S$GLB, | Performed by: FAMILY MEDICINE

## 2025-08-20 PROCEDURE — 1101F PT FALLS ASSESS-DOCD LE1/YR: CPT | Mod: CPTII,HCNC,S$GLB, | Performed by: FAMILY MEDICINE

## 2025-08-20 RX ORDER — FUROSEMIDE 40 MG/1
40 TABLET ORAL 2 TIMES DAILY
Qty: 180 TABLET | Refills: 3 | Status: SHIPPED | OUTPATIENT
Start: 2025-08-20 | End: 2026-08-20

## 2025-09-03 ENCOUNTER — OUTPATIENT CASE MANAGEMENT (OUTPATIENT)
Dept: ADMINISTRATIVE | Facility: OTHER | Age: 85
End: 2025-09-03
Payer: MEDICARE

## 2025-09-04 ENCOUNTER — TELEPHONE (OUTPATIENT)
Dept: FAMILY MEDICINE | Facility: CLINIC | Age: 85
End: 2025-09-04
Payer: MEDICARE

## 2025-09-04 DIAGNOSIS — E11.42 TYPE 2 DIABETES MELLITUS WITH DIABETIC POLYNEUROPATHY, WITHOUT LONG-TERM CURRENT USE OF INSULIN: Chronic | ICD-10-CM

## 2025-09-04 DIAGNOSIS — E11.69 TYPE 2 DIABETES MELLITUS WITH HYPERLIPIDEMIA: ICD-10-CM

## 2025-09-04 DIAGNOSIS — N18.2 TYPE 2 DM WITH CKD STAGE 2 AND HYPERTENSION: ICD-10-CM

## 2025-09-04 DIAGNOSIS — E11.22 TYPE 2 DM WITH CKD STAGE 2 AND HYPERTENSION: ICD-10-CM

## 2025-09-04 DIAGNOSIS — E78.5 TYPE 2 DIABETES MELLITUS WITH HYPERLIPIDEMIA: ICD-10-CM

## 2025-09-04 DIAGNOSIS — I12.9 TYPE 2 DM WITH CKD STAGE 2 AND HYPERTENSION: ICD-10-CM

## 2025-09-04 RX ORDER — BLOOD-GLUCOSE SENSOR
EACH MISCELLANEOUS
Qty: 2 EACH | Refills: 10 | Status: SHIPPED | OUTPATIENT
Start: 2025-09-04

## 2025-09-05 ENCOUNTER — PATIENT MESSAGE (OUTPATIENT)
Dept: FAMILY MEDICINE | Facility: CLINIC | Age: 85
End: 2025-09-05
Payer: MEDICARE

## (undated) DEVICE — BANDAGE MATRIX HK LOOP 2IN 5YD

## (undated) DEVICE — SHEILD & GARTERS FOX METAL EYE

## (undated) DEVICE — COVER LIGHT HANDLE 80/CA

## (undated) DEVICE — Device

## (undated) DEVICE — STOCKINET 4INX48

## (undated) DEVICE — GLOVE SENSICARE PI MICRO 7.5

## (undated) DEVICE — DRESSING XEROFORM NONADH 1X8IN

## (undated) DEVICE — SOL BETADINE 5%

## (undated) DEVICE — DUOVISC

## (undated) DEVICE — BLADE SURG BVL ANG COAX 2.4MM

## (undated) DEVICE — SYR 10CC LUER LOCK

## (undated) DEVICE — DRAPE HAND STERILE

## (undated) DEVICE — PAD CAST 2 IN X 4YDS STERILE

## (undated) DEVICE — PACK SURGERY START

## (undated) DEVICE — SOL IRR 0.9% NACL 500ML PB

## (undated) DEVICE — PACK ECLIPSE BASIC III SURG

## (undated) DEVICE — SYR LUER LOCK 1CC

## (undated) DEVICE — SOL IRR STRL WATER 500ML

## (undated) DEVICE — TOURNIQUET SB QC DP 18X4IN

## (undated) DEVICE — GOWN POLY REINF BRTH SLV XL

## (undated) DEVICE — APPLICATOR CHLORAPREP ORN 26ML

## (undated) DEVICE — BLADE SURG #15 CARBON STEEL

## (undated) DEVICE — SYR SLIP TIP 1CC

## (undated) DEVICE — GLOVE SURG BIOGEL LATEX SZ 7.5

## (undated) DEVICE — SPONGE COTTON TRAY 4X4IN

## (undated) DEVICE — SUT ETHILON 5-0 PS-2 18IN

## (undated) DEVICE — PAD PREP CUFFED NS 24X48IN

## (undated) DEVICE — ALCOHOL 70% ISOP W/GREEN 16OZ

## (undated) DEVICE — MANIFOLD 4 PORT

## (undated) DEVICE — NDL HYPO REG 25G X 1 1/2

## (undated) DEVICE — BANDAGE ESMARK ELASTIC ST 4X9

## (undated) DEVICE — GOWN POLY REINF BRTH SLV LG

## (undated) DEVICE — BLADE SCALP OPHTL BEVEL STR